# Patient Record
Sex: FEMALE | Race: WHITE | NOT HISPANIC OR LATINO | Employment: OTHER | URBAN - METROPOLITAN AREA
[De-identification: names, ages, dates, MRNs, and addresses within clinical notes are randomized per-mention and may not be internally consistent; named-entity substitution may affect disease eponyms.]

---

## 2017-02-20 ENCOUNTER — TRANSFERRED RECORDS (OUTPATIENT)
Dept: HEALTH INFORMATION MANAGEMENT | Facility: CLINIC | Age: 42
End: 2017-02-20

## 2017-03-03 ENCOUNTER — OFFICE VISIT (OUTPATIENT)
Dept: HEMATOLOGY | Facility: CLINIC | Age: 42
End: 2017-03-03
Attending: INTERNAL MEDICINE
Payer: COMMERCIAL

## 2017-03-03 VITALS
HEART RATE: 80 BPM | SYSTOLIC BLOOD PRESSURE: 122 MMHG | TEMPERATURE: 97.8 F | DIASTOLIC BLOOD PRESSURE: 85 MMHG | BODY MASS INDEX: 24.25 KG/M2 | WEIGHT: 160 LBS | HEIGHT: 68 IN

## 2017-03-03 DIAGNOSIS — M32.9 SYSTEMIC LUPUS ERYTHEMATOSUS (H): Primary | ICD-10-CM

## 2017-03-03 DIAGNOSIS — Z79.01 LONG TERM CURRENT USE OF ANTICOAGULANT THERAPY: ICD-10-CM

## 2017-03-03 PROCEDURE — 99204 OFFICE O/P NEW MOD 45 MIN: CPT | Performed by: INTERNAL MEDICINE

## 2017-03-03 PROCEDURE — 99213 OFFICE O/P EST LOW 20 MIN: CPT

## 2017-03-03 ASSESSMENT — PAIN SCALES - GENERAL: PAINLEVEL: MILD PAIN (3)

## 2017-03-03 NOTE — NURSING NOTE
Present during entire visit with provider.  Reviewed and discussed the patient instructions point by point and patient verbalized understanding. Copy of the After Visit Summary (AVS) given to patient for future reference. Patient given the contact card for the [facility] and has the appropriate numbers to call with any questions.    Guadalupe Newby RN - Nurse Clinician - [facility] - [phone]

## 2017-03-03 NOTE — PROGRESS NOTES
[facility]  [address] [place] [address], [facility] [address], [place] [address]  Phone: [phone], Fax: [phone].    Outpatient Visit Note:    Patient: Patricia Dougherty  MRN: [id number]  : 1975  LAUREEN: March 3, 2017    Reason for Consultation:  Anticoagulation recommendations for pregnancy    History of Present Illness:  Patricia Dougherty  is a 41 year old woman referred by Dr Bhandari for evaluation and recommendations regarding anticoagulation around the time of pregnancy. Patricia brings records with her today for my review from [facility] in [place].  She was seen at their high-risk immunologic disorders clinic for testing and counseling for fertility and pregnancy.  She was tested for mutation with the potential to lead to thrombotic complications during pregnancy leading to miscarriage and was recommended to consider anticoagulation.    She reports no history of recurrent miscarriage, no personal history of thrombosis.    Past Medical History:  Past Medical History   Diagnosis Date     Allergy, unspecified not elsewhere classified      Endometriosis      Fibromyalgia      Migraine without aura, with intractable migraine, so stated, without mention of status migrainosus      Myalgia and myositis, unspecified      Systemic lupus erythematosus (H)        Past Surgical History:  Past Surgical History   Procedure Laterality Date     Surgical history of -        left elbow fracture repair     Orthopedic surgery         Medications:  Current Outpatient Prescriptions   Medication Sig Dispense Refill     METFORMIN HCL PO Take 500 mg by mouth 2 times daily (with meals)       LEVOTHYROXINE SODIUM PO Take 0.25 mg by mouth daily       norethindrone-ethinyl estradiol (ORTHO-NOVUM 1-35 TAB,NORTREL 1-35 TAB) 1-35 MG-MCG per tablet Take 1 tablet by mouth daily       Acetaminophen (TYLENOL PO) Take  by mouth.         PREDNISONE PO Take 1 mg by mouth  daily        IBUPROFEN 800 MG OR TABS 1 tab po TID (Three times per day) with food 90 1     AMBIEN 10 MG OR TABS 1 po HS, prn 20 0     PREDNISONE 5 MG OR TABS as directed 20 0     PLAQUENIL 200 MG OR TABS 1 po BID, needs appt. before more refills 180 0     MULTIVITAMIN TABS   OR   0        Allergies:  Allergies   Allergen Reactions     Penicillins Rash     Sulfa Drugs Rash       ROS:  Denies any bleeding issues. No gum bleeding, No nose bleed. Denies any hematuria or blood in stools. Denies any ecchymosis. Denies any lower extremities swelling or pain. Denies any fever, no chest pain. Denies any shortness of breath.     Social History:  Denies any tobacco use. No significant alcohol use. Denies any illicit drug use.     Family History:  None of thrombosis or recurrent miscarriage    Objectives:  Pleasant 41 year old female in no acute distress.  Vitals: B/P: 122/85, T: 97.8, P: 80, R: Data Unavailable, Wt: 160 lbs 0 oz  Exam:   Gen: Appears well, no distress  Ext: no edema    Labs:  Reviewed outside labs    Imaging:  none    Assessment:  In summary, Patricia Dougherty is a 41 year old woman with SLE but no prior history of thrombosis, who is at high risk for miscarriage. She has no prior history of DVT, PE, or Antiphospholipid antibody syndrome.    I discussed the role of anticoagluation in pregnancy with her today being for patients with active thrombosis or a history of APS and thrombosis, we would treat with 1mg/kg BID of enoxaparin.  For patients with a history of APS and recurrent pregnancy loss, most recommend prophylactic dose LMWH after conception.  For those with a prior history of pregnancy or hormone-related thrombosis, we would also treat with prophylactic dose LMWH.    For women with idiopathic recurrent miscarriages, LMWH has not been demonstrated to improve the life birth rate.  However, this patient would not necessarily fit in that category since she has underlying SLE.    I explained that  her risk of bleeding is low and I can not estimate the added benefit for LMWH for her chances of pregnancy and live birth.  I also reviewed the results of her genetic testing for EARNEST-1, FXIII and HPA-1a and these do not significantly increase her risk for thrombosis or thrombosis-related pregnancy loss to our knowledge.    Plan:  1. As per [facility] recommendations, I think that a dose of 40mg of enoxaparin is reasonable and safe around the time of embryo banking and conception.  2. I would keep the same standard prophylaxis wong of LMWH during pregnancy rather than RFU recommendation of 40mg BID.  This is not a standard dosing of the drug (either 1mg/kg BID for treatment dosing or 40mg daily are the standard doses).  3. If she does become pregnant, she should return during the second trimester to discuss implications of anticoagulation for anesthesia and delivery.    The patient is given our center's contact information and is instructed to call if she should have any further questions or concerns.  Otherwise, we will plan on seeing her back as needed (see above).      Guadalupe Newby, nurse clinician is present throughout the entire clinic visit with the patient today.  Patient understands and agrees with the above plan and recommendation.    Total Time Spent:  I spent a total of 45 minutes face-to-face with Patricia Dougherty during today's office visit.  Over 50% of this time was spent counseling the patient and/or coordinating care regarding anticoagulation during pregnancy.    Guero Rodrigez MD   of Medicine  [facility] [employer]

## 2017-03-03 NOTE — PATIENT INSTRUCTIONS
We would be happy to work with any of your providers around your pursuit of fertility.  WE have worked with the maternal medicine team here at the [facility] quite a bit over the years.  See the following website:    [email or web address]    We agree with Lovenox 40 mg once daily when you are in the embryo transfer portion of your process.  We would not recommend increasing this to twice daily when you become pregnant.     Please feel free to call us or return to see us if you have questions or concerns.    Guadalupe Newby, RN - Nurse Clinician - [facility] - [phone]

## 2017-03-03 NOTE — MR AVS SNAPSHOT
After Visit Summary   3/3/2017    Patricia Dougherty    MRN: [id number]           Patient Information     Date Of Birth          1975        Visit Information        Provider Department      3/3/2017 3:30 PM Guero Rodrigez MD [facility]        Care Instructions    We would be happy to work with any of your providers around your pursuit of fertility.  WE have worked with the [facility] medicine team [facility] quite a bit over the years.  See the following website:    [email or web address]    We agree with Lovenox 40 mg once daily when you are in the embryo transfer portion of your process.  We would not recommend increasing this to twice daily when you become pregnant.     Please feel free to call us or return to see us if you have questions or concerns.    Guadalupe Newby RN - Nurse Clinician - [facility] - [phone]          Follow-ups after your visit        Who to contact     If you have questions or need follow up information about today's clinic visit or your schedule please contact [facility] directly at [phone].  Normal or non-critical lab and imaging results will be communicated to you by MyChart, letter or phone within 4 business days after the clinic has received the results. If you do not hear from us within 7 days, please contact the clinic through MyChart or phone. If you have a critical or abnormal lab result, we will notify you by phone as soon as possible.  Submit refill requests through [facility] or call your pharmacy and they will forward the refill request to us. Please allow 3 business days for your refill to be completed.          Additional Information About Your Visit        MyChart Information     [facility] lets you send messages to your doctor, view your test results, renew your prescriptions, schedule  "appointments and more. To sign up, go to [email or web address] . Click on \"Log in\" on the left side of the screen, which will take you to the Welcome page. Then click on \"Sign up Now\" on the right side of the page.     You will be asked to enter the access code listed below, as well as some personal information. Please follow the directions to create your username and password.     Your access code is: [email or web address]  Expires: 2017  6:31 AM     Your access code will  in 90 days. If you need help or a new code, please call your [facility] clinic or [phone].        Care EveryWhere ID     This is your Care EveryWhere ID. This could be used by other organizations to access your [facility] medical records  [id number]        Your Vitals Were     Pulse Temperature Height BMI (Body Mass Index)          80 97.8  F (36.6  C) (Oral) 1.727 m (5' 8\") 24.33 kg/m2         Blood Pressure from Last 3 Encounters:   17 122/85   10/05/12 111/79   06 112/76    Weight from Last 3 Encounters:   17 72.6 kg (160 lb)   06 70.3 kg (155 lb)   06 78.5 kg (173 lb)              Today, you had the following     No orders found for display         Today's Medication Changes          These changes are accurate as of: 3/3/17  4:34 PM.  If you have any questions, ask your nurse or doctor.               Stop taking these medicines if you haven't already. Please contact your care team if you have questions.     IMITREX 50 MG tablet   Generic drug:  SUMAtriptan   Stopped by:  Guero Rodrigez MD           [facility] DE[facility]OT IM   Stopped by:  Guero Rodrigez MD                    Primary Care Provider Office Phone # Fax #    Riaz Bhnadari [phone]       [facility] [address] [address]        Thank you!     Thank you for choosing  [facility]  for your care. Our goal is always to provide you with excellent care. Hearing back from our " patients is one way we can continue to improve our services. Please take a few minutes to complete the written survey that you may receive in the mail after your visit with us. Thank you!             Your Updated Medication List - Protect others around you: Learn how to safely use, store and throw away your medicines at www.disposemymeds.org.          This list is accurate as of: 3/3/17  4:34 PM.  Always use your most recent med list.                   Brand Name Dispense Instructions for use    AMBIEN 10 MG tablet   Generic drug:  zolpidem     20    1 po HS, prn       ibuprofen 800 MG tablet    ADVIL/MOTRIN    90    1 tab po TID (Three times per day) with food       LEVOTHYROXINE SODIUM PO      Take 0.25 mg by mouth daily       METFORMIN HCL PO      Take 500 mg by mouth 2 times daily (with meals)       MULTIVITAMIN TABS   OR          norethindrone-ethinyl estradiol 1-35 MG-MCG per tablet    ORTHO-NOVUM 1-35 TAB,NORTREL 1-35 TAB     Take 1 tablet by mouth daily       PLAQUENIL 200 MG tablet   Generic drug:  hydroxychloroquine     180    1 po BID, needs appt. before more refills       * predniSONE 5 MG tablet    DELTASONE    20    as directed       * PREDNISONE PO      Take 1 mg by mouth daily       TYLENOL PO      Take  by mouth.       * Notice:  This list has 2 medication(s) that are the same as other medications prescribed for you. Read the directions carefully, and ask your doctor or other care provider to review them with you.

## 2017-03-03 NOTE — NURSING NOTE
"Chief Complaint   Patient presents with     Consult     consult with clotting disorder, tzimmer cma       Initial /85  Pulse 80  Temp 97.8  F (36.6  C) (Oral)  Ht 1.727 m (5' 8\")  Wt 72.6 kg (160 lb)  BMI 24.33 kg/m2 Estimated body mass index is 24.33 kg/(m^2) as calculated from the following:    Height as of this encounter: 1.727 m (5' 8\").    Weight as of this encounter: 72.6 kg (160 lb).  Medication Reconciliation: complete    "

## 2017-05-09 ENCOUNTER — TRANSFERRED RECORDS (OUTPATIENT)
Dept: HEALTH INFORMATION MANAGEMENT | Facility: CLINIC | Age: 42
End: 2017-05-09

## 2017-10-03 ENCOUNTER — TELEPHONE (OUTPATIENT)
Dept: RHEUMATOLOGY | Facility: CLINIC | Age: 42
End: 2017-10-03

## 2017-10-03 NOTE — TELEPHONE ENCOUNTER
Pt is requesting to be seen as soon as possible.  Pt will be sending records to clinic via fax from Immun[facility], and [facility] so we are able to access records from current rheumatologist.    Mickie Solis RN  Rheumatology Clinic

## 2017-10-03 NOTE — TELEPHONE ENCOUNTER
Lupus Patient Intake Form    Referring provider/clinic: Self referral    Primary privider/clinic:  No PCP, uses urgent care    Have you been in the hospital because of Lupus? No.     Has your doctor ever told you that you have SLE (systemic lupus erythematosus)? Yes . If yes, when were you diagnosed? 1999.  What symptoms did you have? Bilateral joint pain, severe malar rash, flu like symptoms, fatigue, mouth ulcers . What are your flare symptoms? Same as above.    Who diagnosed you with lupus? Dr. Mao, [place], Dr. Peñaloza, [place].    Have you ever been told that you have fibromyalgia? Yes  If yes, when were you diagnosed? Does not remember. What symptoms did you have? Aches and pains, morning stiffness.    The following set of questions can be answered yes, no or I don't know. Do you currently have or have you had in the past any of the following symptoms?      Butterfly rash on the face Yes     Sun sensativity (e.g. easy burning, feeling sick in the sun?) Yes     Scarring rash Yes     Mouth or nose sores Yes     Joint pain or swelling Yes      Fluid in your lungs or around your heart (serositis) Yes, pleurisy    Blood disorder (e.g. anemia, low blood count) Yes      History of kidney problems No     Neurologic disorder (e.g. Seizures, stroke) No    Lupus blood tests showing that you have lupus Yes     Immune system disorder other than lupus No     High blood pressure No    Diabetes No    High cholesterol Yes     History of heart problems No     Any other disease that I haven't mentioned? Endometriosis    Hair loss Yes     Raynaud's (hands sensitive to cold) No    Skin rashes Yes     Dry eyes Yes      Use of artificial tears for dry eyes No    Dry mouth Yes      History of blood clots No    History of miscarriages Yes  If yes, at what week in pregnancy? 8 weeks    Chronic cough, shortness of breath or chest pain with breathing Yes     Depression or anxiety Yes     Thinking or memory problems Yes     Have you been on any  of these medications:      Prednisone Yes     Methotrexate No    Imuran (Azathioprine) No     Cellcept (mycophenolate) No    Hydroxychloroquine Yes     Cytoxan (Cyclophosphamide) No    Do you currently smoke or have you ever smoked in the past? Yes If yes, how many years/packs per week? 1 pack. How long have you smoked    Have you ever smoked in the past? Yes. If yes, how many years did you smoke? 4 years When did you quit? 20 years ago    How many alcoholic beverages do you drink per week? 0    Do you use any stimulant drugs or cold medicine? No    Is there a family history or diagnosis of an autoimmune disease? Yes  If yes, please list: Chronic Fatigue syndrome    Are there any other symptoms or concerns that I haven't mentioned that you would like to discuss with the doctor? Pregnancy, attempting to get pregnant.  Lupus medications and pregnancy.      Would you like us to contact you about taking part in furture research studies? Doesn't know     Mickie Solis RN  Rheumatology Clinic

## 2017-10-04 NOTE — TELEPHONE ENCOUNTER
Called and spoke with pt.  Pt will come in next Thursday at 9:00.  Pt aware that she will see a resident first and then will see Dr. Roy. Did explain that Dr. Roy will be her provider.  Pt aware that she will likely be here for several hours.    Mickie Solis RN  Rheumatology Clinic

## 2017-10-12 ENCOUNTER — OFFICE VISIT (OUTPATIENT)
Dept: RHEUMATOLOGY | Facility: CLINIC | Age: 42
End: 2017-10-12
Attending: INTERNAL MEDICINE
Payer: COMMERCIAL

## 2017-10-12 VITALS
HEART RATE: 90 BPM | BODY MASS INDEX: 26.67 KG/M2 | SYSTOLIC BLOOD PRESSURE: 113 MMHG | TEMPERATURE: 98.2 F | WEIGHT: 176 LBS | HEIGHT: 68 IN | DIASTOLIC BLOOD PRESSURE: 76 MMHG

## 2017-10-12 DIAGNOSIS — M32.9 SYSTEMIC LUPUS ERYTHEMATOSUS, UNSPECIFIED SLE TYPE, UNSPECIFIED ORGAN INVOLVEMENT STATUS (H): Primary | ICD-10-CM

## 2017-10-12 DIAGNOSIS — M32.9 SYSTEMIC LUPUS ERYTHEMATOSUS, UNSPECIFIED SLE TYPE, UNSPECIFIED ORGAN INVOLVEMENT STATUS (H): ICD-10-CM

## 2017-10-12 LAB
C3 SERPL-MCNC: 82 MG/DL (ref 76–169)
C4 SERPL-MCNC: 16 MG/DL (ref 15–50)

## 2017-10-12 PROCEDURE — 86160 COMPLEMENT ANTIGEN: CPT | Performed by: INTERNAL MEDICINE

## 2017-10-12 PROCEDURE — 81401 MOPATH PROCEDURE LEVEL 2: CPT | Performed by: INTERNAL MEDICINE

## 2017-10-12 PROCEDURE — 86225 DNA ANTIBODY NATIVE: CPT | Performed by: INTERNAL MEDICINE

## 2017-10-12 PROCEDURE — 99213 OFFICE O/P EST LOW 20 MIN: CPT | Mod: ZF

## 2017-10-12 PROCEDURE — 36415 COLL VENOUS BLD VENIPUNCTURE: CPT | Performed by: INTERNAL MEDICINE

## 2017-10-12 RX ORDER — HYDROXYCHLOROQUINE SULFATE 200 MG/1
TABLET, FILM COATED ORAL
Qty: 60 TABLET | Refills: 5 | Status: SHIPPED | OUTPATIENT
Start: 2017-10-12 | End: 2018-06-14

## 2017-10-12 RX ORDER — CALCIUM CARBONATE 500(1250)
1 TABLET ORAL DAILY
COMMUNITY

## 2017-10-12 RX ORDER — TRIAMCINOLONE ACETONIDE 0.1 %
PASTE (GRAM) DENTAL 2 TIMES DAILY
Qty: 5 G | Refills: 3 | Status: SHIPPED | OUTPATIENT
Start: 2017-10-12 | End: 2023-12-18

## 2017-10-12 ASSESSMENT — PAIN SCALES - GENERAL: PAINLEVEL: MODERATE PAIN (5)

## 2017-10-12 NOTE — MR AVS SNAPSHOT
After Visit Summary   10/12/2017    Patricia Hall    MRN: [id number]           Patient Information     Date Of Birth          1975        Visit Information        Provider Department      10/12/2017 9:00 AM Josh Roy MD [facility] Rheumatology        Today's Diagnoses     Systemic lupus erythematosus, unspecified SLE type, unspecified organ involvement status (H)    -  1      Care Instructions    Try plaquenil brand    Kenalog in orabase paste    Yearly eye exam    Labs today     If normal TPMT, start imuran 50 mg a day    Return as needed          Follow-ups after your visit        Additional Services     MAT FETAL MED CTR REFERRAL-PRECONCEPTION       >> Patient may proceed with recommendations for further testing as directed by the Maternal Fetal Medicine Specialist >>    Dear Patient:   Please be aware that coverage of these services is subject to the terms and limitations of your health insurance plan.  Call member services at your health plan with any benefit or coverage questions.      Please bring the following to your appointment:    >>  Any x-rays, CTs or MRIs which have been performed.  Contact the facility where they were done to arrange for  prior to your scheduled appointment.  Any new CT, MRI or other procedures ordered by your specialist must be performed at a [place] facility or coordinated by your clinic's referral office.  >>  List of current medications   >>  This referral request   >>  Any documents/labs given to you for this referral                    Your next 10 appointments already scheduled     Oct 12, 2017 11:30 AM CDT   LAB with [facility] ([facility])    [address] [place] [id number] [phone]           Patient must bring picture ID. Patient should be prepared to give a urine specimen  Please do not eat 10-12 hours before your appointment if you are coming in fasting for  "labs on lipids, cholesterol, or glucose (sugar). Pregnant women should follow their Care Team instructions. Water with medications is okay. Do not drink coffee or other fluids. If you have concerns about taking  your medications, please ask at office or if scheduling via VIDDIX, send a message by clicking on Secure Messaging, Message Your Care Team.              Future tests that were ordered for you today     Open Future Orders        Priority Expected Expires Ordered    Complement C3 Routine  10/12/2018 10/12/2017    Complement C4 Routine  10/12/2018 10/12/2017    DNA double stranded antibodies Routine  10/12/2018 10/12/2017    Complement C3 Routine  10/12/2018 10/12/2017    Complement C4 Routine  10/12/2018 10/12/2017    DNA double stranded antibodies Routine  10/12/2018 10/12/2017            Who to contact     If you have questions or need follow up information about today's clinic visit or your schedule please contact [facility] RHEUMATOLOGY directly at [phone].  Normal or non-critical lab and imaging results will be communicated to you by 500Shopshart, letter or phone within 4 business days after the clinic has received the results. If you do not hear from us within 7 days, please contact the clinic through VIDDIX or phone. If you have a critical or abnormal lab result, we will notify you by phone as soon as possible.  Submit refill requests through VIDDIX or call your pharmacy and they will forward the refill request to us. Please allow 3 business days for your refill to be completed.          Additional Information About Your Visit        VIDDIX Information     VIDDIX lets you send messages to your doctor, view your test results, renew your prescriptions, schedule appointments and more. To sign up, go to [email or web address] . Click on \"Log in\" on the left side of the screen, which will take you to the Welcome page. Then click on \"Sign up Now\" on the right side of the page.     You will be asked to " "enter the access code listed below, as well as some personal information. Please follow the directions to create your username and password.     Your access code is: [email or web address]  Expires: 2018  6:30 AM     Your access code will  in 90 days. If you need help or a new code, please call your [place] clinic or [phone].        Care EveryWhere ID     This is your Care EveryWhere ID. This could be used by other organizations to access your [place] medical records  [id number]        Your Vitals Were     Pulse Temperature Height BMI (Body Mass Index)          90 98.2  F (36.8  C) (Oral) 1.727 m (5' 8\") 26.76 kg/m2         Blood Pressure from Last 3 Encounters:   10/12/17 113/76   17 122/85   10/05/12 111/79    Weight from Last 3 Encounters:   10/12/17 79.8 kg (176 lb)   17 72.6 kg (160 lb)   06 70.3 kg (155 lb)              We Performed the Following     MAT FETAL MED CTR REFERRAL-PRECONCEPTION     Thiopurine Methyltransferase Genotyping          Today's Medication Changes          These changes are accurate as of: 10/12/17 11:13 AM.  If you have any questions, ask your nurse or doctor.               Start taking these medicines.        Dose/Directions    triamcinolone 0.1 % paste   Commonly known as:  KENALOG   Used for:  Systemic lupus erythematosus, unspecified SLE type, unspecified organ involvement status (H)   Started by:  Josh Roy MD        Take by mouth 2 times daily   Quantity:  5 g   Refills:  3         These medicines have changed or have updated prescriptions.        Dose/Directions    hydroxychloroquine 200 MG tablet   Commonly known as:  PLAQUENIL   This may have changed:  See the new instructions.   Used for:  Systemic lupus erythematosus, unspecified SLE type, unspecified organ involvement status (H)   Changed by:  Josh Roy MD        BRAND ONLY, she can not tolerate generic   Quantity:  60 tablet   Refills:  5         Stop taking these medicines if you " haven't already. Please contact your care team if you have questions.     ibuprofen 800 MG tablet   Commonly known as:  ADVIL/MOTRIN   Stopped by:  Josh Roy MD                Where to get your medicines      These medications were sent to [id number] - [place], [place] - [address] AT [facility] [address]  [address], [place] [address]    Hours:  24-hours Phone:  [phone]     hydroxychloroquine 200 MG tablet    triamcinolone 0.1 % paste                Primary Care Provider Office Phone # Fax #    Riaz Bhandari [phone]       [facility] [address] [facility] [address]        Equal Access to Services     SILVIANO JACKSON AH: Hadii sarah yip hadasho Soharitha, waaxda luqadaha, qaybta kaalmada adeegyada, sindy culver. So [facility] [phone].    ATENCIÓN: Si habla español, tiene a rosa disposición servicios gratuitos de asistencia lingüística. Ll[facility] [phone].    We comply with applicable federal civil rights laws and [place] laws. We do not discriminate on the basis of race, color, national origin, age, disability, sex, sexual orientation, or gender identity.            Thank you!     Thank you for choosing [facility] RHEUMATOLOGY  for your care. Our goal is always to provide you with excellent care. Hearing back from our patients is one way we can continue to improve our services. Please take a few minutes to complete the written survey that you may receive in the mail after your visit with us. Thank you!             Your Updated Medication List - Protect others around you: Learn how to safely use, store and throw away your medicines at www.disposemymeds.org.          This list is accurate as of: 10/12/17 11:13 AM.  Always use your most recent med list.                   Brand Name Dispense Instructions for use Diagnosis    RACHNA SYRUP PO           AMBIEN 10 MG tablet   Generic drug:  zolpidem      20    1 po HS, prn        calcium carbonate 1250 MG tablet    OS-VAHID 500 mg [place]. [place]     Take 1 tablet by mouth daily        hydroxychloroquine 200 MG tablet    PLAQUENIL    60 tablet    BRAND ONLY, she can not tolerate generic    Systemic lupus erythematosus, unspecified SLE type, unspecified organ involvement status (H)       LEVOTHYROXINE SODIUM PO      Take 0.25 mg by mouth daily        METANX PO      Take 1,000 mg by mouth daily        METFORMIN HCL PO      Take 500 mg by mouth 2 times daily (with meals)        MULTIVITAMIN TABS   OR           norethindrone-ethinyl estradiol 1-35 MG-MCG per tablet    ORTHO-NOVUM 1-35 TAB,NORTREL 1-35 TAB     Take 1 tablet by mouth daily        * predniSONE 5 MG tablet    DELTASONE    20    as directed    Systemic lupus erythematosus (H)       * PREDNISONE PO      Take 1 mg by mouth daily        SPIRULINA PO      Take by mouth daily        triamcinolone 0.1 % paste    KENALOG    5 g    Take by mouth 2 times daily    Systemic lupus erythematosus, unspecified SLE type, unspecified organ involvement status (H)       TYLENOL PO      Take  by mouth.        VITAMIN D (CHOLECALCIFEROL) PO      Take 5,000 Units by mouth daily        * Notice:  This list has 2 medication(s) that are the same as other medications prescribed for you. Read the directions carefully, and ask your doctor or other care provider to review them with you.

## 2017-10-12 NOTE — PATIENT INSTRUCTIONS
Try plaquenil brand    Kenalog in orabase paste    Yearly eye exam    Labs today     If normal TPMT, start imuran 50 mg a day    MFM referral    Return as needed

## 2017-10-12 NOTE — LETTER
Patient:  Patricia Hall  :   1975  MRN:     [id number]        Ms.Jennifer Marta Hall  [address] [facility] [address]        2017    Dear Ms.Getz Hall,    We are writing to inform you of your test results. These labs are normal (they usually become abnormal with lupus flare) which is good news.      Resulted Orders   Complement C3   Result Value Ref Range    Complement C3 82 76 - 169 mg/dL   Complement C4   Result Value Ref Range    Complement C4 16 15 - 50 mg/dL   DNA double stranded antibodies   Result Value Ref Range    DNA-ds 1 <10 IU/mL      Comment:      Negative       Josh Roy MD

## 2017-10-12 NOTE — PROGRESS NOTES
Rheumatology Consult Note    Reason for referral: SLE, 2nd opinion     Referring physician: Riaz Bhandari    CC: SLE with active flares    HPI:  Patricia Hall is a 42 year old female who was referred to our clinic for evaluation and management of her SLE. The patient has been following with Dr. Mao for her SLE    History obtain from chart review and patient interview    Her lupus symptoms first started in during high school including fatigue and rash. She was diagnosed with lupus in early 2000s and she was in graduate school and presented with a few years of arthralgias involving knees and ankles and hands.  She had developed new onset Raynaud's phenomenon in which her fingers turned white in the cold but no digital sores.  VINITA was 1:160.  All specific autoantibodies and rheumatoid serologies negative.  She had a rash on her face, including cheeks and bridge of her nose.  She followed with Dr. Tejal Mayen in Dermatology.  A biopsy of a lesion from the nose was non-diagnostic.  Diagnosis was earlysigns of cutaneous lupus versus acne rosacea.  She was treated with Elidel cream.  She reported intermittent oral ulcers and significant fatigue as well as intermittent episodes of hair loss.  She was started on prednisone in 2003 along with Plaquenil.  She has been maintained on prednisone 5 mg q day with intermittent bursts up as high as 30 mg for a short time.  She has found it very difficult to taper off of prednisone because she gets flare-ups of skin rash, arthralgias, and fatigue. Patient reports significant symptoms during the summers and reports significant flares this summer with photosensitivity with face rash,  Fatigue and join pain (knees and toes and hips).  She had fevers, mouth sores  And also reports increased hair loss.  Increased pain with walking and morning stiffness with Fibromyalgia burning in the morning. Currently taking prednisone 10 mg qd and plaquenil 200 mg  BID.    Patient reports complicated fertility/OBGYN history with stage four endometriosis, fibroids and one pregnancy resulting in a miscarriage. Three rounds of IVF with one banked viable egg. She is currently undergoing fertility evaluation.  She has been working with infertility specialist for years. She currently has one viable egg and would like to undergo one more episode of IVF. She was seen by an autoimmune OB/GYN specialist in [place] (Daya Frost 02/17) for extensive testing. She was found to be heterozygous for MTHFR mutation. She is now taking Matanx (L-methyl folate). She has noticed less bruising since she started this. Metformin was recommended, but it upsets her stomach. DHEA was recommended, but she is not taking it. Her thyroid studies were normal, but she was started on Synthroid 25  g daily for the TSH to be less than 2.0. It was recommended that she take Lovenox prior to IFV and throughout the pregnancy to avoid risk of miscarriage. It was also recommended that she be treated with IVIG prior to IVF. 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  She plans to proceeded further with in vitro plans, but wants to get better control of her flaring lupus and concerns for IVF treatments/hormones.     Past Medical History:   Diagnosis Date     Allergy, unspecified not elsewhere classified      Endometriosis      Fibromyalgia      Migraine without aura, with intractable migraine, so stated, without mention of status migrainosus      Myalgia and myositis, unspecified      Systemic lupus erythematosus (H)      Past Surgical History:   Procedure Laterality Date     ORTHOPEDIC SURGERY       SURGICAL HISTORY OF -   1986    left elbow fracture repair     Family History   Problem Relation Age of Onset     DIABETES Maternal Grandfather      Lipids Mother      Social History     Social History     Marital status:      Spouse name:  N/A     Number of children: N/A     Years of education: N/A     Occupational History     Not on file.     Social History Main Topics     Smoking status: Never Smoker     Smokeless tobacco: Never Used     Alcohol use Yes      Comment: occ.- 2/week     Drug use: No     Sexual activity: Yes     Partners: Male     Birth control/ protection: Condom     Other Topics Concern     Not on file     Social History Narrative     Patient Active Problem List   Diagnosis     Insomnia     Systemic lupus erythematosus (H)     Refractory migraine without aura     Allergies   Allergen Reactions     Penicillins Rash     Sulfa Drugs Rash       Outpatient Encounter Prescriptions as of 10/12/2017   Medication Sig Dispense Refill     L-Methylfolate-Algae-B12-B6 (METANX PO) Take 1,000 mg by mouth daily       RACHNA SYRUP PO        VITAMIN D, CHOLECALCIFEROL, PO Take 5,000 Units by mouth daily       calcium carbonate (OS-VAHID 500 MG [place]. [place]) 1250 MG tablet Take 1 tablet by mouth daily       SPIRULINA PO Take by mouth daily       METFORMIN HCL PO Take 500 mg by mouth 2 times daily (with meals)       LEVOTHYROXINE SODIUM PO Take 0.25 mg by mouth daily       norethindrone-ethinyl estradiol (ORTHO-NOVUM 1-35 TAB,NORTREL 1-35 TAB) 1-35 MG-MCG per tablet Take 1 tablet by mouth daily       Acetaminophen (TYLENOL PO) Take  by mouth.         PREDNISONE PO Take 1 mg by mouth daily        AMBIEN 10 MG OR TABS 1 po HS, prn 20 0     PREDNISONE 5 MG OR TABS as directed 20 0     PLAQUENIL 200 MG OR TABS 1 po BID, needs appt. before more refills 180 0     MULTIVITAMIN TABS   OR   0     [DISCONTINUED] IBUPROFEN 800 MG OR TABS 1 tab po TID (Three times per day) with food 90 1     No facility-administered encounter medications on file as of 10/12/2017.      ROS:  A comprehensive ROS was done, positives are per HPI.    Her records were reviewed.    Results for orders placed or performed in visit on 09/20/08   EKG 12 LEAD   Result Value Ref Range    Ventricular  "Rate 90 BPM    Atrial Rate 90 BPM    FL Interval 132 ms    QRS Duration 86 ms     ms    QTc 437 ms    P Axis 64 degrees    R AXIS 61 degrees    T Axis 42 degrees    Interpretation ECG       Sinus rhythm  Normal ECG  Unconfirmed report - interpretation of this ECG is computer generated - see   medical record for final interpretation     Pregnancy: She is . H/o OCP and HRT use, see HPI    Ph.E:    /76  Pulse 90  Temp 98.2  F (36.8  C) (Oral)  Ht 1.727 m (5' 8\")  Wt 79.8 kg (176 lb)  BMI 26.76 kg/m2    Constitutional: WD/WN. Pleasant, NAD  Eyes: EOM intact, PERRLA, sclera anicteric, conj not injected  HEENT: No oral ulcers or thrush. Normal salivary pool, hair thinning.   Neck: No cervical LAP or thyromegaly  Chest: Clear to auscultation bilaterally  CV: RRR, no murmurs/ rubs or gallops. No edema, clubbing or cyanosis.   GI: Abdomen is soft and non tender. No HSP.  MS: No synovitis. Cool joints. No tenderness of the joints. No  joint deformities. Full ROM of the joints. No nodules. No Jaccoud's deformity.  Skin: malar rash Erythema around eyes and cheeks. No livedo, periungual erythema, alopecia, digital ulcers or nail changes.  Neuro: A&O x 3. Grossly non focal, NL DTR, sensation intact, muscular power 5/5 in all ext  Psych: reduced affect and diminished mood    Assessment/ plan:  1-Systemic lupus erythematosus. For further work up, including Complement C3 and C4, DNA double stranded DNA, Thiopurine Methyltransferase Genotyping. Will contact the patient with test results and plan of care.  Recommend patient start Imuran if NL TPMT and attempt to taper down to 5 mg of prednisone with better control of the patient's Lupus. Imuran risks were discussed. Needs lab monitoring 4 wk after start of imuran and q3mo afterwards. Imuran could be used in pregnancy.  - Triamcinolone (KENALOG) 0.1 % paste for oral ulcers; risks were discussed  - Hydroxychloroquine (PLAQUENIL) 200 MG tablet; BRAND ONLY, can not " tolerate generic, was reminded to have eye exam  - Plan to start Imuran (azathioprine) 50 mg daily once TPMT testing returns normal  - MAT FETAL MED CTR REFERRAL  - Yearly eye exam for HCQ    2-Education. Advised her to avoid triggers for lupus: Sun, Bactrim, echinacea, mateus mateus sprouts, melatonin, rozerem and garlic.    PLAN: Follow up as needed, could do f/u with primary rheumatologist    MEDICATION CHANGES: Pending initiation of Imuran    Note was completed with Dr. Craig, acting as a scribe    Ambrocio Craig MD, [facility]  Internal Medicine PGY-2  [facility]  Pager: [phone]      Attending Note: I saw and evaluated the patient with Dr. Craig. I agree with the assessment and plan. Agree with trial of AZA for better control of lupus to allow tapering prednisoe to 5 mg qd. Patients with stable lupus at the time of pregnancy have better lupus outcome. Despite active lupus, not severe with major organ involvement and ok to pursue IVF with close monitoring of lupus. Recommend HCQ and AZA (if tolerates and helps) to be continued during pregnancy.     Josh Roy MD      Orders Placed This Encounter   Procedures     Complement C3     Complement C4     DNA double stranded antibodies     Complement C3     Complement C4     DNA double stranded antibodies     Thiopurine Methyltransferase Genotyping     MAT FETAL MED CTR REFERRAL-PRECONCEPTION

## 2017-10-12 NOTE — LETTER
10/12/2017       RE: Patricia Hall  [address]  [facility] [address]     Dear Colleague,    Thank you for referring your patient, Patricia Hall, to the [facility] RHEUMATOLOGY at [facility]. Please see a copy of my visit note below.    Rheumatology Consult Note    Reason for referral: SLE, 2nd opinion     Referring physician: Riaz Bhandari    CC: SLE with active flares    HPI:  Patricia Hall is a 42 year old female who was referred to our clinic for evaluation and management of her SLE. The patient has been following with Dr. Mao for her SLE    History obtain from chart review and patient interview    Her lupus symptoms first started in during high school including fatigue and rash. She was diagnosed with lupus in early 2000s and she was in graduate school and presented with a few years of arthralgias involving knees and ankles and hands.  She had developed new onset Raynaud's phenomenon in which her fingers turned white in the cold but no digital sores.  VINITA was 1:160.  All specific autoantibodies and rheumatoid serologies negative.  She had a rash on her face, including cheeks and bridge of her nose.  She followed with Dr. Tejal Mayen in Dermatology.  A biopsy of a lesion from the nose was non-diagnostic.  Diagnosis was earlysigns of cutaneous lupus versus acne rosacea.  She was treated with Elidel cream.  She reported intermittent oral ulcers and significant fatigue as well as intermittent episodes of hair loss.  She was started on prednisone in 2003 along with Plaquenil.  She has been maintained on prednisone 5 mg q day with intermittent bursts up as high as 30 mg for a short time.  She has found it very difficult to taper off of prednisone because she gets flare-ups of skin rash, arthralgias, and fatigue. Patient reports significant symptoms during the summers and reports significant flares this summer with  photosensitivity with face rash,  Fatigue and join pain (knees and toes and hips).  She had fevers, mouth sores  And also reports increased hair loss.  Increased pain with walking and morning stiffness with Fibromyalgia burning in the morning. Currently taking prednisone 10 mg qd and plaquenil 200 mg BID.    Patient reports complicated fertility/OBGYN history with stage four endometriosis, fibroids and one pregnancy resulting in a miscarriage. Three rounds of IVF with one banked viable egg. She is currently undergoing fertility evaluation.  She has been working with infertility specialist for years. She currently has one viable egg and would like to undergo one more episode of IVF. She was seen by an autoimmune OB/GYN specialist in [place] (Daya Frost 02/17) for extensive testing. She was found to be heterozygous for MTHFR mutation. She is now taking Matanx (L-methyl folate). She has noticed less bruising since she started this. Metformin was recommended, but it upsets her stomach. DHEA was recommended, but she is not taking it. Her thyroid studies were normal, but she was started on Synthroid 25  g daily for the TSH to be less than 2.0. It was recommended that she take Lovenox prior to IFV and throughout the pregnancy to avoid risk of miscarriage. It was also recommended that she be treated with IVIG prior to IVF. 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  She plans to proceeded further with in vitro plans, but wants to get better control of her flaring lupus and concerns for IVF treatments/hormones.     Past Medical History:   Diagnosis Date     Allergy, unspecified not elsewhere classified      Endometriosis      Fibromyalgia      Migraine without aura, with intractable migraine, so stated, without mention of status migrainosus      Myalgia and myositis, unspecified      Systemic lupus erythematosus (H)      Past Surgical History:    Procedure Laterality Date     ORTHOPEDIC SURGERY       SURGICAL HISTORY OF -   1986    left elbow fracture repair     Family History   Problem Relation Age of Onset     DIABETES Maternal Grandfather      Lipids Mother      Social History     Social History     Marital status:      Spouse name: N/A     Number of children: N/A     Years of education: N/A     Occupational History     Not on file.     Social History Main Topics     Smoking status: Never Smoker     Smokeless tobacco: Never Used     Alcohol use Yes      Comment: occ.- 2/week     Drug use: No     Sexual activity: Yes     Partners: Male     Birth control/ protection: Condom     Other Topics Concern     Not on file     Social History Narrative     Patient Active Problem List   Diagnosis     Insomnia     Systemic lupus erythematosus (H)     Refractory migraine without aura     Allergies   Allergen Reactions     Penicillins Rash     Sulfa Drugs Rash       Outpatient Encounter Prescriptions as of 10/12/2017   Medication Sig Dispense Refill     L-Methylfolate-Algae-B12-B6 (METANX PO) Take 1,000 mg by mouth daily       RACHNA SYRUP PO        VITAMIN D, CHOLECALCIFEROL, PO Take 5,000 Units by mouth daily       calcium carbonate (OS-VAHID 500 MG [place]. [place]) 1250 MG tablet Take 1 tablet by mouth daily       SPIRULINA PO Take by mouth daily       METFORMIN HCL PO Take 500 mg by mouth 2 times daily (with meals)       LEVOTHYROXINE SODIUM PO Take 0.25 mg by mouth daily       norethindrone-ethinyl estradiol (ORTHO-NOVUM 1-35 TAB,NORTREL 1-35 TAB) 1-35 MG-MCG per tablet Take 1 tablet by mouth daily       Acetaminophen (TYLENOL PO) Take  by mouth.         PREDNISONE PO Take 1 mg by mouth daily        AMBIEN 10 MG OR TABS 1 po HS, prn 20 0     PREDNISONE 5 MG OR TABS as directed 20 0     PLAQUENIL 200 MG OR TABS 1 po BID, needs appt. before more refills 180 0     MULTIVITAMIN TABS   OR   0     [DISCONTINUED] IBUPROFEN 800 MG OR TABS 1 tab po TID (Three times per day)  "with food 90 1     No facility-administered encounter medications on file as of 10/12/2017.      ROS:  A comprehensive ROS was done, positives are per HPI.    Her records were reviewed.    Results for orders placed or performed in visit on 08   EKG 12 LEAD   Result Value Ref Range    Ventricular Rate 90 BPM    Atrial Rate 90 BPM    FL Interval 132 ms    QRS Duration 86 ms     ms    QTc 437 ms    P Axis 64 degrees    R AXIS 61 degrees    T Axis 42 degrees    Interpretation ECG       Sinus rhythm  Normal ECG  Unconfirmed report - interpretation of this ECG is computer generated - see   medical record for final interpretation     Pregnancy: She is . H/o OCP and HRT use, see HPI    Ph.E:    /76  Pulse 90  Temp 98.2  F (36.8  C) (Oral)  Ht 1.727 m (5' 8\")  Wt 79.8 kg (176 lb)  BMI 26.76 kg/m2    Constitutional: WD/WN. Pleasant, NAD  Eyes: EOM intact, PERRLA, sclera anicteric, conj not injected  HEENT: No oral ulcers or thrush. Normal salivary pool, hair thinning.   Neck: No cervical LAP or thyromegaly  Chest: Clear to auscultation bilaterally  CV: RRR, no murmurs/ rubs or gallops. No edema, clubbing or cyanosis.   GI: Abdomen is soft and non tender. No HSP.  MS: No synovitis. Cool joints. No tenderness of the joints. No  joint deformities. Full ROM of the joints. No nodules. No Jaccoud's deformity.  Skin: malar rash Erythema around eyes and cheeks. No livedo, periungual erythema, alopecia, digital ulcers or nail changes.  Neuro: A&O x 3. Grossly non focal, NL DTR, sensation intact, muscular power 5/5 in all ext  Psych: reduced affect and diminished mood    Assessment/ plan:  1-Systemic lupus erythematosus. For further work up, including Complement C3 and C4, DNA double stranded DNA, Thiopurine Methyltransferase Genotyping. Will contact the patient with test results and plan of care.  Recommend patient start Imuran if NL TPMT and attempt to taper down to 5 mg of prednisone with better control of " abdominal pain the patient's Lupus. Imuran risks were discussed. Needs lab monitoring 4 wk after start of imuran and q3mo afterwards. Imuran could be used in pregnancy.  - Triamcinolone (KENALOG) 0.1 % paste for oral ulcers; risks were discussed  - Hydroxychloroquine (PLAQUENIL) 200 MG tablet; BRAND ONLY, can not tolerate generic, was reminded to have eye exam  - Plan to start Imuran (azathioprine) 50 mg daily once TPMT testing returns normal  - [facility] REFERRAL  - Yearly eye exam for HCQ    2-Education. Advised her to avoid triggers for lupus: Sun, Bactrim, echinacea, mateus mateus sprouts, melatonin, rozerem and garlic.    PLAN: Follow up as needed, could do f/u with primary rheumatologist    MEDICATION CHANGES: Pending initiation of Imuran    Note was completed with Dr. Craig, acting as a scribe    Ambrocio Craig MD, A  Internal Medicine PGY-2  [facility]  Pager: [phone]      Attending Note: I saw and evaluated the patient with Dr. Craig. I agree with the assessment and plan. Agree with trial of AZA for better control of lupus to allow tapering prednisoe to 5 mg qd. Patients with stable lupus at the time of pregnancy have better lupus outcome. Despite active lupus, not severe with major organ involvement and ok to pursue IVF with close monitoring of lupus. Recommend HCQ and AZA (if tolerates and helps) to be continued during pregnancy.     Josh Roy MD      Orders Placed This Encounter   Procedures     Complement C3     Complement C4     DNA double stranded antibodies     Complement C3     Complement C4     DNA double stranded antibodies     Thiopurine Methyltransferase Genotyping     [facility]ETAL [facility] REFERRAL-PRECONCEPTION          Normal TPMT, you could go ahead and start Imuran 50 mg a day. Make sure to do imuran monitoring labs 4 weeks after start of imuran.

## 2017-10-12 NOTE — NURSING NOTE
"Chief Complaint   Patient presents with     Consult     consult with lupus, tzimmer cma       Initial /76  Pulse 90  Temp 98.2  F (36.8  C) (Oral)  Ht 1.727 m (5' 8\")  Wt 79.8 kg (176 lb)  BMI 26.76 kg/m2 Estimated body mass index is 26.76 kg/(m^2) as calculated from the following:    Height as of this encounter: 1.727 m (5' 8\").    Weight as of this encounter: 79.8 kg (176 lb).  Medication Reconciliation: complete    "

## 2017-10-13 LAB — DSDNA AB SER-ACNC: 1 IU/ML

## 2017-10-17 LAB
SPECIMEN SOURCE: NORMAL
TPMT GENE MUT ANL BLD/T: NORMAL
TPMT GENE MUT ANL BLD/T: NORMAL

## 2017-10-18 NOTE — PROGRESS NOTES
Normal TPMT, you could go ahead and start Imuran 50 mg a day. Make sure to do imuran monitoring labs 4 weeks after start of imuran.

## 2017-10-21 ENCOUNTER — HEALTH MAINTENANCE LETTER (OUTPATIENT)
Age: 42
End: 2017-10-21

## 2017-11-15 ENCOUNTER — PRE VISIT (OUTPATIENT)
Dept: MATERNAL FETAL MEDICINE | Facility: CLINIC | Age: 42
End: 2017-11-15

## 2017-11-16 ENCOUNTER — OFFICE VISIT (OUTPATIENT)
Dept: MATERNAL FETAL MEDICINE | Facility: CLINIC | Age: 42
End: 2017-11-16
Attending: INTERNAL MEDICINE
Payer: COMMERCIAL

## 2017-11-16 DIAGNOSIS — Z31.69 ENCOUNTER FOR PRECONCEPTION CONSULTATION: ICD-10-CM

## 2017-11-16 DIAGNOSIS — M32.8 OTHER FORMS OF SYSTEMIC LUPUS ERYTHEMATOSUS, UNSPECIFIED ORGAN INVOLVEMENT STATUS (H): Primary | ICD-10-CM

## 2017-11-16 DIAGNOSIS — N97.9 FEMALE INFERTILITY: ICD-10-CM

## 2017-11-16 PROCEDURE — 40000809 ZZH STATISTIC NO DOCUMENTATION TO SUPPORT CHARGE

## 2017-11-16 PROCEDURE — 96040 ZZH GENETIC COUNSELING, EACH 30 MINUTES: CPT | Mod: ZF | Performed by: GENETIC COUNSELOR, MS

## 2017-11-16 NOTE — PROGRESS NOTES
"[facility]  Genetic Counseling Consult    Patient: Patricia Hall YOB: 1975   Date of Service: 17      Patricia Hall was seen at [facility] for genetic consultation to discuss the options for screening and testing for fetal chromosome abnormalities.  The indication for genetic counseling is advanced maternal age.        Impression/Plan:   Patricia had a genetic counseling consult and Maternal Fetal Medicine consult only.  Please see [facility] consul for additional information.    Pregnancy History:   /Parity:    Age: 42 year old    Pregnancy history:  o 2011: 8 week, SAB. POC karyotype 45, X.    Medical History:   Patricia was diagnosed with Lupus at age 24. She is currently taking Prednisone and Plaquenil. Her rheumatologist wants to also start Imuran. I reviewed with Patricia that Prednisone and Plaquenil have not been associated with an increased risk for congenital anomalies. Studies regarding Imuran use in pregnancy also do not indicated an increased risk for a consistent pattern of congenital anomalies. We discussed that even if certain medications have been associated with adverse fetal outcome, the benefits may still outweigh the risks and this can only be determined by her health care providers.     In  Patricia was diagnosed with endometriosis and uterine fibroids. Patricia had laparoscopy through [facility] in  and had uterine fibroid removed. Following these procedures she had three egg retrievals and a total of 27 eggs were retrieved. Patricia reported that embryos were screened for aneuploidy and she has one frozen embryo.    In 2017, Patricia was evaluated by Dr Daya Frost infertility specialists in [place] who recommended that she take Levothyroxine (0.025 mg) to \"reduce thyroid levels\" and Metformin for mild PCOS. As part of " her work-up in [place], Patricia was also found to be heterozygous for a MTHFR mutation, EARNEST-1 mutation and Factor 13. Patricia has no person or family history of thrombosis. She was evaluated by hematology here six months ago and started Lovenox.    Patricia was scheduled for a Maternal Fetal Medicine consult today. Please see [facility] consult for additional information.       Family History:   A three-generation pedigree was obtained, and is scanned under the  Media  tab. The reported family history is negative for multiple miscarriages, stillbirths, birth defects, mental retardation, known genetic conditions, and consanguinity.       Carrier Screening:   The patient reports that she and the father of the pregnancy have  ancestry:     Cystic fibrosis is an autosomal recessive genetic condition that occurs with increased frequency in individuals of  ancestry and carrier screening for this condition is available.  In addition,  screening in the [facility] includes cystic fibrosis.    Expanded carrier screening for mutations in a large panel of genes associated with autosomal recessive conditions including cystic fibrosis, spinal muscular atrophy, and others, is now available.    The patient has declined the carrier screening options reviewed today.       Risk Assessment for Chromosome Conditions:   We explained that the risk for fetal chromosome abnormalities increases with maternal age. We discussed specific features of common chromosome abnormalities, including Down syndrome, trisomy 13, trisomy 18, and sex chromosome trisomies.      At age 42 at delivery, the risk to have a baby with Down syndrome is 1 in 50.     At age 42 at delivery, the risk to have a baby with any chromosome abnormality is 1 in 32.     Patricia has one frozen embryo with a normal preimplantation genetic screening (PGS) result. We discussed that a normal PGS result would significantly reduce the risk for aneuploidy in  pregnancy.       Testing Options for future pregnancy:   We discussed the following options:   First trimester screening    First trimester ultrasound with nuchal translucency and nasal bone assessments, maternal plasma hCG, JO-A, and AFP measurement    Screens for fetal trisomy 21, trisomy 13, and trisomy 18    Cannot screen for open neural tube defects; maternal serum AFP after 15 weeks is recommended     Non-invasive Prenatal Testing (NIPT)    Maternal plasma cell-free DNA testing; first trimester ultrasound with nuchal translucency and nasal bone assessment is recommended, when appropriate    Screens for fetal trisomy 21, trisomy 13, trisomy 18, and sex chromosome aneuploidy    Cannot screen for open neural tube defects; maternal serum AFP after 15 weeks is recommended     Chorionic villus sampling (CVS)    Invasive procedure typically performed in the first trimester by which placental villi are obtained for the purpose of chromosome analysis and/or other prenatal genetic analysis    Diagnostic results; >99% sensitivity for fetal chromosome abnormalities    Cannot test for open neural tube defects; maternal serum AFP after 15 weeks is recommended     Genetic Amniocentesis    Invasive procedure typically performed in the second trimester by which amniotic fluid is obtained for the purpose of chromosome analysis and/or other prenatal genetic analysis    Diagnostic results; >99% sensitivity for fetal chromosome abnormalities    AFAFP measurement tests for open neural tube defects     Comprehensive (Level II) ultrasound: Detailed ultrasound performed between 18-22 weeks gestation to screen for major birth defects and markers for aneuploidy.    We reviewed the benefits and limitations of this testing.  Screening tests provide a risk assessment specific to the pregnancy for certain fetal chromosome abnormalities, but cannot definitively diagnose or exclude a fetal chromosome abnormality.  Follow-up genetic  counseling and consideration of diagnostic testing is recommended with any abnormal screening result. Diagnostic tests carry inherent risks- including risk of miscarriage- that require careful consideration.  These tests can detect fetal chromosome abnormalities with greater than 99% certainty.  Results can be compromised by maternal cell contamination or mosaicism, and are limited by the resolution of cytogenetic G-banding technology.  There is no screening nor diagnostic test that can detect all forms of birth defects or mental disability.     It was a pleasure to be involved with Patricia whittaker care. Face-to-face time of the meeting was 60 minutes.    Zoë Solorio MS, [facility]  Maternal Fetal Medicine  [facility]  Phone:[phone]  Email: [email or web address]

## 2017-11-16 NOTE — NURSING NOTE
Patricia here for preconception GC/MFM consult d/t Hx lupus, AMA. Patricia is currently preparing for IVF. She confirms 1 previous pregnancy in 2011 which resulted in miscarriage. GC met with Patricia- see note. Dr Steven and Dr Atkinson met with patient to offer MFM recommendations. Patient was discharged stable and ambulatory. PCC contact information was given for her to email or fax us her past laparoscopy reports from Dr Levy's office. CHANI LANIER RN

## 2017-11-16 NOTE — MR AVS SNAPSHOT
After Visit Summary   11/16/2017    Patricia Hall    MRN: [id number]           Patient Information     Date Of Birth          1975        Visit Information        Provider Department      11/16/2017 12:45 PM Zoë Solorio GC [facility]        Today's Diagnoses     Encounter for preconception consultation           Follow-ups after your visit        Who to contact     If you have questions or need follow up information about today's clinic visit or your schedule please contact [facility] directly at [phone].  Normal or non-critical lab and imaging results will be communicated to you by [employer], letter or phone within 4 business days after the clinic has received the results. If you do not hear from us within 7 days, please contact the clinic through [employer] or phone. If you have a critical or abnormal lab result, we will notify you by phone as soon as possible.  Submit refill requests through [employer] or call your pharmacy and they will forward the refill request to us. Please allow 3 business days for your refill to be completed.          Additional Information About Your Visit        MyChart Information     [employer] gives you secure access to your electronic health record. If you see a primary care provider, you can also send messages to your care team and make appointments. If you have questions, please call your primary care clinic.  If you do not have a primary care provider, please call [phone] and they will assist you.        Care EveryWhere ID     This is your Care EveryWhere ID. This could be used by other organizations to access your [place] medical records  [id number]         Blood Pressure from Last 3 Encounters:   10/12/17 113/76   03/03/17 122/85   10/05/12 111/79    Weight from Last 3 Encounters:   10/12/17 79.8 kg (176 lb)   03/03/17 72.6 kg (160 lb)   05/27/06 70.3 kg (155 lb)               We Performed the Following     [facility] Genetic Counseling        Primary Care Provider Office Phone # Fax #    Riaz Bhandari [phone]       [facility] [address] [address]        Equal Access to Services     CROWTIAN RENETTA : Haddavidson sarah yip kellyo Soannieali, waaxda luqadaha, qaybta kaalmada adeegyada, sindy danain hayaaisha lukeyaron villagranla nena culver. So [facility] [phone].    ATENCIÓN: Si habla español, tiene a rosa disposición servicios gratuitos de asistencia lingüística. Llame al [phone].    We comply with applicable federal civil rights laws and [place] laws. We do not discriminate on the basis of race, color, national origin, age, disability, sex, sexual orientation, or gender identity.            Thank you!     Thank you for choosing [facility]  for your care. Our goal is always to provide you with excellent care. Hearing back from our patients is one way we can continue to improve our services. Please take a few minutes to complete the written survey that you may receive in the mail after your visit with us. Thank you!             Your Updated Medication List - Protect others around you: Learn how to safely use, store and throw away your medicines at www.disposemymeds.org.          This list is accurate as of: 11/16/17  4:35 PM.  Always use your most recent med list.                   Brand Name Dispense Instructions for use Diagnosis    RACHNA SYRUP PO           AMBIEN 10 MG tablet   Generic drug:  zolpidem     20    1 po HS, prn        calcium carbonate 1250 MG tablet    OS-VAHID 500 mg [place]. [place]     Take 1 tablet by mouth daily        hydroxychloroquine 200 MG tablet    PLAQUENIL    60 tablet    BRAND ONLY, she can not tolerate generic    Systemic lupus erythematosus, unspecified SLE type, unspecified organ involvement status (H)       LEVOTHYROXINE SODIUM PO      Take 0.25 mg by mouth daily        METANX PO      Take 1,000  mg by mouth daily        METFORMIN HCL PO      Take 500 mg by mouth 2 times daily (with meals)        MULTIVITAMIN TABS   OR           norethindrone-ethinyl estradiol 1-35 MG-MCG per tablet    ORTHO-NOVUM 1-35 TAB,NORTREL 1-35 TAB     Take 1 tablet by mouth daily        * predniSONE 5 MG tablet    DELTASONE    20    as directed    Systemic lupus erythematosus (H)       * PREDNISONE PO      Take 1 mg by mouth daily        SPIRULINA PO      Take by mouth daily        triamcinolone 0.1 % paste    KENALOG    5 g    Take by mouth 2 times daily    Systemic lupus erythematosus, unspecified SLE type, unspecified organ involvement status (H)       TYLENOL PO      Take  by mouth.        VITAMIN D (CHOLECALCIFEROL) PO      Take 5,000 Units by mouth daily        * Notice:  This list has 2 medication(s) that are the same as other medications prescribed for you. Read the directions carefully, and ask your doctor or other care provider to review them with you.

## 2017-11-16 NOTE — MR AVS SNAPSHOT
After Visit Summary   11/16/2017    Patricia Hall    MRN: [id number]           Patient Information     Date Of Birth          1975        Visit Information        Provider Department      11/16/2017 1:30 PM Ellie Atkinson MD [facility] Maternal Fetal Medicine [facility]        Today's Diagnoses     Other forms of systemic lupus erythematosus, unspecified organ involvement status (H)    -  1    Female infertility        Encounter for preconception consultation           Follow-ups after your visit        Who to contact     If you have questions or need follow up information about today's clinic visit or your schedule please contact [facility] MATERNAL FETAL MEDICINE [facility] directly at [phone].  Normal or non-critical lab and imaging results will be communicated to you by MyChart, letter or phone within 4 business days after the clinic has received the results. If you do not hear from us within 7 days, please contact the clinic through [employer] or phone. If you have a critical or abnormal lab result, we will notify you by phone as soon as possible.  Submit refill requests through [employer] or call your pharmacy and they will forward the refill request to us. Please allow 3 business days for your refill to be completed.          Additional Information About Your Visit        MyChart Information     [employer] gives you secure access to your electronic health record. If you see a primary care provider, you can also send messages to your care team and make appointments. If you have questions, please call your primary care clinic.  If you do not have a primary care provider, please call [phone] and they will assist you.        Care EveryWhere ID     This is your Care EveryWhere ID. This could be used by other organizations to access your [place] medical records  [id number]         Blood Pressure from Last 3 Encounters:   10/12/17 113/76   03/03/17 122/85   10/05/12 111/79     Weight from Last 3 Encounters:   10/12/17 79.8 kg (176 lb)   03/03/17 72.6 kg (160 lb)   05/27/06 70.3 kg (155 lb)              We Performed the Following     MFM Office Visit        Primary Care Provider Office Phone # Fax #    Riaz CAMILO MD [place] [phone]       [facility] [address] [address]        Equal Access to Services     SILVIANO JACKSON : Hadii aad ku hadasho Soomaali, waaxda luqadaha, qaybta kaalmada adeegyada, waxay idiin hayaan adeeg jarredchelseyla nena lalucas culver. So [facility] [phone].    ATENCIÓN: Si chelsey bond, tiene a rosa disposición servicios gratuitos de asistencia lingüística. Llame al [phone].    We comply with applicable federal civil rights laws and [place] laws. We do not discriminate on the basis of race, color, national origin, age, disability, sex, sexual orientation, or gender identity.            Thank you!     Thank you for choosing [facility] MATERNAL FETAL MEDIC[facility]  for your care. Our goal is always to provide you with excellent care. Hearing back from our patients is one way we can continue to improve our services. Please take a few minutes to complete the written survey that you may receive in the mail after your visit with us. Thank you!             Your Updated Medication List - Protect others around you: Learn how to safely use, store and throw away your medicines at www.disposemymeds.org.          This list is accurate as of: 11/16/17 11:59 PM.  Always use your most recent med list.                   Brand Name Dispense Instructions for use Diagnosis    RACHNA SYRUP PO           AMBIEN 10 MG tablet   Generic drug:  zolpidem     20    1 po HS, prn        calcium carbonate 1250 MG tablet    OS-VAHID 500 mg [place]. Ca     Take 1 tablet by mouth daily        hydroxychloroquine 200 MG tablet    PLAQUENIL    60 tablet    BRAND ONLY, she can not tolerate generic    Systemic lupus erythematosus, unspecified SLE type, unspecified organ  involvement status (H)       LEVOTHYROXINE SODIUM PO      Take 0.25 mg by mouth daily        METANX PO      Take 1,000 mg by mouth daily        METFORMIN HCL PO      Take 500 mg by mouth 2 times daily (with meals)        MULTIVITAMIN TABS   OR           norethindrone-ethinyl estradiol 1-35 MG-MCG per tablet    ORTHO-NOVUM 1-35 TAB,NORTREL 1-35 TAB     Take 1 tablet by mouth daily        * predniSONE 5 MG tablet    DELTASONE    20    as directed    Systemic lupus erythematosus (H)       * PREDNISONE PO      Take 1 mg by mouth daily        SPIRULINA PO      Take by mouth daily        triamcinolone 0.1 % paste    KENALOG    5 g    Take by mouth 2 times daily    Systemic lupus erythematosus, unspecified SLE type, unspecified organ involvement status (H)       TYLENOL PO      Take  by mouth.        VITAMIN D (CHOLECALCIFEROL) PO      Take 5,000 Units by mouth daily        * Notice:  This list has 2 medication(s) that are the same as other medications prescribed for you. Read the directions carefully, and ask your doctor or other care provider to review them with you.

## 2017-11-16 NOTE — PROGRESS NOTES
"Maternal Fetal Medicine Consultation    Dear Dr. Roy,    Thank you for your request for consultation for your patient, Patricia Hall, regarding her history of systemic lupus in preparation for IVF.    HPI:  As you know, Ms. Neto Hall is a 41 year old , with a history of systemic lupus erythematosus.  She reports her typical symptoms include significant sun-sensitivity, joint pain, and fatigue.  She also reports easy bruising which began in 2016.  She has no renal involvement currently.  She is currently taking prednisone 15 mg daily and Plaquenil for treatment of her lupus.  She has been seeing a reproductive immunologist in [place] who has performed an extensive lab evaluation to help optimize Ms. Neto Hall for pregnancy.  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  She has no history of personal clot, and no family history of clot.  She has been recommended to start on 40 mg of Lovenox BID for her IVF cycle and intends to continue this throughout pregnancy.  She was seen by hematology here at the [facility] who discussed with her the indications for anticoagulation in pregnancy and that enoxaparin for this indication has not been shown to increase the live birth rate.  If she is to continue enoxaparin they recommended no more than 40 mg daily as this is the standard prophylactic dosing.  Her reproductive immunologist also has recommended IVIG infusions for conception and throughout the first trimester.  She has also been started on 25 mcg of levothyroxine for subclinical hypothyroidism.  She is taking metformin for \"mild\"  PCOS as well.    She has had one miscarriage after a spontaneous pregnancy in .  She reports her fertility is complicated by uterine fibroids, endometriosis and adenomyosis.  She reports having had 2 prior laparoscopic procedures for endometriosis and for fibroids.  She states she has had a myomectomy but it is unclear if this " surgery was through the myometrium into the endometrial cavity.  She has undergone 3 cycles of IVF and has one PGS-normal embryo banked and intends to proceed with IVF again when her lupus is under better control.    In an effort to wean her prednisone and keep control of her Lupus, she has been recommended to start Imuran.  She is concerned with the safety profile of this specific medication.    Past medical history:  -endometriosis/adenomyosis/fibroids  -systemic lupus erythematosus  -fibromyalgia  -migraine with aura    Past surgical history:  -repair of left elbow fracture at age 10  - D&C for missed AB  -2012 laparoscopy x 2 with possible myomectomy    Family history:  -Mother with hyperlipidemia    Social history:  -never smoker, denies illicit drug use, reports rare alcohol use    Medications:  -Prednisone 15 mg daily  -norethindrone-ethinyl estradiol daily  -metformin 500 mg twice daily  -levothyroxine 25 mcg daily  -Plaquenil 200 mg twice daily  -triamcinolone paste for oral ulcers  -calcium supplement  -vitamin D 5000 units daily  -Metanx 1000 mg daily    Allergies:  -Penicillin  -sulfa    Obstetric history:  -G1:  First trimester spontaneous ; ; required D&C for resolution    ROS:  Negative per HPI    Assessment:  42 year old  for preconception counseling prior to IVF with systemic lupus erythematosus    Plan:  1.  SLE    The goal is to begin pregnancy with SLE in remission. The patient is working on better control with Plaquenil and prednisone, and her rheumatologists have recommended initiating Imuran to wean down prednisone but maintain good control.  We have reiterated that this medication is considered safe in pregnancy and is a good treatment, if recommended, for lupus control in pregnancy. All medications should be kept at the lowest possible efficacious dose. Women with anti-SSA/Ro and/or anti-SSB/La antibodies have a 1-3% risk of congenital heart block. These should be  drawn prior to attempting to conceive if they have not been drawn within the last year.      Maternal complications of lupus include a 20-50% risk of hypertension and preeclampsia, a 30-50% risk of  birth, and an increased risk for gestational diabetes mellitus. As the patient has no history of renal disease, her risks are likely to fall on the lower end of these ranges.  We would recommend continuing an 81 mg tablet of aspirin daily.      Fetal and  complications include increased incidence of first trimester spontaneous loss (10-20%), fetal death, IUGR, congenital heart block, and  lupus.  Baseline labs should be obtained in pregnancy, including CBC with platelets, transaminases, creatinine, BUN, anti-SSA/Ro antibody, anti-SSB/La antibody.  Comprehensive ultrasound should be obtained at 18-20 weeks in pregnancy.  Fetal growth should be evaluated every 4 weeks.  testing with weekly BPP should be initiated at 32 weeks.  Follow up with rheumatology to evaluate and manage the status of SLE should continue prior to and throughout pregnancy.    2.  History of myomectomy    We have requested the patient send us the records for her prior surgeries.  We discussed that if the surgeries did involve dissection through the myometrium to the endometrial cavity, that she would require  delivery prior to the onset of labor (usually scheduled at 36-37 & 6/7 weeks).    3.  Medication management    The patient was counseled that our field of expertise is not to decide which medications should be used to help her achieve conception, that is for her reproductive endocrinologist.  We discussed that after pregnancy is achieved, we would  her that enoxaparin is not indicated for her given current guidelines.  Especially given her recent easy bruising, the risks of the medication may be greater than the unclear benefit.  We discussed that initiating Imuran for pregnancy is safe and indicated  for her autoimmune condition.  Although we cannot comment on the necessity of IVIG for conception, we would not recommend continuing this medication in pregnancy.  Likewise, although metformin is generally well tolerated in pregnancy, it is likely not needed for pregnancy in the absence of a diagnosis of diabetes.  The patient is on 25 mcg of synthroid but is euthyroid.  Again, if needed for conception this is a safe medication, however, once pregnancy is established, this could likely be discontinued unless the patient were overtly hypothyroid.      4.  Advanced maternal age/planned IVF pregnancy    The patient met with our genetic counselors who took a full family and genetic history.  Please see their notes for full details.  We also discussed maternal risks of advancing age including preeclampsia, gestational diabetes, stillbirth, and  delivery.      The use of IVF, with and without ICSI, has been associated with an increased risk for preeclampsia, congenital anomalies (specifically cardiac), hypertensive disorders, placental abruption, placenta previa and other placental abnormalities, and small for gestational age and  delivery.     ICSI has also been associated with imprinting disorders, such as Tayla-Wiedemann and Angelman s Syndromes, however there are no studies confirming these concerns as a causal relationship.  While the association between IVF and these adverse outcomes raise some concern, it is important to note that the literature is contradictory on this subject and the chances of having a healthy child conceived with ART are overall extremely high.    Summary of recommendations:  1.  Obtain records for prior myomectomy to determine appropriate mode of delivery  2.  Draw SSA/SSB titers prior to pregnancy if not drawn within the last year  3.  Draw beta 2 glycoprotein to complete antiphospholipid antibody work up  4. ECG if not recently done.  5. Continue 81 mg aspirin throughout  pregnancy  6.  Agree with beginning Imuran if this is what is recommended for her autoimmune disease with weaning prednisone.  Preferably this would be done prior to attempting conception to ensure adequate lupus control  7.  Screening for pre-gestational diabetes, if not recently done, due to age and chronic steroid use.  8. Begin prenatal vitamin with folic acid if not already done    Recommendations for pregnancy:  9. Lovenox, synthroid and metformin may all be discontinued in pregnancy  10.  Baseline testing with urine Pro/Cr ratio, serum BUN/Cr, liver function tests  11. Frequent visits to assess blood pressure and urine protein  12. First trimester screening and routine genetic screening/testing are recommended even with PGS  13. Comprehensive ultrasound at 18-20 weeks in pregnancy  14.  Fetal echocardiography is indicated if IVF pregnancy attained  15.  Serial growth ultrasounds every 4 weeks for fetal growth following comprehensive ultrasound  16.   testing weekly beginning at 32 weeks  17. Delivery at 39 weeks  18. Stress dose steroids may be necessary at the time of delivery if she continues prednisone throughout pregnancy  19. Close follow-up with rheumatology throughout pregnancy      Thank you for the opportunity to see this patient in consultation.    I served as scribe for Dr. Ellie Steven MD  M Fellow    MFM Attending Attestation  The documentation recorded by the scribe accurately reflects the services I personally performed and the decisions made by me. I spent a total of 30 minutes face-to-face with Patricia Hall during today's office visit. Over 50% of this time was spent counseling the patient and/or coordinating care regarding her lupus and AMA status with plans for pregnancy. See note for details; I have made the necessary edits/additions.      Ellie Atkinson MD  , OB/GYN  Maternal-Fetal Medicine  [email or web address]  [phone] ([facility]  office)  [phone] (Pager)

## 2018-02-23 ENCOUNTER — TELEPHONE (OUTPATIENT)
Dept: RHEUMATOLOGY | Facility: CLINIC | Age: 43
End: 2018-02-23

## 2018-02-23 DIAGNOSIS — M32.9 SYSTEMIC LUPUS ERYTHEMATOSUS, UNSPECIFIED SLE TYPE, UNSPECIFIED ORGAN INVOLVEMENT STATUS (H): Primary | ICD-10-CM

## 2018-02-23 NOTE — TELEPHONE ENCOUNTER
----- Message from Cora Obregno sent at 2/22/2018  2:09 PM CST -----  Regarding: Kirill pt--request to see derm  Contact: [phone]  Good afternoon!    Pt had called to schedule a f/u with Camilaryan, and requested to see a dermatologist that works with the dept. She states when she saw Kirill she was told that some derms treat skin-related lupus issues, and pt would like to see someone. I wasn't sure if it was appropriate to schedule w/ Gino. Would you be able to f/u?    Thank you!    Cora    Please DO NOT send this message and/or reply back to sender. Call Center Representatives DO NOT respond to messages.

## 2018-02-23 NOTE — TELEPHONE ENCOUNTER
Discussed with Dr. Roy,  Pt should see dermatologist in the dermatology clinic, as pt is continuing with rheumatologist at [facility].    Request sent to clinic coordinators for help with scheduling.    Mickie Solis RN  Rheumatology Clinic

## 2018-03-21 ENCOUNTER — TELEPHONE (OUTPATIENT)
Dept: RHEUMATOLOGY | Facility: CLINIC | Age: 43
End: 2018-03-21

## 2018-03-21 NOTE — TELEPHONE ENCOUNTER
Called and spoke with pt regarding questions that she had about dermatology clinic.     Pt states that since January 1st, she has been sick, was viral, turned into sinus infection, tried antibiotics and she cannot shake it. Pt states that sinuses are always hard, hard to breathe at night.  Pt is currently on 10 mg of prednisone currently, but just doesn't feel very well.  Can't seem to shake things.        Pt has a bad toe fungus as well.  She is only using homeopathic on her toes due concerns over any treatment affecting the quality of her eggs.     Pt did not start Imuran due to all going on.  Pt is just starting a round of IVF, will start Imuran after that, maybe.  Pt is doing a transfer of an embryo, this summer.  She at this point just does not feel well overall, does understand that likely she doesn't feel great due to her not being on medication for her lupus.    Pt is wondering if she can be seen in Rheum/Derm clinic.  She has a spot on her nose that does not want to heal, and then is dealing with the toe fungus as well.  She will send a picture of her nose, as if it is related to her lupus, she can be seen in rheum/derm clinic.    Mickie Solis RN  Rheumatology Clinic

## 2018-03-21 NOTE — TELEPHONE ENCOUNTER
----- Message from Baldomero Hernandez LPN sent at 3/21/2018 10:09 AM CDT -----  Regarding: FW: Dr. Garrett's patient - Lupus clinic  Contact: [phone]      ----- Message -----     From: Gilbert De La Vega     Sent: 3/20/2018   4:47 PM       To: Adult Rheum Triage-  Subject: Dr. Garrett's patient - Lupus clinic             Good afternoon team,    Patient called in today requesting to speak with Dr. Garrett's nurse in regards to the Lupus clinic. Please follow up at your earliest convenience.    Thank you,  Gilbert De La Vega

## 2018-03-27 NOTE — TELEPHONE ENCOUNTER
Could you schedule her with Dr. Christian at [facility] for now? I saw Patricia once for 2nd opinion and she wanted to do f/u with her primary rheumatologist at .

## 2018-04-11 NOTE — TELEPHONE ENCOUNTER
Pt returned call to clinic.  Discussed that Dr. Roy would like her to see Dr. Christian.  Have offered appt for 5/10/18.  Pt has accepted and will call if she needs to cancel.    Mickie Solis RN  Rheumatology Clinic

## 2018-06-13 ENCOUNTER — MYC MEDICAL ADVICE (OUTPATIENT)
Dept: RHEUMATOLOGY | Facility: CLINIC | Age: 43
End: 2018-06-13

## 2018-06-13 DIAGNOSIS — M32.9 SYSTEMIC LUPUS ERYTHEMATOSUS, UNSPECIFIED SLE TYPE, UNSPECIFIED ORGAN INVOLVEMENT STATUS (H): ICD-10-CM

## 2018-06-14 ENCOUNTER — OFFICE VISIT (OUTPATIENT)
Dept: DERMATOLOGY | Facility: CLINIC | Age: 43
End: 2018-06-14
Attending: DERMATOLOGY
Payer: COMMERCIAL

## 2018-06-14 VITALS
RESPIRATION RATE: 18 BRPM | HEART RATE: 89 BPM | SYSTOLIC BLOOD PRESSURE: 128 MMHG | DIASTOLIC BLOOD PRESSURE: 85 MMHG | BODY MASS INDEX: 27.46 KG/M2 | WEIGHT: 180.6 LBS

## 2018-06-14 DIAGNOSIS — B35.1 ONYCHOMYCOSIS: Primary | ICD-10-CM

## 2018-06-14 DIAGNOSIS — B35.3 TINEA PEDIS OF BOTH FEET: ICD-10-CM

## 2018-06-14 PROCEDURE — G0463 HOSPITAL OUTPT CLINIC VISIT: HCPCS | Mod: ZF

## 2018-06-14 PROCEDURE — 87101 SKIN FUNGI CULTURE: CPT | Performed by: DERMATOLOGY

## 2018-06-14 RX ORDER — HYDROXYCHLOROQUINE SULFATE 200 MG/1
TABLET, FILM COATED ORAL
Qty: 60 TABLET | Refills: 0 | Status: SHIPPED | OUTPATIENT
Start: 2018-06-14 | End: 2018-06-18

## 2018-06-14 RX ORDER — PROMETHAZINE HYDROCHLORIDE 25 MG/1
TABLET ORAL
Refills: 0 | COMMUNITY
Start: 2018-06-11 | End: 2020-02-24

## 2018-06-14 RX ORDER — PREDNISONE 5 MG/1
15 TABLET ORAL SEE ADMIN INSTRUCTIONS
Qty: 90 TABLET | Refills: 1 | Status: SHIPPED | OUTPATIENT
Start: 2018-06-14 | End: 2018-12-20

## 2018-06-14 RX ORDER — PRENATAL VIT 91/IRON/FOLIC/DHA 28-975-200
COMBINATION PACKAGE (EA) ORAL
Qty: 42 G | Refills: 3 | Status: SHIPPED | OUTPATIENT
Start: 2018-06-14 | End: 2019-08-21

## 2018-06-14 ASSESSMENT — PAIN SCALES - GENERAL: PAINLEVEL: MILD PAIN (3)

## 2018-06-14 NOTE — PROGRESS NOTES
[facility] Dermatology Note    Dermatology Problem List:  1. Tinea pedis/onychomycosis  2. Periorificial dermatitis  3. Currently going through IVF  4. SLE on plaquenil, prednisone, and azathioprine, IVIG    Encounter Date: Jun 14, 2018    CC:   Chief Complaint   Patient presents with     RECHECK     Rash consult       History of Present Illness:  Ms. Patricia Hall is a 43 year old female w/ history of SLE on plaquenil, prednisone, and azathioprine, IVIG who presents for 2 concerns. Firstly, erythematous bumps on dorsal nose. Since high school she has had papules ont he L lateral nose. This was biopsied at health [facility], in 2002, showing follicular plug,    epidermal flattening. Vascular dilatation is associated   with minimal dermal inflammation. The epidermal changes   could be those of lupus, although, the absence of inflammation   would be unusual for this diagnosis. The possibility of rosacea   or telangiectasia has been considered.   She's currently not using anything on the face.  She also notes foot fungus on both feet, great toenails R>L for approximately 1 year. She has been using magnesium spray, thieves oil, hydrogen peroxide without success. It started on the great toenails and now has spread to the other toes on the L foot. She recently lost the L great toenail last week and is distraught about it not growing back. She's not lost a nail previously.   She does note she is trying IVF with her - they are planning to implant late summer/early fall.     Past Medical History:   Patient Active Problem List   Diagnosis     Insomnia     Systemic lupus erythematosus (H)     Refractory migraine without aura     Past Medical History:   Diagnosis Date     Allergy, unspecified not elsewhere classified      Endometriosis      Fibromyalgia      Migraine without aura, with intractable migraine, so stated, without mention of status migrainosus      Myalgia and myositis,  unspecified      Systemic lupus erythematosus (H)      Past Surgical History:   Procedure Laterality Date     ORTHOPEDIC SURGERY       SURGICAL HISTORY OF -   1986    left elbow fracture repair       Social History:  The patient wears physical sunblockers daily. Uses sunless mirella    Family History:  noncontributory    Medications:  Current Outpatient Prescriptions   Medication Sig Dispense Refill     RACHNA SYRUP PO        Acetaminophen (TYLENOL PO) Take  by mouth.         AMBIEN 10 MG OR TABS 1 po HS, prn 20 0     calcium carbonate (OS-VAHID 500 MG [place]. [place]) 1250 MG tablet Take 1 tablet by mouth daily       hydroxychloroquine (PLAQUENIL) 200 MG tablet BRAND ONLY, she can not tolerate generic 60 tablet 5     L-Methylfolate-Algae-B12-B6 (METANX PO) Take 1,000 mg by mouth daily       LEVOTHYROXINE SODIUM PO Take 0.25 mg by mouth daily       METFORMIN HCL PO Take 500 mg by mouth 2 times daily (with meals)       MULTIVITAMIN TABS   OR   0     norethindrone-ethinyl estradiol (ORTHO-NOVUM 1-35 TAB,NORTREL 1-35 TAB) 1-35 MG-MCG per tablet Take 1 tablet by mouth daily       PREDNISONE 5 MG OR TABS as directed 20 0     PREDNISONE PO Take 1 mg by mouth daily        promethazine (PHENERGAN) 25 MG tablet TK 1 T PO  Q 6 H PRN N  0     SPIRULINA PO Take by mouth daily       triamcinolone (KENALOG) 0.1 % paste Take by mouth 2 times daily 5 g 3     UNABLE TO FIND DISSOLVE 1 TO 4 LOZENGES UNDER TONGUE AS NEEDED DAILY  0     VITAMIN D, CHOLECALCIFEROL, PO Take 5,000 Units by mouth daily          Allergies   Allergen Reactions     Cherry Anaphylaxis     Dairy Aid  [Lactase]      Other reaction(s): Arthralgia (Joint Pain)     Gluten Meal      Other reaction(s): Abdominal Pain     No Clinical Screening - See Comments Anaphylaxis and Itching     Pistachio Nut Extract Skin Test Anaphylaxis     Brassica Oleracea Italica      Other reaction(s): Abdominal Pain  Other reaction(s): Abdominal Pain     Penicillins Hives and Rash     Sulfa Drugs  Hives and Rash     Review of Systems:  -Skin: As above in HPI. No additional skin concerns.    Physical exam:  Vitals: /85  Pulse 89  Resp 18  Wt 81.9 kg (180 lb 9.6 oz)  BMI 27.46 kg/m2  GEN: This is a well developed, well-nourished female in no acute distress, in a pleasant mood.    SKIN: Focused examination of the face, neck, hands, feet was performed.  - R lateral nose with erythematous papule~5mm, no telangectasias or erosions on dermoscopy  - L great toenail mostly removed, with irregular dystrophic broken toenail adherent to the nail bed. Other toenails on that foot are thickened and yellow  - R great toenail is thickened and distally dystrophic with normal appearing proximal rim.   -scant scaling of bilateral lateral feet. A few round macules with infacing scale on the lateral aspects of the toes. Maceration between the 4th and 5th toe  -woods lamp examination of feet negative  -No other lesions of concern on areas examined.     Impression/Plan:  1.tinea pedis/onychomycosis: no evidence of erythrasma today, and quality of scale on feet/maceration between 4th-5th toes c/w tinea pedis will treat this with topical terbinafine daily for 2 weeks then once weekly for maintenance. Also encouraged her to apply this to the nails to keep spread of the fungus at bay but noted that topicals are not effective for toenail fungus. Discussed applying vaseline or teatree oil to the nail bed to keep it moist and help nail grow in.   - nail clipping for culture today- may need systemic treatment in the future however will not do oral at this time 2/2 plan to get pregnant    2. Periorificial dermatitis/ rosacea  - Discussed her nasal papules are most c/w rosacea/periorificial dermatitis. Discussed there are more effective treatments that are systemic, however as she's trying to get pregnant, will not treat with systemic therapy.   - recommended azelaic acid 10% suspension available OTC.   - continue aggressive sun  protection with mineral sunscreen    3. Currently going through IVF    4. SLE on plaquenil, prednisone, and azathioprine, IVIG  - follows with Dr. Roy.    CC Dr. Roy on close of this encounter.  Follow-up prn       staffed the patient.    Staff Involved:  Resident(Indiana Flores)/Staff(as above)    Staff Physician Comments:   I saw and evaluated the patient with the resident and I agree with the assessment and plan.  I was present for the examination.    Vishal Christian MD, FAAD    Departments of Internal Medicine and Dermatology  [facility]  [phone]

## 2018-06-14 NOTE — LETTER
6/14/2018      RE: Patricia Hall  [address]  [place] [address]       [facility] Dermatology Note    Dermatology Problem List:  1. Tinea pedis/onychomycosis  2. Periorificial dermatitis  3. Currently going through IVF  4. SLE on plaquenil, prednisone, and azathioprine, IVIG    Encounter Date: Jun 14, 2018    CC:   Chief Complaint   Patient presents with     RECHECK     Rash consult       History of Present Illness:  Ms. Patricia Hall is a 43 year old female w/ history of SLE on plaquenil, prednisone, and azathioprine, IVIG who presents for 2 concerns. Firstly, erythematous bumps on dorsal nose. Since high school she has had papules ont he L lateral nose. This was biopsied at health [facility], in 2002, showing follicular plug,    epidermal flattening. Vascular dilatation is associated   with minimal dermal inflammation. The epidermal changes   could be those of lupus, although, the absence of inflammation   would be unusual for this diagnosis. The possibility of rosacea   or telangiectasia has been considered.   She's currently not using anything on the face.  She also notes foot fungus on both feet, great toenails R>L for approximately 1 year. She has been using magnesium spray, thieves oil, hydrogen peroxide without success. It started on the great toenails and now has spread to the other toes on the L foot. She recently lost the L great toenail last week and is distraught about it not growing back. She's not lost a nail previously.   She does note she is trying IVF with her - they are planning to implant late summer/early fall.     Past Medical History:   Patient Active Problem List   Diagnosis     Insomnia     Systemic lupus erythematosus (H)     Refractory migraine without aura     Past Medical History:   Diagnosis Date     Allergy, unspecified not elsewhere classified      Endometriosis      Fibromyalgia      Migraine without aura, with intractable  migraine, so stated, without mention of status migrainosus      Myalgia and myositis, unspecified      Systemic lupus erythematosus (H)      Past Surgical History:   Procedure Laterality Date     ORTHOPEDIC SURGERY       SURGICAL HISTORY OF -   1986    left elbow fracture repair       Social History:  The patient wears physical sunblockers daily. Uses sunless mirella    Family History:  noncontributory    Medications:  Current Outpatient Prescriptions   Medication Sig Dispense Refill     RACHNA SYRUP PO        Acetaminophen (TYLENOL PO) Take  by mouth.         AMBIEN 10 MG OR TABS 1 po HS, prn 20 0     calcium carbonate (OS-VAHID 500 MG [place]. [place]) 1250 MG tablet Take 1 tablet by mouth daily       hydroxychloroquine (PLAQUENIL) 200 MG tablet BRAND ONLY, she can not tolerate generic 60 tablet 5     L-Methylfolate-Algae-B12-B6 (METANX PO) Take 1,000 mg by mouth daily       LEVOTHYROXINE SODIUM PO Take 0.25 mg by mouth daily       METFORMIN HCL PO Take 500 mg by mouth 2 times daily (with meals)       MULTIVITAMIN TABS   OR   0     norethindrone-ethinyl estradiol (ORTHO-NOVUM 1-35 TAB,NORTREL 1-35 TAB) 1-35 MG-MCG per tablet Take 1 tablet by mouth daily       PREDNISONE 5 MG OR TABS as directed 20 0     PREDNISONE PO Take 1 mg by mouth daily        promethazine (PHENERGAN) 25 MG tablet TK 1 T PO  Q 6 H PRN N  0     SPIRULINA PO Take by mouth daily       triamcinolone (KENALOG) 0.1 % paste Take by mouth 2 times daily 5 g 3     UNABLE TO FIND DISSOLVE 1 TO 4 LOZENGES UNDER TONGUE AS NEEDED DAILY  0     VITAMIN D, CHOLECALCIFEROL, PO Take 5,000 Units by mouth daily          Allergies   Allergen Reactions     Cherry Anaphylaxis     Dairy Aid  [Lactase]      Other reaction(s): Arthralgia (Joint Pain)     Gluten Meal      Other reaction(s): Abdominal Pain     No Clinical Screening - See Comments Anaphylaxis and Itching     Pistachio Nut Extract Skin Test Anaphylaxis     Brassica Oleracea Italica      Other reaction(s): Abdominal  Pain  Other reaction(s): Abdominal Pain     Penicillins Hives and Rash     Sulfa Drugs Hives and Rash     Review of Systems:  -Skin: As above in HPI. No additional skin concerns.    Physical exam:  Vitals: /85  Pulse 89  Resp 18  Wt 81.9 kg (180 lb 9.6 oz)  BMI 27.46 kg/m2  GEN: This is a well developed, well-nourished female in no acute distress, in a pleasant mood.    SKIN: Focused examination of the face, neck, hands, feet was performed.  - R lateral nose with erythematous papule~5mm, no telangectasias or erosions on dermoscopy  - L great toenail mostly removed, with irregular dystrophic broken toenail adherent to the nail bed. Other toenails on that foot are thickened and yellow  - R great toenail is thickened and distally dystrophic with normal appearing proximal rim.   -scant scaling of bilateral lateral feet. A few round macules with infacing scale on the lateral aspects of the toes. Maceration between the 4th and 5th toe  -woods lamp examination of feet negative  -No other lesions of concern on areas examined.     Impression/Plan:  1.tinea pedis/onychomycosis: no evidence of erythrasma today, and quality of scale on feet/maceration between 4th-5th toes c/w tinea pedis will treat this with topical terbinafine daily for 2 weeks then once weekly for maintenance. Also encouraged her to apply this to the nails to keep spread of the fungus at bay but noted that topicals are not effective for toenail fungus. Discussed applying vaseline or teatree oil to the nail bed to keep it moist and help nail grow in.   - nail clipping for culture today- may need systemic treatment in the future however will not do oral at this time 2/2 plan to get pregnant    2. Periorificial dermatitis/ rosacea  - Discussed her nasal papules are most c/w rosacea/periorificial dermatitis. Discussed there are more effective treatments that are systemic, however as she's trying to get pregnant, will not treat with systemic therapy.   -  recommended azelaic acid 10% suspension available OTC.   - continue aggressive sun protection with mineral sunscreen    3. Currently going through IVF    4. SLE on plaquenil, prednisone, and azathioprine, IVIG  - follows with Dr. Roy.    CC Dr. Roy on close of this encounter.  Follow-up prn       staffed the patient.    Staff Involved:  Resident(Indiana Flores)/Staff(as above)    Staff Physician Comments:   I saw and evaluated the patient with the resident and I agree with the assessment and plan.  I was present for the examination.    Vishal Christian MD, FAAD    Departments of Internal Medicine and Dermatology  [facility]  [phone]

## 2018-06-14 NOTE — MR AVS SNAPSHOT
After Visit Summary   6/14/2018    Patricia Hall    MRN: [id number]           Patient Information     Date Of Birth          1975        Visit Information        Provider Department      6/14/2018 11:00 AM Vishal Christian MD [facility] Rheumatology        Today's Diagnoses     Onychomycosis    -  1    Tinea pedis of both feet          Care Instructions    Azelaic Acid Cream or Suspension 10%  The Ordinary          Follow-ups after your visit        Your next 10 appointments already scheduled     Jul 20, 2018  2:30 PM CDT   (Arrive by 2:15 PM)   Return Visit with Josh Roy MD   [facility] Rheumatology ([facility])    [address]  [address]  [facility] [address] [phone]              Who to contact     If you have questions or need follow up information about today's clinic visit or your schedule please contact [facility] RHEUMATOLOGY directly at [phone].  Normal or non-critical lab and imaging results will be communicated to you by MyChart, letter or phone within 4 business days after the clinic has received the results. If you do not hear from us within 7 days, please contact the clinic through [employer] or phone. If you have a critical or abnormal lab result, we will notify you by phone as soon as possible.  Submit refill requests through [employer] or call your pharmacy and they will forward the refill request to us. Please allow 3 business days for your refill to be completed.          Additional Information About Your Visit        [employer] Information     [employer] gives you secure access to your electronic health record. If you see a primary care provider, you can also send messages to your care team and make appointments. If you have questions, please call your primary care clinic.  If you do not have a primary care provider, please call [phone] and they will assist you.        Care EveryWhere ID     This is your Care  EveryWhere ID. This could be used by other organizations to access your [facility] medical records  [id number]        Your Vitals Were     Pulse Respirations BMI (Body Mass Index)             89 18 27.46 kg/m2          Blood Pressure from Last 3 Encounters:   06/14/18 128/85   10/12/17 113/76   03/03/17 122/85    Weight from Last 3 Encounters:   06/14/18 81.9 kg (180 lb 9.6 oz)   10/12/17 79.8 kg (176 lb)   03/03/17 72.6 kg (160 lb)              We Performed the Following     Fungus skin hair nail culture          Today's Medication Changes          These changes are accurate as of 6/14/18  1:20 PM.  If you have any questions, ask your nurse or doctor.               Start taking these medicines.        Dose/Directions    terbinafine 1 % cream   Commonly known as:  lamISIL   Used for:  Onychomycosis, Tinea pedis of both feet   Started by:  Vishal Christian MD        Apply to feet once daily for 2 weeks then once weekly for maintenance   Quantity:  42 g   Refills:  3            Where to get your medicines      These medications were sent to [id number] - [place], [place] [address] [facility] [address]  [address], [place] [address]    Hours:  24-hours Phone:  [phone]     terbinafine 1 % cream                Primary Care Provider Office Phone # Fax #    Nancykristylissette Bhandari [phone]       [place] [id number] [facility] [address]        Equal Access to Services     TIAN JACKSON AH: Haddavidson mendozao Soharitha, waaxda luqadaha, qaybta kaalmahiginio mastersonay idicecille culver. So [facility] [phone].    ATENCIÓN: Si habla español, tiene a rosa disposición servicios gratuitos de asistencia lingüística. Llame al [phone].    We comply with applicable federal civil rights laws and [place] laws. We do not discriminate on the basis of race, color, national origin, age, disability, sex, sexual orientation,  or gender identity.            Thank you!     Thank you for choosing [facility] RHEUMATOLOGY  for your care. Our goal is always to provide you with excellent care. Hearing back from our patients is one way we can continue to improve our services. Please take a few minutes to complete the written survey that you may receive in the mail after your visit with us. Thank you!             Your Updated Medication List - Protect others around you: Learn how to safely use, store and throw away your medicines at www.disposemymeds.org.          This list is accurate as of 6/14/18  1:20 PM.  Always use your most recent med list.                   Brand Name Dispense Instructions for use Diagnosis    RACHNA SYRUP PO           AMBIEN 10 MG tablet   Generic drug:  zolpidem     20    1 po HS, prn        calcium carbonate 500 MG tablet    OS-VAHID 500 mg [place]. [place]     Take 1 tablet by mouth daily        hydroxychloroquine 200 MG tablet    PLAQUENIL    60 tablet    BRAND ONLY, she can not tolerate generic    Systemic lupus erythematosus, unspecified SLE type, unspecified organ involvement status (H)       LEVOTHYROXINE SODIUM PO      Take 0.25 mg by mouth daily        METANX PO      Take 1,000 mg by mouth daily        METFORMIN HCL PO      Take 500 mg by mouth 2 times daily (with meals)        MULTIVITAMIN TABS   OR           norethindrone-ethinyl estradiol 1-35 MG-MCG per tablet    ORTHO-NOVUM 1-35 TAB,NORTREL 1-35 TAB     Take 1 tablet by mouth daily        * predniSONE 5 MG tablet    DELTASONE    20    as directed    Systemic lupus erythematosus (H)       * PREDNISONE PO      Take 1 mg by mouth daily        promethazine 25 MG tablet    PHENERGAN     TK 1 T PO  Q 6 H PRN N    Onychomycosis, Tinea pedis of both feet       SPIRULINA PO      Take by mouth daily        terbinafine 1 % cream    lamISIL    42 g    Apply to feet once daily for 2 weeks then once weekly for maintenance    Onychomycosis, Tinea pedis of both feet        triamcinolone 0.1 % paste    KENALOG    5 g    Take by mouth 2 times daily    Systemic lupus erythematosus, unspecified SLE type, unspecified organ involvement status (H)       TYLENOL PO      Take  by mouth.        UNABLE TO FIND      DISSOLVE 1 TO 4 LOZENGES UNDER TONGUE AS NEEDED DAILY    Onychomycosis, Tinea pedis of both feet       VITAMIN D (CHOLECALCIFEROL) PO      Take 5,000 Units by mouth daily        * Notice:  This list has 2 medication(s) that are the same as other medications prescribed for you. Read the directions carefully, and ask your doctor or other care provider to review them with you.

## 2018-06-14 NOTE — NURSING NOTE
Chief Complaint   Patient presents with     RECHECK     Rash consult       /85  Pulse 89  Resp 18  Wt 81.9 kg (180 lb 9.6 oz)  BMI 27.46 kg/m2    LUCIA TORIBIO CMA

## 2018-06-14 NOTE — TELEPHONE ENCOUNTER
Pt was in clinic, discussed needing eye exam for refill of plaquenil.  She admits it has been a while since her last eye exam, looks like the last one was in 2015.  Will see if Dr. Roy is willing to refill for one month until eye exam completed.    Pt is also requesting refill of prednisone, continuing to taper, currently at 15 mg a day.    Pt is also requesting to get brand name plaquenil, will check with insurance company to see if able without high co-pay.      Mickie Solis RN  Rheumatology Clinic

## 2018-06-15 ENCOUNTER — MYC MEDICAL ADVICE (OUTPATIENT)
Dept: RHEUMATOLOGY | Facility: CLINIC | Age: 43
End: 2018-06-15

## 2018-06-15 DIAGNOSIS — M32.9 SYSTEMIC LUPUS ERYTHEMATOSUS, UNSPECIFIED SLE TYPE, UNSPECIFIED ORGAN INVOLVEMENT STATUS (H): Primary | ICD-10-CM

## 2018-06-18 DIAGNOSIS — M32.9 SYSTEMIC LUPUS ERYTHEMATOSUS, UNSPECIFIED SLE TYPE, UNSPECIFIED ORGAN INVOLVEMENT STATUS (H): ICD-10-CM

## 2018-06-19 RX ORDER — HYDROXYCHLOROQUINE SULFATE 200 MG/1
200 TABLET, FILM COATED ORAL 2 TIMES DAILY
Qty: 60 TABLET | Refills: 1 | Status: SHIPPED | OUTPATIENT
Start: 2018-06-19 | End: 2019-08-21

## 2018-06-19 NOTE — TELEPHONE ENCOUNTER
"Message from pharmacy:    hydroxychloroquine (PLAQUENIL) 200 MG tablet:   [facility], \"Please clarify directions for medication\".  "

## 2018-06-27 ENCOUNTER — VIRTUAL VISIT (OUTPATIENT)
Dept: DERMATOLOGY | Facility: CLINIC | Age: 43
End: 2018-06-27

## 2018-06-27 DIAGNOSIS — L60.3 ONYCHODYSTROPHY: Primary | ICD-10-CM

## 2018-06-27 RX ORDER — HYDROXYCHLOROQUINE SULFATE 200 MG/1
200 TABLET, FILM COATED ORAL 2 TIMES DAILY
Qty: 60 TABLET | Refills: 5 | Status: SHIPPED | OUTPATIENT
Start: 2018-06-27 | End: 2018-12-17

## 2018-06-27 NOTE — TELEPHONE ENCOUNTER
Pt requesting to be switched back to generic plaquenil due to cost of brand name.    Mickie Solis RN  Rheumatology Clinic

## 2018-06-27 NOTE — PROGRESS NOTES
Called pt with results of toenail clipping: Possible fungus. Will have her come in next week for repeat toenail clipping for PAS with labs for start of lamisil PO.

## 2018-06-27 NOTE — MR AVS SNAPSHOT
After Visit Summary   6/27/2018    Patricia Hall    MRN: [id number]           Patient Information     Date Of Birth          1975        Visit Information        Provider Department      6/27/2018 5:50 PM Vishal Christian MD [facility] Rheumatology        Today's Diagnoses     Onychodystrophy    -  1       Follow-ups after your visit        Your next 10 appointments already scheduled     Jul 20, 2018  2:30 PM CDT   (Arrive by 2:15 PM)   Return Visit with Josh Roy MD   [facility] Rheumatology ([facility])    [address] [place] [address]  [address]  [facility] [address] [phone]              Future tests that were ordered for you today     Open Future Orders        Priority Expected Expires Ordered    ALT Routine  6/27/2019 6/27/2018    CBC with platelets Routine  6/27/2019 6/27/2018    AST Routine  6/27/2019 6/27/2018            Who to contact     If you have questions or need follow up information about today's clinic visit or your schedule please contact [facility] RHEUMATOLOGY directly at [phone].  Normal or non-critical lab and imaging results will be communicated to you by [employer], letter or phone within 4 business days after the clinic has received the results. If you do not hear from us within 7 days, please contact the clinic through [employer] or phone. If you have a critical or abnormal lab result, we will notify you by phone as soon as possible.  Submit refill requests through [employer] or call your pharmacy and they will forward the refill request to us. Please allow 3 business days for your refill to be completed.          Additional Information About Your Visit        AskNsharehart Information     [employer] gives you secure access to your electronic health record. If you see a primary care provider, you can also send messages to your care team and make appointments. If you have questions, please call your primary care clinic.  If you do not  have a primary care provider, please call [phone] and they will assist you.        Care EveryWhere ID     This is your Care EveryWhere ID. This could be used by other organizations to access your [place] medical records  [id number]         Blood Pressure from Last 3 Encounters:   06/14/18 128/85   10/12/17 113/76   03/03/17 122/85    Weight from Last 3 Encounters:   06/14/18 81.9 kg (180 lb 9.6 oz)   10/12/17 79.8 kg (176 lb)   03/03/17 72.6 kg (160 lb)               Primary Care Provider Office Phone # Fax #    Nancyaysha CAMILO MD [place] [phone]       [place] [address] [facility] [address]        Equal Access to Services     SILVIANO JACKSON : Hadii sarah yip hadasho Soharitha, waaxda luqadaha, qaybta kaalmada adeegyada, sindy culver. So [facility] [phone].    ATENCIÓN: Si habla español, tiene a rosa disposición servicios gratuitos de asistencia lingüística. Jose al [phone].    We comply with applicable federal civil rights laws and [place] laws. We do not discriminate on the basis of race, color, national origin, age, disability, sex, sexual orientation, or gender identity.            Thank you!     Thank you for choosing [facility]  for your care. Our goal is always to provide you with excellent care. Hearing back from our patients is one way we can continue to improve our services. Please take a few minutes to complete the written survey that you may receive in the mail after your visit with us. Thank you!             Your Updated Medication List - Protect others around you: Learn how to safely use, store and throw away your medicines at www.disposemymeds.org.          This list is accurate as of 6/27/18  5:53 PM.  Always use your most recent med list.                   Brand Name Dispense Instructions for use Diagnosis    RACHNA SYRUP PO           AMBIEN 10 MG tablet   Generic drug:  zolpidem     20    1 po HS, prn         calcium carbonate 500 MG tablet    OS-VAHID 500 mg [place]. [place]     Take 1 tablet by mouth daily        * hydroxychloroquine 200 MG tablet    PLAQUENIL    60 tablet    Take 1 tablet (200 mg) by mouth 2 times daily BRAND ONLY, she can not tolerate generic    Systemic lupus erythematosus, unspecified SLE type, unspecified organ involvement status (H)       * hydroxychloroquine 200 MG tablet    PLAQUENIL    60 tablet    Take 1 tablet (200 mg) by mouth 2 times daily    Systemic lupus erythematosus, unspecified SLE type, unspecified organ involvement status (H)       LEVOTHYROXINE SODIUM PO      Take 0.25 mg by mouth daily        METANX PO      Take 1,000 mg by mouth daily        METFORMIN HCL PO      Take 500 mg by mouth 2 times daily (with meals)        MULTIVITAMIN TABS   OR           norethindrone-ethinyl estradiol 1-35 MG-MCG per tablet    ORTHO-NOVUM 1-35 TAB,NORTREL 1-35 TAB     Take 1 tablet by mouth daily        * predniSONE 5 MG tablet    DELTASONE    90 tablet    Take 3 tablets (15 mg) by mouth See Admin Instructions    Systemic lupus erythematosus, unspecified SLE type, unspecified organ involvement status (H)       * PREDNISONE PO      Take 1 mg by mouth daily        promethazine 25 MG tablet    PHENERGAN     TK 1 T PO  Q 6 H PRN N    Onychomycosis, Tinea pedis of both feet       SPIRULINA PO      Take by mouth daily        terbinafine 1 % cream    lamISIL    42 g    Apply to feet once daily for 2 weeks then once weekly for maintenance    Onychomycosis, Tinea pedis of both feet       triamcinolone 0.1 % paste    KENALOG    5 g    Take by mouth 2 times daily    Systemic lupus erythematosus, unspecified SLE type, unspecified organ involvement status (H)       TYLENOL PO      Take  by mouth.        UNABLE TO FIND      DISSOLVE 1 TO 4 LOZENGES UNDER TONGUE AS NEEDED DAILY    Onychomycosis, Tinea pedis of both feet       VITAMIN D (CHOLECALCIFEROL) PO      Take 5,000 Units by mouth daily        * Notice:  This list has  4 medication(s) that are the same as other medications prescribed for you. Read the directions carefully, and ask your doctor or other care provider to review them with you.

## 2018-06-28 ENCOUNTER — TELEPHONE (OUTPATIENT)
Dept: DERMATOLOGY | Facility: CLINIC | Age: 43
End: 2018-06-28

## 2018-07-05 ENCOUNTER — OFFICE VISIT (OUTPATIENT)
Dept: DERMATOLOGY | Facility: CLINIC | Age: 43
End: 2018-07-05
Payer: COMMERCIAL

## 2018-07-05 DIAGNOSIS — L60.3 NAIL DYSTROPHY: Primary | ICD-10-CM

## 2018-07-05 ASSESSMENT — PAIN SCALES - GENERAL: PAINLEVEL: NO PAIN (0)

## 2018-07-05 NOTE — MR AVS SNAPSHOT
After Visit Summary   7/5/2018    Patricia Hall    MRN: [id number]           Patient Information     Date Of Birth          1975        Visit Information        Provider Department      7/5/2018 12:45 PM Vishal Christian MD [facility] Dermatology        Today's Diagnoses     Nail dystrophy    -  1       Follow-ups after your visit        Follow-up notes from your care team     Return if symptoms worsen or fail to improve.      Your next 10 appointments already scheduled     Jul 20, 2018  2:30 PM CDT   (Arrive by 2:15 PM)   Return Visit with Josh Roy MD   [facility] Rheumatology ([facility])    [address] [facility]  [address]  [facility] [address] [phone]              Who to contact     Please call your clinic at [phone] to:    Ask questions about your health    Make or cancel appointments    Discuss your medicines    Learn about your test results    Speak to your doctor            Additional Information About Your Visit        MyChart Information     [employer] gives you secure access to your electronic health record. If you see a primary care provider, you can also send messages to your care team and make appointments. If you have questions, please call your primary care clinic.  If you do not have a primary care provider, please call [phone] and they will assist you.      [employer] is an electronic gateway that provides easy, online access to your medical records. With [employer], you can request a clinic appointment, read your test results, renew a prescription or communicate with your care team.     To access your existing account, please contact your [facility] or call [phone] for assistance.        Care EveryWhere ID     This is your Care EveryWhere ID. This could be used by other organizations to access your [place] medical records  [id number]         Blood Pressure from Last 3 Encounters:    06/14/18 128/85   10/12/17 113/76   03/03/17 122/85    Weight from Last 3 Encounters:   06/14/18 81.9 kg (180 lb 9.6 oz)   10/12/17 79.8 kg (176 lb)   03/03/17 72.6 kg (160 lb)              We Performed the Following     Dermatological path order and indications        Primary Care Provider Office Phone # Fax #    Riaz Bhandari [phone]       [facility] [address] [facility] [address]        Equal Access to Services     [facility]: Hadii aad ku hadasho Soomaali, waaxda luqadaha, qaybta kaalmada adesanjeev, sindy culver. So [facility] [phone].    ATENCIÓN: Si habla español, tiene a rosa disposición servicios gratuitos de asistencia lingüística. [facility] [phone].    We comply with applicable federal civil rights laws and [place] laws. We do not discriminate on the basis of race, color, national origin, age, disability, sex, sexual orientation, or gender identity.            Thank you!     Thank you for choosing [facility] DERMATOLOGY  for your care. Our goal is always to provide you with excellent care. Hearing back from our patients is one way we can continue to improve our services. Please take a few minutes to complete the written survey that you may receive in the mail after your visit with us. Thank you!             Your Updated Medication List - Protect others around you: Learn how to safely use, store and throw away your medicines at www.disposemymeds.org.          This list is accurate as of 7/5/18 11:59 PM.  Always use your most recent med list.                   Brand Name Dispense Instructions for use Diagnosis    RACHNA SYRUP PO           AMBIEN 10 MG tablet   Generic drug:  zolpidem     20    1 po HS, prn        calcium carbonate 500 MG tablet    OS-VAHID 500 mg [place]. Ca     Take 1 tablet by mouth daily        * hydroxychloroquine 200 MG tablet    PLAQUENIL    60 tablet    Take 1 tablet (200 mg) by mouth 2 times daily BRAND  ONLY, she can not tolerate generic    Systemic lupus erythematosus, unspecified SLE type, unspecified organ involvement status (H)       * hydroxychloroquine 200 MG tablet    PLAQUENIL    60 tablet    Take 1 tablet (200 mg) by mouth 2 times daily    Systemic lupus erythematosus, unspecified SLE type, unspecified organ involvement status (H)       LEVOTHYROXINE SODIUM PO      Take 0.25 mg by mouth daily        METANX PO      Take 1,000 mg by mouth daily        METFORMIN HCL PO      Take 500 mg by mouth 2 times daily (with meals)        MULTIVITAMIN TABS   OR           norethindrone-ethinyl estradiol 1-35 MG-MCG per tablet    ORTHO-NOVUM 1-35 TAB,NORTREL 1-35 TAB     Take 1 tablet by mouth daily        * predniSONE 5 MG tablet    DELTASONE    90 tablet    Take 3 tablets (15 mg) by mouth See Admin Instructions    Systemic lupus erythematosus, unspecified SLE type, unspecified organ involvement status (H)       * PREDNISONE PO      Take 1 mg by mouth daily        promethazine 25 MG tablet    PHENERGAN     TK 1 T PO  Q 6 H PRN N    Onychomycosis, Tinea pedis of both feet       SPIRULINA PO      Take by mouth daily        terbinafine 1 % cream    lamISIL    42 g    Apply to feet once daily for 2 weeks then once weekly for maintenance    Onychomycosis, Tinea pedis of both feet       triamcinolone 0.1 % paste    KENALOG    5 g    Take by mouth 2 times daily    Systemic lupus erythematosus, unspecified SLE type, unspecified organ involvement status (H)       TYLENOL PO      Take  by mouth.        UNABLE TO FIND      DISSOLVE 1 TO 4 LOZENGES UNDER TONGUE AS NEEDED DAILY    Onychomycosis, Tinea pedis of both feet       VITAMIN D (CHOLECALCIFEROL) PO      Take 5,000 Units by mouth daily        * Notice:  This list has 4 medication(s) that are the same as other medications prescribed for you. Read the directions carefully, and ask your doctor or other care provider to review them with you.

## 2018-07-05 NOTE — LETTER
7/5/2018       RE: Patricia Hall  [address]  [place] [address]     Dear Colleague,    Thank you for referring your patient, Patricia Hall, to the [facility] DERMATOLOGY at [facility]. Please see a copy of my visit note below.    [facility] Dermatology Note    Dermatology Problem List:  1. Tinea pedis/onychomycosis  2. Periorificial dermatitis  3. Currently going through IVF  4. SLE on plaquenil, prednisone, and azathioprine, IVIG    Encounter Date: Jul 5, 2018    CC:   Chief Complaint   Patient presents with     Derm Problem     Patricia is here to have a sample of her toenail collected.         History of Present Illness:  Ms. Patricia Hall is a 43 year old female who presents as a follow-up for onychomycosis. The patient was last seen on June 14, 2018 when a fungal culture was sent and KOH was read as possible fungus, but culture is reading negative to date.  The patient presents for repeat nail clipping for PAS.  She will also obtain lab work in advance of initiating oral antifungal medication.  She says that there has been little change in her nails since her last visit about 2 weeks ago.  She has not been applying any topical agents to her nails.    Past Medical History:   Patient Active Problem List   Diagnosis     Insomnia     Systemic lupus erythematosus (H)     Refractory migraine without aura     Past Medical History:   Diagnosis Date     Allergy, unspecified not elsewhere classified      Endometriosis      Fibromyalgia      Migraine without aura, with intractable migraine, so stated, without mention of status migrainosus      Myalgia and myositis, unspecified      Systemic lupus erythematosus (H)      Past Surgical History:   Procedure Laterality Date     ORTHOPEDIC SURGERY       SURGICAL HISTORY OF -   1986    left elbow fracture repair       Social History:  The patient works. The patient denies  use of tanning beds.    Family History:  Noncontributory    Medications:  Current Outpatient Prescriptions   Medication Sig Dispense Refill     RACHNA SYRUP PO        Acetaminophen (TYLENOL PO) Take  by mouth.         AMBIEN 10 MG OR TABS 1 po HS, prn 20 0     calcium carbonate (OS-VAHID 500 MG [place]. [place]) 1250 MG tablet Take 1 tablet by mouth daily       hydroxychloroquine (PLAQUENIL) 200 MG tablet Take 1 tablet (200 mg) by mouth 2 times daily 60 tablet 5     hydroxychloroquine (PLAQUENIL) 200 MG tablet Take 1 tablet (200 mg) by mouth 2 times daily BRAND ONLY, she can not tolerate generic 60 tablet 1     L-Methylfolate-Algae-B12-B6 (METANX PO) Take 1,000 mg by mouth daily       LEVOTHYROXINE SODIUM PO Take 0.25 mg by mouth daily       METFORMIN HCL PO Take 500 mg by mouth 2 times daily (with meals)       MULTIVITAMIN TABS   OR   0     norethindrone-ethinyl estradiol (ORTHO-NOVUM 1-35 TAB,NORTREL 1-35 TAB) 1-35 MG-MCG per tablet Take 1 tablet by mouth daily       predniSONE (DELTASONE) 5 MG tablet Take 3 tablets (15 mg) by mouth See Admin Instructions 90 tablet 1     PREDNISONE PO Take 1 mg by mouth daily        promethazine (PHENERGAN) 25 MG tablet TK 1 T PO  Q 6 H PRN N  0     SPIRULINA PO Take by mouth daily       terbinafine (LAMISIL) 1 % cream Apply to feet once daily for 2 weeks then once weekly for maintenance 42 g 3     triamcinolone (KENALOG) 0.1 % paste Take by mouth 2 times daily 5 g 3     UNABLE TO FIND DISSOLVE 1 TO 4 LOZENGES UNDER TONGUE AS NEEDED DAILY  0     VITAMIN D, CHOLECALCIFEROL, PO Take 5,000 Units by mouth daily          Allergies   Allergen Reactions     Cherry Anaphylaxis     Dairy Aid  [Lactase]      Other reaction(s): Arthralgia (Joint Pain)     Gluten Meal      Other reaction(s): Abdominal Pain     No Clinical Screening - See Comments Anaphylaxis and Itching     Pistachio Nut Extract Skin Test Anaphylaxis     Brassica Oleracea Italica      Other reaction(s): Abdominal Pain  Other  reaction(s): Abdominal Pain     Penicillins Hives and Rash     Sulfa Drugs Hives and Rash       Review of Systems:  -Const: Denies fevers, chills or changes in weight.   -Constitutional: The patient denies fatigue, fevers, chills, unintended weight loss, and night sweats.  -HEENT: Patient denies nonhealing oral sores.  -Skin: As above in HPI. No additional skin concerns.    Physical exam:  Vitals: There were no vitals taken for this visit.  GEN: This is a well developed, well-nourished female in no acute distress, in a pleasant mood.    SKIN: Focused examination of the hands and feet was performed.  - L great toenail mostly removed, with irregular dystrophic broken toenail adherent to the nail bed. Other toenails on that foot are thickened and yellow  - R great toenail is thickened and distally dystrophic with normal appearing proximal rim.   -No other lesions of concern on areas examined.     Impression/Plan:  1. Dystrophic toe nails    Nail clippings of the right and left great toes and left second toe was obtained    CBC, AST, ALT ordered in advance of initiation of oral terbinafine    CC Dr. Bhandari on close of this encounter.  Follow-up PRN for new or changing lesions.       Dr. Christian staffed the patient.    Staff Involved:  Resident(Clemente Geiger)/Staff(as above)    Staff Physician Comments:   I saw and evaluated the patient with the resident and I agree with the assessment and plan.  I was present for the examination.    Again, thank you for allowing me to participate in the care of your patient.      Sincerely,    Vishal Christian MD

## 2018-07-05 NOTE — NURSING NOTE
Dermatology Rooming Note    Patricia Hall's goals for this visit include:   Chief Complaint   Patient presents with     Derm Problem     Patricia is here to have a sample of her toenail collected.         Liya Mcnamara LPN

## 2018-07-05 NOTE — PROGRESS NOTES
[facility] Dermatology Note    Dermatology Problem List:  1. Tinea pedis/onychomycosis  2. Periorificial dermatitis  3. Currently going through IVF  4. SLE on plaquenil, prednisone, and azathioprine, IVIG    Encounter Date: Jul 5, 2018    CC:   Chief Complaint   Patient presents with     Derm Problem     Patricia is here to have a sample of her toenail collected.         History of Present Illness:  Ms. Patricia Hall is a 43 year old female who presents as a follow-up for onychomycosis. The patient was last seen on June 14, 2018 when a fungal culture was sent and KOH was read as possible fungus, but culture is reading negative to date.  The patient presents for repeat nail clipping for PAS.  She will also obtain lab work in advance of initiating oral antifungal medication.  She says that there has been little change in her nails since her last visit about 2 weeks ago.  She has not been applying any topical agents to her nails.    Past Medical History:   Patient Active Problem List   Diagnosis     Insomnia     Systemic lupus erythematosus (H)     Refractory migraine without aura     Past Medical History:   Diagnosis Date     Allergy, unspecified not elsewhere classified      Endometriosis      Fibromyalgia      Migraine without aura, with intractable migraine, so stated, without mention of status migrainosus      Myalgia and myositis, unspecified      Systemic lupus erythematosus (H)      Past Surgical History:   Procedure Laterality Date     ORTHOPEDIC SURGERY       SURGICAL HISTORY OF -   1986    left elbow fracture repair       Social History:  The patient works. The patient denies use of tanning beds.    Family History:  Noncontributory    Medications:  Current Outpatient Prescriptions   Medication Sig Dispense Refill     RACHNA SYRUP PO        Acetaminophen (TYLENOL PO) Take  by mouth.         AMBIEN 10 MG OR TABS 1 po HS, prn 20 0     calcium carbonate (OS-VAHID 500 MG [place]. [place])  1250 MG tablet Take 1 tablet by mouth daily       hydroxychloroquine (PLAQUENIL) 200 MG tablet Take 1 tablet (200 mg) by mouth 2 times daily 60 tablet 5     hydroxychloroquine (PLAQUENIL) 200 MG tablet Take 1 tablet (200 mg) by mouth 2 times daily BRAND ONLY, she can not tolerate generic 60 tablet 1     L-Methylfolate-Algae-B12-B6 (METANX PO) Take 1,000 mg by mouth daily       LEVOTHYROXINE SODIUM PO Take 0.25 mg by mouth daily       METFORMIN HCL PO Take 500 mg by mouth 2 times daily (with meals)       MULTIVITAMIN TABS   OR   0     norethindrone-ethinyl estradiol (ORTHO-NOVUM 1-35 TAB,NORTREL 1-35 TAB) 1-35 MG-MCG per tablet Take 1 tablet by mouth daily       predniSONE (DELTASONE) 5 MG tablet Take 3 tablets (15 mg) by mouth See Admin Instructions 90 tablet 1     PREDNISONE PO Take 1 mg by mouth daily        promethazine (PHENERGAN) 25 MG tablet TK 1 T PO  Q 6 H PRN N  0     SPIRULINA PO Take by mouth daily       terbinafine (LAMISIL) 1 % cream Apply to feet once daily for 2 weeks then once weekly for maintenance 42 g 3     triamcinolone (KENALOG) 0.1 % paste Take by mouth 2 times daily 5 g 3     UNABLE TO FIND DISSOLVE 1 TO 4 LOZENGES UNDER TONGUE AS NEEDED DAILY  0     VITAMIN D, CHOLECALCIFEROL, PO Take 5,000 Units by mouth daily          Allergies   Allergen Reactions     Cherry Anaphylaxis     Dairy Aid  [Lactase]      Other reaction(s): Arthralgia (Joint Pain)     Gluten Meal      Other reaction(s): Abdominal Pain     No Clinical Screening - See Comments Anaphylaxis and Itching     Pistachio Nut Extract Skin Test Anaphylaxis     Brassica Oleracea Italica      Other reaction(s): Abdominal Pain  Other reaction(s): Abdominal Pain     Penicillins Hives and Rash     Sulfa Drugs Hives and Rash       Review of Systems:  -Const: Denies fevers, chills or changes in weight.   -Constitutional: The patient denies fatigue, fevers, chills, unintended weight loss, and night sweats.  -HEENT: Patient denies nonhealing oral  sores.  -Skin: As above in HPI. No additional skin concerns.    Physical exam:  Vitals: There were no vitals taken for this visit.  GEN: This is a well developed, well-nourished female in no acute distress, in a pleasant mood.    SKIN: Focused examination of the hands and feet was performed.  - L great toenail mostly removed, with irregular dystrophic broken toenail adherent to the nail bed. Other toenails on that foot are thickened and yellow  - R great toenail is thickened and distally dystrophic with normal appearing proximal rim.   -No other lesions of concern on areas examined.     Impression/Plan:  1. Dystrophic toe nails    Nail clippings of the right and left great toes and left second toe was obtained    CBC, AST, ALT ordered in advance of initiation of oral terbinafine    CC Dr. Bhandari on close of this encounter.  Follow-up PRN for new or changing lesions.       Dr. Christian staffed the patient.    Staff Involved:  Resident(Clemente Geiger)/Staff(as above)    Answers for HPI/ROS submitted by the patient on 7/5/2018   PHQ-2 Score: Incomplete    Staff Physician Comments:   I saw and evaluated the patient with the resident and I agree with the assessment and plan.  I was present for the examination.    Vishal Christian MD, FAAD    Departments of Internal Medicine and Dermatology  [facility]  [phone]

## 2018-07-12 LAB
BACTERIA SPEC CULT: NORMAL
SPECIMEN SOURCE: NORMAL

## 2018-07-13 LAB — COPATH REPORT: NORMAL

## 2018-07-16 DIAGNOSIS — B35.1 ONYCHOMYCOSIS: Primary | ICD-10-CM

## 2018-07-16 RX ORDER — TERBINAFINE HYDROCHLORIDE 250 MG/1
250 TABLET ORAL DAILY
Qty: 45 TABLET | Refills: 0 | Status: SHIPPED | OUTPATIENT
Start: 2018-07-16 | End: 2018-07-16

## 2018-07-20 ENCOUNTER — OFFICE VISIT (OUTPATIENT)
Dept: RHEUMATOLOGY | Facility: CLINIC | Age: 43
End: 2018-07-20
Attending: INTERNAL MEDICINE
Payer: COMMERCIAL

## 2018-07-20 VITALS
BODY MASS INDEX: 28.07 KG/M2 | HEIGHT: 68 IN | OXYGEN SATURATION: 96 % | DIASTOLIC BLOOD PRESSURE: 88 MMHG | HEART RATE: 90 BPM | SYSTOLIC BLOOD PRESSURE: 130 MMHG | TEMPERATURE: 97.7 F | WEIGHT: 185.2 LBS

## 2018-07-20 DIAGNOSIS — M32.9 SYSTEMIC LUPUS ERYTHEMATOSUS, UNSPECIFIED SLE TYPE, UNSPECIFIED ORGAN INVOLVEMENT STATUS (H): ICD-10-CM

## 2018-07-20 DIAGNOSIS — M32.9 SYSTEMIC LUPUS ERYTHEMATOSUS, UNSPECIFIED SLE TYPE, UNSPECIFIED ORGAN INVOLVEMENT STATUS (H): Primary | ICD-10-CM

## 2018-07-20 DIAGNOSIS — Z51.81 ENCOUNTER FOR MEDICATION MONITORING: ICD-10-CM

## 2018-07-20 LAB
ALBUMIN SERPL-MCNC: 4.2 G/DL (ref 3.4–5)
ALBUMIN UR-MCNC: NEGATIVE MG/DL
ALP SERPL-CCNC: 62 U/L (ref 40–150)
ALT SERPL W P-5'-P-CCNC: 152 U/L (ref 0–50)
ANION GAP SERPL CALCULATED.3IONS-SCNC: 7 MMOL/L (ref 3–14)
APPEARANCE UR: CLEAR
AST SERPL W P-5'-P-CCNC: 45 U/L (ref 0–45)
BASOPHILS # BLD AUTO: 0 10E9/L (ref 0–0.2)
BASOPHILS NFR BLD AUTO: 0.5 %
BILIRUB SERPL-MCNC: 0.4 MG/DL (ref 0.2–1.3)
BILIRUB UR QL STRIP: NEGATIVE
BUN SERPL-MCNC: 12 MG/DL (ref 7–30)
CALCIUM SERPL-MCNC: 9.1 MG/DL (ref 8.5–10.1)
CHLORIDE SERPL-SCNC: 102 MMOL/L (ref 94–109)
CO2 SERPL-SCNC: 29 MMOL/L (ref 20–32)
COLOR UR AUTO: YELLOW
CREAT SERPL-MCNC: 0.79 MG/DL (ref 0.52–1.04)
CREAT UR-MCNC: 23 MG/DL
CRP SERPL-MCNC: <2.9 MG/L (ref 0–8)
DIFFERENTIAL METHOD BLD: NORMAL
EOSINOPHIL # BLD AUTO: 0 10E9/L (ref 0–0.7)
EOSINOPHIL NFR BLD AUTO: 0 %
ERYTHROCYTE [DISTWIDTH] IN BLOOD BY AUTOMATED COUNT: 12.8 % (ref 10–15)
ERYTHROCYTE [SEDIMENTATION RATE] IN BLOOD BY WESTERGREN METHOD: 7 MM/H (ref 0–20)
GFR SERPL CREATININE-BSD FRML MDRD: 79 ML/MIN/1.7M2
GLUCOSE SERPL-MCNC: 96 MG/DL (ref 70–99)
GLUCOSE UR STRIP-MCNC: NEGATIVE MG/DL
HCT VFR BLD AUTO: 43.9 % (ref 35–47)
HGB BLD-MCNC: 14.5 G/DL (ref 11.7–15.7)
HGB UR QL STRIP: ABNORMAL
IMM GRANULOCYTES # BLD: 0.1 10E9/L (ref 0–0.4)
IMM GRANULOCYTES NFR BLD: 1.2 %
KETONES UR STRIP-MCNC: NEGATIVE MG/DL
LEUKOCYTE ESTERASE UR QL STRIP: NEGATIVE
LYMPHOCYTES # BLD AUTO: 1 10E9/L (ref 0.8–5.3)
LYMPHOCYTES NFR BLD AUTO: 13 %
MCH RBC QN AUTO: 31.3 PG (ref 26.5–33)
MCHC RBC AUTO-ENTMCNC: 33 G/DL (ref 31.5–36.5)
MCV RBC AUTO: 95 FL (ref 78–100)
MONOCYTES # BLD AUTO: 0.2 10E9/L (ref 0–1.3)
MONOCYTES NFR BLD AUTO: 2.4 %
NEUTROPHILS # BLD AUTO: 6.4 10E9/L (ref 1.6–8.3)
NEUTROPHILS NFR BLD AUTO: 82.9 %
NITRATE UR QL: NEGATIVE
NRBC # BLD AUTO: 0 10*3/UL
NRBC BLD AUTO-RTO: 0 /100
PH UR STRIP: 7 PH (ref 5–7)
PLATELET # BLD AUTO: 246 10E9/L (ref 150–450)
POTASSIUM SERPL-SCNC: 3.7 MMOL/L (ref 3.4–5.3)
PROT SERPL-MCNC: 8 G/DL (ref 6.8–8.8)
PROT UR-MCNC: <0.05 G/L
PROT/CREAT 24H UR: NORMAL G/G CR (ref 0–0.2)
RBC # BLD AUTO: 4.64 10E12/L (ref 3.8–5.2)
RBC #/AREA URNS AUTO: 2 /HPF (ref 0–2)
SODIUM SERPL-SCNC: 138 MMOL/L (ref 133–144)
SOURCE: ABNORMAL
SP GR UR STRIP: 1.01 (ref 1–1.03)
SQUAMOUS #/AREA URNS AUTO: <1 /HPF (ref 0–1)
UROBILINOGEN UR STRIP-MCNC: 0 MG/DL (ref 0–2)
WBC # BLD AUTO: 7.8 10E9/L (ref 4–11)
WBC #/AREA URNS AUTO: <1 /HPF (ref 0–5)

## 2018-07-20 PROCEDURE — 86146 BETA-2 GLYCOPROTEIN ANTIBODY: CPT | Performed by: INTERNAL MEDICINE

## 2018-07-20 PROCEDURE — 00000401 ZZHCL STATISTIC THROMBIN TIME NC: Performed by: INTERNAL MEDICINE

## 2018-07-20 PROCEDURE — 86160 COMPLEMENT ANTIGEN: CPT | Performed by: INTERNAL MEDICINE

## 2018-07-20 PROCEDURE — 84156 ASSAY OF PROTEIN URINE: CPT | Performed by: INTERNAL MEDICINE

## 2018-07-20 PROCEDURE — 85730 THROMBOPLASTIN TIME PARTIAL: CPT | Performed by: INTERNAL MEDICINE

## 2018-07-20 PROCEDURE — 85652 RBC SED RATE AUTOMATED: CPT | Performed by: INTERNAL MEDICINE

## 2018-07-20 PROCEDURE — 81001 URINALYSIS AUTO W/SCOPE: CPT | Performed by: INTERNAL MEDICINE

## 2018-07-20 PROCEDURE — 86140 C-REACTIVE PROTEIN: CPT | Performed by: INTERNAL MEDICINE

## 2018-07-20 PROCEDURE — 00000167 ZZHCL STATISTIC INR NC: Performed by: INTERNAL MEDICINE

## 2018-07-20 PROCEDURE — 82306 VITAMIN D 25 HYDROXY: CPT | Performed by: INTERNAL MEDICINE

## 2018-07-20 PROCEDURE — 80053 COMPREHEN METABOLIC PANEL: CPT | Performed by: INTERNAL MEDICINE

## 2018-07-20 PROCEDURE — 86225 DNA ANTIBODY NATIVE: CPT | Performed by: INTERNAL MEDICINE

## 2018-07-20 PROCEDURE — 36415 COLL VENOUS BLD VENIPUNCTURE: CPT | Performed by: INTERNAL MEDICINE

## 2018-07-20 PROCEDURE — 85613 RUSSELL VIPER VENOM DILUTED: CPT | Performed by: INTERNAL MEDICINE

## 2018-07-20 PROCEDURE — G0463 HOSPITAL OUTPT CLINIC VISIT: HCPCS | Mod: ZF

## 2018-07-20 PROCEDURE — 85025 COMPLETE CBC W/AUTO DIFF WBC: CPT | Performed by: INTERNAL MEDICINE

## 2018-07-20 RX ORDER — AZATHIOPRINE 50 MG/1
50 TABLET ORAL DAILY
Qty: 30 TABLET | Refills: 1 | Status: SHIPPED | OUTPATIENT
Start: 2018-07-20 | End: 2019-08-21

## 2018-07-20 ASSESSMENT — PAIN SCALES - GENERAL: PAINLEVEL: WORST PAIN (10)

## 2018-07-20 NOTE — LETTER
Patient:  Patricia Hall  :   1975  MRN:     [id number]        Ms.Jennifer Marta Hall  [address] [place] [address]        2018    Dear Ms.Getz Hall,    We are writing to inform you of your test results. Stable labs (including negative lupus and anti-phospholipid serology) except elevated ALT (liver test). This abnormal test could be due to oral contraceptives (birth control pills), recommend to re-check it in 2 weeks, you should be off birth control pills, correct? Don't start the imuran till this liver test goes back to normal. I ordered the liver tests.      Results for orders placed or performed in visit on 18   CBC with platelets differential   Result Value Ref Range    WBC 7.8 4.0 - 11.0 10e9/L    RBC Count 4.64 3.8 - 5.2 10e12/L    Hemoglobin 14.5 11.7 - 15.7 g/dL    Hematocrit 43.9 35.0 - 47.0 %    MCV 95 78 - 100 fl    MCH 31.3 26.5 - 33.0 pg    MCHC 33.0 31.5 - 36.5 g/dL    RDW 12.8 10.0 - 15.0 %    Platelet Count 246 150 - 450 10e9/L    Diff Method Automated Method     % Neutrophils 82.9 %    % Lymphocytes 13.0 %    % Monocytes 2.4 %    % Eosinophils 0.0 %    % Basophils 0.5 %    % Immature Granulocytes 1.2 %    Nucleated RBCs 0 0 /100    Absolute Neutrophil 6.4 1.6 - 8.3 10e9/L    Absolute Lymphocytes 1.0 0.8 - 5.3 10e9/L    Absolute Monocytes 0.2 0.0 - 1.3 10e9/L    Absolute Eosinophils 0.0 0.0 - 0.7 10e9/L    Absolute Basophils 0.0 0.0 - 0.2 10e9/L    Abs Immature Granulocytes 0.1 0 - 0.4 10e9/L    Absolute Nucleated RBC 0.0    Complement C3   Result Value Ref Range    Complement C3 101 76 - 169 mg/dL   Complement C4   Result Value Ref Range    Complement C4 16 15 - 50 mg/dL   Comprehensive metabolic panel   Result Value Ref Range    Sodium 138 133 - 144 mmol/L    Potassium 3.7 3.4 - 5.3 mmol/L    Chloride 102 94 - 109 mmol/L    Carbon Dioxide 29 20 - 32 mmol/L    Anion Gap 7 3 - 14 mmol/L    Glucose 96 70 - 99 mg/dL    Urea Nitrogen 12 7 - 30  mg/dL    Creatinine 0.79 0.52 - 1.04 mg/dL    GFR Estimate 79 >60 mL/min/1.7m2    GFR Estimate If Black >90 >60 mL/min/1.7m2    Calcium 9.1 8.5 - 10.1 mg/dL    Bilirubin Total 0.4 0.2 - 1.3 mg/dL    Albumin 4.2 3.4 - 5.0 g/dL    Protein Total 8.0 6.8 - 8.8 g/dL    Alkaline Phosphatase 62 40 - 150 U/L     (H) 0 - 50 U/L    AST 45 0 - 45 U/L   CRP inflammation   Result Value Ref Range    CRP Inflammation <2.9 0.0 - 8.0 mg/L   Erythrocyte sedimentation rate auto   Result Value Ref Range    Sed Rate 7 0 - 20 mm/h   DNA double stranded antibodies   Result Value Ref Range    DNA-ds 1 <10 IU/mL   Routine UA with micro reflex to culture   Result Value Ref Range    Color Urine Yellow     Appearance Urine Clear     Glucose Urine Negative NEG^Negative mg/dL    Bilirubin Urine Negative NEG^Negative    Ketones Urine Negative NEG^Negative mg/dL    Specific Gravity Urine 1.006 1.003 - 1.035    Blood Urine Small (A) NEG^Negative    pH Urine 7.0 5.0 - 7.0 pH    Protein Albumin Urine Negative NEG^Negative mg/dL    Urobilinogen mg/dL 0.0 0.0 - 2.0 mg/dL    Nitrite Urine Negative NEG^Negative    Leukocyte Esterase Urine Negative NEG^Negative    Source Midstream Urine     WBC Urine <1 0 - 5 /HPF    RBC Urine 2 0 - 2 /HPF    Squamous Epithelial /HPF Urine <1 0 - 1 /HPF   Protein  random urine with Creat Ratio   Result Value Ref Range    Protein Random Urine <0.05 g/L    Protein Total Urine g/gr Creatinine Unable to calculate due to low value 0 - 0.2 g/g Cr   Vitamin D Deficiency   Result Value Ref Range    Vitamin D Deficiency screening 51 20 - 75 ug/L   Beta 2 Glycoprotein 1 Antibody IgA   Result Value Ref Range    Beta 2 Glycoprotein 1 Antibody IgA 0.6 <7 U/mL   Beta 2 Glycoprotein 1 Antibody IgG   Result Value Ref Range    Beta 2 Glycoprotein 1 Antibody IgG 1.2 <7 U/mL   Beta 2 Glycoprotein 1 Antibody IgM   Result Value Ref Range    Beta 2 Glycoprotein 1 Antibody IgM <0.9 <7 U/mL   Lupus Anticoagulant Panel   Result Value Ref  Range    Lupus Result Negative NEG^Negative   Creatinine urine calculation only   Result Value Ref Range    Creatinine Urine 23 mg/dL       Josh Roy MD

## 2018-07-20 NOTE — LETTER
7/20/2018      RE: Patrciia Hall  [address]  [place] [address]       Rheumatology Consult Note    Reason for referral: SLE, 2nd opinion     Referring physician: Riaz Bhandari    CC: SLE with active flares    First Consult: 10/12/2017    Today's Date of Service: 7/20/2018    Original HPI (10/12/2017):  Patricia Hall is a 42 year old female who was referred to our clinic for evaluation and management of her SLE. The patient has been following with Dr. Mao for her SLE    History obtain from chart review and patient interview    Her lupus symptoms first started in during high school including fatigue and rash. She was diagnosed with lupus in early 2000s and she was in graduate school and presented with a few years of arthralgias involving knees and ankles and hands.  She had developed new onset Raynaud's phenomenon in which her fingers turned white in the cold but no digital sores.  VINITA was 1:160.  All specific autoantibodies and rheumatoid serologies negative.  She had a rash on her face, including cheeks and bridge of her nose.  She followed with Dr. Tejal Mayen in Dermatology.  A biopsy of a lesion from the nose was non-diagnostic.  Diagnosis was earlysigns of cutaneous lupus versus acne rosacea.  She was treated with Elidel cream.  She reported intermittent oral ulcers and significant fatigue as well as intermittent episodes of hair loss.  She was started on prednisone in 2003 along with Plaquenil.  She has been maintained on prednisone 5 mg q day with intermittent bursts up as high as 30 mg for a short time.  She has found it very difficult to taper off of prednisone because she gets flare-ups of skin rash, arthralgias, and fatigue. Patient reports significant symptoms during the summers and reports significant flares this summer with photosensitivity with face rash,  Fatigue and join pain (knees and toes and hips).  She had fevers, mouth sores  And also reports  increased hair loss.  Increased pain with walking and morning stiffness with Fibromyalgia burning in the morning. Currently taking prednisone 10 mg qd and plaquenil 200 mg BID.    Patient reports complicated fertility/OBGYN history with stage four endometriosis, fibroids and one pregnancy resulting in a miscarriage. Three rounds of IVF with one banked viable egg. She is currently undergoing fertility evaluation.  She has been working with infertility specialist for years. She currently has one viable egg and would like to undergo one more episode of IVF. She was seen by an autoimmune OB/GYN specialist in [place] (Daya Frost 02/17) for extensive testing. She was found to be heterozygous for MTHFR mutation. She is now taking Matanx (L-methyl folate). She has noticed less bruising since she started this. Metformin was recommended, but it upsets her stomach. DHEA was recommended, but she is not taking it. Her thyroid studies were normal, but she was started on Synthroid 25  g daily for the TSH to be less than 2.0. It was recommended that she take Lovenox prior to IFV and throughout the pregnancy to avoid risk of miscarriage. It was also recommended that she be treated with IVIG prior to IVF. She states she was also seen at the 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  She plans to proceeded further with in vitro plans, but wants to get better control of her flaring lupus and concerns for IVF treatments/hormones.     Interval HPI (7/20/2018):  Repeatedly ill during the winter with both URIs and flares of disease (joint pain, malaise, fatigue). Still undergoing IVF care via MFM and reproductive immunology. Did not initiate azathioprine following previous visit with Dr. Roy due to worry over negative effects on pregnancy. Currently undergoing medication treatment for IVF, is picking up medications this weekend for stimulation.    Symptomatically, her recent flares include  "fatigue, flu-like symptoms (fever, chills, sweats), polyarticular joint pain (fingers, toes, feet, ankles, wrists, elbows).    Recently, she has experienced further hair loss (clumps in shower), ulcers on buccal mucosa (now resolved), fatigue, malaise, significant B/L hip pain (worse from previous, approx 1 year). Specifically, it is constant deep joint pain in hips, would say 8/10 in severity when it occurs during movement. Has been seriously disrupting her daily activities. Has been using tylenol to control pain without complete relief.     Has seen Reproductive Immunology in the interim, has undergone two rounds of IVIG (believed to help with NK cell and cytokine activity in embryo implantation), first IVIG May 14th, then second was July 16th. Had flu-like symptoms with first infusion (HA, myalgias, malaise, felt \"gross\", lower back pain, neck pain). During the 2nd infusion, they slowed infusion, took benadryl/tylenol/upped prednisone to 20 mg she did not experience SEs with this. Overall, felt that IVIG improved her lupus symptoms globally. Short-lived relief. Plan is to use IVIG monthly with monitoring of cytokine levels and NK cell counts.     Currently on 15 mg of prednisone chronically, > 1 year. Today, she would say her disease is mild-moderately active in spite of the 15 mg prednisone. Currently on 400-500 U of vitamin D. Taking 1000 mg calcium.     ROS:  (-) pleurisy, sob, cough, hematuria, dysuria, eye redness, nose bleeds, easy bruising, malar rash  (+) alternating diarrhea/constipation, dry eye, dry mouth, palatal ulcers/petechiae    Is interested in discussing SLE management (Imuran) while pursuing IVF. Will be undergoing planning and another round of IVF stimulation in August.         Past Medical History:   Diagnosis Date     Allergy, unspecified not elsewhere classified      Endometriosis      Fibromyalgia      Migraine without aura, with intractable migraine, so stated, without mention of status " migrainosus      Myalgia and myositis, unspecified      Systemic lupus erythematosus (H)      Past Surgical History:   Procedure Laterality Date     ORTHOPEDIC SURGERY       SURGICAL HISTORY OF -   1986    left elbow fracture repair     Family History   Problem Relation Age of Onset     Diabetes Maternal Grandfather      Lipids Mother      Skin Cancer Paternal Grandmother      Melanoma No family hx of      Social History     Social History     Marital status:      Spouse name: N/A     Number of children: N/A     Years of education: N/A     Occupational History     Not on file.     Social History Main Topics     Smoking status: Never Smoker     Smokeless tobacco: Never Used     Alcohol use Yes      Comment: occ.- 2/week     Drug use: No     Sexual activity: Yes     Partners: Male     Birth control/ protection: Condom     Other Topics Concern     Not on file     Social History Narrative     Patient Active Problem List   Diagnosis     Insomnia     Systemic lupus erythematosus (H)     Refractory migraine without aura     Allergies   Allergen Reactions     Cherry Anaphylaxis     Dairy Aid  [Lactase]      Other reaction(s): Arthralgia (Joint Pain)     Gluten Meal      Other reaction(s): Abdominal Pain     No Clinical Screening - See Comments Anaphylaxis and Itching     Pistachio Nut Extract Skin Test Anaphylaxis     Brassica Oleracea Italica      Other reaction(s): Abdominal Pain  Other reaction(s): Abdominal Pain     Penicillins Hives and Rash     Sulfa Drugs Hives and Rash       Outpatient Encounter Prescriptions as of 7/20/2018   Medication Sig Dispense Refill     RACHNA SYRUP PO        Acetaminophen (TYLENOL PO) Take  by mouth.         AMBIEN 10 MG OR TABS 1 po HS, prn 20 0     calcium carbonate (OS-VAHID 500 MG Kiana. CA) 1250 MG tablet Take 1 tablet by mouth daily       hydroxychloroquine (PLAQUENIL) 200 MG tablet Take 1 tablet (200 mg) by mouth 2 times daily 60 tablet 5     hydroxychloroquine (PLAQUENIL) 200 MG  tablet Take 1 tablet (200 mg) by mouth 2 times daily BRAND ONLY, she can not tolerate generic 60 tablet 1     L-Methylfolate-Algae-B12-B6 (METANX PO) Take 1,000 mg by mouth daily       LEVOTHYROXINE SODIUM PO Take 0.25 mg by mouth daily       METFORMIN HCL PO Take 500 mg by mouth 2 times daily (with meals)       MULTIVITAMIN TABS   OR   0     norethindrone-ethinyl estradiol (ORTHO-NOVUM 1-35 TAB,NORTREL 1-35 TAB) 1-35 MG-MCG per tablet Take 1 tablet by mouth daily       predniSONE (DELTASONE) 5 MG tablet Take 3 tablets (15 mg) by mouth See Admin Instructions 90 tablet 1     PREDNISONE PO Take 1 mg by mouth daily        promethazine (PHENERGAN) 25 MG tablet TK 1 T PO  Q 6 H PRN N  0     SPIRULINA PO Take by mouth daily       terbinafine (LAMISIL) 1 % cream Apply to feet once daily for 2 weeks then once weekly for maintenance 42 g 3     triamcinolone (KENALOG) 0.1 % paste Take by mouth 2 times daily 5 g 3     UNABLE TO FIND DISSOLVE 1 TO 4 LOZENGES UNDER TONGUE AS NEEDED DAILY  0     VITAMIN D, CHOLECALCIFEROL, PO Take 5,000 Units by mouth daily       No facility-administered encounter medications on file as of 7/20/2018.      ROS:  A comprehensive ROS was done, positives are per HPI.    Her records were reviewed.    Results for orders placed or performed in visit on 07/05/18   Dermatological path order and indications   Result Value Ref Range    Copath Report       Patient Name: ALEXA QUIGLEY  MR#: [id number]  Specimen #: [id number]  Collected: 7/5/2018  Received: 7/6/2018  Reported: 7/13/2018 18:33  Ordering Phy(s): DANIELLE ONTIVEROS  Additional Phy(s): LROENA GODOY    For improved result formatting, select 'View Enhanced Report Format' under   Linked Documents section.    SPECIMEN(S):  Nail, bilateral great toenails    FINAL DIAGNOSIS:  Nail, bilateral great toenails:  - Oncyhomycosis    I have personally reviewed all specimens  and/or slides, including the   listed special stains, and used  "them  with my medical judgement to determine or confirm the final diagnosis.    Electronically signed out by:  Kennedy Chung    GROSS:  A: The specimen is received in formalin with proper patient   identification, labeled \"bilateral great toenail\".   The specimen consists of multiple segments of yellow thickened now   ranging from 0.2-0.8 cm in greatest  dimension.  It is wrapped and entirely submitted in cassette A1. (Dictated   by: Will PUENTE ASC 2018  03:04 PM)    MICROSCOPIC:  The specimen consists of compacted keratin consistent with nail plate   fragments stained with PAS. Fungal  hyphae are seen invading the keratinaceous nail plate.    CPT Codes:  A: 89895-WF7.P, 38762-MC0.T, 09910-BLM    TESTING LAB LOCATION:  [facility], [address]   [address]  [phone]    COLLECTION SITE:  Client: [facility]  Location: [facility] (B)         Pregnancy: She is . H/o OCP and HRT use, see HPI    Ph.E:    /88  Pulse 90  Temp 97.7  F (36.5  C) (Oral)  Ht 1.727 m (5' 8\")  Wt 84 kg (185 lb 3.2 oz)  SpO2 96%  BMI 28.16 kg/m2    Constitutional: WD/WN. Pleasant, NAD  Eyes: EOM intact, PERRLA, sclera anicteric, conj not injected  HEENT: No oral ulcers or thrush. Normal salivary pool, hair thinning.   Neck: No cervical LAP or thyromegaly  Chest: Clear to auscultation bilaterally  CV: RRR, no murmurs/ rubs or gallops. No edema, clubbing or cyanosis.   GI: Abdomen is soft and non tender. No HSP.  MS: No synovitis. Cool joints. No tenderness of the joints. No  joint deformities. Full ROM of the joints. No nodules. No Jaccoud's deformity.  Skin: Lack of malar rash. No livedo, periungual erythema, alopecia, digital ulcers or nail changes.  Neuro: A&O x 3. Grossly non focal, NL DTR, sensation intact, muscular power 5/5 in all ext  Psych: reduced affect and diminished mood    Assessment/Plan " (7/20/2018):  1 - Known SLE, dx in 2000s (VINITA: 1:80, dsDNA +, Smith negative, normal complements, joint pains, malar rash, oral ulcers), usually flares approximately monthly with joint pains and malar rash. Currently, feels that disease is mild-moderately active. Has been on chronic 15 mg every day prednisone since Fall of 2017. Beginning stimulation this weekend in preparation for IVF. Planning for PGS normal embryo implantation planning on August, 9th 2018.   - Lupus monitoring labs today  - Continue hydroxychloroquine 200 mg daily, reminded to have eye exam (will have appt next Monday)  - Continue prednisone taper at 15 mg, will taper to 12.5 mg once next round of stimulation is complete  - Yearly eye exam while on HCQ (appt next Monday)  - Initiate 50 mg azathioprine daily (TPMT testing complete, NL), will begin drug monitoring labs 4 wk after start of AZA    PLAN: RTC in 1 month    MEDICATION CHANGES: initiate 50 mg azathioprine    Viraj Lauren MD  PGY-1, Internal Medicine  p [phone]      This patient was seen and discussed with Dr. Roy, and she is in agreement with the assessment and plan.     Attending Note: I saw and evaluated the patient with Dr. Lauren. I agree with the assessment and plan.    Josh Roy MD    Orders Placed This Encounter   Procedures     CBC with platelets differential     Complement C3     Complement C4     Comprehensive metabolic panel     CRP inflammation     Erythrocyte sedimentation rate auto     DNA double stranded antibodies     Routine UA with micro reflex to culture     Protein  random urine with Creat Ratio     Creatinine random urine     Vitamin D Deficiency     Beta 2 Glycoprotein 1 Antibody IgA     Beta 2 Glycoprotein 1 Antibody IgG     Beta 2 Glycoprotein 1 Antibody IgM     Lupus Anticoagulant Panel           Josh Roy MD

## 2018-07-20 NOTE — MR AVS SNAPSHOT
After Visit Summary   7/20/2018    Patricia Hall    MRN: [id number]           Patient Information     Date Of Birth          1975        Visit Information        Provider Department      7/20/2018 2:30 PM Josh Roy MD [facility] Rheumatology        Today's Diagnoses     Systemic lupus erythematosus, unspecified SLE type, unspecified organ involvement status (H)    -  1    Encounter for medication monitoring          Care Instructions    Labs today    Start imuran 50 mg a day (safe in pregnancy)    Try to taper prednisone to 12.5 mg a day x 2 weeks then 10 mg a day    Return in a month 8/17 4:30 pm          Follow-ups after your visit        Your next 10 appointments already scheduled     Jul 20, 2018  4:30 PM CDT   Lab with  LAB   [facility] Lab ([facility])    [address] [place] [address]  1st Floor  [facility] [address] [phone]            Aug 17, 2018  4:30 PM CDT   (Arrive by 4:15 PM)   Return Visit with Josh Roy MD   [facility] Rheumatology ([facility])    [address] [place] [address]  [address] [place] [address] [phone]              Future tests that were ordered for you today     Open Future Orders        Priority Expected Expires Ordered    CBC with platelets differential Routine  1/19/2019 7/20/2018    Complement C3 Routine  1/19/2019 7/20/2018    Complement C4 Routine  1/19/2019 7/20/2018    Comprehensive metabolic panel Routine  1/19/2019 7/20/2018    CRP inflammation Routine  1/19/2019 7/20/2018    Erythrocyte sedimentation rate auto Routine  1/19/2019 7/20/2018    DNA double stranded antibodies Routine  1/19/2019 7/20/2018    Routine UA with micro reflex to culture Routine  1/19/2019 7/20/2018    Protein  random urine with Creat Ratio Routine  1/19/2019 7/20/2018    Creatinine random urine Routine  1/19/2019 7/20/2018    Vitamin D Deficiency Routine  1/19/2019 7/20/2018    Beta 2 Glycoprotein 1  "Antibody IgA Routine  1/19/2019 7/20/2018    Beta 2 Glycoprotein 1 Antibody IgG Routine  1/19/2019 7/20/2018    Beta 2 Glycoprotein 1 Antibody IgM Routine  1/19/2019 7/20/2018    Lupus Anticoagulant Panel Routine  1/19/2019 7/20/2018            Who to contact     If you have questions or need follow up information about today's clinic visit or your schedule please contact [facility] RHEUMATOLOGY directly at [phone].  Normal or non-critical lab and imaging results will be communicated to you by [employer], letter or phone within 4 business days after the clinic has received the results. If you do not hear from us within 7 days, please contact the clinic through [employer] or phone. If you have a critical or abnormal lab result, we will notify you by phone as soon as possible.  Submit refill requests through [employer] or call your pharmacy and they will forward the refill request to us. Please allow 3 business days for your refill to be completed.          Additional Information About Your Visit        Prediculous Information     [employer] gives you secure access to your electronic health record. If you see a primary care provider, you can also send messages to your care team and make appointments. If you have questions, please call your primary care clinic.  If you do not have a primary care provider, please call [phone] and they will assist you.        Care EveryWhere ID     This is your Care EveryWhere ID. This could be used by other organizations to access your [place] medical records  [id number]        Your Vitals Were     Pulse Temperature Height Pulse Oximetry BMI (Body Mass Index)       90 97.7  F (36.5  C) (Oral) 1.727 m (5' 8\") 96% 28.16 kg/m2        Blood Pressure from Last 3 Encounters:   07/20/18 130/88   06/14/18 128/85   10/12/17 113/76    Weight from Last 3 Encounters:   07/20/18 84 kg (185 lb 3.2 oz)   06/14/18 81.9 kg (180 lb 9.6 oz)   10/12/17 79.8 kg (176 lb)                 Today's Medication Changes "          These changes are accurate as of 7/20/18  4:21 PM.  If you have any questions, ask your nurse or doctor.               Start taking these medicines.        Dose/Directions    azaTHIOprine 50 MG tablet   Commonly known as:  IMURAN   Used for:  Systemic lupus erythematosus, unspecified SLE type, unspecified organ involvement status (H)   Started by:  Josh Roy MD        Dose:  50 mg   Take 1 tablet (50 mg) by mouth daily   Quantity:  30 tablet   Refills:  1            Where to get your medicines      These medications were sent to [facility] - [place], [place] - [address] [facility] [address]  [address], [place] [address]    Hours:  24-hours Phone:  [phone]     azaTHIOprine 50 MG tablet                Primary Care Provider Office Phone # Fax #    Riaz Bhandari [phone]       [facility] [address] [facility] [address]        Equal Access to Services     SILVIANO JACKSON AH: Hadii aad ku hadasho Soomaali, waaxda luqadaha, qaybta kaalmada adeegyada, waxay idiin hayaan richelle damon ah. So [facility] [phone].    ATENCIÓN: Si habla español, tiene a rosa disposición servicios gratuitos de asistencia lingüística. [facility] [phone].    We comply with applicable federal civil rights laws and [place] laws. We do not discriminate on the basis of race, color, national origin, age, disability, sex, sexual orientation, or gender identity.            Thank you!     Thank you for choosing [facility] RHEUMATOLOGY  for your care. Our goal is always to provide you with excellent care. Hearing back from our patients is one way we can continue to improve our services. Please take a few minutes to complete the written survey that you may receive in the mail after your visit with us. Thank you!             Your Updated Medication List - Protect others around you: Learn how to safely use, store and throw away your medicines at  www.disposemymeds.org.          This list is accurate as of 7/20/18  4:21 PM.  Always use your most recent med list.                   Brand Name Dispense Instructions for use Diagnosis    RACHNA SYRUP PO           AMBIEN 10 MG tablet   Generic drug:  zolpidem     20    1 po HS, prn        azaTHIOprine 50 MG tablet    IMURAN    30 tablet    Take 1 tablet (50 mg) by mouth daily    Systemic lupus erythematosus, unspecified SLE type, unspecified organ involvement status (H)       calcium carbonate 500 MG tablet    OS-VAHID 500 mg [place]. Ca     Take 1 tablet by mouth daily        * hydroxychloroquine 200 MG tablet    PLAQUENIL    60 tablet    Take 1 tablet (200 mg) by mouth 2 times daily BRAND ONLY, she can not tolerate generic    Systemic lupus erythematosus, unspecified SLE type, unspecified organ involvement status (H)       * hydroxychloroquine 200 MG tablet    PLAQUENIL    60 tablet    Take 1 tablet (200 mg) by mouth 2 times daily    Systemic lupus erythematosus, unspecified SLE type, unspecified organ involvement status (H)       LEVOTHYROXINE SODIUM PO      Take 0.25 mg by mouth daily        METANX PO      Take 1,000 mg by mouth daily        METFORMIN HCL PO      Take 500 mg by mouth 2 times daily (with meals)        MULTIVITAMIN TABS   OR           norethindrone-ethinyl estradiol 1-35 MG-MCG per tablet    ORTHO-NOVUM 1-35 TAB,NORTREL 1-35 TAB     Take 1 tablet by mouth daily        * predniSONE 5 MG tablet    DELTASONE    90 tablet    Take 3 tablets (15 mg) by mouth See Admin Instructions    Systemic lupus erythematosus, unspecified SLE type, unspecified organ involvement status (H)       * PREDNISONE PO      Take 1 mg by mouth daily        promethazine 25 MG tablet    PHENERGAN     TK 1 T PO  Q 6 H PRN N    Onychomycosis, Tinea pedis of both feet       SPIRULINA PO      Take by mouth daily        terbinafine 1 % cream    lamISIL    42 g    Apply to feet once daily for 2 weeks then once weekly for maintenance     Onychomycosis, Tinea pedis of both feet       triamcinolone 0.1 % paste    KENALOG    5 g    Take by mouth 2 times daily    Systemic lupus erythematosus, unspecified SLE type, unspecified organ involvement status (H)       TYLENOL PO      Take  by mouth.        UNABLE TO FIND      DISSOLVE 1 TO 4 LOZENGES UNDER TONGUE AS NEEDED DAILY    Onychomycosis, Tinea pedis of both feet       VITAMIN D (CHOLECALCIFEROL) PO      Take 5,000 Units by mouth daily        * Notice:  This list has 4 medication(s) that are the same as other medications prescribed for you. Read the directions carefully, and ask your doctor or other care provider to review them with you.

## 2018-07-20 NOTE — PROGRESS NOTES
Rheumatology Consult Note    Reason for referral: SLE, 2nd opinion     Referring physician: Riaz Bhandari    CC: SLE with active flares    First Consult: 10/12/2017    Today's Date of Service: 7/20/2018    Original HPI (10/12/2017):  Patricia Hall is a 42 year old female who was referred to our clinic for evaluation and management of her SLE. The patient has been following with Dr. Mao for her SLE    History obtain from chart review and patient interview    Her lupus symptoms first started in during high school including fatigue and rash. She was diagnosed with lupus in early 2000s and she was in graduate school and presented with a few years of arthralgias involving knees and ankles and hands.  She had developed new onset Raynaud's phenomenon in which her fingers turned white in the cold but no digital sores.  VINITA was 1:160.  All specific autoantibodies and rheumatoid serologies negative.  She had a rash on her face, including cheeks and bridge of her nose.  She followed with Dr. Tejal Mayen in Dermatology.  A biopsy of a lesion from the nose was non-diagnostic.  Diagnosis was earlysigns of cutaneous lupus versus acne rosacea.  She was treated with Elidel cream.  She reported intermittent oral ulcers and significant fatigue as well as intermittent episodes of hair loss.  She was started on prednisone in 2003 along with Plaquenil.  She has been maintained on prednisone 5 mg q day with intermittent bursts up as high as 30 mg for a short time.  She has found it very difficult to taper off of prednisone because she gets flare-ups of skin rash, arthralgias, and fatigue. Patient reports significant symptoms during the summers and reports significant flares this summer with photosensitivity with face rash,  Fatigue and join pain (knees and toes and hips).  She had fevers, mouth sores  And also reports increased hair loss.  Increased pain with walking and morning stiffness with Fibromyalgia  burning in the morning. Currently taking prednisone 10 mg qd and plaquenil 200 mg BID.    Patient reports complicated fertility/OBGYN history with stage four endometriosis, fibroids and one pregnancy resulting in a miscarriage. Three rounds of IVF with one banked viable egg. She is currently undergoing fertility evaluation.  She has been working with infertility specialist for years. She currently has one viable egg and would like to undergo one more episode of IVF. She was seen by an autoimmune OB/GYN specialist in [place] (Daya Frost 02/17) for extensive testing. She was found to be heterozygous for MTHFR mutation. She is now taking Matanx (L-methyl folate). She has noticed less bruising since she started this. Metformin was recommended, but it upsets her stomach. DHEA was recommended, but she is not taking it. Her thyroid studies were normal, but she was started on Synthroid 25  g daily for the TSH to be less than 2.0. It was recommended that she take Lovenox prior to IFV and throughout the pregnancy to avoid risk of miscarriage. It was also recommended that she be treated with IVIG prior to IVF. 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  She plans to proceeded further with in vitro plans, but wants to get better control of her flaring lupus and concerns for IVF treatments/hormones.     Interval HPI (7/20/2018):  Repeatedly ill during the winter with both URIs and flares of disease (joint pain, malaise, fatigue). Still undergoing IVF care via MFM and reproductive immunology. Did not initiate azathioprine following previous visit with Dr. Roy due to worry over negative effects on pregnancy. Currently undergoing medication treatment for IVF, is picking up medications this weekend for stimulation.    Symptomatically, her recent flares include fatigue, flu-like symptoms (fever, chills, sweats), polyarticular joint pain (fingers, toes,  "feet, ankles, wrists, elbows).    Recently, she has experienced further hair loss (clumps in shower), ulcers on buccal mucosa (now resolved), fatigue, malaise, significant B/L hip pain (worse from previous, approx 1 year). Specifically, it is constant deep joint pain in hips, would say 8/10 in severity when it occurs during movement. Has been seriously disrupting her daily activities. Has been using tylenol to control pain without complete relief.     Has seen Reproductive Immunology in the interim, has undergone two rounds of IVIG (believed to help with NK cell and cytokine activity in embryo implantation), first IVIG May 14th, then second was July 16th. Had flu-like symptoms with first infusion (HA, myalgias, malaise, felt \"gross\", lower back pain, neck pain). During the 2nd infusion, they slowed infusion, took benadryl/tylenol/upped prednisone to 20 mg she did not experience SEs with this. Overall, felt that IVIG improved her lupus symptoms globally. Short-lived relief. Plan is to use IVIG monthly with monitoring of cytokine levels and NK cell counts.     Currently on 15 mg of prednisone chronically, > 1 year. Today, she would say her disease is mild-moderately active in spite of the 15 mg prednisone. Currently on 400-500 U of vitamin D. Taking 1000 mg calcium.     ROS:  (-) pleurisy, sob, cough, hematuria, dysuria, eye redness, nose bleeds, easy bruising, malar rash  (+) alternating diarrhea/constipation, dry eye, dry mouth, palatal ulcers/petechiae    Is interested in discussing SLE management (Imuran) while pursuing IVF. Will be undergoing planning and another round of IVF stimulation in August.         Past Medical History:   Diagnosis Date     Allergy, unspecified not elsewhere classified      Endometriosis      Fibromyalgia      Migraine without aura, with intractable migraine, so stated, without mention of status migrainosus      Myalgia and myositis, unspecified      Systemic lupus erythematosus (H)  "     Past Surgical History:   Procedure Laterality Date     ORTHOPEDIC SURGERY       SURGICAL HISTORY OF -   1986    left elbow fracture repair     Family History   Problem Relation Age of Onset     Diabetes Maternal Grandfather      Lipids Mother      Skin Cancer Paternal Grandmother      Melanoma No family hx of      Social History     Social History     Marital status:      Spouse name: N/A     Number of children: N/A     Years of education: N/A     Occupational History     Not on file.     Social History Main Topics     Smoking status: Never Smoker     Smokeless tobacco: Never Used     Alcohol use Yes      Comment: occ.- 2/week     Drug use: No     Sexual activity: Yes     Partners: Male     Birth control/ protection: Condom     Other Topics Concern     Not on file     Social History Narrative     Patient Active Problem List   Diagnosis     Insomnia     Systemic lupus erythematosus (H)     Refractory migraine without aura     Allergies   Allergen Reactions     Cherry Anaphylaxis     Dairy Aid  [Lactase]      Other reaction(s): Arthralgia (Joint Pain)     Gluten Meal      Other reaction(s): Abdominal Pain     No Clinical Screening - See Comments Anaphylaxis and Itching     Pistachio Nut Extract Skin Test Anaphylaxis     Brassica Oleracea Italica      Other reaction(s): Abdominal Pain  Other reaction(s): Abdominal Pain     Penicillins Hives and Rash     Sulfa Drugs Hives and Rash       Outpatient Encounter Prescriptions as of 7/20/2018   Medication Sig Dispense Refill     RACHNA SYRUP PO        Acetaminophen (TYLENOL PO) Take  by mouth.         AMBIEN 10 MG OR TABS 1 po HS, prn 20 0     calcium carbonate (OS-VAHID 500 MG [place]. [place]) 1250 MG tablet Take 1 tablet by mouth daily       hydroxychloroquine (PLAQUENIL) 200 MG tablet Take 1 tablet (200 mg) by mouth 2 times daily 60 tablet 5     hydroxychloroquine (PLAQUENIL) 200 MG tablet Take 1 tablet (200 mg) by mouth 2 times daily BRAND ONLY, she can not tolerate  generic 60 tablet 1     L-Methylfolate-Algae-B12-B6 (METANX PO) Take 1,000 mg by mouth daily       LEVOTHYROXINE SODIUM PO Take 0.25 mg by mouth daily       METFORMIN HCL PO Take 500 mg by mouth 2 times daily (with meals)       MULTIVITAMIN TABS   OR   0     norethindrone-ethinyl estradiol (ORTHO-NOVUM 1-35 TAB,NORTREL 1-35 TAB) 1-35 MG-MCG per tablet Take 1 tablet by mouth daily       predniSONE (DELTASONE) 5 MG tablet Take 3 tablets (15 mg) by mouth See Admin Instructions 90 tablet 1     PREDNISONE PO Take 1 mg by mouth daily        promethazine (PHENERGAN) 25 MG tablet TK 1 T PO  Q 6 H PRN N  0     SPIRULINA PO Take by mouth daily       terbinafine (LAMISIL) 1 % cream Apply to feet once daily for 2 weeks then once weekly for maintenance 42 g 3     triamcinolone (KENALOG) 0.1 % paste Take by mouth 2 times daily 5 g 3     UNABLE TO FIND DISSOLVE 1 TO 4 LOZENGES UNDER TONGUE AS NEEDED DAILY  0     VITAMIN D, CHOLECALCIFEROL, PO Take 5,000 Units by mouth daily       No facility-administered encounter medications on file as of 7/20/2018.      ROS:  A comprehensive ROS was done, positives are per HPI.    Her records were reviewed.    Results for orders placed or performed in visit on 07/05/18   Dermatological path order and indications   Result Value Ref Range    Copath Report       Patient Name: ALEXA QUIGLEY  MR#: [id number]  Specimen #: [id number]  Collected: 7/5/2018  Received: 7/6/2018  Reported: 7/13/2018 18:33  Ordering Phy(s): DANIELLE ONTIVEROS  Additional Phy(s): LORENA GODOY    For improved result formatting, select 'View Enhanced Report Format' under   Linked Documents section.    SPECIMEN(S):  Nail, bilateral great toenails    FINAL DIAGNOSIS:  Nail, bilateral great toenails:  - Oncyhomycosis    I have personally reviewed all specimens  and/or slides, including the   listed special stains, and used them  with my medical judgement to determine or confirm the final diagnosis.    Electronically  "signed out by:  Kennedy Chung    GROSS:  A: The specimen is received in formalin with proper patient   identification, labeled \"bilateral great toenail\".   The specimen consists of multiple segments of yellow thickened now   ranging from 0.2-0.8 cm in greatest  dimension.  It is wrapped and entirely submitted in cassette A1. (Dictated   by: Will PUENTE [facility] 2018  03:04 PM)    MICROSCOPIC:  The specimen consists of compacted keratin consistent with nail plate   fragments stained with PAS. Fungal  hyphae are seen invading the keratinaceous nail plate.    CPT Codes:  A: 40812-TX5.P, 51525-QQ1.T, 80673-AZN    TESTING LAB LOCATION:  [facility], [address]   [address] [phone]    COLLECTION SITE:  Client: [facility]  Location: [facility] ([facility])         Pregnancy: She is . H/o OCP and HRT use, see HPI    Ph.E:    /88  Pulse 90  Temp 97.7  F (36.5  C) (Oral)  Ht 1.727 m (5' 8\")  Wt 84 kg (185 lb 3.2 oz)  SpO2 96%  BMI 28.16 kg/m2    Constitutional: WD/WN. Pleasant, NAD  Eyes: EOM intact, PERRLA, sclera anicteric, conj not injected  HEENT: No oral ulcers or thrush. Normal salivary pool, hair thinning.   Neck: No cervical LAP or thyromegaly  Chest: Clear to auscultation bilaterally  CV: RRR, no murmurs/ rubs or gallops. No edema, clubbing or cyanosis.   GI: Abdomen is soft and non tender. No HSP.  MS: No synovitis. Cool joints. No tenderness of the joints. No  joint deformities. Full ROM of the joints. No nodules. No Jaccoud's deformity.  Skin: Lack of malar rash. No livedo, periungual erythema, alopecia, digital ulcers or nail changes.  Neuro: A&O x 3. Grossly non focal, NL DTR, sensation intact, muscular power 5/5 in all ext  Psych: reduced affect and diminished mood    Assessment/Plan (2018):  1 - Known SLE, dx in s (VINITA: 1:80, dsDNA +, Smith negative, normal " complements, joint pains, malar rash, oral ulcers), usually flares approximately monthly with joint pains and malar rash. Currently, feels that disease is mild-moderately active. Has been on chronic 15 mg every day prednisone since Fall of 2017. Beginning stimulation this weekend in preparation for IVF. Planning for PGS normal embryo implantation planning on August, 9th 2018.   - Lupus monitoring labs today  - Continue hydroxychloroquine 200 mg daily, reminded to have eye exam (will have appt next Monday)  - Continue prednisone taper at 15 mg, will taper to 12.5 mg once next round of stimulation is complete  - Yearly eye exam while on HCQ (appt next Monday)  - Initiate 50 mg azathioprine daily (TPMT testing complete, NL), will begin drug monitoring labs 4 wk after start of AZA    PLAN: RTC in 1 month    MEDICATION CHANGES: initiate 50 mg azathioprine    Viraj Lauren MD  PGY-1, Internal Medicine  p [phone]      This patient was seen and discussed with Dr. Roy, and she is in agreement with the assessment and plan.     Attending Note: I saw and evaluated the patient with Dr. Lauren. I agree with the assessment and plan.    Josh Roy MD    Orders Placed This Encounter   Procedures     CBC with platelets differential     Complement C3     Complement C4     Comprehensive metabolic panel     CRP inflammation     Erythrocyte sedimentation rate auto     DNA double stranded antibodies     Routine UA with micro reflex to culture     Protein  random urine with Creat Ratio     Creatinine random urine     Vitamin D Deficiency     Beta 2 Glycoprotein 1 Antibody IgA     Beta 2 Glycoprotein 1 Antibody IgG     Beta 2 Glycoprotein 1 Antibody IgM     Lupus Anticoagulant Panel

## 2018-07-20 NOTE — PATIENT INSTRUCTIONS
Labs today    Start imuran 50 mg a day (safe in pregnancy)    Try to taper prednisone to 12.5 mg a day x 2 weeks then 10 mg a day    Return in a month 8/17 4:30 pm

## 2018-07-22 LAB — DEPRECATED CALCIDIOL+CALCIFEROL SERPL-MC: 51 UG/L (ref 20–75)

## 2018-07-23 ENCOUNTER — TRANSFERRED RECORDS (OUTPATIENT)
Dept: HEALTH INFORMATION MANAGEMENT | Facility: CLINIC | Age: 43
End: 2018-07-23

## 2018-07-23 LAB
B2 GLYCOPROT1 IGA SER-ACNC: 0.6 U/ML
B2 GLYCOPROT1 IGG SERPL IA-ACNC: 1.2 U/ML
B2 GLYCOPROT1 IGM SERPL IA-ACNC: <0.9 U/ML
C3 SERPL-MCNC: 101 MG/DL (ref 76–169)
C4 SERPL-MCNC: 16 MG/DL (ref 15–50)
DSDNA AB SER-ACNC: 1 IU/ML

## 2018-07-24 LAB — LA PPP-IMP: NEGATIVE

## 2018-07-28 ENCOUNTER — HOSPITAL ENCOUNTER (OUTPATIENT)
Dept: LAB | Facility: CLINIC | Age: 43
Discharge: HOME | End: 2018-07-28
Payer: COMMERCIAL

## 2018-07-28 ENCOUNTER — MEDICAL CORRESPONDENCE (OUTPATIENT)
Dept: HEALTH INFORMATION MANAGEMENT | Facility: CLINIC | Age: 43
End: 2018-07-28

## 2018-07-28 DIAGNOSIS — N97.9 PRIMARY FEMALE INFERTILITY: ICD-10-CM

## 2018-07-28 LAB
ESTRADIOL SERPL-MCNC: 141 PG/ML
FSH SERPL-ACNC: 7.1 IU/L

## 2018-07-28 PROCEDURE — 82670 ASSAY OF TOTAL ESTRADIOL: CPT

## 2018-07-28 PROCEDURE — 36415 COLL VENOUS BLD VENIPUNCTURE: CPT

## 2018-07-28 PROCEDURE — 83001 ASSAY OF GONADOTROPIN (FSH): CPT

## 2018-07-31 ENCOUNTER — TELEPHONE (OUTPATIENT)
Dept: RHEUMATOLOGY | Facility: CLINIC | Age: 43
End: 2018-07-31

## 2018-07-31 DIAGNOSIS — R79.89 ABNORMAL LIVER FUNCTION TEST: Primary | ICD-10-CM

## 2018-07-31 NOTE — TELEPHONE ENCOUNTER
Notes      Josh Roy MD at 7/31/2018 11:33 AM      Status: Signed            Stable labs (including negative lupus and anti-phospholipid serology) except elevated ALT (liver test). This abnormal test could be due to oral contraceptives (birth control pills), recommend to re-check it in 2 weeks, you should be off birth control pills, correct? Don't start the imuran till this liver test goes back to normal. I ordered the liver test.           Left message requesting return call.    Mickie Solis RN  Rheumatology Clinic

## 2018-07-31 NOTE — PROGRESS NOTES
Stable labs (including negative lupus and anti-phospholipid serology) except elevated ALT (liver test). This abnormal test could be due to oral contraceptives (birth control pills), recommend to re-check it in 2 weeks, you should be off birth control pills, correct? Don't start the imuran till this liver test goes back to normal. I ordered the liver test.

## 2018-08-02 ENCOUNTER — TELEPHONE (OUTPATIENT)
Dept: RHEUMATOLOGY | Facility: CLINIC | Age: 43
End: 2018-08-02

## 2018-08-02 NOTE — TELEPHONE ENCOUNTER
[facility] Call Center    Phone Message    May a detailed message be left on voicemail: yes    Reason for Call: Other: Pt was calling to discuss lab results with Mickie.  Pt said to send a message over to Mickie to have her call pt and she will also send a mychart msg.  Please have Mickie follow up with pt. Thank you!     Action Taken: Other: [facility] Rheum Adult [facility]

## 2018-08-02 NOTE — TELEPHONE ENCOUNTER
Pt read message in My Chart on labs.  Will send to her in My Chart message to see if I can hear back from her.  Will complete in My Chart Encounter.    Mickie Solis RN  Rheumatology Clinic

## 2018-11-02 DIAGNOSIS — R79.89 ABNORMAL LIVER FUNCTION TEST: ICD-10-CM

## 2018-11-02 DIAGNOSIS — M32.9 SYSTEMIC LUPUS ERYTHEMATOSUS, UNSPECIFIED SLE TYPE, UNSPECIFIED ORGAN INVOLVEMENT STATUS (H): ICD-10-CM

## 2018-11-02 DIAGNOSIS — L60.3 ONYCHODYSTROPHY: ICD-10-CM

## 2018-11-02 LAB
ALBUMIN SERPL-MCNC: 3.8 G/DL (ref 3.4–5)
ALP SERPL-CCNC: 41 U/L (ref 40–150)
ALT SERPL W P-5'-P-CCNC: 39 U/L (ref 0–50)
AST SERPL W P-5'-P-CCNC: 28 U/L (ref 0–45)
BILIRUB DIRECT SERPL-MCNC: 0.1 MG/DL (ref 0–0.2)
BILIRUB SERPL-MCNC: 0.5 MG/DL (ref 0.2–1.3)
ERYTHROCYTE [DISTWIDTH] IN BLOOD BY AUTOMATED COUNT: 12.1 % (ref 10–15)
HCT VFR BLD AUTO: 42.8 % (ref 35–47)
HGB BLD-MCNC: 14.2 G/DL (ref 11.7–15.7)
MCH RBC QN AUTO: 31.6 PG (ref 26.5–33)
MCHC RBC AUTO-ENTMCNC: 33.2 G/DL (ref 31.5–36.5)
MCV RBC AUTO: 95 FL (ref 78–100)
PLATELET # BLD AUTO: 232 10E9/L (ref 150–450)
PROT SERPL-MCNC: 7.9 G/DL (ref 6.8–8.8)
RBC # BLD AUTO: 4.5 10E12/L (ref 3.8–5.2)
WBC # BLD AUTO: 8.6 10E9/L (ref 4–11)

## 2018-11-02 NOTE — LETTER
Patient:  Patricia Hall  :   1975  MRN:     [id number]        Ms.Jennifer Marta Hall  [address] [place] [address]        2018    Dear Ms.Getz Hall,    We are writing to inform you of your test results. Normal labs which is good news.       Results for orders placed or performed in visit on 18   Complement C3   Result Value Ref Range    Complement C3 97 76 - 169 mg/dL   Complement C4   Result Value Ref Range    Complement C4 17 15 - 50 mg/dL   DNA double stranded antibodies   Result Value Ref Range    DNA-ds 1 <10 IU/mL   CBC with platelets   Result Value Ref Range    WBC 8.6 4.0 - 11.0 10e9/L    RBC Count 4.50 3.8 - 5.2 10e12/L    Hemoglobin 14.2 11.7 - 15.7 g/dL    Hematocrit 42.8 35.0 - 47.0 %    MCV 95 78 - 100 fl    MCH 31.6 26.5 - 33.0 pg    MCHC 33.2 31.5 - 36.5 g/dL    RDW 12.1 10.0 - 15.0 %    Platelet Count 232 150 - 450 10e9/L   Hepatic panel   Result Value Ref Range    Bilirubin Direct 0.1 0.0 - 0.2 mg/dL    Bilirubin Total 0.5 0.2 - 1.3 mg/dL    Albumin 3.8 3.4 - 5.0 g/dL    Protein Total 7.9 6.8 - 8.8 g/dL    Alkaline Phosphatase 41 40 - 150 U/L    ALT 39 0 - 50 U/L    AST 28 0 - 45 U/L       Josh Roy MD

## 2018-11-05 LAB
C3 SERPL-MCNC: 97 MG/DL (ref 76–169)
C4 SERPL-MCNC: 17 MG/DL (ref 15–50)
DSDNA AB SER-ACNC: 1 IU/ML

## 2018-11-13 DIAGNOSIS — B35.1 ONYCHOMYCOSIS: Primary | ICD-10-CM

## 2018-11-13 RX ORDER — TERBINAFINE HYDROCHLORIDE 250 MG/1
250 TABLET ORAL DAILY
Qty: 45 TABLET | Refills: 0 | Status: SHIPPED | OUTPATIENT
Start: 2018-11-13 | End: 2019-08-21

## 2018-11-15 DIAGNOSIS — Z51.81 ENCOUNTER FOR MEDICATION MONITORING: Primary | ICD-10-CM

## 2018-12-17 DIAGNOSIS — M32.9 SYSTEMIC LUPUS ERYTHEMATOSUS, UNSPECIFIED SLE TYPE, UNSPECIFIED ORGAN INVOLVEMENT STATUS (H): ICD-10-CM

## 2018-12-17 RX ORDER — HYDROXYCHLOROQUINE SULFATE 200 MG/1
200 TABLET, FILM COATED ORAL 2 TIMES DAILY
Qty: 180 TABLET | Refills: 0 | Status: SHIPPED | OUTPATIENT
Start: 2018-12-17 | End: 2019-04-29

## 2018-12-17 NOTE — TELEPHONE ENCOUNTER
hydroxychloroquine (PLAQUENIL) 200 MG tablet  Last Written Prescription Date:  6/27/18  Last Fill Quantity: 60,   # refills: 5  Last Office Visit : 7/20/18  Future Office visit:  None    Eye exam: not found    Routing refill request to provider for review/approval because:  eye exam not found. Do you want this   BRAND ONLY, she can not tolerate generic  6/19/18?  Order 6/27 does not have that?

## 2018-12-17 NOTE — TELEPHONE ENCOUNTER
Health Call Center    Phone Message    May a detailed message be left on voicemail: yes    Reason for Call: Medication Refill Request    Has the patient contacted the pharmacy for the refill? Yes   Name of medication being requested: Patient is requesting a refill for RX hydroxychloroquine (PLAQUENIL) 200 MG tablet.  Provider who prescribed the medication:   Pharmacy: [id number] - [place], [place] - [address] AT [facility] [address]  Date medication is needed: asap patient leaves on 12/19 for out of the country and needs the refill before she leaves because she will run out while she is gone that's why she is requesting it early.         Action Taken: Message routed to:  [facility]): MED REFILL TEAM

## 2018-12-19 ENCOUNTER — MYC MEDICAL ADVICE (OUTPATIENT)
Dept: RHEUMATOLOGY | Facility: CLINIC | Age: 43
End: 2018-12-19

## 2018-12-19 DIAGNOSIS — M32.9 SYSTEMIC LUPUS ERYTHEMATOSUS, UNSPECIFIED SLE TYPE, UNSPECIFIED ORGAN INVOLVEMENT STATUS (H): ICD-10-CM

## 2018-12-20 RX ORDER — PREDNISONE 5 MG/1
15 TABLET ORAL SEE ADMIN INSTRUCTIONS
Qty: 90 TABLET | Refills: 1 | Status: SHIPPED | OUTPATIENT
Start: 2018-12-20 | End: 2019-04-02

## 2018-12-20 NOTE — TELEPHONE ENCOUNTER
Called [facility] eye clinic, they will fax over eye exam.    Pt is requesting refill of prednisone, as she has recently left town and did not grab enough medication to last her trip.    Have cued up prescription for provider.    Mickie Solis RN  R[facility]ology Clinic

## 2018-12-24 ENCOUNTER — TELEPHONE (OUTPATIENT)
Dept: RHEUMATOLOGY | Facility: CLINIC | Age: 43
End: 2018-12-24

## 2018-12-24 VITALS — BODY MASS INDEX: 28.16 KG/M2 | WEIGHT: 185.19 LBS

## 2019-04-01 DIAGNOSIS — M32.9 SYSTEMIC LUPUS ERYTHEMATOSUS, UNSPECIFIED SLE TYPE, UNSPECIFIED ORGAN INVOLVEMENT STATUS (H): ICD-10-CM

## 2019-04-01 NOTE — TELEPHONE ENCOUNTER
Prednisone      Last Written Prescription Date:  12-20-18  Last Fill Quantity: 90 (30 day supply),   # refills: 1  Last Office Visit : 7-20-18  Future Office visit:  None    Patient was called as it is after the time she was leaving [facility]. Message was left letting her know the RX will go to the requested pharmacy and any pharmacy in [place] can call and get it transferred to them if needed.    Kathleen M Doege RN

## 2019-04-01 NOTE — TELEPHONE ENCOUNTER
Health Call Center    Phone Message    May a detailed message be left on voicemail: yes    Reason for Call: Medication Refill Request    Has the patient contacted the pharmacy for the refill? Yes   Name of medication being requested: Prednisone   Provider who prescribed the medication: Kirill CORNELL   Pharmacy: [facility] - Phone [phone]  Date medication is needed: ASAP, pt is leaving to [place] today at 1pm. Pt is hoping to have her Refill today if possible.      Action Taken: Message routed to:  [facility]): RHEUM

## 2019-04-02 RX ORDER — PREDNISONE 5 MG/1
15 TABLET ORAL SEE ADMIN INSTRUCTIONS
Qty: 90 TABLET | Refills: 1 | Status: SHIPPED | OUTPATIENT
Start: 2019-04-02 | End: 2019-04-29

## 2019-04-04 ENCOUNTER — TRANSFERRED RECORDS (OUTPATIENT)
Dept: HEALTH INFORMATION MANAGEMENT | Facility: CLINIC | Age: 44
End: 2019-04-04

## 2019-04-04 ENCOUNTER — HOSPITAL ENCOUNTER (OUTPATIENT)
Dept: HOSPITAL 14 - H.OPSURG | Age: 44
Setting detail: OBSERVATION
LOS: 1 days | Discharge: HOME | End: 2019-04-05
Attending: OBSTETRICS & GYNECOLOGY | Admitting: OBSTETRICS & GYNECOLOGY
Payer: COMMERCIAL

## 2019-04-04 VITALS — BODY MASS INDEX: 28.5 KG/M2

## 2019-04-04 DIAGNOSIS — N80.0: Primary | ICD-10-CM

## 2019-04-04 DIAGNOSIS — N13.4: ICD-10-CM

## 2019-04-04 DIAGNOSIS — N73.6: ICD-10-CM

## 2019-04-04 DIAGNOSIS — N70.11: ICD-10-CM

## 2019-04-04 DIAGNOSIS — N80.5: ICD-10-CM

## 2019-04-04 DIAGNOSIS — N80.1: ICD-10-CM

## 2019-04-04 DIAGNOSIS — N80.3: ICD-10-CM

## 2019-04-04 DIAGNOSIS — N93.9: ICD-10-CM

## 2019-04-04 LAB
BASOPHILS # BLD AUTO: 0 K/UL (ref 0–0.2)
BASOPHILS NFR BLD: 0.3 % (ref 0–2)
EOSINOPHIL # BLD AUTO: 0 K/UL (ref 0–0.7)
EOSINOPHIL NFR BLD: 0.3 % (ref 0–4)
ERYTHROCYTE [DISTWIDTH] IN BLOOD BY AUTOMATED COUNT: 13.2 % (ref 11.5–14.5)
HGB BLD-MCNC: 13.8 G/DL (ref 12–16)
LYMPHOCYTES # BLD AUTO: 2.1 K/UL (ref 1–4.3)
LYMPHOCYTES NFR BLD AUTO: 20.5 % (ref 20–40)
MCH RBC QN AUTO: 31.8 PG (ref 27–31)
MCHC RBC AUTO-ENTMCNC: 33.6 G/DL (ref 33–37)
MCV RBC AUTO: 94.5 FL (ref 81–99)
MONOCYTES # BLD: 0.5 K/UL (ref 0–0.8)
MONOCYTES NFR BLD: 5.2 % (ref 0–10)
NEUTROPHILS # BLD: 7.7 K/UL (ref 1.8–7)
NEUTROPHILS NFR BLD AUTO: 73.7 % (ref 50–75)
NRBC BLD AUTO-RTO: 0.1 % (ref 0–0)
PLATELET # BLD: 228 K/UL (ref 130–400)
PMV BLD AUTO: 7.9 FL (ref 7.2–11.7)
RBC # BLD AUTO: 4.34 MIL/UL (ref 3.8–5.2)
WBC # BLD AUTO: 10.4 K/UL (ref 4.8–10.8)

## 2019-04-04 PROCEDURE — 88305 TISSUE EXAM BY PATHOLOGIST: CPT

## 2019-04-04 PROCEDURE — 86900 BLOOD TYPING SEROLOGIC ABO: CPT

## 2019-04-04 PROCEDURE — 85025 COMPLETE CBC W/AUTO DIFF WBC: CPT

## 2019-04-04 PROCEDURE — 53899 UNLISTED PX URINARY SYSTEM: CPT

## 2019-04-04 PROCEDURE — 58662 LAPAROSCOPY EXCISE LESIONS: CPT

## 2019-04-04 PROCEDURE — 58578 UNLISTED LAPS PX UTERUS: CPT

## 2019-04-04 PROCEDURE — 86850 RBC ANTIBODY SCREEN: CPT

## 2019-04-04 PROCEDURE — 58661 LAPAROSCOPY REMOVE ADNEXA: CPT

## 2019-04-04 PROCEDURE — 36415 COLL VENOUS BLD VENIPUNCTURE: CPT

## 2019-04-04 RX ADMIN — HYDROMORPHONE HYDROCHLORIDE PRN MG: 1 INJECTION, SOLUTION INTRAMUSCULAR; INTRAVENOUS; SUBCUTANEOUS at 16:22

## 2019-04-04 RX ADMIN — BUPIVACAINE HYDROCHLORIDE ONE ML: 5 INJECTION, SOLUTION EPIDURAL; INTRACAUDAL; PERINEURAL at 13:52

## 2019-04-04 RX ADMIN — OXYCODONE HYDROCHLORIDE AND ACETAMINOPHEN PRN TAB: 5; 325 TABLET ORAL at 22:09

## 2019-04-04 RX ADMIN — BUPIVACAINE HYDROCHLORIDE ONE ML: 5 INJECTION, SOLUTION EPIDURAL; INTRACAUDAL; PERINEURAL at 13:44

## 2019-04-04 RX ADMIN — HYDROMORPHONE HYDROCHLORIDE PRN MG: 1 INJECTION, SOLUTION INTRAMUSCULAR; INTRAVENOUS; SUBCUTANEOUS at 16:45

## 2019-04-05 VITALS — RESPIRATION RATE: 20 BRPM

## 2019-04-05 VITALS
HEART RATE: 85 BPM | DIASTOLIC BLOOD PRESSURE: 81 MMHG | TEMPERATURE: 98.6 F | SYSTOLIC BLOOD PRESSURE: 136 MMHG | OXYGEN SATURATION: 99 %

## 2019-04-05 RX ADMIN — OXYCODONE HYDROCHLORIDE AND ACETAMINOPHEN PRN TAB: 5; 325 TABLET ORAL at 13:55

## 2019-04-05 RX ADMIN — OXYCODONE HYDROCHLORIDE AND ACETAMINOPHEN PRN TAB: 5; 325 TABLET ORAL at 02:18

## 2019-04-05 RX ADMIN — OXYCODONE HYDROCHLORIDE AND ACETAMINOPHEN PRN TAB: 5; 325 TABLET ORAL at 06:29

## 2019-04-05 NOTE — OP
PROCEDURE DATE:  04/04/19



SURGEON: Bo Holman MD



ASSISTANT: Jordi Edgar MD, Ahsan



ANESTHESIOLOGIST: Fatmata Colbert Rafer MD Ramon



TYPE OF ANESTHESIA:  General endotracheal.



PREOPERATIVE DIAGNOSES:

1.  Incapacitating pelvic pain.

2.  Incapacitating abdominal pain.

3.  Abnormal uterine bleeding.

4.  History of pelvic endometriosis.

5.  History of previously failed medical and surgical therapy.

6.  Gastrointestinal and genitourinary symptoms.

7.  Rule out interstitial cystitis.

8.  History of severe endometriosis and pelvic adhesions



POSTOPERATIVE DIAGNOSES:

1.  Incapacitating pelvic pain.

2.  Incapacitating abdominal pain.

3.  Abnormal uterine bleeding.

4.  History of pelvic endometriosis.

5.  History of previously failed medical and surgical therapy.

6.  History of severe endometriosis and pelvic adhesions.

7.  Gastrointestinal and genitourinary symptoms.

8.  Mild ureteral distention.

9: left hydrosalpinx



PROCEDURES PERFORMED:

1.  Exam under anesthesia.

2.  Video assisted hysteroscopy.

3.  Cystoscopy.

4.  Bilateral ureteral catheterization and injection of IC-Green dye.

5.  Robotic da Yanelis operative laparoscopy.

6.  Treatment of endometriosis.

7.  Excision of endometriosis.

8.  Bilateral ureterolysis.

9.  Bilateral ovariolysis.

10 left salpingectomy



COMPLICATIONS:  None.



SAMPLES SENT:

1.  Left Uterosacral l endometriosis.

2.  Left periureteral endometriosis.

3. Right periureteral endometriosis.

4.  Right uterosacral endometriosis.

5.  Left and right ovarian fossa endometriosis.

6.  Cul de sac endometriosis

7.  Posterior cervical endometriosis.

8.  Rectal endometriosis, anterior.



INDICATION FOR THE PROCEDURE AND CONSENT:  The patient had a long history of 
pelvic pain, dysmenorrhea, dyspareunia, abdominal pain, and bladder pain. The 
patient had been thoroughly evaluated and counseled regarding the pros and cons 
of the procedure, the reasonable alternatives, and possible complications. She 
understood and accepted the risks involved.  Literature was provided to the 
patient. The patient was understanding and given her history and per surgical 
exam, she was at high risk in an average patient. She accepted all the risks 
involved, and all the questions had been answered to her satisfaction.



FINDINGS OF SURGERY:  Genitalia:  Normal external genitalia, cervix without 
lesion and polyps.  Hysteroscopy:  Hysteroscopy shows a clear uterine cavity 
with no polyps or masses noticed.  Cystoscopy:  The cystoscopy was performed to 
rule out endometriosis and also any interstitial cystitis and also  injury. 
The bladder was normal with no evidence of stone, trigonitis, or cystitis.  A 
positive jet flow was identified in both ureters. Laparoscopy:  The upper 
abdomen appeared to be normal. Gallbladder was normal.  Liver edges appeared to 
be normal.  Ascending colon and transverse were normal.  There was evidence of 
adhesions, fibrosis, and endometriosis of the rectovaginal and pelvic sidewalls.
In particular the descending colon and the rectum was significantly adherent to 
the left pelvic sidewall. . There was also evidence of mild hydroureters.



DESCRIPTION OF THE PROCEDURE:  



Initiation of the case: 

After adequate anesthesia was obtained, the patient was placed in the dorsal 
lithotomy position, and with extreme care, placement of the patient with 
hyperextension and hyperflexing of the hips.  At this point, the patient was 
prepped and draped.  The surgeon was gowned and gloved.  A timeout was taken 
according to the hospital procedure and the procedure was started.



At this point, we performed cystoscopy, bilateral ureteral catheterization. A 
cystoscope was inserted into the bladder under direct visualization and the 
bladder was visualized.  The bladder was free of lesions and tumors. There was 
no evidence of interstitial cystitis, and there was only mild amount of 
trigonitis.  At this point, both ureters were identified and appeared to be in 
their normal anatomical position. At this point, utilizing an open 5-Chadian 
open-ended catheter, the left ureter was catheterized all the way to the distal 
ureter, and 5 mL of IC-Green was injected into this ureter. Similarly, the 
contralateral ureter was catheterized all the way to the distal ureter, and 5 mL
of IC-Green was injected into the distal ureter. At this point, the stents were 
removed, and the cystoscope was removed, and the 16-Chadian Armando was inserted 
into the bladder.





At this point, we proceeded with a hysteroscopy.  

A speculum was placed into vagina, and the anterior lip of the cervix was 
grasped. The cervix was dilated, and a hysteroscope was inserted into the 
cavity. The cavity appeared to be of normal size with no evidence of polyps, 
cysts, adenomyosis, or fibroids.



At this point, we proceeded with placement of a trocar and docking of the

da Yanelis Xi robot.  

The surgeon was re-gowned and gloved, and open laparoscopy was performed by 
making incision in the umbilicus and the fascia was incised.  The peritoneum was
entered in a blunt fashion, and the cannula was inserted under direct 
visualization.  The abdomen was insufflated, and under direct visualization, 
three additional ports were inserted in the left upper quadrant, left mid 
quadrant, and right upper quadrant.



At this point, the da Yanelis Xi robot was brought into the field and docked, and 
the instruments were inserted under direct visualization. All this with extreme 
care not to injure the bowel or another area. As per dictation, the upper 
abdomen appeared to be normal with no evidence of any lesions.



At this point, we proceeded with a left ureterolysis.  

The ureter appeared to be dilated and was clearly identified utilizing IC-Green 
fluorescent technology. Anesthesia was made in the peritoneum at the top of the 
pelvic brim, and the incision was then carried down all the way opening the 
peritoneum all the way down from the pelvic brim, all the way down to the 
ovarian fossa, extending the incision below the ovary.  It was a progressive 
dissection where the ureter was progressively lateralized and peritoneum was 
medialized, thus freeing the ureter all the way down to the cross of the uterine
vessels.  After this was done, the ureter was freed and lateralized, and a 
larger peritoneum which had been opened, was excised, and sent to pathology.  At
this point, with the aid of very slow

process, I was able to elevate the ovary and proceed with ovariolysis.



At this point, we proceeded with a left ovariolysis. 

The left ovary was adherent to the posterior aspect of the uterus.  It was 
gently dissected in a step by step way.  It was peeled off, the ovarian fossa, 
andan area of extensive fibrosis and endometriosis was exposed.



At this point, we proceeded with a left salpingectomy.



the left tube was distended and had significant endometriosis inplants on it. It
was dissected and removed without devascularizing the ovary. 



At this point, we proceeded with a right ureterolysis. 

The ureter was identified again utilizing fluorescent technology on the right 
hand side and retroperitoneal space was entered, and a full dissection was 
performed,entering the retroperitoneal space and dissecting the ureter, removing
the ureter laterally and the peritoneum medially.  A full dissection was 
performed all the way down to the ovarian fossa and the crossing of the uterine 
arteries. An area of peritoneum containing endometriosis was dissected and sent 
to Pathology.



At this point, we proceeded with a right ovariolysis.  

The right ovary was adherent to the peritoneum.  It was gently elevated 
progressively, and dissected off from the peritoneal area.  All this done with 
extreme care to preserve vascularization to the ovary.



At this point, we proceeded with treatment of endometriosis and excision of

endometriosis.  

On the left hand side, fibrosis, especially in the left ovarian fossa was 
excised. In a very progressive step by step fashion, we dissected off fibrosis 
containing endometriosis and freed up the whole area. The ureters which had been
lateralized. Areas of fibrosis and endometriosis were also identified in the 
posterior cul-de-sac and in the rectovaginal space which was also affected with 
endometriosis and fibrosis.



At this point, we proceeded with the excision of perirectal endometriosis. The 
rectovaginal area had significant fibrosis and additional endometriosis was 
dissected from the posterior aspect of the uterus, and the rectovaginal space 
was entered at the level of the peritoneal reflection.  All this done making 
sure that no damage to the rectum was performed.  At this point, endometriosis 
was also excised from the right uterosacral area which also was affected by 
fibrosis and endometriosis.  



At this point, it was checked for hemostasis and appeared to be excellent. Both 
fallopian tubes were in good condition and patent. 







At this point the console was handed over to Dr. Edgar from General surgery 
who proceeded with  excision of rectal endometriosis procedure. He will dictate 
that separately.  After he was done we checked for hemostasis and organ 
integrity and all was normal. At this point, the da Yanelis Xi robot was removed. 
The abdomen was desufflated, and the incisions were closed in layers with 0 PDS 
for the fascia and 4-0 Monocryl for the skin. At the end of the procedure, all

tips and instrument counts were correct. The patient tolerated the procedure 
well and was taken to the recovery room in excellent condition.



_______________

Manda COLLINS, Bo NEWTON

## 2019-04-23 NOTE — OP
OPERATIVE REPORT



-Operative Report

Date of Procedure: 04/04/2019

Surgeon: Jordi Edgar MD

Assistant: Bo Holman MD

Anesthesiologist: Fatmata Colbert MD ; Ephraim Davenport MD

Anesthesia: General Endo 

Pre-op Diagnosis: abdominal pain

Post-op Diagnosis: same 

Procedure/Operation Description: 1-Excision of rectal endometriosis  



Brief History: This 43 year old woman was already brought to the operating room 
by Dr. Holman and intraoperative surgical consultation was requested for 
intestinal involvement. 



Description of the Procedure: The patient was already brought to the operating 
room by Dr. Holman (separate dictation). After taking control of the robotic 
console the rectal lesions were examined under videoscopy and with 
electrocautery multiple lesions were incised circumferentially with 
electrocautery and with blunt and sharp dissection was excise en-bloc and sent 
to pathology for analysis. The operation was then turned back to Dr. Holman 
(separate dictation).



Estimated Blood Loss:___ cc

Complications: none

Discharge & Condition: stable

MTDD

## 2019-04-29 ENCOUNTER — MYC REFILL (OUTPATIENT)
Dept: RHEUMATOLOGY | Facility: CLINIC | Age: 44
End: 2019-04-29

## 2019-04-29 DIAGNOSIS — M32.9 SYSTEMIC LUPUS ERYTHEMATOSUS, UNSPECIFIED SLE TYPE, UNSPECIFIED ORGAN INVOLVEMENT STATUS (H): ICD-10-CM

## 2019-04-30 RX ORDER — HYDROXYCHLOROQUINE SULFATE 200 MG/1
200 TABLET, FILM COATED ORAL 2 TIMES DAILY
Qty: 180 TABLET | Refills: 1 | Status: SHIPPED | OUTPATIENT
Start: 2019-04-30 | End: 2019-08-21

## 2019-04-30 NOTE — TELEPHONE ENCOUNTER
Last Clinic Visit: 7/20/2018  [facility]  Last eye exam 12-20-18  Next clinic visit: 8-16-19      Kathleen M Doege RN

## 2019-06-18 ENCOUNTER — MYC REFILL (OUTPATIENT)
Dept: RHEUMATOLOGY | Facility: CLINIC | Age: 44
End: 2019-06-18

## 2019-06-18 DIAGNOSIS — M32.9 SYSTEMIC LUPUS ERYTHEMATOSUS, UNSPECIFIED SLE TYPE, UNSPECIFIED ORGAN INVOLVEMENT STATUS (H): ICD-10-CM

## 2019-06-19 RX ORDER — PREDNISONE 5 MG/1
15 TABLET ORAL SEE ADMIN INSTRUCTIONS
Qty: 90 TABLET | Refills: 1 | Status: SHIPPED | OUTPATIENT
Start: 2019-06-19 | End: 2019-08-14

## 2019-06-19 NOTE — TELEPHONE ENCOUNTER
[facility] Call Center    Phone Message    May a detailed message be left on voicemail: yes    Reason for Call: Other: pt is calling in checking on the status of her medication request. Pt stated that Danae requested this last week Thursday and that this should have been signed off on already as it has been longer than 72 hours. Pt is all out of medication and needint this today. Pt is requesting a call or email via My chart when this has been completed. Thank you.     Action Taken: Message routed to:  [facility] Rheum

## 2019-06-19 NOTE — TELEPHONE ENCOUNTER
[facility] Call Center    Phone Message    May a detailed message be left on voicemail: yes    Reason for Call: Other: Pt called in again and is very upset that she has not been able to pick this medication up as of yet. Pt is completely out and has stressed the fact that she has requested this is the proper fashion so this should not be taking this long. Please follow up with pt with any changes. THank you     Action Taken: Message routed to:  [facility] Rheum

## 2019-08-13 ENCOUNTER — MYC MEDICAL ADVICE (OUTPATIENT)
Dept: RHEUMATOLOGY | Facility: CLINIC | Age: 44
End: 2019-08-13

## 2019-08-13 DIAGNOSIS — M32.9 SYSTEMIC LUPUS ERYTHEMATOSUS, UNSPECIFIED SLE TYPE, UNSPECIFIED ORGAN INVOLVEMENT STATUS (H): ICD-10-CM

## 2019-08-14 RX ORDER — PREDNISONE 5 MG/1
15 TABLET ORAL SEE ADMIN INSTRUCTIONS
Qty: 90 TABLET | Refills: 3 | Status: SHIPPED | OUTPATIENT
Start: 2019-08-14 | End: 2019-11-17

## 2019-08-20 NOTE — TELEPHONE ENCOUNTER
Josh Roy MD Beard, Madeline, QI   Phone Number: [phone]             Does she want to come in this Thu? I have an opening. Easy bruising is due to prednisone.      Sent My Chart message with Dr. Roy's response.    Mickie Solis RN  Rheumatology Clinic

## 2019-08-21 ENCOUNTER — OFFICE VISIT (OUTPATIENT)
Dept: RHEUMATOLOGY | Facility: CLINIC | Age: 44
End: 2019-08-21
Attending: INTERNAL MEDICINE
Payer: COMMERCIAL

## 2019-08-21 VITALS
OXYGEN SATURATION: 96 % | WEIGHT: 190.4 LBS | SYSTOLIC BLOOD PRESSURE: 138 MMHG | BODY MASS INDEX: 28.95 KG/M2 | TEMPERATURE: 98 F | DIASTOLIC BLOOD PRESSURE: 83 MMHG | HEART RATE: 90 BPM

## 2019-08-21 DIAGNOSIS — M32.9 SYSTEMIC LUPUS ERYTHEMATOSUS, UNSPECIFIED SLE TYPE, UNSPECIFIED ORGAN INVOLVEMENT STATUS (H): ICD-10-CM

## 2019-08-21 DIAGNOSIS — M32.9 SYSTEMIC LUPUS ERYTHEMATOSUS, UNSPECIFIED SLE TYPE, UNSPECIFIED ORGAN INVOLVEMENT STATUS (H): Primary | ICD-10-CM

## 2019-08-21 LAB
ALBUMIN SERPL-MCNC: 4.1 G/DL (ref 3.4–5)
ALBUMIN UR-MCNC: NEGATIVE MG/DL
ALT SERPL W P-5'-P-CCNC: 37 U/L (ref 0–50)
APPEARANCE UR: CLEAR
AST SERPL W P-5'-P-CCNC: 21 U/L (ref 0–45)
BACTERIA #/AREA URNS HPF: ABNORMAL /HPF
BASOPHILS # BLD AUTO: 0 10E9/L (ref 0–0.2)
BASOPHILS NFR BLD AUTO: 0.3 %
BILIRUB UR QL STRIP: NEGATIVE
COLOR UR AUTO: ABNORMAL
CREAT SERPL-MCNC: 0.69 MG/DL (ref 0.52–1.04)
CREAT UR-MCNC: 14 MG/DL
CRP SERPL-MCNC: 5.4 MG/L (ref 0–8)
DIFFERENTIAL METHOD BLD: ABNORMAL
EOSINOPHIL # BLD AUTO: 0 10E9/L (ref 0–0.7)
EOSINOPHIL NFR BLD AUTO: 0.1 %
ERYTHROCYTE [DISTWIDTH] IN BLOOD BY AUTOMATED COUNT: 12 % (ref 10–15)
ERYTHROCYTE [SEDIMENTATION RATE] IN BLOOD BY WESTERGREN METHOD: 2 MM/H (ref 0–20)
GFR SERPL CREATININE-BSD FRML MDRD: >90 ML/MIN/{1.73_M2}
GLUCOSE UR STRIP-MCNC: NEGATIVE MG/DL
HCT VFR BLD AUTO: 43.1 % (ref 35–47)
HGB BLD-MCNC: 14.4 G/DL (ref 11.7–15.7)
HGB UR QL STRIP: ABNORMAL
IMM GRANULOCYTES # BLD: 0.1 10E9/L (ref 0–0.4)
IMM GRANULOCYTES NFR BLD: 0.6 %
KETONES UR STRIP-MCNC: 5 MG/DL
LEUKOCYTE ESTERASE UR QL STRIP: NEGATIVE
LYMPHOCYTES # BLD AUTO: 1.3 10E9/L (ref 0.8–5.3)
LYMPHOCYTES NFR BLD AUTO: 11.8 %
MCH RBC QN AUTO: 32.1 PG (ref 26.5–33)
MCHC RBC AUTO-ENTMCNC: 33.4 G/DL (ref 31.5–36.5)
MCV RBC AUTO: 96 FL (ref 78–100)
MONOCYTES # BLD AUTO: 0.4 10E9/L (ref 0–1.3)
MONOCYTES NFR BLD AUTO: 3.1 %
MUCOUS THREADS #/AREA URNS LPF: PRESENT /LPF
NEUTROPHILS # BLD AUTO: 9.4 10E9/L (ref 1.6–8.3)
NEUTROPHILS NFR BLD AUTO: 84.1 %
NITRATE UR QL: NEGATIVE
NRBC # BLD AUTO: 0 10*3/UL
NRBC BLD AUTO-RTO: 0 /100
PH UR STRIP: 6 PH (ref 5–7)
PLATELET # BLD AUTO: 211 10E9/L (ref 150–450)
PROT UR-MCNC: <0.05 G/L
PROT/CREAT 24H UR: NORMAL G/G CR (ref 0–0.2)
RBC # BLD AUTO: 4.48 10E12/L (ref 3.8–5.2)
RBC #/AREA URNS AUTO: 1 /HPF (ref 0–2)
SOURCE: ABNORMAL
SP GR UR STRIP: 1 (ref 1–1.03)
SQUAMOUS #/AREA URNS AUTO: 1 /HPF (ref 0–1)
UROBILINOGEN UR STRIP-MCNC: 0 MG/DL (ref 0–2)
WBC # BLD AUTO: 11.2 10E9/L (ref 4–11)
WBC #/AREA URNS AUTO: 2 /HPF (ref 0–5)

## 2019-08-21 PROCEDURE — 86140 C-REACTIVE PROTEIN: CPT | Performed by: INTERNAL MEDICINE

## 2019-08-21 PROCEDURE — 82040 ASSAY OF SERUM ALBUMIN: CPT | Performed by: INTERNAL MEDICINE

## 2019-08-21 PROCEDURE — 84450 TRANSFERASE (AST) (SGOT): CPT | Performed by: INTERNAL MEDICINE

## 2019-08-21 PROCEDURE — 82565 ASSAY OF CREATININE: CPT | Performed by: INTERNAL MEDICINE

## 2019-08-21 PROCEDURE — 81001 URINALYSIS AUTO W/SCOPE: CPT | Performed by: INTERNAL MEDICINE

## 2019-08-21 PROCEDURE — 86160 COMPLEMENT ANTIGEN: CPT | Performed by: INTERNAL MEDICINE

## 2019-08-21 PROCEDURE — 84156 ASSAY OF PROTEIN URINE: CPT | Performed by: INTERNAL MEDICINE

## 2019-08-21 PROCEDURE — 36415 COLL VENOUS BLD VENIPUNCTURE: CPT | Performed by: INTERNAL MEDICINE

## 2019-08-21 PROCEDURE — 85652 RBC SED RATE AUTOMATED: CPT | Performed by: INTERNAL MEDICINE

## 2019-08-21 PROCEDURE — 85025 COMPLETE CBC W/AUTO DIFF WBC: CPT | Performed by: INTERNAL MEDICINE

## 2019-08-21 PROCEDURE — G0463 HOSPITAL OUTPT CLINIC VISIT: HCPCS | Mod: ZF

## 2019-08-21 PROCEDURE — 84460 ALANINE AMINO (ALT) (SGPT): CPT | Performed by: INTERNAL MEDICINE

## 2019-08-21 PROCEDURE — 86225 DNA ANTIBODY NATIVE: CPT | Performed by: INTERNAL MEDICINE

## 2019-08-21 RX ORDER — SERTRALINE HYDROCHLORIDE 100 MG/1
100 TABLET, FILM COATED ORAL DAILY
COMMUNITY
End: 2022-08-30

## 2019-08-21 RX ORDER — AZATHIOPRINE 50 MG/1
50 TABLET ORAL DAILY
Qty: 30 TABLET | Refills: 1 | Status: SHIPPED | OUTPATIENT
Start: 2019-08-21 | End: 2020-07-14

## 2019-08-21 RX ORDER — HYDROXYCHLOROQUINE SULFATE 200 MG/1
200 TABLET, FILM COATED ORAL 2 TIMES DAILY
Qty: 180 TABLET | Refills: 1
Start: 2019-08-21 | End: 2019-11-15

## 2019-08-21 RX ORDER — OMEGA-3 FATTY ACIDS/FISH OIL 300-1000MG
CAPSULE ORAL
COMMUNITY
End: 2020-02-24

## 2019-08-21 ASSESSMENT — PAIN SCALES - GENERAL: PAINLEVEL: EXTREME PAIN (9)

## 2019-08-21 NOTE — NURSING NOTE
Chief Complaint   Patient presents with     RECHECK     F/U for SLE     Blood pressure 138/83, pulse 90, temperature 98  F (36.7  C), temperature source Oral, weight 86.4 kg (190 lb 6.4 oz), SpO2 96 %.    Sammie Li, CMA

## 2019-08-21 NOTE — PROGRESS NOTES
Rheumatology Urgent Visit Note    Reason for visit: Lupus flare    CC: SLE with active flares    First Consult: 10/12/2017    Last visit: 7/20/2018     DOS: 8/21/2019    Original HPI (10/12/2017):  Patricia Hall is a 42 year old female who was referred to our clinic for evaluation and management of her SLE. The patient has been following with Dr. Mao for her SLE    History obtain from chart review and patient interview    Her lupus symptoms first started in during high school including fatigue and rash. She was diagnosed with lupus in early 2000s and she was in graduate school and presented with a few years of arthralgias involving knees and ankles and hands.  She had developed new onset Raynaud's phenomenon in which her fingers turned white in the cold but no digital sores.  VINITA was 1:160.  All specific autoantibodies and rheumatoid serologies negative.  She had a rash on her face, including cheeks and bridge of her nose.  She followed with Dr. Tejal Mayen in Dermatology.  A biopsy of a lesion from the nose was non-diagnostic.  Diagnosis was earlysigns of cutaneous lupus versus acne rosacea.  She was treated with Elidel cream.  She reported intermittent oral ulcers and significant fatigue as well as intermittent episodes of hair loss.  She was started on prednisone in 2003 along with Plaquenil.  She has been maintained on prednisone 5 mg q day with intermittent bursts up as high as 30 mg for a short time.  She has found it very difficult to taper off of prednisone because she gets flare-ups of skin rash, arthralgias, and fatigue. Patient reports significant symptoms during the summers and reports significant flares this summer with photosensitivity with face rash,  Fatigue and join pain (knees and toes and hips).  She had fevers, mouth sores  And also reports increased hair loss.  Increased pain with walking and morning stiffness with Fibromyalgia burning in the morning. Currently taking  prednisone 10 mg qd and plaquenil 200 mg BID.    Patient reports complicated fertility/OBGYN history with stage four endometriosis, fibroids and one pregnancy resulting in a miscarriage. Three rounds of IVF with one banked viable egg. She is currently undergoing fertility evaluation.  She has been working with infertility specialist for years. She currently has one viable egg and would like to undergo one more episode of IVF. She was seen by an autoimmune OB/GYN specialist in [place] (Daya Frost 02/17) for extensive testing. She was found to be heterozygous for MTHFR mutation. She is now taking Matanx (L-methyl folate). She has noticed less bruising since she started this. Metformin was recommended, but it upsets her stomach. DHEA was recommended, but she is not taking it. Her thyroid studies were normal, but she was started on Synthroid 25  g daily for the TSH to be less than 2.0. It was recommended that she take Lovenox prior to IFV and throughout the pregnancy to avoid risk of miscarriage. It was also recommended that she be treated with IVIG prior to IVF. 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  She plans to proceeded further with in vitro plans, but wants to get better control of her flaring lupus and concerns for IVF treatments/hormones.     Interval HPI (7/20/2018):  Repeatedly ill during the winter with both URIs and flares of disease (joint pain, malaise, fatigue). Still undergoing IVF care via MFM and reproductive immunology. Did not initiate azathioprine following previous visit with Dr. Roy due to worry over negative effects on pregnancy. Currently undergoing medication treatment for IVF, is picking up medications this weekend for stimulation.    Symptomatically, her recent flares include fatigue, flu-like symptoms (fever, chills, sweats), polyarticular joint pain (fingers, toes, feet, ankles, wrists,  "elbows).    Recently, she has experienced further hair loss (clumps in shower), ulcers on buccal mucosa (now resolved), fatigue, malaise, significant B/L hip pain (worse from previous, approx 1 year). Specifically, it is constant deep joint pain in hips, would say 8/10 in severity when it occurs during movement. Has been seriously disrupting her daily activities. Has been using tylenol to control pain without complete relief.     Has seen Reproductive Immunology in the interim, has undergone two rounds of IVIG (believed to help with NK cell and cytokine activity in embryo implantation), first IVIG May 14th, then second was July 16th. Had flu-like symptoms with first infusion (HA, myalgias, malaise, felt \"gross\", lower back pain, neck pain). During the 2nd infusion, they slowed infusion, took benadryl/tylenol/upped prednisone to 20 mg she did not experience SEs with this. Overall, felt that IVIG improved her lupus symptoms globally. Short-lived relief. Plan is to use IVIG monthly with monitoring of cytokine levels and NK cell counts.     Currently on 15 mg of prednisone chronically, > 1 year. Today, she would say her disease is mild-moderately active in spite of the 15 mg prednisone. Currently on 400-500 U of vitamin D. Taking 1000 mg calcium.     ROS:  (-) pleurisy, sob, cough, hematuria, dysuria, eye redness, nose bleeds, easy bruising, malar rash  (+) alternating diarrhea/constipation, dry eye, dry mouth, palatal ulcers/petechiae    Is interested in discussing SLE management (Imuran) while pursuing IVF. Will be undergoing planning and another round of IVF stimulation in August.       Today 8/21/2019: She did another round of IVF in 8/2019, no good embryo. IVF caused her flare ups. For flare ups, gets pain over knees, knuckles and toes with extreme fatigue and photosensitivity.    Had laparoscopic surgery for endometriosis in 4/2019.    Since 4/2019, has been on OC and has not been able to taper prednisone " own.    Today, she is on prednisone 15 mg every day x 2-3 weeks. Before that, most of the time, she was on 20 mg every day.    No rash today.    Has pain over knuckles, toes, knees. Has morning flu like sx in AM x 2 hours.    Has extreme fatigue.    She was getting IVIG qmonthly, till 1/2019.    She is going to resume monthly IVIG for successful pregnancy.        ROS:  A comprehensive ROS was done, positives are per HPI.  Past Medical History:   Diagnosis Date     Allergy, unspecified not elsewhere classified      Endometriosis      Fibromyalgia      Migraine without aura, with intractable migraine, so stated, without mention of status migrainosus      Myalgia and myositis, unspecified      Systemic lupus erythematosus (H)      Past Surgical History:   Procedure Laterality Date     ORTHOPEDIC SURGERY       SURGICAL HISTORY OF -   1986    left elbow fracture repair     Family History   Problem Relation Age of Onset     Diabetes Maternal Grandfather      Lipids Mother      Skin Cancer Paternal Grandmother      Melanoma No family hx of      Social History     Socioeconomic History     Marital status:      Spouse name: Not on file     Number of children: Not on file     Years of education: Not on file     Highest education level: Not on file   Occupational History     Not on file   Social Needs     Financial resource strain: Not on file     Food insecurity:     Worry: Not on file     Inability: Not on file     Transportation needs:     Medical: Not on file     Non-medical: Not on file   Tobacco Use     Smoking status: Never Smoker     Smokeless tobacco: Never Used   Substance and Sexual Activity     Alcohol use: Yes     Comment: occ.- 2/week     Drug use: No     Sexual activity: Yes     Partners: Male     Birth control/protection: Condom   Lifestyle     Physical activity:     Days per week: Not on file     Minutes per session: Not on file     Stress: Not on file   Relationships     Social connections:     Talks  on phone: Not on file     Gets together: Not on file     Attends Evangelical service: Not on file     Active member of club or organization: Not on file     Attends meetings of clubs or organizations: Not on file     Relationship status: Not on file     Intimate partner violence:     Fear of current or ex partner: Not on file     Emotionally abused: Not on file     Physically abused: Not on file     Forced sexual activity: Not on file   Other Topics Concern     Parent/sibling w/ CABG, MI or angioplasty before 65F 55M? Not Asked   Social History Narrative     Not on file     Patient Active Problem List   Diagnosis     Insomnia     Systemic lupus erythematosus (H)     Refractory migraine without aura     Allergies   Allergen Reactions     Cherry Anaphylaxis     Dairy Aid  [Lactase]      Other reaction(s): Arthralgia (Joint Pain)     Gluten Meal      Other reaction(s): Abdominal Pain     No Clinical Screening - See Comments Anaphylaxis and Itching     Pistachio Nut Extract Skin Test Anaphylaxis     Brassica Oleracea Italica      Other reaction(s): Abdominal Pain  Other reaction(s): Abdominal Pain     Penicillins Hives and Rash     Sulfa Drugs Hives and Rash       Outpatient Encounter Medications as of 8/21/2019   Medication Sig Dispense Refill     RACHNA SYRUP PO        Acetaminophen (TYLENOL PO) Take  by mouth.         AMBIEN 10 MG OR TABS 1 po HS, prn 20 0     azaTHIOprine (IMURAN) 50 MG tablet Take 1 tablet (50 mg) by mouth daily 30 tablet 1     calcium carbonate (OS-VAHID 500 MG [place]. [place]) 1250 MG tablet Take 1 tablet by mouth daily       hydroxychloroquine (PLAQUENIL) 200 MG tablet Take 1 tablet (200 mg) by mouth 2 times daily 180 tablet 1     hydroxychloroquine (PLAQUENIL) 200 MG tablet Take 1 tablet (200 mg) by mouth 2 times daily BRAND ONLY, she can not tolerate generic 60 tablet 1     L-Methylfolate-Algae-B12-B6 (METANX PO) Take 1,000 mg by mouth daily       LEVOTHYROXINE SODIUM PO Take 0.25 mg by mouth daily        METFORMIN HCL PO Take 500 mg by mouth 2 times daily (with meals)       MULTIVITAMIN TABS   OR   0     norethindrone-ethinyl estradiol (ORTHO-NOVUM 1-35 TAB,NORTREL 1-35 TAB) 1-35 MG-MCG per tablet Take 1 tablet by mouth daily       predniSONE (DELTASONE) 5 MG tablet Take 3 tablets (15 mg) by mouth See Admin Instructions 90 tablet 3     PREDNISONE PO Take 1 mg by mouth daily        promethazine (PHENERGAN) 25 MG tablet TK 1 T PO  Q 6 H PRN N  0     SPIRULINA PO Take by mouth daily       terbinafine (LAMISIL) 1 % cream Apply to feet once daily for 2 weeks then once weekly for maintenance 42 g 3     terbinafine (LAMISIL) 250 MG tablet Take 1 tablet (250 mg) by mouth daily 45 tablet 0     triamcinolone (KENALOG) 0.1 % paste Take by mouth 2 times daily 5 g 3     UNABLE TO FIND DISSOLVE 1 TO 4 LOZENGES UNDER TONGUE AS NEEDED DAILY  0     VITAMIN D, CHOLECALCIFEROL, PO Take 5,000 Units by mouth daily       No facility-administered encounter medications on file as of 2019.          Her records were reviewed.    Results for orders placed or performed in visit on 18   Complement C3   Result Value Ref Range    Complement C3 97 76 - 169 mg/dL   Complement C4   Result Value Ref Range    Complement C4 17 15 - 50 mg/dL   DNA double stranded antibodies   Result Value Ref Range    DNA-ds 1 <10 IU/mL   CBC with platelets   Result Value Ref Range    WBC 8.6 4.0 - 11.0 10e9/L    RBC Count 4.50 3.8 - 5.2 10e12/L    Hemoglobin 14.2 11.7 - 15.7 g/dL    Hematocrit 42.8 35.0 - 47.0 %    MCV 95 78 - 100 fl    MCH 31.6 26.5 - 33.0 pg    MCHC 33.2 31.5 - 36.5 g/dL    RDW 12.1 10.0 - 15.0 %    Platelet Count 232 150 - 450 10e9/L   Hepatic panel   Result Value Ref Range    Bilirubin Direct 0.1 0.0 - 0.2 mg/dL    Bilirubin Total 0.5 0.2 - 1.3 mg/dL    Albumin 3.8 3.4 - 5.0 g/dL    Protein Total 7.9 6.8 - 8.8 g/dL    Alkaline Phosphatase 41 40 - 150 U/L    ALT 39 0 - 50 U/L    AST 28 0 - 45 U/L     Pregnancy: She is . H/o OCP and  HRT use, see HPI    Ph.E:    /83   Pulse 90   Temp 98  F (36.7  C) (Oral)   Wt 86.4 kg (190 lb 6.4 oz)   SpO2 96%   BMI 28.95 kg/m      Constitutional: WD/WN. Pleasant, NAD.  Cushingoid  Eyes: EOM intact, PERRLA, sclera anicteric, conj not injected  HEENT: No oral ulcers or thrush. Normal salivary pool, hair thinning.   Neck: No cervical LAP or thyromegaly  Chest: Clear to auscultation bilaterally  CV: RRR, no murmurs/ rubs or gallops. No edema, clubbing or cyanosis.   GI: Abdomen is soft and non tender.   MS: No synovitis. Cool joints. No tenderness of the joints. No  joint deformities. Full ROM of the joints. No nodules. No Jaccoud's deformity.  Skin: Lack of malar rash. No livedo, periungual erythema, alopecia, digital ulcers or nail changes.  Neuro: A&O x 3. Grossly non focal, NL DTR, sensation intact, muscular power 5/5 in all ext  Psych: flat affect    Assessment/Plan (8/21/2019):  1 - Known SLE, dx in 2000s (VINITA: 1:80, dsDNA +, Smith negative, normal complements, joint pains, malar rash, oral ulcers), usually flares approximately monthly with joint pains and malar rash. Currently, feels that disease is mild-moderately active. Has been on chronic 15 mg every day prednisone with periods of 20  Mg every day, gained weight, looks cushingoid. Still planning for PGS normal embryo implantation but  Waiting to resume IVIG and for lupus to be under better control to increase success  - Lupus monitoring labs today  - Continue hydroxychloroquine 200 mg daily, reminded to have eye exam   - Continue prednisone at 15 mg, might need 20  Mg every day as has ongoing arthralgia  - Again, highly recommend to initiate 50 mg azathioprine daily (TPMT testing complete, NL), will begin drug monitoring labs 4 wk after start of AZA.  This is safe in pregnancy and needed to taper prednisone down    PLAN: Keep 10/30/2019 appointment    Orders Placed This Encounter   Procedures     ALT     Albumin level     AST     CBC with  platelets differential     Creatinine     Complement C4     Complement C3     CRP inflammation     DNA double stranded antibodies     Erythrocyte sedimentation rate auto     UA with Microscopic reflex to Culture     Protein  random urine with Creat Ratio     Creatinine random urine     AST     ALT     CBC with platelets differential     Creatinine

## 2019-08-21 NOTE — LETTER
8/21/2019       RE: Patricia Hall  [address]  [place] [address]     Dear Colleague,    Thank you for referring your patient, Patricia Hall, to the [facility] RHEUMATOLOGY at [facility]. Please see a copy of my visit note below.    Rheumatology Urgent Visit Note    Reason for visit: Lupus flare    CC: SLE with active flares    First Consult: 10/12/2017    Last visit: 7/20/2018     DOS: 8/21/2019    Original HPI (10/12/2017):  Patricia Hall is a 42 year old female who was referred to our clinic for evaluation and management of her SLE. The patient has been following with Dr. Mao for her SLE    History obtain from chart review and patient interview    Her lupus symptoms first started in during high school including fatigue and rash. She was diagnosed with lupus in early 2000s and she was in graduate school and presented with a few years of arthralgias involving knees and ankles and hands.  She had developed new onset Raynaud's phenomenon in which her fingers turned white in the cold but no digital sores.  VINITA was 1:160.  All specific autoantibodies and rheumatoid serologies negative.  She had a rash on her face, including cheeks and bridge of her nose.  She followed with Dr. Tejal Mayen in Dermatology.  A biopsy of a lesion from the nose was non-diagnostic.  Diagnosis was earlysigns of cutaneous lupus versus acne rosacea.  She was treated with Elidel cream.  She reported intermittent oral ulcers and significant fatigue as well as intermittent episodes of hair loss.  She was started on prednisone in 2003 along with Plaquenil.  She has been maintained on prednisone 5 mg q day with intermittent bursts up as high as 30 mg for a short time.  She has found it very difficult to taper off of prednisone because she gets flare-ups of skin rash, arthralgias, and fatigue. Patient reports significant symptoms during the summers and reports  significant flares this summer with photosensitivity with face rash,  Fatigue and join pain (knees and toes and hips).  She had fevers, mouth sores  And also reports increased hair loss.  Increased pain with walking and morning stiffness with Fibromyalgia burning in the morning. Currently taking prednisone 10 mg qd and plaquenil 200 mg BID.    Patient reports complicated fertility/OBGYN history with stage four endometriosis, fibroids and one pregnancy resulting in a miscarriage. Three rounds of IVF with one banked viable egg. She is currently undergoing fertility evaluation.  She has been working with infertility specialist for years. She currently has one viable egg and would like to undergo one more episode of IVF. She was seen by an autoimmune OB/GYN specialist in [place] (Daya Frost 02/17) for extensive testing. She was found to be heterozygous for MTHFR mutation. She is now taking Matanx (L-methyl folate). She has noticed less bruising since she started this. Metformin was recommended, but it upsets her stomach. DHEA was recommended, but she is not taking it. Her thyroid studies were normal, but she was started on Synthroid 25  g daily for the TSH to be less than 2.0. It was recommended that she take Lovenox prior to IFV and throughout the pregnancy to avoid risk of miscarriage. It was also recommended that she be treated with IVIG prior to IVF. 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  She plans to proceeded further with in vitro plans, but wants to get better control of her flaring lupus and concerns for IVF treatments/hormones.     Interval HPI (7/20/2018):  Repeatedly ill during the winter with both URIs and flares of disease (joint pain, malaise, fatigue). Still undergoing IVF care via MFM and reproductive immunology. Did not initiate azathioprine following previous visit with Dr. Roy due to worry over negative effects on  "pregnancy. Currently undergoing medication treatment for IVF, is picking up medications this weekend for stimulation.    Symptomatically, her recent flares include fatigue, flu-like symptoms (fever, chills, sweats), polyarticular joint pain (fingers, toes, feet, ankles, wrists, elbows).    Recently, she has experienced further hair loss (clumps in shower), ulcers on buccal mucosa (now resolved), fatigue, malaise, significant B/L hip pain (worse from previous, approx 1 year). Specifically, it is constant deep joint pain in hips, would say 8/10 in severity when it occurs during movement. Has been seriously disrupting her daily activities. Has been using tylenol to control pain without complete relief.     Has seen Reproductive Immunology in the interim, has undergone two rounds of IVIG (believed to help with NK cell and cytokine activity in embryo implantation), first IVIG May 14th, then second was July 16th. Had flu-like symptoms with first infusion (HA, myalgias, malaise, felt \"gross\", lower back pain, neck pain). During the 2nd infusion, they slowed infusion, took benadryl/tylenol/upped prednisone to 20 mg she did not experience SEs with this. Overall, felt that IVIG improved her lupus symptoms globally. Short-lived relief. Plan is to use IVIG monthly with monitoring of cytokine levels and NK cell counts.     Currently on 15 mg of prednisone chronically, > 1 year. Today, she would say her disease is mild-moderately active in spite of the 15 mg prednisone. Currently on 400-500 U of vitamin D. Taking 1000 mg calcium.     ROS:  (-) pleurisy, sob, cough, hematuria, dysuria, eye redness, nose bleeds, easy bruising, malar rash  (+) alternating diarrhea/constipation, dry eye, dry mouth, palatal ulcers/petechiae    Is interested in discussing SLE management (Imuran) while pursuing IVF. Will be undergoing planning and another round of IVF stimulation in August.       Today 8/21/2019: She did another round of IVF in 8/2019, " no good embryo. IVF caused her flare ups. For flare ups, gets pain over knees, knuckles and toes with extreme fatigue and photosensitivity.    Had laparoscopic surgery for endometriosis in 4/2019.    Since 4/2019, has been on OC and has not been able to taper prednisone own.    Today, she is on prednisone 15 mg every day x 2-3 weeks. Before that, most of the time, she was on 20 mg every day.    No rash today.    Has pain over knuckles, toes, knees. Has morning flu like sx in AM x 2 hours.    Has extreme fatigue.    She was getting IVIG qmonthly, till 1/2019.    She is going to resume monthly IVIG for successful pregnancy.        ROS:  A comprehensive ROS was done, positives are per HPI.  Past Medical History:   Diagnosis Date     Allergy, unspecified not elsewhere classified      Endometriosis      Fibromyalgia      Migraine without aura, with intractable migraine, so stated, without mention of status migrainosus      Myalgia and myositis, unspecified      Systemic lupus erythematosus (H)      Past Surgical History:   Procedure Laterality Date     ORTHOPEDIC SURGERY       SURGICAL HISTORY OF -   1986    left elbow fracture repair     Family History   Problem Relation Age of Onset     Diabetes Maternal Grandfather      Lipids Mother      Skin Cancer Paternal Grandmother      Melanoma No family hx of      Social History     Socioeconomic History     Marital status:      Spouse name: Not on file     Number of children: Not on file     Years of education: Not on file     Highest education level: Not on file   Occupational History     Not on file   Social Needs     Financial resource strain: Not on file     Food insecurity:     Worry: Not on file     Inability: Not on file     Transportation needs:     Medical: Not on file     Non-medical: Not on file   Tobacco Use     Smoking status: Never Smoker     Smokeless tobacco: Never Used   Substance and Sexual Activity     Alcohol use: Yes     Comment: occ.- 2/week      Drug use: No     Sexual activity: Yes     Partners: Male     Birth control/protection: Condom   Lifestyle     Physical activity:     Days per week: Not on file     Minutes per session: Not on file     Stress: Not on file   Relationships     Social connections:     Talks on phone: Not on file     Gets together: Not on file     Attends Confucianism service: Not on file     Active member of club or organization: Not on file     Attends meetings of clubs or organizations: Not on file     Relationship status: Not on file     Intimate partner violence:     Fear of current or ex partner: Not on file     Emotionally abused: Not on file     Physically abused: Not on file     Forced sexual activity: Not on file   Other Topics Concern     Parent/sibling w/ CABG, MI or angioplasty before 65F 55M? Not Asked   Social History Narrative     Not on file     Patient Active Problem List   Diagnosis     Insomnia     Systemic lupus erythematosus (H)     Refractory migraine without aura     Allergies   Allergen Reactions     Cherry Anaphylaxis     Dairy Aid  [Lactase]      Other reaction(s): Arthralgia (Joint Pain)     Gluten Meal      Other reaction(s): Abdominal Pain     No Clinical Screening - See Comments Anaphylaxis and Itching     Pistachio Nut Extract Skin Test Anaphylaxis     Brassica Oleracea Italica      Other reaction(s): Abdominal Pain  Other reaction(s): Abdominal Pain     Penicillins Hives and Rash     Sulfa Drugs Hives and Rash       Outpatient Encounter Medications as of 8/21/2019   Medication Sig Dispense Refill     RACHNA SYRUP PO        Acetaminophen (TYLENOL PO) Take  by mouth.         AMBIEN 10 MG OR TABS 1 po HS, prn 20 0     azaTHIOprine (IMURAN) 50 MG tablet Take 1 tablet (50 mg) by mouth daily 30 tablet 1     calcium carbonate (OS-VAHID 500 MG [place]. [place]) 1250 MG tablet Take 1 tablet by mouth daily       hydroxychloroquine (PLAQUENIL) 200 MG tablet Take 1 tablet (200 mg) by mouth 2 times daily 180 tablet 1      hydroxychloroquine (PLAQUENIL) 200 MG tablet Take 1 tablet (200 mg) by mouth 2 times daily BRAND ONLY, she can not tolerate generic 60 tablet 1     L-Methylfolate-Algae-B12-B6 (METANX PO) Take 1,000 mg by mouth daily       LEVOTHYROXINE SODIUM PO Take 0.25 mg by mouth daily       METFORMIN HCL PO Take 500 mg by mouth 2 times daily (with meals)       MULTIVITAMIN TABS   OR   0     norethindrone-ethinyl estradiol (ORTHO-NOVUM 1-35 TAB,NORTREL 1-35 TAB) 1-35 MG-MCG per tablet Take 1 tablet by mouth daily       predniSONE (DELTASONE) 5 MG tablet Take 3 tablets (15 mg) by mouth See Admin Instructions 90 tablet 3     PREDNISONE PO Take 1 mg by mouth daily        promethazine (PHENERGAN) 25 MG tablet TK 1 T PO  Q 6 H PRN N  0     SPIRULINA PO Take by mouth daily       terbinafine (LAMISIL) 1 % cream Apply to feet once daily for 2 weeks then once weekly for maintenance 42 g 3     terbinafine (LAMISIL) 250 MG tablet Take 1 tablet (250 mg) by mouth daily 45 tablet 0     triamcinolone (KENALOG) 0.1 % paste Take by mouth 2 times daily 5 g 3     UNABLE TO FIND DISSOLVE 1 TO 4 LOZENGES UNDER TONGUE AS NEEDED DAILY  0     VITAMIN D, CHOLECALCIFEROL, PO Take 5,000 Units by mouth daily       No facility-administered encounter medications on file as of 8/21/2019.          Her records were reviewed.    Results for orders placed or performed in visit on 11/02/18   Complement C3   Result Value Ref Range    Complement C3 97 76 - 169 mg/dL   Complement C4   Result Value Ref Range    Complement C4 17 15 - 50 mg/dL   DNA double stranded antibodies   Result Value Ref Range    DNA-ds 1 <10 IU/mL   CBC with platelets   Result Value Ref Range    WBC 8.6 4.0 - 11.0 10e9/L    RBC Count 4.50 3.8 - 5.2 10e12/L    Hemoglobin 14.2 11.7 - 15.7 g/dL    Hematocrit 42.8 35.0 - 47.0 %    MCV 95 78 - 100 fl    MCH 31.6 26.5 - 33.0 pg    MCHC 33.2 31.5 - 36.5 g/dL    RDW 12.1 10.0 - 15.0 %    Platelet Count 232 150 - 450 10e9/L   Hepatic panel   Result  Value Ref Range    Bilirubin Direct 0.1 0.0 - 0.2 mg/dL    Bilirubin Total 0.5 0.2 - 1.3 mg/dL    Albumin 3.8 3.4 - 5.0 g/dL    Protein Total 7.9 6.8 - 8.8 g/dL    Alkaline Phosphatase 41 40 - 150 U/L    ALT 39 0 - 50 U/L    AST 28 0 - 45 U/L     Pregnancy: She is . H/o OCP and HRT use, see HPI    Ph.E:    /83   Pulse 90   Temp 98  F (36.7  C) (Oral)   Wt 86.4 kg (190 lb 6.4 oz)   SpO2 96%   BMI 28.95 kg/m       Constitutional: WD/WN. Pleasant, NAD.  Cushingoid  Eyes: EOM intact, PERRLA, sclera anicteric, conj not injected  HEENT: No oral ulcers or thrush. Normal salivary pool, hair thinning.   Neck: No cervical LAP or thyromegaly  Chest: Clear to auscultation bilaterally  CV: RRR, no murmurs/ rubs or gallops. No edema, clubbing or cyanosis.   GI: Abdomen is soft and non tender.   MS: No synovitis. Cool joints. No tenderness of the joints. No  joint deformities. Full ROM of the joints. No nodules. No Jaccoud's deformity.  Skin: Lack of malar rash. No livedo, periungual erythema, alopecia, digital ulcers or nail changes.  Neuro: A&O x 3. Grossly non focal, NL DTR, sensation intact, muscular power 5/5 in all ext  Psych: flat affect    Assessment/Plan (2019):  1 - Known SLE, dx in s (VINITA: 1:80, dsDNA +, Smith negative, normal complements, joint pains, malar rash, oral ulcers), usually flares approximately monthly with joint pains and malar rash. Currently, feels that disease is mild-moderately active. Has been on chronic 15 mg every day prednisone with periods of 20  Mg every day, gained weight, looks cushingoid. Still planning for PGS normal embryo implantation but  Waiting to resume IVIG and for lupus to be under better control to increase success  - Lupus monitoring labs today  - Continue hydroxychloroquine 200 mg daily, reminded to have eye exam   - Continue prednisone at 15 mg, might need 20  Mg every day as has ongoing arthralgia  - Again, highly recommend to initiate 50 mg azathioprine  daily (TPMT testing complete, NL), will begin drug monitoring labs 4 wk after start of AZA.  This is safe in pregnancy and needed to taper prednisone down    PLAN: Keep 10/30/2019 appointment    Orders Placed This Encounter   Procedures     ALT     Albumin level     AST     CBC with platelets differential     Creatinine     Complement C4     Complement C3     CRP inflammation     DNA double stranded antibodies     Erythrocyte sedimentation rate auto     UA with Microscopic reflex to Culture     Protein  random urine with Creat Ratio     Creatinine random urine     AST     ALT     CBC with platelets differential     Creatinine             Again, thank you for allowing me to participate in the care of your patient.      Sincerely,    Josh Roy MD

## 2019-08-21 NOTE — LETTER
Patient:  Patricia Hall  :   1975  MRN:     [id number]        Ms.Jennifer Marta Hall  [address]  [place] [address]        2019    Dear Ms.Getz Hall,    We are writing to inform you of your test results. Labs look good. Slightly high white count is because of prednisone.      Results for orders placed or performed in visit on 19   Protein  random urine with Creat Ratio   Result Value Ref Range    Protein Random Urine <0.05 g/L    Protein Total Urine g/gr Creatinine Unable to calculate due to low value 0 - 0.2 g/g Cr   UA with Microscopic reflex to Culture   Result Value Ref Range    Color Urine Straw     Appearance Urine Clear     Glucose Urine Negative NEG^Negative mg/dL    Bilirubin Urine Negative NEG^Negative    Ketones Urine 5 (A) NEG^Negative mg/dL    Specific Gravity Urine 1.002 (L) 1.003 - 1.035    Blood Urine Small (A) NEG^Negative    pH Urine 6.0 5.0 - 7.0 pH    Protein Albumin Urine Negative NEG^Negative mg/dL    Urobilinogen mg/dL 0.0 0.0 - 2.0 mg/dL    Nitrite Urine Negative NEG^Negative    Leukocyte Esterase Urine Negative NEG^Negative    Source Midstream Urine     WBC Urine 2 0 - 5 /HPF    RBC Urine 1 0 - 2 /HPF    Bacteria Urine Few (A) NEG^Negative /HPF    Squamous Epithelial /HPF Urine 1 0 - 1 /HPF    Mucous Urine Present (A) NEG^Negative /LPF   Erythrocyte sedimentation rate auto   Result Value Ref Range    Sed Rate 2 0 - 20 mm/h   DNA double stranded antibodies   Result Value Ref Range    DNA-ds 1 <10 IU/mL   CRP inflammation   Result Value Ref Range    CRP Inflammation 5.4 0.0 - 8.0 mg/L   Complement C3   Result Value Ref Range    Complement C3 87 76 - 169 mg/dL   Complement C4   Result Value Ref Range    Complement C4 17 15 - 50 mg/dL   Creatinine   Result Value Ref Range    Creatinine 0.69 0.52 - 1.04 mg/dL    GFR Estimate >90 >60 mL/min/[1.73_m2]    GFR Estimate If Black >90 >60 mL/min/[1.73_m2]   CBC with platelets differential    Result Value Ref Range    WBC 11.2 (H) 4.0 - 11.0 10e9/L    RBC Count 4.48 3.8 - 5.2 10e12/L    Hemoglobin 14.4 11.7 - 15.7 g/dL    Hematocrit 43.1 35.0 - 47.0 %    MCV 96 78 - 100 fl    MCH 32.1 26.5 - 33.0 pg    MCHC 33.4 31.5 - 36.5 g/dL    RDW 12.0 10.0 - 15.0 %    Platelet Count 211 150 - 450 10e9/L    Diff Method Automated Method     % Neutrophils 84.1 %    % Lymphocytes 11.8 %    % Monocytes 3.1 %    % Eosinophils 0.1 %    % Basophils 0.3 %    % Immature Granulocytes 0.6 %    Nucleated RBCs 0 0 /100    Absolute Neutrophil 9.4 (H) 1.6 - 8.3 10e9/L    Absolute Lymphocytes 1.3 0.8 - 5.3 10e9/L    Absolute Monocytes 0.4 0.0 - 1.3 10e9/L    Absolute Eosinophils 0.0 0.0 - 0.7 10e9/L    Absolute Basophils 0.0 0.0 - 0.2 10e9/L    Abs Immature Granulocytes 0.1 0 - 0.4 10e9/L    Absolute Nucleated RBC 0.0    AST   Result Value Ref Range    AST 21 0 - 45 U/L   Albumin level   Result Value Ref Range    Albumin 4.1 3.4 - 5.0 g/dL   ALT   Result Value Ref Range    ALT 37 0 - 50 U/L   Creatinine urine calculation only   Result Value Ref Range    Creatinine Urine 14 mg/dL       Josh Roy MD

## 2019-08-21 NOTE — LETTER
8/21/2019      RE: Patricia Hall  [address]  [place] [address]       Rheumatology Urgent Visit Note    Reason for visit: Lupus flare    CC: SLE with active flares    First Consult: 10/12/2017    Last visit: 7/20/2018     DOS: 8/21/2019    Original HPI (10/12/2017):  Patricia Hall is a 42 year old female who was referred to our clinic for evaluation and management of her SLE. The patient has been following with Dr. Mao for her SLE    History obtain from chart review and patient interview    Her lupus symptoms first started in during high school including fatigue and rash. She was diagnosed with lupus in early 2000s and she was in graduate school and presented with a few years of arthralgias involving knees and ankles and hands.  She had developed new onset Raynaud's phenomenon in which her fingers turned white in the cold but no digital sores.  VINITA was 1:160.  All specific autoantibodies and rheumatoid serologies negative.  She had a rash on her face, including cheeks and bridge of her nose.  She followed with Dr. Tejal Mayen in Dermatology.  A biopsy of a lesion from the nose was non-diagnostic.  Diagnosis was earlysigns of cutaneous lupus versus acne rosacea.  She was treated with Elidel cream.  She reported intermittent oral ulcers and significant fatigue as well as intermittent episodes of hair loss.  She was started on prednisone in 2003 along with Plaquenil.  She has been maintained on prednisone 5 mg q day with intermittent bursts up as high as 30 mg for a short time.  She has found it very difficult to taper off of prednisone because she gets flare-ups of skin rash, arthralgias, and fatigue. Patient reports significant symptoms during the summers and reports significant flares this summer with photosensitivity with face rash,  Fatigue and join pain (knees and toes and hips).  She had fevers, mouth sores  And also reports increased hair loss.  Increased pain with  walking and morning stiffness with Fibromyalgia burning in the morning. Currently taking prednisone 10 mg qd and plaquenil 200 mg BID.    Patient reports complicated fertility/OBGYN history with stage four endometriosis, fibroids and one pregnancy resulting in a miscarriage. Three rounds of IVF with one banked viable egg. She is currently undergoing fertility evaluation.  She has been working with infertility specialist for years. She currently has one viable egg and would like to undergo one more episode of IVF. She was seen by an autoimmune OB/GYN specialist in [place] (Daya Frost 02/17) for extensive testing. She was found to be heterozygous for MTHFR mutation. She is now taking Matanx (L-methyl folate). She has noticed less bruising since she started this. Metformin was recommended, but it upsets her stomach. DHEA was recommended, but she is not taking it. Her thyroid studies were normal, but she was started on Synthroid 25  g daily for the TSH to be less than 2.0. It was recommended that she take Lovenox prior to IFV and throughout the pregnancy to avoid risk of miscarriage. It was also recommended that she be treated with IVIG prior to IVF. 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  She plans to proceeded further with in vitro plans, but wants to get better control of her flaring lupus and concerns for IVF treatments/hormones.     Interval HPI (7/20/2018):  Repeatedly ill during the winter with both URIs and flares of disease (joint pain, malaise, fatigue). Still undergoing IVF care via MFM and reproductive immunology. Did not initiate azathioprine following previous visit with Dr. Roy due to worry over negative effects on pregnancy. Currently undergoing medication treatment for IVF, is picking up medications this weekend for stimulation.    Symptomatically, her recent flares include fatigue, flu-like symptoms (fever, chills,  "sweats), polyarticular joint pain (fingers, toes, feet, ankles, wrists, elbows).    Recently, she has experienced further hair loss (clumps in shower), ulcers on buccal mucosa (now resolved), fatigue, malaise, significant B/L hip pain (worse from previous, approx 1 year). Specifically, it is constant deep joint pain in hips, would say 8/10 in severity when it occurs during movement. Has been seriously disrupting her daily activities. Has been using tylenol to control pain without complete relief.     Has seen Reproductive Immunology in the interim, has undergone two rounds of IVIG (believed to help with NK cell and cytokine activity in embryo implantation), first IVIG May 14th, then second was July 16th. Had flu-like symptoms with first infusion (HA, myalgias, malaise, felt \"gross\", lower back pain, neck pain). During the 2nd infusion, they slowed infusion, took benadryl/tylenol/upped prednisone to 20 mg she did not experience SEs with this. Overall, felt that IVIG improved her lupus symptoms globally. Short-lived relief. Plan is to use IVIG monthly with monitoring of cytokine levels and NK cell counts.     Currently on 15 mg of prednisone chronically, > 1 year. Today, she would say her disease is mild-moderately active in spite of the 15 mg prednisone. Currently on 400-500 U of vitamin D. Taking 1000 mg calcium.     ROS:  (-) pleurisy, sob, cough, hematuria, dysuria, eye redness, nose bleeds, easy bruising, malar rash  (+) alternating diarrhea/constipation, dry eye, dry mouth, palatal ulcers/petechiae    Is interested in discussing SLE management (Imuran) while pursuing IVF. Will be undergoing planning and another round of IVF stimulation in August.       Today 8/21/2019: She did another round of IVF in 8/2019, no good embryo. IVF caused her flare ups. For flare ups, gets pain over knees, knuckles and toes with extreme fatigue and photosensitivity.    Had laparoscopic surgery for endometriosis in 4/2019.    Since " 4/2019, has been on OC and has not been able to taper prednisone own.    Today, she is on prednisone 15 mg every day x 2-3 weeks. Before that, most of the time, she was on 20 mg every day.    No rash today.    Has pain over knuckles, toes, knees. Has morning flu like sx in AM x 2 hours.    Has extreme fatigue.    She was getting IVIG qmonthly, till 1/2019.    She is going to resume monthly IVIG for successful pregnancy.        ROS:  A comprehensive ROS was done, positives are per HPI.  Past Medical History:   Diagnosis Date     Allergy, unspecified not elsewhere classified      Endometriosis      Fibromyalgia      Migraine without aura, with intractable migraine, so stated, without mention of status migrainosus      Myalgia and myositis, unspecified      Systemic lupus erythematosus (H)      Past Surgical History:   Procedure Laterality Date     ORTHOPEDIC SURGERY       SURGICAL HISTORY OF -   1986    left elbow fracture repair     Family History   Problem Relation Age of Onset     Diabetes Maternal Grandfather      Lipids Mother      Skin Cancer Paternal Grandmother      Melanoma No family hx of      Social History     Socioeconomic History     Marital status:      Spouse name: Not on file     Number of children: Not on file     Years of education: Not on file     Highest education level: Not on file   Occupational History     Not on file   Social Needs     Financial resource strain: Not on file     Food insecurity:     Worry: Not on file     Inability: Not on file     Transportation needs:     Medical: Not on file     Non-medical: Not on file   Tobacco Use     Smoking status: Never Smoker     Smokeless tobacco: Never Used   Substance and Sexual Activity     Alcohol use: Yes     Comment: occ.- 2/week     Drug use: No     Sexual activity: Yes     Partners: Male     Birth control/protection: Condom   Lifestyle     Physical activity:     Days per week: Not on file     Minutes per session: Not on file      Stress: Not on file   Relationships     Social connections:     Talks on phone: Not on file     Gets together: Not on file     Attends [employer] service: Not on file     Active member of club or organization: Not on file     Attends meetings of clubs or organizations: Not on file     Relationship status: Not on file     Intimate partner violence:     Fear of current or ex partner: Not on file     Emotionally abused: Not on file     Physically abused: Not on file     Forced sexual activity: Not on file   Other Topics Concern     Parent/sibling w/ CABG, MI or angioplasty before 65F 55M? Not Asked   Social History Narrative     Not on file     Patient Active Problem List   Diagnosis     Insomnia     Systemic lupus erythematosus (H)     Refractory migraine without aura     Allergies   Allergen Reactions     Cherry Anaphylaxis     Dairy Aid  [Lactase]      Other reaction(s): Arthralgia (Joint Pain)     Gluten Meal      Other reaction(s): Abdominal Pain     No Clinical Screening - See Comments Anaphylaxis and Itching     Pistachio Nut Extract Skin Test Anaphylaxis     Brassica Oleracea Italica      Other reaction(s): Abdominal Pain  Other reaction(s): Abdominal Pain     Penicillins Hives and Rash     Sulfa Drugs Hives and Rash       Outpatient Encounter Medications as of 8/21/2019   Medication Sig Dispense Refill     RACHNA SYRUP PO        Acetaminophen (TYLENOL PO) Take  by mouth.         AMBIEN 10 MG OR TABS 1 po HS, prn 20 0     azaTHIOprine (IMURAN) 50 MG tablet Take 1 tablet (50 mg) by mouth daily 30 tablet 1     calcium carbonate (OS-VAHID 500 MG [place]. [place]) 1250 MG tablet Take 1 tablet by mouth daily       hydroxychloroquine (PLAQUENIL) 200 MG tablet Take 1 tablet (200 mg) by mouth 2 times daily 180 tablet 1     hydroxychloroquine (PLAQUENIL) 200 MG tablet Take 1 tablet (200 mg) by mouth 2 times daily BRAND ONLY, she can not tolerate generic 60 tablet 1     L-Methylfolate-Algae-B12-B6 (METANX PO) Take 1,000 mg by mouth  daily       LEVOTHYROXINE SODIUM PO Take 0.25 mg by mouth daily       METFORMIN HCL PO Take 500 mg by mouth 2 times daily (with meals)       MULTIVITAMIN TABS   OR   0     norethindrone-ethinyl estradiol (ORTHO-NOVUM 1-35 TAB,NORTREL 1-35 TAB) 1-35 MG-MCG per tablet Take 1 tablet by mouth daily       predniSONE (DELTASONE) 5 MG tablet Take 3 tablets (15 mg) by mouth See Admin Instructions 90 tablet 3     PREDNISONE PO Take 1 mg by mouth daily        promethazine (PHENERGAN) 25 MG tablet TK 1 T PO  Q 6 H PRN N  0     SPIRULINA PO Take by mouth daily       terbinafine (LAMISIL) 1 % cream Apply to feet once daily for 2 weeks then once weekly for maintenance 42 g 3     terbinafine (LAMISIL) 250 MG tablet Take 1 tablet (250 mg) by mouth daily 45 tablet 0     triamcinolone (KENALOG) 0.1 % paste Take by mouth 2 times daily 5 g 3     UNABLE TO FIND DISSOLVE 1 TO 4 LOZENGES UNDER TONGUE AS NEEDED DAILY  0     VITAMIN D, CHOLECALCIFEROL, PO Take 5,000 Units by mouth daily       No facility-administered encounter medications on file as of 8/21/2019.          Her records were reviewed.    Results for orders placed or performed in visit on 11/02/18   Complement C3   Result Value Ref Range    Complement C3 97 76 - 169 mg/dL   Complement C4   Result Value Ref Range    Complement C4 17 15 - 50 mg/dL   DNA double stranded antibodies   Result Value Ref Range    DNA-ds 1 <10 IU/mL   CBC with platelets   Result Value Ref Range    WBC 8.6 4.0 - 11.0 10e9/L    RBC Count 4.50 3.8 - 5.2 10e12/L    Hemoglobin 14.2 11.7 - 15.7 g/dL    Hematocrit 42.8 35.0 - 47.0 %    MCV 95 78 - 100 fl    MCH 31.6 26.5 - 33.0 pg    MCHC 33.2 31.5 - 36.5 g/dL    RDW 12.1 10.0 - 15.0 %    Platelet Count 232 150 - 450 10e9/L   Hepatic panel   Result Value Ref Range    Bilirubin Direct 0.1 0.0 - 0.2 mg/dL    Bilirubin Total 0.5 0.2 - 1.3 mg/dL    Albumin 3.8 3.4 - 5.0 g/dL    Protein Total 7.9 6.8 - 8.8 g/dL    Alkaline Phosphatase 41 40 - 150 U/L    ALT 39 0 -  50 U/L    AST 28 0 - 45 U/L     Pregnancy: She is . H/o OCP and HRT use, see HPI    Ph.E:    /83   Pulse 90   Temp 98  F (36.7  C) (Oral)   Wt 86.4 kg (190 lb 6.4 oz)   SpO2 96%   BMI 28.95 kg/m       Constitutional: WD/WN. Pleasant, NAD.  Cushingoid  Eyes: EOM intact, PERRLA, sclera anicteric, conj not injected  HEENT: No oral ulcers or thrush. Normal salivary pool, hair thinning.   Neck: No cervical LAP or thyromegaly  Chest: Clear to auscultation bilaterally  CV: RRR, no murmurs/ rubs or gallops. No edema, clubbing or cyanosis.   GI: Abdomen is soft and non tender.   MS: No synovitis. Cool joints. No tenderness of the joints. No  joint deformities. Full ROM of the joints. No nodules. No Jaccoud's deformity.  Skin: Lack of malar rash. No livedo, periungual erythema, alopecia, digital ulcers or nail changes.  Neuro: A&O x 3. Grossly non focal, NL DTR, sensation intact, muscular power 5/5 in all ext  Psych: flat affect    Assessment/Plan (2019):  1 - Known SLE, dx in s (VINITA: 1:80, dsDNA +, Smith negative, normal complements, joint pains, malar rash, oral ulcers), usually flares approximately monthly with joint pains and malar rash. Currently, feels that disease is mild-moderately active. Has been on chronic 15 mg every day prednisone with periods of 20  Mg every day, gained weight, looks cushingoid. Still planning for PGS normal embryo implantation but  Waiting to resume IVIG and for lupus to be under better control to increase success  - Lupus monitoring labs today  - Continue hydroxychloroquine 200 mg daily, reminded to have eye exam   - Continue prednisone at 15 mg, might need 20  Mg every day as has ongoing arthralgia  - Again, highly recommend to initiate 50 mg azathioprine daily (TPMT testing complete, NL), will begin drug monitoring labs 4 wk after start of AZA.  This is safe in pregnancy and needed to taper prednisone down    PLAN: Keep 10/30/2019 appointment    Orders Placed This  Encounter   Procedures     ALT     Albumin level     AST     CBC with platelets differential     Creatinine     Complement C4     Complement C3     CRP inflammation     DNA double stranded antibodies     Erythrocyte sedimentation rate auto     UA with Microscopic reflex to Culture     Protein  random urine with Creat Ratio     Creatinine random urine     AST     ALT     CBC with platelets differential     Creatinine             Josh Roy MD

## 2019-08-21 NOTE — PATIENT INSTRUCTIONS
Labs today    Start imuran 50 mg a day with monitoring labs 4 weeks later    Keep 10/30/2019 appointment

## 2019-08-22 LAB
C3 SERPL-MCNC: 87 MG/DL (ref 76–169)
C4 SERPL-MCNC: 17 MG/DL (ref 15–50)
DSDNA AB SER-ACNC: 1 IU/ML

## 2019-09-04 ENCOUNTER — TRANSFERRED RECORDS (OUTPATIENT)
Dept: HEALTH INFORMATION MANAGEMENT | Facility: CLINIC | Age: 44
End: 2019-09-04

## 2019-09-28 ENCOUNTER — HEALTH MAINTENANCE LETTER (OUTPATIENT)
Age: 44
End: 2019-09-28

## 2019-10-09 DIAGNOSIS — M32.9 SYSTEMIC LUPUS ERYTHEMATOSUS, UNSPECIFIED SLE TYPE, UNSPECIFIED ORGAN INVOLVEMENT STATUS (H): ICD-10-CM

## 2019-10-09 LAB
ALT SERPL W P-5'-P-CCNC: 44 U/L (ref 0–50)
AST SERPL W P-5'-P-CCNC: 28 U/L (ref 0–45)
BASOPHILS # BLD AUTO: 0.1 10E9/L (ref 0–0.2)
BASOPHILS NFR BLD AUTO: 0.6 %
CREAT SERPL-MCNC: 0.67 MG/DL (ref 0.52–1.04)
DIFFERENTIAL METHOD BLD: NORMAL
EOSINOPHIL # BLD AUTO: 0 10E9/L (ref 0–0.7)
EOSINOPHIL NFR BLD AUTO: 0 %
ERYTHROCYTE [DISTWIDTH] IN BLOOD BY AUTOMATED COUNT: 12.9 % (ref 10–15)
GFR SERPL CREATININE-BSD FRML MDRD: >90 ML/MIN/{1.73_M2}
HCT VFR BLD AUTO: 42.7 % (ref 35–47)
HGB BLD-MCNC: 14 G/DL (ref 11.7–15.7)
IMM GRANULOCYTES # BLD: 0.1 10E9/L (ref 0–0.4)
IMM GRANULOCYTES NFR BLD: 1.3 %
LYMPHOCYTES # BLD AUTO: 1.2 10E9/L (ref 0.8–5.3)
LYMPHOCYTES NFR BLD AUTO: 14.6 %
MCH RBC QN AUTO: 32.1 PG (ref 26.5–33)
MCHC RBC AUTO-ENTMCNC: 32.8 G/DL (ref 31.5–36.5)
MCV RBC AUTO: 98 FL (ref 78–100)
MONOCYTES # BLD AUTO: 0.3 10E9/L (ref 0–1.3)
MONOCYTES NFR BLD AUTO: 3.7 %
NEUTROPHILS # BLD AUTO: 6.7 10E9/L (ref 1.6–8.3)
NEUTROPHILS NFR BLD AUTO: 79.8 %
NRBC # BLD AUTO: 0 10*3/UL
NRBC BLD AUTO-RTO: 0 /100
PLATELET # BLD AUTO: 239 10E9/L (ref 150–450)
RBC # BLD AUTO: 4.36 10E12/L (ref 3.8–5.2)
WBC # BLD AUTO: 8.4 10E9/L (ref 4–11)

## 2019-10-09 NOTE — LETTER
Patient:  Patricia Hall  :   1975  MRN:     [id number]        Ms.Jennifer Marta Hall  [address] [place] [address]        2019    Dear Ms.Getz Hall,    We are writing to inform you of your test results. Normal labs, is imuran helping you? Do you want to increase the dose?      Results for orders placed or performed in visit on 10/09/19   Creatinine   Result Value Ref Range    Creatinine 0.67 0.52 - 1.04 mg/dL    GFR Estimate >90 >60 mL/min/[1.73_m2]    GFR Estimate If Black >90 >60 mL/min/[1.73_m2]   CBC with platelets differential   Result Value Ref Range    WBC 8.4 4.0 - 11.0 10e9/L    RBC Count 4.36 3.8 - 5.2 10e12/L    Hemoglobin 14.0 11.7 - 15.7 g/dL    Hematocrit 42.7 35.0 - 47.0 %    MCV 98 78 - 100 fl    MCH 32.1 26.5 - 33.0 pg    MCHC 32.8 31.5 - 36.5 g/dL    RDW 12.9 10.0 - 15.0 %    Platelet Count 239 150 - 450 10e9/L    Diff Method Automated Method     % Neutrophils 79.8 %    % Lymphocytes 14.6 %    % Monocytes 3.7 %    % Eosinophils 0.0 %    % Basophils 0.6 %    % Immature Granulocytes 1.3 %    Nucleated RBCs 0 0 /100    Absolute Neutrophil 6.7 1.6 - 8.3 10e9/L    Absolute Lymphocytes 1.2 0.8 - 5.3 10e9/L    Absolute Monocytes 0.3 0.0 - 1.3 10e9/L    Absolute Eosinophils 0.0 0.0 - 0.7 10e9/L    Absolute Basophils 0.1 0.0 - 0.2 10e9/L    Abs Immature Granulocytes 0.1 0 - 0.4 10e9/L    Absolute Nucleated RBC 0.0    ALT   Result Value Ref Range    ALT 44 0 - 50 U/L   AST   Result Value Ref Range    AST 28 0 - 45 U/L       Josh Roy MD

## 2019-10-18 ENCOUNTER — TRANSFERRED RECORDS (OUTPATIENT)
Dept: HEALTH INFORMATION MANAGEMENT | Facility: CLINIC | Age: 44
End: 2019-10-18

## 2019-10-21 ENCOUNTER — DOCUMENTATION ONLY (OUTPATIENT)
Dept: RHEUMATOLOGY | Facility: CLINIC | Age: 44
End: 2019-10-21

## 2019-10-21 VITALS — BODY MASS INDEX: 28.96 KG/M2 | WEIGHT: 190.48 LBS

## 2019-11-11 ENCOUNTER — TELEPHONE (OUTPATIENT)
Dept: RHEUMATOLOGY | Facility: CLINIC | Age: 44
End: 2019-11-11

## 2019-11-11 NOTE — TELEPHONE ENCOUNTER
[facility] Call Center    Phone Message    May a detailed message be left on voicemail: yes    Reason for Call: Patient is communication via [employer].           I have my period right now and it is extremely painful today with my severe endometriosis (stage IV) - plus leg pain. I'm miserable. Can I get on a wait list or schedule next appointment rather than today? I also feel like starting Imuran is important before seeing her - don't want to waste her time. Soonest we have is in April now.      Action Taken: [facility]

## 2019-11-13 NOTE — TELEPHONE ENCOUNTER
Tried to return call to patient and left a message to have her call back.  Will also send [employer] message.    Kesha Rivera, BSN RN   Rheumatology Clinic Nurse   [facility]

## 2019-11-13 NOTE — TELEPHONE ENCOUNTER
Patricia returned my call and she is able to come in on 11/27 at 3:30 pm. She also wanted to give an update on her health. Since sept she has been having this extreme pain from her right side of her butt down to her ankle every morning when she wakes up which goes away after a couple hours, but has been just as bad every single morning. She thought this may be due to sciatica but the pain is not the same as the pain she has had from that. She started to notice this 4 days after her last IVIG infusion. She is unsure if any of this is kidney related since the pain on her right side also feels like it is flank pain and she has had increased edema. She notices that when she has watches on or if anything touches her skin, an imprint is left for quite some time. She also get swelling on the corners of her eyes with a rash which she said she will send a picture of. She has had lupus rashes and also has had a lot of her hair fall out. She was to start Imuran but still hasn't with fear that it will do more harm then good if there is something going on with her kidneys. She states that her urine looks the same but has had a slightly different odor.     Kesha Rivera, NOBLEN RN   Rheumatology Clinic Nurse   [facility] [place]

## 2019-11-15 NOTE — TELEPHONE ENCOUNTER
Sent message in [employer] with Dr. Roy's recommendation:    Hard to assess over the chart, is she ok to go to ER or PCP to have a full evaluations, don't want to miss anything.     Kesha Rivera, BSN RN   Rheumatology Clinic Nurse    [facility]

## 2019-11-27 ENCOUNTER — OFFICE VISIT (OUTPATIENT)
Dept: ORTHOPEDICS | Facility: CLINIC | Age: 44
End: 2019-11-27
Payer: COMMERCIAL

## 2019-11-27 ENCOUNTER — ANCILLARY PROCEDURE (OUTPATIENT)
Dept: GENERAL RADIOLOGY | Facility: CLINIC | Age: 44
End: 2019-11-27
Attending: PREVENTIVE MEDICINE
Payer: COMMERCIAL

## 2019-11-27 ENCOUNTER — OFFICE VISIT (OUTPATIENT)
Dept: RHEUMATOLOGY | Facility: CLINIC | Age: 44
End: 2019-11-27
Attending: INTERNAL MEDICINE
Payer: COMMERCIAL

## 2019-11-27 VITALS
TEMPERATURE: 98.2 F | OXYGEN SATURATION: 100 % | SYSTOLIC BLOOD PRESSURE: 124 MMHG | HEART RATE: 101 BPM | DIASTOLIC BLOOD PRESSURE: 79 MMHG

## 2019-11-27 DIAGNOSIS — R52 PAIN: Primary | ICD-10-CM

## 2019-11-27 DIAGNOSIS — M54.16 LUMBAR RADICULAR PAIN: ICD-10-CM

## 2019-11-27 DIAGNOSIS — Z51.81 ENCOUNTER FOR MEDICATION MONITORING: Primary | ICD-10-CM

## 2019-11-27 DIAGNOSIS — R52 PAIN: ICD-10-CM

## 2019-11-27 DIAGNOSIS — M77.41 METATARSALGIA OF RIGHT FOOT: ICD-10-CM

## 2019-11-27 DIAGNOSIS — M32.9 SYSTEMIC LUPUS ERYTHEMATOSUS, UNSPECIFIED SLE TYPE, UNSPECIFIED ORGAN INVOLVEMENT STATUS (H): ICD-10-CM

## 2019-11-27 PROCEDURE — G0463 HOSPITAL OUTPT CLINIC VISIT: HCPCS | Mod: ZF

## 2019-11-27 RX ORDER — PREGABALIN 25 MG/1
25 CAPSULE ORAL 2 TIMES DAILY
COMMUNITY
End: 2020-02-24

## 2019-11-27 ASSESSMENT — PAIN SCALES - GENERAL: PAINLEVEL: SEVERE PAIN (7)

## 2019-11-27 NOTE — LETTER
11/27/2019       RE: Patricia Hall  [address] [facility] [id number]     Dear Colleague,    Thank you for referring your patient, Patricia Hall, to the [facility] RHEUMATOLOGY at [facility]. Please see a copy of my visit note below.    Rheumatology Urgent Visit Note    Reason for visit: Lupus, back pain      First Consult: 10/12/2017    Last visit: 8/21/2019    DOS: 11/27/2019    Original HPI (10/12/2017):  Patricia Hall is a 42 year old female who was referred to our clinic for evaluation and management of her SLE. The patient has been following with Dr. Mao for her SLE    History obtain from chart review and patient interview    Her lupus symptoms first started in during high school including fatigue and rash. She was diagnosed with lupus in early 2000s and she was in graduate school and presented with a few years of arthralgias involving knees and ankles and hands.  She had developed new onset Raynaud's phenomenon in which her fingers turned white in the cold but no digital sores.  VINITA was 1:160.  All specific autoantibodies and rheumatoid serologies negative.  She had a rash on her face, including cheeks and bridge of her nose.  She followed with Dr. Tejal Mayen in Dermatology.  A biopsy of a lesion from the nose was non-diagnostic.  Diagnosis was earlysigns of cutaneous lupus versus acne rosacea.  She was treated with Elidel cream.  She reported intermittent oral ulcers and significant fatigue as well as intermittent episodes of hair loss.  She was started on prednisone in 2003 along with Plaquenil.  She has been maintained on prednisone 5 mg q day with intermittent bursts up as high as 30 mg for a short time.  She has found it very difficult to taper off of prednisone because she gets flare-ups of skin rash, arthralgias, and fatigue. Patient reports significant symptoms during the summers and reports significant flares this summer  with photosensitivity with face rash,  Fatigue and join pain (knees and toes and hips).  She had fevers, mouth sores  And also reports increased hair loss.  Increased pain with walking and morning stiffness with Fibromyalgia burning in the morning. Currently taking prednisone 10 mg qd and plaquenil 200 mg BID.    Patient reports complicated fertility/OBGYN history with stage four endometriosis, fibroids and one pregnancy resulting in a miscarriage. Three rounds of IVF with one banked viable egg. She is currently undergoing fertility evaluation.  She has been working with infertility specialist for years. She currently has one viable egg and would like to undergo one more episode of IVF. She was seen by an autoimmune OB/GYN specialist in [place] (Daya Frost 02/17) for extensive testing. She was found to be heterozygous for MTHFR mutation. She is now taking Matanx (L-methyl folate). She has noticed less bruising since she started this. Metformin was recommended, but it upsets her stomach. DHEA was recommended, but she is not taking it. Her thyroid studies were normal, but she was started on Synthroid 25  g daily for the TSH to be less than 2.0. It was recommended that she take Lovenox prior to IFV and throughout the pregnancy to avoid risk of miscarriage. It was also recommended that she be treated with IVIG prior to IVF. 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  She plans to proceeded further with in vitro plans, but wants to get better control of her flaring lupus and concerns for IVF treatments/hormones.     Interval HPI (7/20/2018):  Repeatedly ill during the winter with both URIs and flares of disease (joint pain, malaise, fatigue). Still undergoing IVF care via M and reproductive immunology. Did not initiate azathioprine following previous visit with Dr. Roy due to worry over negative effects on pregnancy. Currently undergoing  "medication treatment for IVF, is picking up medications this weekend for stimulation.    Symptomatically, her recent flares include fatigue, flu-like symptoms (fever, chills, sweats), polyarticular joint pain (fingers, toes, feet, ankles, wrists, elbows).    Recently, she has experienced further hair loss (clumps in shower), ulcers on buccal mucosa (now resolved), fatigue, malaise, significant B/L hip pain (worse from previous, approx 1 year). Specifically, it is constant deep joint pain in hips, would say 8/10 in severity when it occurs during movement. Has been seriously disrupting her daily activities. Has been using tylenol to control pain without complete relief.     Has seen Reproductive Immunology in the interim, has undergone two rounds of IVIG (believed to help with NK cell and cytokine activity in embryo implantation), first IVIG May 14th, then second was July 16th. Had flu-like symptoms with first infusion (HA, myalgias, malaise, felt \"gross\", lower back pain, neck pain). During the 2nd infusion, they slowed infusion, took benadryl/tylenol/upped prednisone to 20 mg she did not experience SEs with this. Overall, felt that IVIG improved her lupus symptoms globally. Short-lived relief. Plan is to use IVIG monthly with monitoring of cytokine levels and NK cell counts.     Currently on 15 mg of prednisone chronically, > 1 year. Today, she would say her disease is mild-moderately active in spite of the 15 mg prednisone. Currently on 400-500 U of vitamin D. Taking 1000 mg calcium.     ROS:  (-) pleurisy, sob, cough, hematuria, dysuria, eye redness, nose bleeds, easy bruising, malar rash  (+) alternating diarrhea/constipation, dry eye, dry mouth, palatal ulcers/petechiae    Is interested in discussing SLE management (Imuran) while pursuing IVF. Will be undergoing planning and another round of IVF stimulation in August.       Today 8/21/2019: She did another round of IVF in 8/2019, no good embryo. IVF caused her " flare ups. For flare ups, gets pain over knees, knuckles and toes with extreme fatigue and photosensitivity.    Had laparoscopic surgery for endometriosis in 4/2019.    Since 4/2019, has been on OC and has not been able to taper prednisone own.    Today, she is on prednisone 15 mg every day x 2-3 weeks. Before that, most of the time, she was on 20 mg every day.    No rash today.    Has pain over knuckles, toes, knees. Has morning flu like sx in AM x 2 hours.    Has extreme fatigue.    She was getting IVIG qmonthly, till 1/2019.    She is going to resume monthly IVIG for successful pregnancy.    Today 11/27/2019: Started IVIG on 9/8/2019, her eyes swelled up, 2 days later had severe LBP/SI joint pain. Was doing keto diet at that time, had headaches.now has sciatica pain on the R side. Did not have this reaction with IVIG before.    Late Oct/early Nov, had malar rash, hair loss.    Had LE edema, went up on her prednisone to 30-40 mg a day x few days. Back to her baseline hair loss and baseline prednisone 20 mg every day.      R SI joint pain is better, but still has it, uses topical pain cream. It is the worst in AM.    Still has swollen ankles.    This edema is new for her.    Off birth control pills. Not on IVF tx either.    Her lupus is back to her baseline.    Has not started AZA yet, but not thinks she is ready to try it.      ROS:  A comprehensive ROS was done, positives are per HPI.  Past Medical History:   Diagnosis Date     Allergy, unspecified not elsewhere classified      Endometriosis      Fibromyalgia      Migraine without aura, with intractable migraine, so stated, without mention of status migrainosus      Myalgia and myositis, unspecified      Systemic lupus erythematosus (H)      Past Surgical History:   Procedure Laterality Date     ORTHOPEDIC SURGERY       SURGICAL HISTORY OF -   1986    left elbow fracture repair     Family History   Problem Relation Age of Onset     Diabetes Maternal Grandfather       Lipids Mother      Skin Cancer Paternal Grandmother      Melanoma No family hx of      Social History     Socioeconomic History     Marital status:      Spouse name: Not on file     Number of children: Not on file     Years of education: Not on file     Highest education level: Not on file   Occupational History     Not on file   Social Needs     Financial resource strain: Not on file     Food insecurity:     Worry: Not on file     Inability: Not on file     Transportation needs:     Medical: Not on file     Non-medical: Not on file   Tobacco Use     Smoking status: Never Smoker     Smokeless tobacco: Never Used   Substance and Sexual Activity     Alcohol use: Yes     Comment: occ.- 2/week     Drug use: No     Sexual activity: Yes     Partners: Male     Birth control/protection: Condom   Lifestyle     Physical activity:     Days per week: Not on file     Minutes per session: Not on file     Stress: Not on file   Relationships     Social connections:     Talks on phone: Not on file     Gets together: Not on file     Attends [employer] service: Not on file     Active member of club or organization: Not on file     Attends meetings of clubs or organizations: Not on file     Relationship status: Not on file     Intimate partner violence:     Fear of current or ex partner: Not on file     Emotionally abused: Not on file     Physically abused: Not on file     Forced sexual activity: Not on file   Other Topics Concern     Parent/sibling w/ CABG, MI or angioplasty before 65F 55M? Not Asked   Social History Narrative     Not on file     Patient Active Problem List   Diagnosis     Insomnia     Systemic lupus erythematosus (H)     Refractory migraine without aura     Allergies   Allergen Reactions     Cherry Anaphylaxis     Dairy Aid  [Lactase]      Other reaction(s): Arthralgia (Joint Pain)     Gluten Meal      Other reaction(s): Abdominal Pain     No Clinical Screening - See Comments Anaphylaxis and Itching      Pistachio Nut Extract Skin Test Anaphylaxis     Brassica Oleracea Italica      Other reaction(s): Abdominal Pain  Other reaction(s): Abdominal Pain     Penicillins Hives and Rash     Sulfa Drugs Hives and Rash       Outpatient Encounter Medications as of 11/27/2019   Medication Sig Dispense Refill     RACHNA SYRUP PO        Acetaminophen (TYLENOL PO) Take  by mouth.         AMBIEN 10 MG OR TABS 1 po HS, prn 20 0     hydroxychloroquine (PLAQUENIL) 200 MG tablet Take 1 tablet (200 mg) by mouth 2 times daily 180 tablet 1     MULTIVITAMIN TABS   OR   0     Omega-3 Fatty Acids (OMEGA 3 PO) Take 1,250 mg by mouth daily       predniSONE (DELTASONE) 5 MG tablet Take 3 tablets (15 mg) by mouth See Admin Instructions (Patient taking differently: Take 20 mg by mouth See Admin Instructions ) 90 tablet 3     sertraline (ZOLOFT) 100 MG tablet Take 100 mg by mouth daily       VITAMIN D, CHOLECALCIFEROL, PO Take 5,000 Units by mouth daily       azaTHIOprine (IMURAN) 50 MG tablet Take 1 tablet (50 mg) by mouth daily (Patient not taking: Reported on 11/27/2019) 30 tablet 1     calcium carbonate (OS-VAHID 500 MG [place]. CA) 1250 MG tablet Take 1 tablet by mouth daily       L-Methylfolate-Algae-B12-B6 (METANX PO) Take 1,000 mg by mouth daily       LEVOTHYROXINE SODIUM PO Take 0.25 mg by mouth daily       METFORMIN HCL PO Take 500 mg by mouth 2 times daily (with meals)       norethindrone-ethinyl estradiol (ORTHO-NOVUM 1-35 TAB,NORTREL 1-35 TAB) 1-35 MG-MCG per tablet Take 1 tablet by mouth daily       omega 3 1000 MG CAPS        PREDNISONE PO Take 1 mg by mouth daily        promethazine (PHENERGAN) 25 MG tablet TK 1 T PO  Q 6 H PRN N  0     SPIRULINA PO Take by mouth daily       triamcinolone (KENALOG) 0.1 % paste Take by mouth 2 times daily (Patient not taking: Reported on 8/21/2019) 5 g 3     UNABLE TO FIND DISSOLVE 1 TO 4 LOZENGES UNDER TONGUE AS NEEDED DAILY  0     No facility-administered encounter medications on file as of  2019.          Her records were reviewed.    No results found for any visits on 19.  Pregnancy: She is . H/o OCP and HRT use, see HPI    Ph.E:    /79 (BP Location: Right arm, Patient Position: Sitting, Cuff Size: Adult Regular)   Pulse 101   Temp 98.2  F (36.8  C)   SpO2 100%     Constitutional: WD/WN. Pleasant, NAD.  Cushingoid  Eyes: EOM intact, PERRLA, sclera anicteric, conj not injected  HEENT: No oral ulcers or thrush. Normal salivary pool, hair thinning.   Neck: No cervical LAP or thyromegaly  Chest: Clear to auscultation bilaterally  CV: RRR, no murmurs/ rubs or gallops. No edema, clubbing or cyanosis.   GI: Abdomen is soft and non tender.   MS: No synovitis. Cool joints. No tenderness of the joints. No  joint deformities. Full ROM of the joints. No nodules. No Jaccoud's deformity. _R SLR test  Skin: Lack of malar rash. No livedo, periungual erythema, alopecia, digital ulcers or nail changes.  Neuro: A&O x 3. Grossly non focal, NL DTR, sensation intact, muscular power 5/5 in all ext  Psych: NL affect    Assessment/Plan (2019):  1 - Known SLE, dx in s (VINITA: 1:80, dsDNA +, Smith negative, normal complements, joint pains, malar rash, oral ulcers), usually flares approximately monthly with joint pains and malar rash. Currently, feels that disease is active. Has been on chronic 20 mg every day prednisone, gained weight, looks cushingoid. Still planning for PGS normal embryo implantation. Had a reaction to IVIG which was given for infertility tx and is not going to get it again. Her back pain seems to be sciatica and not related to SLE.    Refer to pain management    Try walking clinic for ortho/sciatica pain today    - Continue hydroxychloroquine 200 mg daily, reminded to have eye exam   - Continue prednisone at 20 mg every day  - Again, highly recommend to initiate 50 mg azathioprine daily (TPMT testing complete, NL), will begin drug monitoring labs 4 wk after start of AZA.  This  is safe in pregnancy and needed to taper prednisone down    PLAN: RTC 3-4 months    Orders Placed This Encounter   Procedures     ALT     Albumin level     AST     CBC with platelets differential     Creatinine     Complement C4     Complement C3     CRP inflammation     DNA double stranded antibodies     Erythrocyte sedimentation rate auto     UA with Microscopic reflex to Culture     Protein  random urine with Creat Ratio     Creatinine random urine     PAIN MANAGEMENT REFERRAL             Again, thank you for allowing me to participate in the care of your patient.      Sincerely,    Josh Roy MD

## 2019-11-27 NOTE — PATIENT INSTRUCTIONS
Labs 4 weeks after start of imuran 50 mg a day with food    Refer to pain management    Try walking clinic for ortho/sciatica pain today    Return in 3-4 months (new pt slots could be used)

## 2019-11-27 NOTE — NURSING NOTE
Chief Complaint   Patient presents with     RECHECK     lupus     /79 (BP Location: Right arm, Patient Position: Sitting, Cuff Size: Adult Regular)   Pulse 101   Temp 98.2  F (36.8  C)   SpO2 100%       Andrew Vallejo, EMT

## 2019-11-27 NOTE — PROGRESS NOTES
Rheumatology Urgent Visit Note    Reason for visit: Lupus, back pain      First Consult: 10/12/2017    Last visit: 8/21/2019    DOS: 11/27/2019    Original HPI (10/12/2017):  Patricia Hall is a 42 year old female who was referred to our clinic for evaluation and management of her SLE. The patient has been following with Dr. Mao for her SLE    History obtain from chart review and patient interview    Her lupus symptoms first started in during high school including fatigue and rash. She was diagnosed with lupus in early 2000s and she was in graduate school and presented with a few years of arthralgias involving knees and ankles and hands.  She had developed new onset Raynaud's phenomenon in which her fingers turned white in the cold but no digital sores.  VINITA was 1:160.  All specific autoantibodies and rheumatoid serologies negative.  She had a rash on her face, including cheeks and bridge of her nose.  She followed with Dr. Tejal Mayen in Dermatology.  A biopsy of a lesion from the nose was non-diagnostic.  Diagnosis was earlysigns of cutaneous lupus versus acne rosacea.  She was treated with Elidel cream.  She reported intermittent oral ulcers and significant fatigue as well as intermittent episodes of hair loss.  She was started on prednisone in 2003 along with Plaquenil.  She has been maintained on prednisone 5 mg q day with intermittent bursts up as high as 30 mg for a short time.  She has found it very difficult to taper off of prednisone because she gets flare-ups of skin rash, arthralgias, and fatigue. Patient reports significant symptoms during the summers and reports significant flares this summer with photosensitivity with face rash,  Fatigue and join pain (knees and toes and hips).  She had fevers, mouth sores  And also reports increased hair loss.  Increased pain with walking and morning stiffness with Fibromyalgia burning in the morning. Currently taking prednisone 10 mg qd and  plaquenil 200 mg BID.    Patient reports complicated fertility/OBGYN history with stage four endometriosis, fibroids and one pregnancy resulting in a miscarriage. Three rounds of IVF with one banked viable egg. She is currently undergoing fertility evaluation.  She has been working with infertility specialist for years. She currently has one viable egg and would like to undergo one more episode of IVF. She was seen by an autoimmune OB/GYN specialist in [place] (Daya Frost 02/17) for extensive testing. She was found to be heterozygous for MTHFR mutation. She is now taking Matanx (L-methyl folate). She has noticed less bruising since she started this. Metformin was recommended, but it upsets her stomach. DHEA was recommended, but she is not taking it. Her thyroid studies were normal, but she was started on Synthroid 25  g daily for the TSH to be less than 2.0. It was recommended that she take Lovenox prior to IFV and throughout the pregnancy to avoid risk of miscarriage. It was also recommended that she be treated with IVIG prior to IVF. 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  She plans to proceeded further with in vitro plans, but wants to get better control of her flaring lupus and concerns for IVF treatments/hormones.     Interval HPI (7/20/2018):  Repeatedly ill during the winter with both URIs and flares of disease (joint pain, malaise, fatigue). Still undergoing IVF care via MFM and reproductive immunology. Did not initiate azathioprine following previous visit with Dr. Roy due to worry over negative effects on pregnancy. Currently undergoing medication treatment for IVF, is picking up medications this weekend for stimulation.    Symptomatically, her recent flares include fatigue, flu-like symptoms (fever, chills, sweats), polyarticular joint pain (fingers, toes, feet, ankles, wrists, elbows).    Recently, she has experienced  "further hair loss (clumps in shower), ulcers on buccal mucosa (now resolved), fatigue, malaise, significant B/L hip pain (worse from previous, approx 1 year). Specifically, it is constant deep joint pain in hips, would say 8/10 in severity when it occurs during movement. Has been seriously disrupting her daily activities. Has been using tylenol to control pain without complete relief.     Has seen Reproductive Immunology in the interim, has undergone two rounds of IVIG (believed to help with NK cell and cytokine activity in embryo implantation), first IVIG May 14th, then second was July 16th. Had flu-like symptoms with first infusion (HA, myalgias, malaise, felt \"gross\", lower back pain, neck pain). During the 2nd infusion, they slowed infusion, took benadryl/tylenol/upped prednisone to 20 mg she did not experience SEs with this. Overall, felt that IVIG improved her lupus symptoms globally. Short-lived relief. Plan is to use IVIG monthly with monitoring of cytokine levels and NK cell counts.     Currently on 15 mg of prednisone chronically, > 1 year. Today, she would say her disease is mild-moderately active in spite of the 15 mg prednisone. Currently on 400-500 U of vitamin D. Taking 1000 mg calcium.     ROS:  (-) pleurisy, sob, cough, hematuria, dysuria, eye redness, nose bleeds, easy bruising, malar rash  (+) alternating diarrhea/constipation, dry eye, dry mouth, palatal ulcers/petechiae    Is interested in discussing SLE management (Imuran) while pursuing IVF. Will be undergoing planning and another round of IVF stimulation in August.       Today 8/21/2019: She did another round of IVF in 8/2019, no good embryo. IVF caused her flare ups. For flare ups, gets pain over knees, knuckles and toes with extreme fatigue and photosensitivity.    Had laparoscopic surgery for endometriosis in 4/2019.    Since 4/2019, has been on OC and has not been able to taper prednisone own.    Today, she is on prednisone 15 mg every " day x 2-3 weeks. Before that, most of the time, she was on 20 mg every day.    No rash today.    Has pain over knuckles, toes, knees. Has morning flu like sx in AM x 2 hours.    Has extreme fatigue.    She was getting IVIG qmonthly, till 1/2019.    She is going to resume monthly IVIG for successful pregnancy.    Today 11/27/2019: Started IVIG on 9/8/2019, her eyes swelled up, 2 days later had severe LBP/SI joint pain. Was doing keto diet at that time, had headaches.now has sciatica pain on the R side. Did not have this reaction with IVIG before.    Late Oct/early Nov, had malar rash, hair loss.    Had LE edema, went up on her prednisone to 30-40 mg a day x few days. Back to her baseline hair loss and baseline prednisone 20 mg every day.      R SI joint pain is better, but still has it, uses topical pain cream. It is the worst in AM.    Still has swollen ankles.    This edema is new for her.    Off birth control pills. Not on IVF tx either.    Her lupus is back to her baseline.    Has not started AZA yet, but not thinks she is ready to try it.      ROS:  A comprehensive ROS was done, positives are per HPI.  Past Medical History:   Diagnosis Date     Allergy, unspecified not elsewhere classified      Endometriosis      Fibromyalgia      Migraine without aura, with intractable migraine, so stated, without mention of status migrainosus      Myalgia and myositis, unspecified      Systemic lupus erythematosus (H)      Past Surgical History:   Procedure Laterality Date     ORTHOPEDIC SURGERY       SURGICAL HISTORY OF -   1986    left elbow fracture repair     Family History   Problem Relation Age of Onset     Diabetes Maternal Grandfather      Lipids Mother      Skin Cancer Paternal Grandmother      Melanoma No family hx of      Social History     Socioeconomic History     Marital status:      Spouse name: Not on file     Number of children: Not on file     Years of education: Not on file     Highest education  level: Not on file   Occupational History     Not on file   Social Needs     Financial resource strain: Not on file     Food insecurity:     Worry: Not on file     Inability: Not on file     Transportation needs:     Medical: Not on file     Non-medical: Not on file   Tobacco Use     Smoking status: Never Smoker     Smokeless tobacco: Never Used   Substance and Sexual Activity     Alcohol use: Yes     Comment: occ.- 2/week     Drug use: No     Sexual activity: Yes     Partners: Male     Birth control/protection: Condom   Lifestyle     Physical activity:     Days per week: Not on file     Minutes per session: Not on file     Stress: Not on file   Relationships     Social connections:     Talks on phone: Not on file     Gets together: Not on file     Attends Holiness service: Not on file     Active member of club or organization: Not on file     Attends meetings of clubs or organizations: Not on file     Relationship status: Not on file     Intimate partner violence:     Fear of current or ex partner: Not on file     Emotionally abused: Not on file     Physically abused: Not on file     Forced sexual activity: Not on file   Other Topics Concern     Parent/sibling w/ CABG, MI or angioplasty before 65F 55M? Not Asked   Social History Narrative     Not on file     Patient Active Problem List   Diagnosis     Insomnia     Systemic lupus erythematosus (H)     Refractory migraine without aura     Allergies   Allergen Reactions     Cherry Anaphylaxis     Dairy Aid  [Lactase]      Other reaction(s): Arthralgia (Joint Pain)     Gluten Meal      Other reaction(s): Abdominal Pain     No Clinical Screening - See Comments Anaphylaxis and Itching     Pistachio Nut Extract Skin Test Anaphylaxis     Brassica Oleracea Italica      Other reaction(s): Abdominal Pain  Other reaction(s): Abdominal Pain     Penicillins Hives and Rash     Sulfa Drugs Hives and Rash       Outpatient Encounter Medications as of 11/27/2019   Medication Sig  Dispense Refill     RACHNA SYRUP PO        Acetaminophen (TYLENOL PO) Take  by mouth.         AMBIEN 10 MG OR TABS 1 po HS, prn 20 0     hydroxychloroquine (PLAQUENIL) 200 MG tablet Take 1 tablet (200 mg) by mouth 2 times daily 180 tablet 1     MULTIVITAMIN TABS   OR   0     Omega-3 Fatty Acids (OMEGA 3 PO) Take 1,250 mg by mouth daily       predniSONE (DELTASONE) 5 MG tablet Take 3 tablets (15 mg) by mouth See Admin Instructions (Patient taking differently: Take 20 mg by mouth See Admin Instructions ) 90 tablet 3     sertraline (ZOLOFT) 100 MG tablet Take 100 mg by mouth daily       VITAMIN D, CHOLECALCIFEROL, PO Take 5,000 Units by mouth daily       azaTHIOprine (IMURAN) 50 MG tablet Take 1 tablet (50 mg) by mouth daily (Patient not taking: Reported on 2019) 30 tablet 1     calcium carbonate (OS-VAIHD 500 MG [place]. [place]) 1250 MG tablet Take 1 tablet by mouth daily       L-Methylfolate-Algae-B12-B6 (METANX PO) Take 1,000 mg by mouth daily       LEVOTHYROXINE SODIUM PO Take 0.25 mg by mouth daily       METFORMIN HCL PO Take 500 mg by mouth 2 times daily (with meals)       norethindrone-ethinyl estradiol (ORTHO-NOVUM 1-35 TAB,NORTREL 1-35 TAB) 1-35 MG-MCG per tablet Take 1 tablet by mouth daily       omega 3 1000 MG CAPS        PREDNISONE PO Take 1 mg by mouth daily        promethazine (PHENERGAN) 25 MG tablet TK 1 T PO  Q 6 H PRN N  0     SPIRULINA PO Take by mouth daily       triamcinolone (KENALOG) 0.1 % paste Take by mouth 2 times daily (Patient not taking: Reported on 2019) 5 g 3     UNABLE TO FIND DISSOLVE 1 TO 4 LOZENGES UNDER TONGUE AS NEEDED DAILY  0     No facility-administered encounter medications on file as of 2019.          Her records were reviewed.    No results found for any visits on 19.  Pregnancy: She is . H/o OCP and HRT use, see HPI    Ph.E:    /79 (BP Location: Right arm, Patient Position: Sitting, Cuff Size: Adult Regular)   Pulse 101   Temp 98.2  F (36.8  C)    SpO2 100%     Constitutional: WD/WN. Pleasant, NAD.  Cushingoid  Eyes: EOM intact, PERRLA, sclera anicteric, conj not injected  HEENT: No oral ulcers or thrush. Normal salivary pool, hair thinning.   Neck: No cervical LAP or thyromegaly  Chest: Clear to auscultation bilaterally  CV: RRR, no murmurs/ rubs or gallops. No edema, clubbing or cyanosis.   GI: Abdomen is soft and non tender.   MS: No synovitis. Cool joints. No tenderness of the joints. No  joint deformities. Full ROM of the joints. No nodules. No Jaccoud's deformity. _R SLR test  Skin: Lack of malar rash. No livedo, periungual erythema, alopecia, digital ulcers or nail changes.  Neuro: A&O x 3. Grossly non focal, NL DTR, sensation intact, muscular power 5/5 in all ext  Psych: NL affect    Assessment/Plan (8/21/2019):  1 - Known SLE, dx in 2000s (VINITA: 1:80, dsDNA +, Smith negative, normal complements, joint pains, malar rash, oral ulcers), usually flares approximately monthly with joint pains and malar rash. Currently, feels that disease is active. Has been on chronic 20 mg every day prednisone, gained weight, looks cushingoid. Still planning for PGS normal embryo implantation. Had a reaction to IVIG which was given for infertility tx and is not going to get it again. Her back pain seems to be sciatica and not related to SLE.    Refer to pain management    Try walking clinic for ortho/sciatica pain today    - Continue hydroxychloroquine 200 mg daily, reminded to have eye exam   - Continue prednisone at 20 mg every day  - Again, highly recommend to initiate 50 mg azathioprine daily (TPMT testing complete, NL), will begin drug monitoring labs 4 wk after start of AZA.  This is safe in pregnancy and needed to taper prednisone down    PLAN: RTC 3-4 months    Orders Placed This Encounter   Procedures     ALT     Albumin level     AST     CBC with platelets differential     Creatinine     Complement C4     Complement C3     CRP inflammation     DNA double stranded  antibodies     Erythrocyte sedimentation rate auto     UA with Microscopic reflex to Culture     Protein  random urine with Creat Ratio     Creatinine random urine     PAIN MANAGEMENT REFERRAL

## 2019-11-27 NOTE — PROGRESS NOTES
SPORTS & ORTHOPEDIC WALK-IN VISIT 11/27/2019    Primary Care Physician: Dr. Bhandari      Pain on right low back. When waking up, back is locked and seized up. Has tried tiger balm and heating and stretching and as the day goes on it gets better but flares up in the am. A lot of neuropathy when it first started to happen. Long toe has been number for years   Reason for visit:     What part of your body is injured / painful?  right low back    What caused the injury /pain? No inciting event     How long ago did your injury occur or pain begin? September 20219    What are your most bothersome symptoms? Pain    How would you characterize your symptom?  aching, dull and sharp    What makes your symptoms better? Heat    What makes your symptoms worse? Movement and Bending    Have you been previously seen for this problem? Yes, GP     Medical History:    Any recent changes to your medical history? No    Any new medication prescribed since last visit? No    Have you had surgery on this body part before? No      Review of Systems:    Do you have fever, chills, weight loss? No    Do you have any vision problems? No    Do you have any chest pain or edema? No     Do you have any shortness of breath or wheezing?  No    Do you have stomach problems? Yes,     Do you have any numbness or focal weakness? No    Do you have diabetes? No    Do you have problems with bleeding or clotting? No    Do you have an rashes or other skin lesions? No

## 2019-11-27 NOTE — PROGRESS NOTES
HISTORY OF PRESENT ILLNESS  Ms. Neto Hall is a pleasant 44 year old year old female who presents to clinic today with low back pain and right foot pain  Patricia explains that this has been going on over the past year  Location: low back and right foot  Quality:  achy pain    Severity: 5/10 at worst    Duration: see above  Timing: occurs intermittently  Context: occurs while sitting and standing for long periods  Modifying factors:  resting and non-use makes it better, movement and use makes it worse  Associated signs & symptoms: radiation into right leg and tingling in foot and pain at times  Additional history: as documented    MEDICAL HISTORY  Patient Active Problem List   Diagnosis     Insomnia     Systemic lupus erythematosus (H)     Refractory migraine without aura       Current Outpatient Medications   Medication Sig Dispense Refill     RACHNA SYRUP PO        Acetaminophen (TYLENOL PO) Take  by mouth.         AMBIEN 10 MG OR TABS 1 po HS, prn 20 0     azaTHIOprine (IMURAN) 50 MG tablet Take 1 tablet (50 mg) by mouth daily (Patient not taking: Reported on 11/27/2019) 30 tablet 1     calcium carbonate (OS-VAHID 500 MG [place]. [place]) 1250 MG tablet Take 1 tablet by mouth daily       hydroxychloroquine (PLAQUENIL) 200 MG tablet Take 1 tablet (200 mg) by mouth 2 times daily 180 tablet 1     L-Methylfolate-Algae-B12-B6 (METANX PO) Take 1,000 mg by mouth daily       LEVOTHYROXINE SODIUM PO Take 0.25 mg by mouth daily       METFORMIN HCL PO Take 500 mg by mouth 2 times daily (with meals)       MULTIVITAMIN TABS   OR   0     norethindrone-ethinyl estradiol (ORTHO-NOVUM 1-35 TAB,NORTREL 1-35 TAB) 1-35 MG-MCG per tablet Take 1 tablet by mouth daily       omega 3 1000 MG CAPS        Omega-3 Fatty Acids (OMEGA 3 PO) Take 1,250 mg by mouth daily       predniSONE (DELTASONE) 5 MG tablet Take 3 tablets (15 mg) by mouth See Admin Instructions (Patient taking differently: Take 20 mg by mouth See Admin Instructions ) 90 tablet 3      PREDNISONE PO Take 1 mg by mouth daily        pregabalin (LYRICA) 25 MG capsule Take 25 mg by mouth 2 times daily       promethazine (PHENERGAN) 25 MG tablet TK 1 T PO  Q 6 H PRN N  0     sertraline (ZOLOFT) 100 MG tablet Take 100 mg by mouth daily       SPIRULINA PO Take by mouth daily       triamcinolone (KENALOG) 0.1 % paste Take by mouth 2 times daily (Patient not taking: Reported on 8/21/2019) 5 g 3     UNABLE TO FIND DISSOLVE 1 TO 4 LOZENGES UNDER TONGUE AS NEEDED DAILY  0     VITAMIN D, CHOLECALCIFEROL, PO Take 5,000 Units by mouth daily         Allergies   Allergen Reactions     Cherry Anaphylaxis     Dairy Aid  [Lactase]      Other reaction(s): Arthralgia (Joint Pain)     Gluten Meal      Other reaction(s): Abdominal Pain     No Clinical Screening - See Comments Anaphylaxis and Itching     Pistachio Nut Extract Skin Test Anaphylaxis     Brassica Oleracea Italica      Other reaction(s): Abdominal Pain  Other reaction(s): Abdominal Pain     Penicillins Hives and Rash     Sulfa Drugs Hives and Rash       Family History   Problem Relation Age of Onset     Diabetes Maternal Grandfather      Lipids Mother      Skin Cancer Paternal Grandmother      Melanoma No family hx of        Additional medical/Social/Surgical histories reviewed in [facility] and updated as appropriate.     REVIEW OF SYSTEMS (11/27/2019)  10 point ROS of systems including Constitutional, Eyes, Respiratory, Cardiovascular, Gastroenterology, Genitourinary, Integumentary, Musculoskeletal, Psychiatric were all negative except for pertinent positives noted in my HPI.     PHYSICAL EXAM  Vitals:     VSS, reviewed  General  - normal appearance, in no obvious distress  CV  - normal peripheral perfusion  Pulm  - normal respiratory pattern, non-labored  Musculoskeletal - lumbar spine  - stance: normal gait without limp, no obvious leg length discrepancy, normal heel and toe walk  - inspection: normal bone and joint alignment, no obvious scoliosis  - palpation:  no paravertebral or bony tenderness  - ROM: flexion exacerbates some right low back pain, normal extension, sidebending, rotation  - strength: lower extremities 5/5 in all planes  - special tests:  (+) straight leg raise- right  (+) slump test- right  Neuro  - patellar and Achilles DTRs 2+ bilaterally, some right lower extremity sensory deficit throughout L5 distribution, grossly normal coordination, normal muscle tone  Skin  - no ecchymosis, erythema, warmth, or induration, no obvious rash  Psych  - interactive, appropriate, normal mood and affect  Right foot: has some pain to palpation over sesamoids and interdigital between 1st and 2nd toes  ASSESSMENT & PLAN  43 yo female with right foot sesamoiditis and lumbar radicular pain  Reviewed lumbar: has some disc space narrowing  Ordered MRI lumbar and MRI right foot at her request  Consider f/u and [place]      Jean-Claude Gilman MD, CAQSM

## 2019-11-27 NOTE — LETTER
11/27/2019      RE: Patricia Hall  [address]  [place] [address]       Rheumatology Urgent Visit Note    Reason for visit: Lupus, back pain      First Consult: 10/12/2017    Last visit: 8/21/2019    DOS: 11/27/2019    Original HPI (10/12/2017):  Patricia Hall is a 42 year old female who was referred to our clinic for evaluation and management of her SLE. The patient has been following with Dr. Mao for her SLE    History obtain from chart review and patient interview    Her lupus symptoms first started in during high school including fatigue and rash. She was diagnosed with lupus in early 2000s and she was in graduate school and presented with a few years of arthralgias involving knees and ankles and hands.  She had developed new onset Raynaud's phenomenon in which her fingers turned white in the cold but no digital sores.  VINITA was 1:160.  All specific autoantibodies and rheumatoid serologies negative.  She had a rash on her face, including cheeks and bridge of her nose.  She followed with Dr. Tejal Mayen in Dermatology.  A biopsy of a lesion from the nose was non-diagnostic.  Diagnosis was earlysigns of cutaneous lupus versus acne rosacea.  She was treated with Elidel cream.  She reported intermittent oral ulcers and significant fatigue as well as intermittent episodes of hair loss.  She was started on prednisone in 2003 along with Plaquenil.  She has been maintained on prednisone 5 mg q day with intermittent bursts up as high as 30 mg for a short time.  She has found it very difficult to taper off of prednisone because she gets flare-ups of skin rash, arthralgias, and fatigue. Patient reports significant symptoms during the summers and reports significant flares this summer with photosensitivity with face rash,  Fatigue and join pain (knees and toes and hips).  She had fevers, mouth sores  And also reports increased hair loss.  Increased pain with walking and morning stiffness  with Fibromyalgia burning in the morning. Currently taking prednisone 10 mg qd and plaquenil 200 mg BID.    Patient reports complicated fertility/OBGYN history with stage four endometriosis, fibroids and one pregnancy resulting in a miscarriage. Three rounds of IVF with one banked viable egg. She is currently undergoing fertility evaluation.  She has been working with infertility specialist for years. She currently has one viable egg and would like to undergo one more episode of IVF. She was seen by an autoimmune OB/GYN specialist in [place] (Daya Frost 02/17) for extensive testing. She was found to be heterozygous for MTHFR mutation. She is now taking Matanx (L-methyl folate). She has noticed less bruising since she started this. Metformin was recommended, but it upsets her stomach. DHEA was recommended, but she is not taking it. Her thyroid studies were normal, but she was started on Synthroid 25  g daily for the TSH to be less than 2.0. It was recommended that she take Lovenox prior to IFV and throughout the pregnancy to avoid risk of miscarriage. It was also recommended that she be treated with IVIG prior to IVF. 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  She plans to proceeded further with in vitro plans, but wants to get better control of her flaring lupus and concerns for IVF treatments/hormones.     Interval HPI (7/20/2018):  Repeatedly ill during the winter with both URIs and flares of disease (joint pain, malaise, fatigue). Still undergoing IVF care via MFM and reproductive immunology. Did not initiate azathioprine following previous visit with Dr. Roy due to worry over negative effects on pregnancy. Currently undergoing medication treatment for IVF, is picking up medications this weekend for stimulation.    Symptomatically, her recent flares include fatigue, flu-like symptoms (fever, chills, sweats), polyarticular joint pain  "(fingers, toes, feet, ankles, wrists, elbows).    Recently, she has experienced further hair loss (clumps in shower), ulcers on buccal mucosa (now resolved), fatigue, malaise, significant B/L hip pain (worse from previous, approx 1 year). Specifically, it is constant deep joint pain in hips, would say 8/10 in severity when it occurs during movement. Has been seriously disrupting her daily activities. Has been using tylenol to control pain without complete relief.     Has seen Reproductive Immunology in the interim, has undergone two rounds of IVIG (believed to help with NK cell and cytokine activity in embryo implantation), first IVIG May 14th, then second was July 16th. Had flu-like symptoms with first infusion (HA, myalgias, malaise, felt \"gross\", lower back pain, neck pain). During the 2nd infusion, they slowed infusion, took benadryl/tylenol/upped prednisone to 20 mg she did not experience SEs with this. Overall, felt that IVIG improved her lupus symptoms globally. Short-lived relief. Plan is to use IVIG monthly with monitoring of cytokine levels and NK cell counts.     Currently on 15 mg of prednisone chronically, > 1 year. Today, she would say her disease is mild-moderately active in spite of the 15 mg prednisone. Currently on 400-500 U of vitamin D. Taking 1000 mg calcium.     ROS:  (-) pleurisy, sob, cough, hematuria, dysuria, eye redness, nose bleeds, easy bruising, malar rash  (+) alternating diarrhea/constipation, dry eye, dry mouth, palatal ulcers/petechiae    Is interested in discussing SLE management (Imuran) while pursuing IVF. Will be undergoing planning and another round of IVF stimulation in August.       Today 8/21/2019: She did another round of IVF in 8/2019, no good embryo. IVF caused her flare ups. For flare ups, gets pain over knees, knuckles and toes with extreme fatigue and photosensitivity.    Had laparoscopic surgery for endometriosis in 4/2019.    Since 4/2019, has been on OC and has not " been able to taper prednisone own.    Today, she is on prednisone 15 mg every day x 2-3 weeks. Before that, most of the time, she was on 20 mg every day.    No rash today.    Has pain over knuckles, toes, knees. Has morning flu like sx in AM x 2 hours.    Has extreme fatigue.    She was getting IVIG qmonthly, till 1/2019.    She is going to resume monthly IVIG for successful pregnancy.    Today 11/27/2019: Started IVIG on 9/8/2019, her eyes swelled up, 2 days later had severe LBP/SI joint pain. Was doing keto diet at that time, had headaches.now has sciatica pain on the R side. Did not have this reaction with IVIG before.    Late Oct/early Nov, had malar rash, hair loss.    Had LE edema, went up on her prednisone to 30-40 mg a day x few days. Back to her baseline hair loss and baseline prednisone 20 mg every day.      R SI joint pain is better, but still has it, uses topical pain cream. It is the worst in AM.    Still has swollen ankles.    This edema is new for her.    Off birth control pills. Not on IVF tx either.    Her lupus is back to her baseline.    Has not started AZA yet, but not thinks she is ready to try it.      ROS:  A comprehensive ROS was done, positives are per HPI.  Past Medical History:   Diagnosis Date     Allergy, unspecified not elsewhere classified      Endometriosis      Fibromyalgia      Migraine without aura, with intractable migraine, so stated, without mention of status migrainosus      Myalgia and myositis, unspecified      Systemic lupus erythematosus (H)      Past Surgical History:   Procedure Laterality Date     ORTHOPEDIC SURGERY       SURGICAL HISTORY OF -   1986    left elbow fracture repair     Family History   Problem Relation Age of Onset     Diabetes Maternal Grandfather      Lipids Mother      Skin Cancer Paternal Grandmother      Melanoma No family hx of      Social History     Socioeconomic History     Marital status:      Spouse name: Not on file     Number of  children: Not on file     Years of education: Not on file     Highest education level: Not on file   Occupational History     Not on file   Social Needs     Financial resource strain: Not on file     Food insecurity:     Worry: Not on file     Inability: Not on file     Transportation needs:     Medical: Not on file     Non-medical: Not on file   Tobacco Use     Smoking status: Never Smoker     Smokeless tobacco: Never Used   Substance and Sexual Activity     Alcohol use: Yes     Comment: occ.- 2/week     Drug use: No     Sexual activity: Yes     Partners: Male     Birth control/protection: Condom   Lifestyle     Physical activity:     Days per week: Not on file     Minutes per session: Not on file     Stress: Not on file   Relationships     Social connections:     Talks on phone: Not on file     Gets together: Not on file     Attends Jew service: Not on file     Active member of club or organization: Not on file     Attends meetings of clubs or organizations: Not on file     Relationship status: Not on file     Intimate partner violence:     Fear of current or ex partner: Not on file     Emotionally abused: Not on file     Physically abused: Not on file     Forced sexual activity: Not on file   Other Topics Concern     Parent/sibling w/ CABG, MI or angioplasty before 65F 55M? Not Asked   Social History Narrative     Not on file     Patient Active Problem List   Diagnosis     Insomnia     Systemic lupus erythematosus (H)     Refractory migraine without aura     Allergies   Allergen Reactions     Cherry Anaphylaxis     Dairy Aid  [Lactase]      Other reaction(s): Arthralgia (Joint Pain)     Gluten Meal      Other reaction(s): Abdominal Pain     No Clinical Screening - See Comments Anaphylaxis and Itching     Pistachio Nut Extract Skin Test Anaphylaxis     Brassica Oleracea Italica      Other reaction(s): Abdominal Pain  Other reaction(s): Abdominal Pain     Penicillins Hives and Rash     Sulfa Drugs Hives and  Rash       Outpatient Encounter Medications as of 2019   Medication Sig Dispense Refill     RACHNA SYRUP PO        Acetaminophen (TYLENOL PO) Take  by mouth.         AMBIEN 10 MG OR TABS 1 po HS, prn 20 0     hydroxychloroquine (PLAQUENIL) 200 MG tablet Take 1 tablet (200 mg) by mouth 2 times daily 180 tablet 1     MULTIVITAMIN TABS   OR   0     Omega-3 Fatty Acids (OMEGA 3 PO) Take 1,250 mg by mouth daily       predniSONE (DELTASONE) 5 MG tablet Take 3 tablets (15 mg) by mouth See Admin Instructions (Patient taking differently: Take 20 mg by mouth See Admin Instructions ) 90 tablet 3     sertraline (ZOLOFT) 100 MG tablet Take 100 mg by mouth daily       VITAMIN D, CHOLECALCIFEROL, PO Take 5,000 Units by mouth daily       azaTHIOprine (IMURAN) 50 MG tablet Take 1 tablet (50 mg) by mouth daily (Patient not taking: Reported on 2019) 30 tablet 1     calcium carbonate (OS-VAHID 500 MG [place]. [place]) 1250 MG tablet Take 1 tablet by mouth daily       L-Methylfolate-Algae-B12-B6 (METANX PO) Take 1,000 mg by mouth daily       LEVOTHYROXINE SODIUM PO Take 0.25 mg by mouth daily       METFORMIN HCL PO Take 500 mg by mouth 2 times daily (with meals)       norethindrone-ethinyl estradiol (ORTHO-NOVUM 1-35 TAB,NORTREL 1-35 TAB) 1-35 MG-MCG per tablet Take 1 tablet by mouth daily       omega 3 1000 MG CAPS        PREDNISONE PO Take 1 mg by mouth daily        promethazine (PHENERGAN) 25 MG tablet TK 1 T PO  Q 6 H PRN N  0     SPIRULINA PO Take by mouth daily       triamcinolone (KENALOG) 0.1 % paste Take by mouth 2 times daily (Patient not taking: Reported on 2019) 5 g 3     UNABLE TO FIND DISSOLVE 1 TO 4 LOZENGES UNDER TONGUE AS NEEDED DAILY  0     No facility-administered encounter medications on file as of 2019.          Her records were reviewed.    No results found for any visits on 19.  Pregnancy: She is . H/o OCP and HRT use, see HPI    Ph.E:    /79 (BP Location: Right arm, Patient Position:  Sitting, Cuff Size: Adult Regular)   Pulse 101   Temp 98.2  F (36.8  C)   SpO2 100%     Constitutional: WD/WN. Pleasant, NAD.  Cushingoid  Eyes: EOM intact, PERRLA, sclera anicteric, conj not injected  HEENT: No oral ulcers or thrush. Normal salivary pool, hair thinning.   Neck: No cervical LAP or thyromegaly  Chest: Clear to auscultation bilaterally  CV: RRR, no murmurs/ rubs or gallops. No edema, clubbing or cyanosis.   GI: Abdomen is soft and non tender.   MS: No synovitis. Cool joints. No tenderness of the joints. No  joint deformities. Full ROM of the joints. No nodules. No Jaccoud's deformity. _R SLR test  Skin: Lack of malar rash. No livedo, periungual erythema, alopecia, digital ulcers or nail changes.  Neuro: A&O x 3. Grossly non focal, NL DTR, sensation intact, muscular power 5/5 in all ext  Psych: NL affect    Assessment/Plan (8/21/2019):  1 - Known SLE, dx in 2000s (VINITA: 1:80, dsDNA +, Smith negative, normal complements, joint pains, malar rash, oral ulcers), usually flares approximately monthly with joint pains and malar rash. Currently, feels that disease is active. Has been on chronic 20 mg every day prednisone, gained weight, looks cushingoid. Still planning for PGS normal embryo implantation. Had a reaction to IVIG which was given for infertility tx and is not going to get it again. Her back pain seems to be sciatica and not related to SLE.    Refer to pain management    Try walking clinic for ortho/sciatica pain today    - Continue hydroxychloroquine 200 mg daily, reminded to have eye exam   - Continue prednisone at 20 mg every day  - Again, highly recommend to initiate 50 mg azathioprine daily (TPMT testing complete, NL), will begin drug monitoring labs 4 wk after start of AZA.  This is safe in pregnancy and needed to taper prednisone down    PLAN: RTC 3-4 months    Orders Placed This Encounter   Procedures     ALT     Albumin level     AST     CBC with platelets differential     Creatinine      Complement C4     Complement C3     CRP inflammation     DNA double stranded antibodies     Erythrocyte sedimentation rate auto     UA with Microscopic reflex to Culture     Protein  random urine with Creat Ratio     Creatinine random urine     PAIN MANAGEMENT REFERRAL             Josh Roy MD

## 2019-12-09 ENCOUNTER — ANCILLARY PROCEDURE (OUTPATIENT)
Dept: MRI IMAGING | Facility: CLINIC | Age: 44
End: 2019-12-09
Attending: PREVENTIVE MEDICINE
Payer: COMMERCIAL

## 2019-12-09 DIAGNOSIS — R52 PAIN: ICD-10-CM

## 2020-02-12 ENCOUNTER — OFFICE VISIT (OUTPATIENT)
Dept: ANESTHESIOLOGY | Facility: CLINIC | Age: 45
End: 2020-02-12
Attending: INTERNAL MEDICINE
Payer: COMMERCIAL

## 2020-02-12 VITALS
HEART RATE: 106 BPM | SYSTOLIC BLOOD PRESSURE: 136 MMHG | HEIGHT: 68 IN | DIASTOLIC BLOOD PRESSURE: 96 MMHG | BODY MASS INDEX: 28.96 KG/M2 | RESPIRATION RATE: 16 BRPM

## 2020-02-12 DIAGNOSIS — M79.7 PRIMARY FIBROMYALGIA SYNDROME: Primary | ICD-10-CM

## 2020-02-12 PROBLEM — M35.9 DISORDER OF CONNECTIVE TISSUE (H): Status: ACTIVE | Noted: 2020-02-12

## 2020-02-12 PROBLEM — J30.2 SEASONAL ALLERGIES: Status: ACTIVE | Noted: 2020-02-12

## 2020-02-12 PROBLEM — Z91.09 HISTORY OF ENVIRONMENTAL ALLERGIES: Status: ACTIVE | Noted: 2020-02-12

## 2020-02-12 PROBLEM — F43.22 ADJUSTMENT DISORDER WITH ANXIETY: Status: ACTIVE | Noted: 2019-11-18

## 2020-02-12 PROBLEM — D89.9 DISEASE OF IMMUNE SYSTEM (H): Status: ACTIVE | Noted: 2017-12-06

## 2020-02-12 PROBLEM — T78.04XA: Status: ACTIVE | Noted: 2020-02-12

## 2020-02-12 PROBLEM — N97.9 FEMALE INFERTILITY: Status: ACTIVE | Noted: 2020-02-12

## 2020-02-12 PROBLEM — N96 RECURRENT PREGNANCY LOSS WITHOUT CURRENT PREGNANCY: Status: ACTIVE | Noted: 2020-02-12

## 2020-02-12 PROBLEM — D68.9 BLOOD COAGULATION DISORDER (H): Status: ACTIVE | Noted: 2017-01-25

## 2020-02-12 PROBLEM — N83.8 DIMINISHED OVARIAN RESERVE: Status: ACTIVE | Noted: 2020-02-12

## 2020-02-12 PROBLEM — I73.00 RAYNAUD'S DISEASE: Status: ACTIVE | Noted: 2017-01-25

## 2020-02-12 PROBLEM — E72.12: Status: ACTIVE | Noted: 2020-02-12

## 2020-02-12 PROBLEM — N80.00 ENDOMETRIOSIS OF UTERUS: Status: ACTIVE | Noted: 2020-02-12

## 2020-02-12 RX ORDER — PREGABALIN 25 MG/1
25 CAPSULE ORAL 2 TIMES DAILY
Qty: 60 CAPSULE | Refills: 2 | Status: SHIPPED | OUTPATIENT
Start: 2020-02-12 | End: 2020-10-08

## 2020-02-12 ASSESSMENT — ENCOUNTER SYMPTOMS
COUGH: 1
LEG PAIN: 1
LEG PAIN: 1
LOSS OF CONSCIOUSNESS: 0
MYALGIAS: 1
VOMITING: 1
DECREASED CONCENTRATION: 1
POLYDIPSIA: 1
SYNCOPE: 0
ARTHRALGIAS: 1
FEVER: 1
EYE WATERING: 1
INSOMNIA: 1
SLEEP DISTURBANCES DUE TO BREATHING: 1
SNORES LOUDLY: 0
SPUTUM PRODUCTION: 1
ALTERED TEMPERATURE REGULATION: 1
JOINT SWELLING: 0
EXERCISE INTOLERANCE: 1
WEIGHT LOSS: 0
DECREASED APPETITE: 1
NERVOUS/ANXIOUS: 1
VOMITING: 1
HEADACHES: 1
TASTE DISTURBANCE: 1
EYE WATERING: 1
ORTHOPNEA: 1
DECREASED APPETITE: 1
MEMORY LOSS: 0
WEIGHT LOSS: 0
NECK MASS: 0
DISTURBANCES IN COORDINATION: 0
NECK PAIN: 1
SWOLLEN GLANDS: 1
HEMOPTYSIS: 0
NAIL CHANGES: 1
SWOLLEN GLANDS: 1
SNORES LOUDLY: 0
MYALGIAS: 1
LIGHT-HEADEDNESS: 1
NAIL CHANGES: 1
DECREASED CONCENTRATION: 1
BLOATING: 1
INSOMNIA: 1
FEVER: 1
SLEEP DISTURBANCES DUE TO BREATHING: 1
LOSS OF CONSCIOUSNESS: 0
SYNCOPE: 0
COUGH: 1
NECK PAIN: 1
SHORTNESS OF BREATH: 1
SKIN CHANGES: 0
NAUSEA: 1
HEARTBURN: 1
WEIGHT GAIN: 1
POLYDIPSIA: 1
SKIN CHANGES: 0
FATIGUE: 1
SMELL DISTURBANCE: 1
PALPITATIONS: 1
DIZZINESS: 1
BACK PAIN: 1
LIGHT-HEADEDNESS: 1
EXERCISE INTOLERANCE: 1
DEPRESSION: 1
FATIGUE: 1
ALTERED TEMPERATURE REGULATION: 1
NAUSEA: 1
DIZZINESS: 1
SPUTUM PRODUCTION: 1
SMELL DISTURBANCE: 1
BACK PAIN: 1
ABDOMINAL PAIN: 1
HEARTBURN: 1
DEPRESSION: 1
ORTHOPNEA: 1
JOINT SWELLING: 0
PALPITATIONS: 1
MEMORY LOSS: 0
BLOATING: 1
EYE PAIN: 1
HEMOPTYSIS: 0
NERVOUS/ANXIOUS: 1
BRUISES/BLEEDS EASILY: 1
ABDOMINAL PAIN: 1
SHORTNESS OF BREATH: 1
BRUISES/BLEEDS EASILY: 1
HEADACHES: 1
WEIGHT GAIN: 1
TASTE DISTURBANCE: 1
ARTHRALGIAS: 1
NECK MASS: 0
EYE PAIN: 1
DISTURBANCES IN COORDINATION: 0

## 2020-02-12 ASSESSMENT — ANXIETY QUESTIONNAIRES
GAD7 TOTAL SCORE: 8
7. FEELING AFRAID AS IF SOMETHING AWFUL MIGHT HAPPEN: SEVERAL DAYS
3. WORRYING TOO MUCH ABOUT DIFFERENT THINGS: SEVERAL DAYS
1. FEELING NERVOUS, ANXIOUS, OR ON EDGE: MORE THAN HALF THE DAYS
GAD7 TOTAL SCORE: 8
5. BEING SO RESTLESS THAT IT IS HARD TO SIT STILL: NOT AT ALL
4. TROUBLE RELAXING: MORE THAN HALF THE DAYS
6. BECOMING EASILY ANNOYED OR IRRITABLE: SEVERAL DAYS
7. FEELING AFRAID AS IF SOMETHING AWFUL MIGHT HAPPEN: SEVERAL DAYS
2. NOT BEING ABLE TO STOP OR CONTROL WORRYING: SEVERAL DAYS

## 2020-02-12 NOTE — PROGRESS NOTES
"  Pain Clinic New Patient Consult Note:    Referring Provider: Kirill   Primary care provider: Riaz Bhandari    Patricia Hall is a 44 year old y.o. old female who presents to the pain clinic with diffuse, whole body pain; axial low back pain; migraines and endometriosis with associated chronic pelvic pain.  She characterizes the pain as follows:    HPI:  Patient Supplied Answers To the [facility] Pain Questionnaire  [facility] -  Patient Entered Questionnaire/Answers 2/12/2020   What number best describes your pain right now:  0 = No pain  to  10 = Worst pain imaginable 8   How would you describe the pain? burning, cramping, sharp, numbness, dull, aching, throbbing   Which of the following worsen your pain? lying down, standing, sitting, walking, exercise   Which of the following improve or reduce your pain?  nothing relieves the pain   What number best describes your average pain for the past week:  0 = No pain  to  10 = Worst pain imaginable 10   What number best describes your LOWEST pain in past 24 hours:  0 = No pain  to  10 = Worst pain imaginable 5   What number best describes your WORST pain in past 24 hours:  0 = No pain  to  10 = Worst pain imaginable 9   When is your pain worst? AM, PM, Night, Constant   What non-medicine treatments have you already had for your pain? acupuncture, chiropractic care, surgery   Have you tried treating your pain with medication?  Yes   Are you currently taking medications for your pain? Yes       She has many sources and types of pain.  She has some difficulty narrowing down the resources for management.  She states today her priority is addressing the types of pain that are most compromising for her quality of life and function, these are all over \"blood boiling\" type of pain that is worse in the morning as well as axial low back pain that is worse with prolonged walking and stairs as well as lumbar extension.    She had right-sided burning pain down the posterior " aspect of her right leg from approximately September to December 2019.  This has now resolved and her pain is isolated to the low back.  She has no current numbness tingling weakness or burning pain.    She recently had quite an extensive surgery to manage her endometriosis.  She has been less since that time and feels quite deconditioned.  She is also frustrated because she knows that is helpful for her pain and overall a general healthy diet but finds this difficult in the setting of her current pain.     Current pain treatments:  Medications:   -Acetaminophen 502,000 mg daily PRN-not taking this very frequently as she is concerned about long-term side effects  -Oral prednisone 20 mg daily as maintenance dose for her lupus  -Xanax-somewhat helpful for pain    Other medications: Zoloft, she has been on this for approximately 6 months.  This is somewhat helpful for mood.    Past pain treatments:  Patricia LYNDSEY [place] Hall has not been seen at a pain clinic in the past.    Herbal medicines: No  Physical therapy:  Previously, but flared her pain  Chiropractor: No  Pain physician: No  Surgery: None  Biofeedback: no  Acupuncture: no    Past Pain Medications:  Has been prescribed Lyrica for pain has not started this as she had not want to start it concurrently with Imuran, as she had managed to isolate did not benefit from various medications.  With Imuran-also has not started this as she is unsure if she wants to use this medication  Muscle relaxant - some cognitive slowing with this    Tests/Imaging reviewed with the patient:  Discussed lumbar MRI from 12/9/2019.  Discussed facet synovial cyst that was displacing the right descending S2 nerve root. Discussed facet degenerative changes also seen on MRI, primarily at L4/5 and L5/S1    Significant Medical History:   Past Medical History:   Diagnosis Date     Allergy, unspecified not elsewhere classified      Endometriosis      Fibromyalgia      Migraine without aura, with  intractable migraine, so stated, without mention of status migrainosus      Myalgia and myositis, unspecified      Systemic lupus erythematosus (H)         Past Surgical History:  Past Surgical History:   Procedure Laterality Date     ORTHOPEDIC SURGERY       SURGICAL HISTORY OF -   1986    left elbow fracture repair        Family History:  Family History   Problem Relation Age of Onset     Diabetes Maternal Grandfather      Lipids Mother      Skin Cancer Paternal Grandmother      Melanoma No family hx of         Social History:  Social History     Socioeconomic History     Marital status:      Spouse name: Not on file     Number of children: Not on file     Years of education: Not on file     Highest education level: Not on file   Occupational History     Not on file   Social Needs     Financial resource strain: Not on file     Food insecurity:     Worry: Not on file     Inability: Not on file     Transportation needs:     Medical: Not on file     Non-medical: Not on file   Tobacco Use     Smoking status: Never Smoker     Smokeless tobacco: Never Used   Substance and Sexual Activity     Alcohol use: Yes     Comment: occ.- 2/week     Drug use: No     Sexual activity: Yes     Partners: Male     Birth control/protection: Condom   Lifestyle     Physical activity:     Days per week: Not on file     Minutes per session: Not on file     Stress: Not on file   Relationships     Social connections:     Talks on phone: Not on file     Gets together: Not on file     Attends Hindu service: Not on file     Active member of club or organization: Not on file     Attends meetings of clubs or organizations: Not on file     Relationship status: Not on file     Intimate partner violence:     Fear of current or ex partner: Not on file     Emotionally abused: Not on file     Physically abused: Not on file     Forced sexual activity: Not on file   Other Topics Concern     Parent/sibling w/ CABG, MI or angioplasty before 65F  55M? Not Asked   Social History Narrative     Not on file     Social History     Social History Narrative     Not on file        Allergies:  Allergies   Allergen Reactions     Cherry Anaphylaxis     Dairy Aid  [Lactase]      Other reaction(s): Arthralgia (Joint Pain)     Gluten Meal      Other reaction(s): Abdominal Pain     No Clinical Screening - See Comments Anaphylaxis and Itching     Pistachio Nut Extract Skin Test Anaphylaxis     Brassica Oleracea Italica      Other reaction(s): Abdominal Pain  Other reaction(s): Abdominal Pain     Penicillins Hives and Rash     Sulfa Drugs Hives and Rash       Current Medications:   Current Outpatient Medications   Medication Sig Dispense Refill     RACHNA SYRUP PO        Acetaminophen (TYLENOL PO) Take  by mouth.         AMBIEN 10 MG OR TABS 1 po HS, prn 20 0     calcium carbonate (OS-VAHID 500 MG [place]. [place]) 1250 MG tablet Take 1 tablet by mouth daily       hydroxychloroquine (PLAQUENIL) 200 MG tablet Take 1 tablet (200 mg) by mouth 2 times daily 180 tablet 1     L-Methylfolate-Algae-B12-B6 (METANX PO) Take 1,000 mg by mouth daily       LEVOTHYROXINE SODIUM PO Take 0.25 mg by mouth daily       METFORMIN HCL PO Take 500 mg by mouth 2 times daily (with meals)       MULTIVITAMIN TABS   OR   0     norethindrone-ethinyl estradiol (ORTHO-NOVUM 1-35 TAB,NORTREL 1-35 TAB) 1-35 MG-MCG per tablet Take 1 tablet by mouth daily       omega 3 1000 MG CAPS        Omega-3 Fatty Acids (OMEGA 3 PO) Take 1,250 mg by mouth daily       predniSONE (DELTASONE) 5 MG tablet Take 3 tablets (15 mg) by mouth See Admin Instructions (Patient taking differently: Take 20 mg by mouth See Admin Instructions ) 90 tablet 3     PREDNISONE PO Take 1 mg by mouth daily        pregabalin (LYRICA) 25 MG capsule Take 25 mg by mouth 2 times daily       promethazine (PHENERGAN) 25 MG tablet TK 1 T PO  Q 6 H PRN N  0     sertraline (ZOLOFT) 100 MG tablet Take 100 mg by mouth daily       SPIRULINA PO Take by mouth daily        UNABLE TO FIND DISSOLVE 1 TO 4 LOZENGES UNDER TONGUE AS NEEDED DAILY  0     VITAMIN D, CHOLECALCIFEROL, PO Take 5,000 Units by mouth daily       azaTHIOprine (IMURAN) 50 MG tablet Take 1 tablet (50 mg) by mouth daily (Patient not taking: Reported on 11/27/2019) 30 tablet 1     triamcinolone (KENALOG) 0.1 % paste Take by mouth 2 times daily (Patient not taking: Reported on 8/21/2019) 5 g 3        [place] prescription Monitoring Program reviewed    Blood thinner: no    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     Current work status: working part time    Psychosocial History:   History of treatment for behavioral disorder: no  History of treatment for chemical dependency: no  History of suicidal ideation or suicidal attempt: no    Review of Systems:  Review of Systems   Constitutional: Positive for fever. Negative for weight loss.   HENT: Positive for ear pain. Negative for ear discharge, hearing loss, nosebleeds and tinnitus.    Eyes: Positive for pain.   Respiratory: Positive for cough, sputum production and shortness of breath. Negative for hemoptysis.    Cardiovascular: Positive for palpitations and orthopnea. Negative for chest pain.   Gastrointestinal: Positive for abdominal pain, heartburn, nausea and vomiting.   Musculoskeletal: Positive for back pain, myalgias and neck pain.   Skin: Negative for itching and rash.   Neurological: Positive for dizziness and headaches. Negative for loss of consciousness.   Endo/Heme/Allergies: Positive for polydipsia. Bruises/bleeds easily.   Psychiatric/Behavioral: Positive for depression. Negative for memory loss. The patient is nervous/anxious and has insomnia.    All other systems reviewed and are negative.    Answers for HPI/ROS submitted by the patient on 2/12/2020   ARELY 7 TOTAL SCORE: 8  General Symptoms: Yes  Skin Symptoms: Yes  HENT Symptoms: Yes  EYE SYMPTOMS: Yes  HEART SYMPTOMS: Yes  LUNG SYMPTOMS: Yes  INTESTINAL SYMPTOMS: Yes  URINARY SYMPTOMS: No  GYNECOLOGIC  "SYMPTOMS: No  BREAST SYMPTOMS: No  SKELETAL SYMPTOMS: Yes  BLOOD SYMPTOMS: Yes  NERVOUS SYSTEM SYMPTOMS: Yes  MENTAL HEALTH SYMPTOMS: Yes  Loss of appetite: Yes  Weight gain: Yes  Fatigue: Yes  Feeling hot or cold when others believe the temperature is normal: Yes  Loss of height: No  Post-operative complications: No  Surgical site pain: Yes  Changes in hair: Yes  Changes in moles/birth marks: No  Changes in nails: Yes  Tooth pain: No  Gum tenderness: Yes  Bleeding gums: No  Change in taste: Yes  Change in sense of smell: Yes  Dry mouth: Yes  Hearing aid used: No  Neck lump: No  Vision loss: No  Dry eyes: Yes  Watery eyes: Yes  Eye bulging: No  Snoring: No  Pain in legs with walking: Yes  Fingers or toes appear blue: No  Fainting: No  Murmurs: No  Pacemaker: No  Varicose veins: Yes  Edema or swelling: Yes  Wake up at night with shortness of breath: Yes  Light-headedness: Yes  Exercise intolerance: Yes  Bloating: Yes  Swollen joints: No  Joint pain: Yes  Anemia: No  Swollen glands: Yes  Trouble with coordination: No  Trouble thinking or concentrating: Yes  Mood changes: Yes    Physical Exam:   Vitals:    02/12/20 1426   BP: (!) 136/96   Pulse: 106   Resp: 16   Height: 1.727 m (5' 8\")     General Appearance: No distress, seated comfortably  Mood: Euthymic  HE ENT: Non constricted pupils  Respiratory: Non labored breathing  CVS: normal heart rate  GI: Soft, non distended, no TTP  Skin: No rashes over exposed skin  MS: No clear redness, swelling, asymmetry of thoracic and lumbar paraspinal tissues  Seated slump and straight leg raise negative bilaterally  Lumbar paraspinal muscles/facets tender to palpation  Reproduction of pain with lumbar extension and rotation  Neuro: CN II-XII are grossly intact.   Strength 5/5 for bilateral shoulder abduction, elbow flexion, wrist extension, elbow extension, finger abduction, and grasp. Sensation intact to light touch throughout the C5-T1 dermatomes of the bilateral upper " extremities.  Reflexes 2+/4 and symmetric for biceps, triceps, brachioradialis. Negative heart's bilaterally. Neg Spurling's and L'hermitte's  Strength 5/5 for bilateral hip flexion, knee extension, dorsiflexion, EHL, and plantarflexion. Sensation intact to light touch throughout the L2-S1 dermatomes of the bilateral lower extremities. Reflexes 2+/4 and symmetric for bilateral patellar, achilles.  No clonus on ankle jerk  Gait: not antalgic, ambulates with out assistance  Neuro: AAOx3.   Skin: No rashes or lesions noted on exposed areas of skin      Laboratory results:  Recent Labs   Lab Test 10/09/19  1711 08/21/19  1653 07/20/18  1640   NA  --   --  138   POTASSIUM  --   --  3.7   CHLORIDE  --   --  102   CO2  --   --  29   ANIONGAP  --   --  7   GLC  --   --  96   BUN  --   --  12   CR 0.67 0.69 0.79   VAHID  --   --  9.1       CBC RESULTS:   Recent Labs   Lab Test 10/09/19  1711   WBC 8.4   RBC 4.36   HGB 14.0   HCT 42.7   MCV 98   MCH 32.1   MCHC 32.8   RDW 12.9            Imaging:       ASSESSMENT AND PLAN:     Encounter Diagnosis:    1. Fibromyalgia-type diffuse pain  2. Lumbar spondylosis with facet joint effusions at L4/5 and L5/S1  3. Right L5/S1 facet synovial cyst without current radicular symptoms  4. History of endometriosis   5. History of infertility s/p multiple rounds of IVF with plans for future attemps    Patricia Hall is a 44 year old y.o. old female who presents to the pain clinic with diffuse, whole body pain; axial low back pain; migraines and endometriosis with associated chronic pelvic pain.  She states that her quality of life is currently quite low due to her chronic pain.  Today her priorities are to address her allover pain as well as her axial low back pain, and she thinks these are most problematic for her quality of life.    Patient's history, physical exam and imaging are suggestive of their pain is resulting from fibromyalgia and polyarthralgia as well as lumbar  facet-mediated pain and associated lumbar myofascial pain.     I have summarized the patient history, discussed their clinical findings and differential diagnosis with the patient. I have discussed anatomy and possible sources of the pain using models and/or pictures(diagrams). I have discussed multi- disciplinary pain management options with the patient as pertaining to their case as detailed above. All questions and concerns were answered to the best of my ability.     Recommendations/Plan:   1. Patient education: I went over the above diagnoses and treatment plan with her and answered all of her questions.  2. Imaging review: I reviewed the lumbar MRI from 12/9/2019 with her   3. Exercise program: Referral to pain physical therapy for development of a daily aerobic graded exercise program for management of fibromyalgia pain as well as for generalized strengthening and conditioning to support her low back health.  4. Medications:   - Consider transitioning from Zoloft to Cymbalta for its dual pain/mood benefits for diffuse fibromyalgia-type pain. SSRIs do not confer the same pain benefits as SNRIs  - Recommend trial of Lyrica as more short-acting medication for management of diffuse fibromyalgia-type pain. With goal dose of 25 mg BID  5. Imaging Orders: none  6. Interventions:   - Given her substantial facet pathology seen on lumbar MRI and exam consistent with facet-mediated pain, consider lumbar MBBs as a workup for possible lumbar RFA.   7. Referrals:   - Pain PT as above  - Pain psychology for CBT, relaxation, biofeedback, and coping mechanisms  8. Follow up: 3 months    Thank you for the consult.   Leesa Norton MD  Pain Medicine Fellow  [facility], [facility]    Patient seen and examined with Dr. Woodward who agrees with the assessment and plan      I saw and examined the patient with the Pain Fellow/Resident. I have reviewed and agree with the resident's note and plan of care and made changes and  corrections directly to the body of the note.    TIME SPENT:  BY FELLOW/RESIDENT ALONE 25 MIN  BY MYSELF AND FELLOW/RESIDENT TOGETHER 20 MIN      These times included 20 minutes I spent counseling her about her diagnosis and treatment options and coordination of care with the primary team    Mana Woodward MD  Pain Medicine, Department of Anesthesiology  , [facility]

## 2020-02-12 NOTE — LETTER
"2/12/2020      RE: Patricia aHll  [address]  [place] [address]     Pain Clinic New Patient Consult Note:    Referring Provider: Kirill   Primary care provider: Riaz Bhandari    Patricia Hall is a 44 year old y.o. old female who presents to the pain clinic with diffuse, whole body pain; axial low back pain; migraines and endometriosis with associated chronic pelvic pain.  She characterizes the pain as follows:    HPI:  Patient Supplied Answers To the [facility] Pain Questionnaire  [facility] -  Patient Entered Questionnaire/Answers 2/12/2020   What number best describes your pain right now:  0 = No pain  to  10 = Worst pain imaginable 8   How would you describe the pain? burning, cramping, sharp, numbness, dull, aching, throbbing   Which of the following worsen your pain? lying down, standing, sitting, walking, exercise   Which of the following improve or reduce your pain?  nothing relieves the pain   What number best describes your average pain for the past week:  0 = No pain  to  10 = Worst pain imaginable 10   What number best describes your LOWEST pain in past 24 hours:  0 = No pain  to  10 = Worst pain imaginable 5   What number best describes your WORST pain in past 24 hours:  0 = No pain  to  10 = Worst pain imaginable 9   When is your pain worst? AM, PM, Night, Constant   What non-medicine treatments have you already had for your pain? acupuncture, chiropractic care, surgery   Have you tried treating your pain with medication?  Yes   Are you currently taking medications for your pain? Yes       She has many sources and types of pain.  She has some difficulty narrowing down the resources for management.  She states today her priority is addressing the types of pain that are most compromising for her quality of life and function, these are all over \"blood boiling\" type of pain that is worse in the morning as well as axial low back pain that is worse with prolonged walking and stairs as " well as lumbar extension.    She had right-sided burning pain down the posterior aspect of her right leg from approximately September to December 2019.  This has now resolved and her pain is isolated to the low back.  She has no current numbness tingling weakness or burning pain.    She recently had quite an extensive surgery to manage her endometriosis.  She has been less since that time and feels quite deconditioned.  She is also frustrated because she knows that is helpful for her pain and overall a general healthy diet but finds this difficult in the setting of her current pain.     Current pain treatments:  Medications:   -Acetaminophen 502,000 mg daily PRN-not taking this very frequently as she is concerned about long-term side effects  -Oral prednisone 20 mg daily as maintenance dose for her lupus  -Xanax-somewhat helpful for pain    Other medications: Zoloft, she has been on this for approximately 6 months.  This is somewhat helpful for mood.    Past pain treatments:  Patricia Hall has not been seen at a pain clinic in the past.    Herbal medicines: No  Physical therapy:  Previously, but flared her pain  Chiropractor: No  Pain physician: No  Surgery: None  Biofeedback: no  Acupuncture: no    Past Pain Medications:  Has been prescribed Lyrica for pain has not started this as she had not want to start it concurrently with Imuran, as she had managed to isolate did not benefit from various medications.  With Imuran-also has not started this as she is unsure if she wants to use this medication  Muscle relaxant - some cognitive slowing with this    Tests/Imaging reviewed with the patient:  Discussed lumbar MRI from 12/9/2019.  Discussed facet synovial cyst that was displacing the right descending S2 nerve root. Discussed facet degenerative changes also seen on MRI, primarily at L4/5 and L5/S1    Significant Medical History:   Past Medical History:   Diagnosis Date     Allergy, unspecified not elsewhere  classified      Endometriosis      Fibromyalgia      Migraine without aura, with intractable migraine, so stated, without mention of status migrainosus      Myalgia and myositis, unspecified      Systemic lupus erythematosus (H)         Past Surgical History:  Past Surgical History:   Procedure Laterality Date     ORTHOPEDIC SURGERY       SURGICAL HISTORY OF -   1986    left elbow fracture repair        Family History:  Family History   Problem Relation Age of Onset     Diabetes Maternal Grandfather      Lipids Mother      Skin Cancer Paternal Grandmother      Melanoma No family hx of         Social History:  Social History     Socioeconomic History     Marital status:      Spouse name: Not on file     Number of children: Not on file     Years of education: Not on file     Highest education level: Not on file   Occupational History     Not on file   Social Needs     Financial resource strain: Not on file     Food insecurity:     Worry: Not on file     Inability: Not on file     Transportation needs:     Medical: Not on file     Non-medical: Not on file   Tobacco Use     Smoking status: Never Smoker     Smokeless tobacco: Never Used   Substance and Sexual Activity     Alcohol use: Yes     Comment: occ.- 2/week     Drug use: No     Sexual activity: Yes     Partners: Male     Birth control/protection: Condom   Lifestyle     Physical activity:     Days per week: Not on file     Minutes per session: Not on file     Stress: Not on file   Relationships     Social connections:     Talks on phone: Not on file     Gets together: Not on file     Attends Orthodox service: Not on file     Active member of club or organization: Not on file     Attends meetings of clubs or organizations: Not on file     Relationship status: Not on file     Intimate partner violence:     Fear of current or ex partner: Not on file     Emotionally abused: Not on file     Physically abused: Not on file     Forced sexual activity: Not on file    Other Topics Concern     Parent/sibling w/ CABG, MI or angioplasty before 65F 55M? Not Asked   Social History Narrative     Not on file     Social History     Social History Narrative     Not on file        Allergies:  Allergies   Allergen Reactions     Cherry Anaphylaxis     Dairy Aid  [Lactase]      Other reaction(s): Arthralgia (Joint Pain)     Gluten Meal      Other reaction(s): Abdominal Pain     No Clinical Screening - See Comments Anaphylaxis and Itching     Pistachio Nut Extract Skin Test Anaphylaxis     Brassica Oleracea Italica      Other reaction(s): Abdominal Pain  Other reaction(s): Abdominal Pain     Penicillins Hives and Rash     Sulfa Drugs Hives and Rash       Current Medications:   Current Outpatient Medications   Medication Sig Dispense Refill     RACHNA SYRUP PO        Acetaminophen (TYLENOL PO) Take  by mouth.         AMBIEN 10 MG OR TABS 1 po HS, prn 20 0     calcium carbonate (OS-VAHID 500 MG Tribe. CA) 1250 MG tablet Take 1 tablet by mouth daily       hydroxychloroquine (PLAQUENIL) 200 MG tablet Take 1 tablet (200 mg) by mouth 2 times daily 180 tablet 1     L-Methylfolate-Algae-B12-B6 (METANX PO) Take 1,000 mg by mouth daily       LEVOTHYROXINE SODIUM PO Take 0.25 mg by mouth daily       METFORMIN HCL PO Take 500 mg by mouth 2 times daily (with meals)       MULTIVITAMIN TABS   OR   0     norethindrone-ethinyl estradiol (ORTHO-NOVUM 1-35 TAB,NORTREL 1-35 TAB) 1-35 MG-MCG per tablet Take 1 tablet by mouth daily       omega 3 1000 MG CAPS        Omega-3 Fatty Acids (OMEGA 3 PO) Take 1,250 mg by mouth daily       predniSONE (DELTASONE) 5 MG tablet Take 3 tablets (15 mg) by mouth See Admin Instructions (Patient taking differently: Take 20 mg by mouth See Admin Instructions ) 90 tablet 3     PREDNISONE PO Take 1 mg by mouth daily        pregabalin (LYRICA) 25 MG capsule Take 25 mg by mouth 2 times daily       promethazine (PHENERGAN) 25 MG tablet TK 1 T PO  Q 6 H PRN N  0     sertraline (ZOLOFT) 100  "MG tablet Take 100 mg by mouth daily       SPIRULINA PO Take by mouth daily       UNABLE TO FIND DISSOLVE 1 TO 4 LOZENGES UNDER TONGUE AS NEEDED DAILY  0     VITAMIN D, CHOLECALCIFEROL, PO Take 5,000 Units by mouth daily       azaTHIOprine (IMURAN) 50 MG tablet Take 1 tablet (50 mg) by mouth daily (Patient not taking: Reported on 11/27/2019) 30 tablet 1     triamcinolone (KENALOG) 0.1 % paste Take by mouth 2 times daily (Patient not taking: Reported on 8/21/2019) 5 g 3        [place] prescription Monitoring Program reviewed    Blood thinner: no    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     Current work status: working part time    Psychosocial History:   History of treatment for behavioral disorder: no  History of treatment for chemical dependency: no  History of suicidal ideation or suicidal attempt: no    Review of Systems:  Review of Systems   Constitutional: Positive for fever. Negative for weight loss.   HENT: Positive for ear pain. Negative for ear discharge, hearing loss, nosebleeds and tinnitus.    Eyes: Positive for pain.   Respiratory: Positive for cough, sputum production and shortness of breath. Negative for hemoptysis.    Cardiovascular: Positive for palpitations and orthopnea. Negative for chest pain.   Gastrointestinal: Positive for abdominal pain, heartburn, nausea and vomiting.   Musculoskeletal: Positive for back pain, myalgias and neck pain.   Skin: Negative for itching and rash.   Neurological: Positive for dizziness and headaches. Negative for loss of consciousness.   Endo/Heme/Allergies: Positive for polydipsia. Bruises/bleeds easily.   Psychiatric/Behavioral: Positive for depression. Negative for memory loss. The patient is nervous/anxious and has insomnia.    All other systems reviewed and are negative.    Physical Exam:   Vitals:    02/12/20 1426   BP: (!) 136/96   Pulse: 106   Resp: 16   Height: 1.727 m (5' 8\")     General Appearance: No distress, seated comfortably  Mood: Euthymic  HE " ENT: Non constricted pupils  Respiratory: Non labored breathing  CVS: normal heart rate  GI: Soft, non distended, no TTP  Skin: No rashes over exposed skin  MS: No clear redness, swelling, asymmetry of thoracic and lumbar paraspinal tissues  Seated slump and straight leg raise negative bilaterally  Lumbar paraspinal muscles/facets tender to palpation  Reproduction of pain with lumbar extension and rotation  Neuro: CN II-XII are grossly intact.   Strength 5/5 for bilateral shoulder abduction, elbow flexion, wrist extension, elbow extension, finger abduction, and grasp. Sensation intact to light touch throughout the C5-T1 dermatomes of the bilateral upper extremities.  Reflexes 2+/4 and symmetric for biceps, triceps, brachioradialis. Negative heart's bilaterally. Neg Spurling's and L'hermitte's  Strength 5/5 for bilateral hip flexion, knee extension, dorsiflexion, EHL, and plantarflexion. Sensation intact to light touch throughout the L2-S1 dermatomes of the bilateral lower extremities. Reflexes 2+/4 and symmetric for bilateral patellar, achilles.  No clonus on ankle jerk  Gait: not antalgic, ambulates with out assistance  Neuro: AAOx3.   Skin: No rashes or lesions noted on exposed areas of skin    Laboratory results:  Recent Labs   Lab Test 10/09/19  1711 08/21/19  1653 07/20/18  1640   NA  --   --  138   POTASSIUM  --   --  3.7   CHLORIDE  --   --  102   CO2  --   --  29   ANIONGAP  --   --  7   GLC  --   --  96   BUN  --   --  12   CR 0.67 0.69 0.79   VAHID  --   --  9.1       CBC RESULTS:   Recent Labs   Lab Test 10/09/19  1711   WBC 8.4   RBC 4.36   HGB 14.0   HCT 42.7   MCV 98   MCH 32.1   MCHC 32.8   RDW 12.9            Imaging:       ASSESSMENT AND PLAN:     Encounter Diagnosis:    1. Fibromyalgia-type diffuse pain  2. Lumbar spondylosis with facet joint effusions at L4/5 and L5/S1  3. Right L5/S1 facet synovial cyst without current radicular symptoms  4. History of endometriosis   5. History of  infertility s/p multiple rounds of IVF with plans for future attemps    Patricia Hall is a 44 year old y.o. old female who presents to the pain clinic with diffuse, whole body pain; axial low back pain; migraines and endometriosis with associated chronic pelvic pain.  She states that her quality of life is currently quite low due to her chronic pain.  Today her priorities are to address her allover pain as well as her axial low back pain, and she thinks these are most problematic for her quality of life.    Patient's history, physical exam and imaging are suggestive of their pain is resulting from fibromyalgia and polyarthralgia as well as lumbar facet-mediated pain and associated lumbar myofascial pain.     I have summarized the patient history, discussed their clinical findings and differential diagnosis with the patient. I have discussed anatomy and possible sources of the pain using models and/or pictures(diagrams). I have discussed multi- disciplinary pain management options with the patient as pertaining to their case as detailed above. All questions and concerns were answered to the best of my ability.     Recommendations/Plan:   1. Patient education: I went over the above diagnoses and treatment plan with her and answered all of her questions.  2. Imaging review: I reviewed the lumbar MRI from 12/9/2019 with her   3. Exercise program: Referral to pain physical therapy for development of a daily aerobic graded exercise program for management of fibromyalgia pain as well as for generalized strengthening and conditioning to support her low back health.  4. Medications:   - Consider transitioning from Zoloft to Cymbalta for its dual pain/mood benefits for diffuse fibromyalgia-type pain. SSRIs do not confer the same pain benefits as SNRIs  - Recommend trial of Lyrica as more short-acting medication for management of diffuse fibromyalgia-type pain. With goal dose of 25 mg BID  5. Imaging Orders:  none  6. Interventions:   - Given her substantial facet pathology seen on lumbar MRI and exam consistent with facet-mediated pain, consider lumbar MBBs as a workup for possible lumbar RFA.   7. Referrals:   - Pain PT as above  - Pain psychology for CBT, relaxation, biofeedback, and coping mechanisms  8. Follow up: 3 months    Thank you for the consult.   Leesa Norton MD  Pain Medicine Fellow  [facility]    Patient seen and examined with Dr. Woodward who agrees with the assessment and plan    I saw and examined the patient with the Pain Fellow/Resident. I have reviewed and agree with the resident's note and plan of care and made changes and corrections directly to the body of the note.    TIME SPENT:  BY FELLOW/RESIDENT ALONE 25 MIN  BY MYSELF AND FELLOW/RESIDENT TOGETHER 20 MIN    These times included 20 minutes I spent counseling her about her diagnosis and treatment options and coordination of care with the primary team    Mana Woodward MD  Pain Medicine, Department of Anesthesiology  , [facility]

## 2020-02-12 NOTE — PATIENT INSTRUCTIONS
Medications:    Start taking Lyrica 25mg at bedtime for three days, then increase to 25mg twice daily.        Referrals:    Pain psychology:    To make an appointment for health psychology, call any of these providers to make an appointment:    Dr. Edu Medina [phone]  Dr. Sally Reno [phone]  Dr. Anson Mackay [phone]  Dr. Melissa Obrien [phone]  Dr. Liya Calderon [phone]          Pain physical therapy at [facility]    Make an appointment for physical therapy with [facility] for Pain physical therapy   Treatment: Evaluation & Treatment    [email or web address]    [place] [phone]  [place] [phone]  [facility] [phone]  [place] [phone]  [place], [place]) [phone]  [place] [phone]  [place] [phone]      Please be aware that coverage of these services is subject to the terms and limitations of your health insurance plan.  Call member services at your health plan with any benefit or coverage questions.            Recommended Follow up:  3 months           To speak with a nurse, schedule/reschedule/cancel a clinic appointment, or request a medication refill call: [phone]     You can also reach us by [employer]: [email or web address]    Please provide the clinic with a minium of 1 week notice, on all prescription refills.

## 2020-02-12 NOTE — NURSING NOTE
AVS given and reviewed with pt.  Pt verbalized understanding and declined any questions.     Kelsy Wyman, RN, BSN

## 2020-02-12 NOTE — LETTER
2/12/2020       RE: Patricia Hall  [address]  [place] [address]     Dear Colleague,    Thank you for referring your patient, Patricia Hall, to the [facility] FOR [facility]HENSIVE PAIN MANAGEMENT at [facility]. Please see a copy of my visit note below.    Pain Clinic New Patient Consult Note:    Referring Provider: Kirill   Primary care provider: Riaz Bhandari    Patricia Hall is a 44 year old y.o. old female who presents to the pain clinic with diffuse, whole body pain; axial low back pain; migraines and endometriosis with associated chronic pelvic pain.  She characterizes the pain as follows:    HPI:  Patient Supplied Answers To the [facility] Pain Questionnaire  [facility] -  Patient Entered Questionnaire/Answers 2/12/2020   What number best describes your pain right now:  0 = No pain  to  10 = Worst pain imaginable 8   How would you describe the pain? burning, cramping, sharp, numbness, dull, aching, throbbing   Which of the following worsen your pain? lying down, standing, sitting, walking, exercise   Which of the following improve or reduce your pain?  nothing relieves the pain   What number best describes your average pain for the past week:  0 = No pain  to  10 = Worst pain imaginable 10   What number best describes your LOWEST pain in past 24 hours:  0 = No pain  to  10 = Worst pain imaginable 5   What number best describes your WORST pain in past 24 hours:  0 = No pain  to  10 = Worst pain imaginable 9   When is your pain worst? AM, PM, Night, Constant   What non-medicine treatments have you already had for your pain? acupuncture, chiropractic care, surgery   Have you tried treating your pain with medication?  Yes   Are you currently taking medications for your pain? Yes       She has many sources and types of pain.  She has some difficulty narrowing down the resources for management.  She states today her priority is addressing the  "types of pain that are most compromising for her quality of life and function, these are all over \"blood boiling\" type of pain that is worse in the morning as well as axial low back pain that is worse with prolonged walking and stairs as well as lumbar extension.    She had right-sided burning pain down the posterior aspect of her right leg from approximately September to December 2019.  This has now resolved and her pain is isolated to the low back.  She has no current numbness tingling weakness or burning pain.    She recently had quite an extensive surgery to manage her endometriosis.  She has been less since that time and feels quite deconditioned.  She is also frustrated because she knows that is helpful for her pain and overall a general healthy diet but finds this difficult in the setting of her current pain.     Current pain treatments:  Medications:   -Acetaminophen 502,000 mg daily PRN-not taking this very frequently as she is concerned about long-term side effects  -Oral prednisone 20 mg daily as maintenance dose for her lupus  -Xanax-somewhat helpful for pain    Other medications: Zoloft, she has been on this for approximately 6 months.  This is somewhat helpful for mood.    Past pain treatments:  Patricia Hall has not been seen at a pain clinic in the past.    Herbal medicines: No  Physical therapy:  Previously, but flared her pain  Chiropractor: No  Pain physician: No  Surgery: None  Biofeedback: no  Acupuncture: no    Past Pain Medications:  Has been prescribed Lyrica for pain has not started this as she had not want to start it concurrently with Imuran, as she had managed to isolate did not benefit from various medications.  With Imuran-also has not started this as she is unsure if she wants to use this medication  Muscle relaxant - some cognitive slowing with this    Tests/Imaging reviewed with the patient:  Discussed lumbar MRI from 12/9/2019.  Discussed facet synovial cyst that was " displacing the right descending S2 nerve root. Discussed facet degenerative changes also seen on MRI, primarily at L4/5 and L5/S1    Significant Medical History:   Past Medical History:   Diagnosis Date     Allergy, unspecified not elsewhere classified      Endometriosis      Fibromyalgia      Migraine without aura, with intractable migraine, so stated, without mention of status migrainosus      Myalgia and myositis, unspecified      Systemic lupus erythematosus (H)         Past Surgical History:  Past Surgical History:   Procedure Laterality Date     ORTHOPEDIC SURGERY       SURGICAL HISTORY OF -   1986    left elbow fracture repair        Family History:  Family History   Problem Relation Age of Onset     Diabetes Maternal Grandfather      Lipids Mother      Skin Cancer Paternal Grandmother      Melanoma No family hx of         Social History:  Social History     Socioeconomic History     Marital status:      Spouse name: Not on file     Number of children: Not on file     Years of education: Not on file     Highest education level: Not on file   Occupational History     Not on file   Social Needs     Financial resource strain: Not on file     Food insecurity:     Worry: Not on file     Inability: Not on file     Transportation needs:     Medical: Not on file     Non-medical: Not on file   Tobacco Use     Smoking status: Never Smoker     Smokeless tobacco: Never Used   Substance and Sexual Activity     Alcohol use: Yes     Comment: occ.- 2/week     Drug use: No     Sexual activity: Yes     Partners: Male     Birth control/protection: Condom   Lifestyle     Physical activity:     Days per week: Not on file     Minutes per session: Not on file     Stress: Not on file   Relationships     Social connections:     Talks on phone: Not on file     Gets together: Not on file     Attends [employer] service: Not on file     Active member of club or organization: Not on file     Attends meetings of clubs or  organizations: Not on file     Relationship status: Not on file     Intimate partner violence:     Fear of current or ex partner: Not on file     Emotionally abused: Not on file     Physically abused: Not on file     Forced sexual activity: Not on file   Other Topics Concern     Parent/sibling w/ CABG, MI or angioplasty before 65F 55M? Not Asked   Social History Narrative     Not on file     Social History     Social History Narrative     Not on file        Allergies:  Allergies   Allergen Reactions     Cherry Anaphylaxis     Dairy Aid  [Lactase]      Other reaction(s): Arthralgia (Joint Pain)     Gluten Meal      Other reaction(s): Abdominal Pain     No Clinical Screening - See Comments Anaphylaxis and Itching     Pistachio Nut Extract Skin Test Anaphylaxis     Brassica Oleracea Italica      Other reaction(s): Abdominal Pain  Other reaction(s): Abdominal Pain     Penicillins Hives and Rash     Sulfa Drugs Hives and Rash       Current Medications:   Current Outpatient Medications   Medication Sig Dispense Refill     RACHNA SYRUP PO        Acetaminophen (TYLENOL PO) Take  by mouth.         AMBIEN 10 MG OR TABS 1 po HS, prn 20 0     calcium carbonate (OS-VAHID 500 MG [place]. [place]) 1250 MG tablet Take 1 tablet by mouth daily       hydroxychloroquine (PLAQUENIL) 200 MG tablet Take 1 tablet (200 mg) by mouth 2 times daily 180 tablet 1     L-Methylfolate-Algae-B12-B6 (METANX PO) Take 1,000 mg by mouth daily       LEVOTHYROXINE SODIUM PO Take 0.25 mg by mouth daily       METFORMIN HCL PO Take 500 mg by mouth 2 times daily (with meals)       MULTIVITAMIN TABS   OR   0     norethindrone-ethinyl estradiol (ORTHO-NOVUM 1-35 TAB,NORTREL 1-35 TAB) 1-35 MG-MCG per tablet Take 1 tablet by mouth daily       omega 3 1000 MG CAPS        Omega-3 Fatty Acids (OMEGA 3 PO) Take 1,250 mg by mouth daily       predniSONE (DELTASONE) 5 MG tablet Take 3 tablets (15 mg) by mouth See Admin Instructions (Patient taking differently: Take 20 mg by mouth  See Admin Instructions ) 90 tablet 3     PREDNISONE PO Take 1 mg by mouth daily        pregabalin (LYRICA) 25 MG capsule Take 25 mg by mouth 2 times daily       promethazine (PHENERGAN) 25 MG tablet TK 1 T PO  Q 6 H PRN N  0     sertraline (ZOLOFT) 100 MG tablet Take 100 mg by mouth daily       SPIRULINA PO Take by mouth daily       UNABLE TO FIND DISSOLVE 1 TO 4 LOZENGES UNDER TONGUE AS NEEDED DAILY  0     VITAMIN D, CHOLECALCIFEROL, PO Take 5,000 Units by mouth daily       azaTHIOprine (IMURAN) 50 MG tablet Take 1 tablet (50 mg) by mouth daily (Patient not taking: Reported on 11/27/2019) 30 tablet 1     triamcinolone (KENALOG) 0.1 % paste Take by mouth 2 times daily (Patient not taking: Reported on 8/21/2019) 5 g 3        [place] prescription Monitoring Program reviewed    Blood thinner: no    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     Current work status: working part time    Psychosocial History:   History of treatment for behavioral disorder: no  History of treatment for chemical dependency: no  History of suicidal ideation or suicidal attempt: no    Review of Systems:  Review of Systems   Constitutional: Positive for fever. Negative for weight loss.   HENT: Positive for ear pain. Negative for ear discharge, hearing loss, nosebleeds and tinnitus.    Eyes: Positive for pain.   Respiratory: Positive for cough, sputum production and shortness of breath. Negative for hemoptysis.    Cardiovascular: Positive for palpitations and orthopnea. Negative for chest pain.   Gastrointestinal: Positive for abdominal pain, heartburn, nausea and vomiting.   Musculoskeletal: Positive for back pain, myalgias and neck pain.   Skin: Negative for itching and rash.   Neurological: Positive for dizziness and headaches. Negative for loss of consciousness.   Endo/Heme/Allergies: Positive for polydipsia. Bruises/bleeds easily.   Psychiatric/Behavioral: Positive for depression. Negative for memory loss. The patient is nervous/anxious and  "has insomnia.    All other systems reviewed and are negative.    Physical Exam:   Vitals:    02/12/20 1426   BP: (!) 136/96   Pulse: 106   Resp: 16   Height: 1.727 m (5' 8\")     General Appearance: No distress, seated comfortably  Mood: Euthymic  HE ENT: Non constricted pupils  Respiratory: Non labored breathing  CVS: normal heart rate  GI: Soft, non distended, no TTP  Skin: No rashes over exposed skin  MS: No clear redness, swelling, asymmetry of thoracic and lumbar paraspinal tissues  Seated slump and straight leg raise negative bilaterally  Lumbar paraspinal muscles/facets tender to palpation  Reproduction of pain with lumbar extension and rotation  Neuro: CN II-XII are grossly intact.   Strength 5/5 for bilateral shoulder abduction, elbow flexion, wrist extension, elbow extension, finger abduction, and grasp. Sensation intact to light touch throughout the C5-T1 dermatomes of the bilateral upper extremities.  Reflexes 2+/4 and symmetric for biceps, triceps, brachioradialis. Negative heart's bilaterally. Neg Spurling's and L'hermitte's  Strength 5/5 for bilateral hip flexion, knee extension, dorsiflexion, EHL, and plantarflexion. Sensation intact to light touch throughout the L2-S1 dermatomes of the bilateral lower extremities. Reflexes 2+/4 and symmetric for bilateral patellar, achilles.  No clonus on ankle jerk  Gait: not antalgic, ambulates with out assistance  Neuro: AAOx3.   Skin: No rashes or lesions noted on exposed areas of skin      Laboratory results:  Recent Labs   Lab Test 10/09/19  1711 08/21/19  1653 07/20/18  1640   NA  --   --  138   POTASSIUM  --   --  3.7   CHLORIDE  --   --  102   CO2  --   --  29   ANIONGAP  --   --  7   GLC  --   --  96   BUN  --   --  12   CR 0.67 0.69 0.79   VAHID  --   --  9.1       CBC RESULTS:   Recent Labs   Lab Test 10/09/19  1711   WBC 8.4   RBC 4.36   HGB 14.0   HCT 42.7   MCV 98   MCH 32.1   MCHC 32.8   RDW 12.9            Imaging:       ASSESSMENT AND PLAN: "     Encounter Diagnosis:    1. Fibromyalgia-type diffuse pain  2. Lumbar spondylosis with facet joint effusions at L4/5 and L5/S1  3. Right L5/S1 facet synovial cyst without current radicular symptoms  4. History of endometriosis   5. History of infertility s/p multiple rounds of IVF with plans for future attemps    Patricia Hall is a 44 year old y.o. old female who presents to the pain clinic with diffuse, whole body pain; axial low back pain; migraines and endometriosis with associated chronic pelvic pain.  She states that her quality of life is currently quite low due to her chronic pain.  Today her priorities are to address her allover pain as well as her axial low back pain, and she thinks these are most problematic for her quality of life.    Patient's history, physical exam and imaging are suggestive of their pain is resulting from fibromyalgia and polyarthralgia as well as lumbar facet-mediated pain and associated lumbar myofascial pain.     I have summarized the patient history, discussed their clinical findings and differential diagnosis with the patient. I have discussed anatomy and possible sources of the pain using models and/or pictures(diagrams). I have discussed multi- disciplinary pain management options with the patient as pertaining to their case as detailed above. All questions and concerns were answered to the best of my ability.     Recommendations/Plan:   1. Patient education: I went over the above diagnoses and treatment plan with her and answered all of her questions.  2. Imaging review: I reviewed the lumbar MRI from 12/9/2019 with her   3. Exercise program: Referral to pain physical therapy for development of a daily aerobic graded exercise program for management of fibromyalgia pain as well as for generalized strengthening and conditioning to support her low back health.  4. Medications:   - Consider transitioning from Zoloft to Cymbalta for its dual pain/mood benefits for  diffuse fibromyalgia-type pain. SSRIs do not confer the same pain benefits as SNRIs  - Recommend trial of Lyrica as more short-acting medication for management of diffuse fibromyalgia-type pain. With goal dose of 25 mg BID  5. Imaging Orders: none  6. Interventions:   - Given her substantial facet pathology seen on lumbar MRI and exam consistent with facet-mediated pain, consider lumbar MBBs as a workup for possible lumbar RFA.   7. Referrals:   - Pain PT as above  - Pain psychology for CBT, relaxation, biofeedback, and coping mechanisms  8. Follow up: 3 months    Thank you for the consult.   Leesa Norton MD  Pain Medicine Fellow  [facility]    Patient seen and examined with Dr. Woodward who agrees with the assessment and plan    I saw and examined the patient with the Pain Fellow/Resident. I have reviewed and agree with the resident's note and plan of care and made changes and corrections directly to the body of the note.    TIME SPENT:  BY FELLOW/RESIDENT ALONE 25 MIN  BY MYSELF AND FELLOW/RESIDENT TOGETHER 20 MIN    These times included 20 minutes I spent counseling her about her diagnosis and treatment options and coordination of care with the primary team    Mana Woodward MD  Pain Medicine, Department of Anesthesiology  , [facility]

## 2020-02-13 ASSESSMENT — ANXIETY QUESTIONNAIRES: GAD7 TOTAL SCORE: 8

## 2020-02-24 ENCOUNTER — OFFICE VISIT (OUTPATIENT)
Dept: INTERNAL MEDICINE | Facility: CLINIC | Age: 45
End: 2020-02-24
Payer: COMMERCIAL

## 2020-02-24 VITALS
HEIGHT: 67 IN | HEART RATE: 93 BPM | BODY MASS INDEX: 31.83 KG/M2 | TEMPERATURE: 98 F | OXYGEN SATURATION: 98 % | SYSTOLIC BLOOD PRESSURE: 126 MMHG | DIASTOLIC BLOOD PRESSURE: 80 MMHG | WEIGHT: 202.8 LBS

## 2020-02-24 DIAGNOSIS — M32.9 SYSTEMIC LUPUS ERYTHEMATOSUS, UNSPECIFIED SLE TYPE, UNSPECIFIED ORGAN INVOLVEMENT STATUS (H): ICD-10-CM

## 2020-02-24 DIAGNOSIS — G89.29 OTHER CHRONIC PAIN: Primary | ICD-10-CM

## 2020-02-24 RX ORDER — FLUTICASONE PROPIONATE 50 MCG
SPRAY, SUSPENSION (ML) NASAL
COMMUNITY
Start: 2020-01-27 | End: 2022-08-30

## 2020-02-24 RX ORDER — PREDNISONE 5 MG/1
15 TABLET ORAL SEE ADMIN INSTRUCTIONS
Qty: 90 TABLET | Refills: 3 | Status: SHIPPED | OUTPATIENT
Start: 2020-02-24 | End: 2020-06-25

## 2020-02-24 RX ORDER — ALBUTEROL SULFATE 90 UG/1
AEROSOL, METERED RESPIRATORY (INHALATION)
COMMUNITY
Start: 2020-01-23 | End: 2023-12-18

## 2020-02-24 RX ORDER — SERTRALINE HYDROCHLORIDE 100 MG/1
TABLET, FILM COATED ORAL
COMMUNITY
Start: 2020-02-16 | End: 2020-02-24

## 2020-02-24 RX ORDER — TRAMADOL HYDROCHLORIDE 50 MG/1
50 TABLET ORAL EVERY 6 HOURS PRN
Qty: 30 TABLET | Refills: 5 | Status: SHIPPED | OUTPATIENT
Start: 2020-02-24 | End: 2020-05-11

## 2020-02-24 RX ORDER — EPINEPHRINE 0.3 MG/.3ML
INJECTION SUBCUTANEOUS
COMMUNITY
End: 2022-08-30

## 2020-02-24 ASSESSMENT — PAIN SCALES - GENERAL: PAINLEVEL: EXTREME PAIN (8)

## 2020-02-24 ASSESSMENT — MIFFLIN-ST. JEOR: SCORE: 1595.13

## 2020-02-24 NOTE — NURSING NOTE
Chief Complaint   Patient presents with     Establish Care     pt here to establish care     Sinus Problem     pt states she is plugged up, aches, cough       Sylvia Manuel CMA at 4:41 PM on 2/24/2020.

## 2020-02-24 NOTE — PATIENT INSTRUCTIONS
[facility] Medication Refill Request Information:  * Please contact your pharmacy regarding ANY request for medication refills.  ** [facility] Prescription Fax = [phone]  * Please allow 3 business days for routine medication refills.  * Please allow 5 business days for controlled substance medication refills.     [facility] Test Result notification information:  *You will be notified with in 7-10 days of your appointment day regarding the results of your test.  If you are on MyChart you will be notified as soon as the provider has reviewed the results and signed off on them.    [facility]: [phone]

## 2020-02-24 NOTE — PROGRESS NOTES
[facility]  Primary Care Center   Thomas Mendez MD  02/24/2020      Chief Complaint:   Establish Care and Sinus Problem       History of Present Illness:   Patricia Hall is a 44 year old female with a history of endometriosis, fibromyalgia, migraine, systemic lupus erythematosus, and irritable bowel syndrome who presents to [facility].     She has had a difficult past year with endometriosis difficulties and a herniated disc. She had the flu at the beginning of 2020 and felt ill for over a month, and continues to have congestion.     Systemic lupus erythematosus: She has discussed starting Imuran with Dr. Roy. She has one embryo after four rounds of IVF and is worried that this will affect her potential for pregnancy. She needs a Prednisone refill. She has tapered down to 15 mg daily. She has experienced hair loss and leg edema. She was supposed to schedule an echocardiogram and never got it. She thinks this may be related to her Prednisone use. Her hair is starting to grow back. She denies rash. Her most recent CBC and thyroid labs were normal.     Primary fibromyalgia: She recently saw the pain clinic. She is interested in doing physical therapy. They suggested switching from Zoloft to Cymbalta and also starting Lyrica. She is going to talk to her mental health provider about switching from Zoloft to Cymbalta. She usually takes Tylenol when she has pain, but is concerned about taking too much. NSAIDs work somewhat but cause GI side effects.    Review of Systems:   Pertinent items are noted in HPI or as in patient entered ROS below, remainder of complete ROS is negative.     Active Medications:     Current Outpatient Medications:      RACHNA SYRUP PO, , Disp: , Rfl:      Acetaminophen (TYLENOL PO), Take  by mouth.  , Disp: , Rfl:      albuterol (PROAIR HFA/PROVENTIL HFA/VENTOLIN HFA) 108 (90 Base) MCG/ACT inhaler, , Disp: , Rfl:      AMBIEN 10 MG OR TABS, 1 po HS, prn, Disp: 20, Rfl: 0      calcium carbonate (OS-VAHID 500 MG [place]. [place]) 1250 MG tablet, Take 1 tablet by mouth daily, Disp: , Rfl:      EPINEPHrine (ANY BX GENERIC EQUIV) 0.3 MG/0.3ML injection 2-pack, epinephrine 0.3 mg/0.3 mL injection, auto-injector, Disp: , Rfl:      fluticasone (FLONASE) 50 MCG/ACT nasal spray, , Disp: , Rfl:      hydroxychloroquine (PLAQUENIL) 200 MG tablet, Take 1 tablet (200 mg) by mouth 2 times daily, Disp: 180 tablet, Rfl: 1     L-Methylfolate-Algae-B12-B6 (METANX PO), Take 1,000 mg by mouth daily, Disp: , Rfl:      MULTIVITAMIN TABS   OR, , Disp: , Rfl: 0     Omega-3 Fatty Acids (OMEGA 3 PO), Take 1,250 mg by mouth daily, Disp: , Rfl:      predniSONE (DELTASONE) 5 MG tablet, Take 3 tablets (15 mg) by mouth See Admin Instructions, Disp: 90 tablet, Rfl: 3     sertraline (ZOLOFT) 100 MG tablet, Take 100 mg by mouth daily, Disp: , Rfl:      SPIRULINA PO, Take by mouth daily, Disp: , Rfl:      traMADol (ULTRAM) 50 MG tablet, Take 1 tablet (50 mg) by mouth every 6 hours as needed, Disp: 30 tablet, Rfl: 5     VITAMIN D, CHOLECALCIFEROL, PO, Take 5,000 Units by mouth daily, Disp: , Rfl:      azaTHIOprine (IMURAN) 50 MG tablet, Take 1 tablet (50 mg) by mouth daily (Patient not taking: Reported on 2/24/2020), Disp: 30 tablet, Rfl: 1     pregabalin (LYRICA) 25 MG capsule, Take 1 capsule (25 mg) by mouth 2 times daily (Patient not taking: Reported on 2/24/2020), Disp: 60 capsule, Rfl: 2     triamcinolone (KENALOG) 0.1 % paste, Take by mouth 2 times daily (Patient not taking: Reported on 8/21/2019), Disp: 5 g, Rfl: 3      Allergies:   Cherry; Dairy aid  [lactase]; Gluten meal; No clinical screening - see comments; Pistachio nut extract skin test; Brassica oleracea italica; Penicillins; and Sulfa drugs      Past Medical History:  Endometriosis  Fibromyalgia  Migraine without aura, with intractable migraine, so stated, without mention of status migrainosus  Myalgia and myositis  Systemic lupus erythematosus   Insomnia  "5,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  Adjustment disorder with anxiety  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 She is a  and .      Physical Exam:   /80 (BP Location: Right arm, Patient Position: Sitting, Cuff Size: Adult Large)   Pulse 93   Temp 98  F (36.7  C) (Oral)   Ht 1.698 m (5' 6.85\")   Wt 92 kg (202 lb 12.8 oz)   SpO2 98%   BMI 31.91 kg/m     Constitutional: Alert. In no distress.  Psychiatric: Mentation appears normal. Normal affect.     No further exam was done. Total time spent 30  minutes.  More than 50% of the time spent with Ms. Neto Hall on counseling / coordinating her care      Assessment and Plan:  Systemic lupus erythematosus, unspecified SLE type, unspecified organ involvement status (H)  Refilled. This is normally managed by rheumatology, but her next appointment is not until April, and she would otherwise run out.  - predniSONE (DELTASONE) 5 MG tablet  Dispense: 90 tablet; Refill: 3    Other chronic pain  Fibromyalgia  Reviewed notes from recent pain clinic visit. Discussed Tramadol as a potential for pain management. She is interested in this and will trial Tramadol 50 mg daily. She will also continue Lyrica, as prescribed by the pain clinic. Discussed heat, pool therapy, and stretching as additional treatment for fibromyalgia. She will return in 3 months " for follow up to see how these treatments are working. She can also contact via [employer].   - traMADol (ULTRAM) 50 MG tablet  Dispense: 30 tablet; Refill: 5     Follow-up: Return in about 3 months (around 5/24/2020) for Routine Visit.      Scribe Disclosure:  Arina CHEN, am serving as a scribe to document services personally performed by Thomas Mendez MD at this visit, based upon the provider's statements to me. All documentation has been reviewed by the aforementioned provider prior to being entered into the official medical record.    Scribe Preparation Attestation:  Arina CHEN, a scribe, prepared the chart for today's encounter.      Portions of this medical record were completed by a scribe. UPON MY REVIEW AND AUTHENTICATION BY ELECTRONIC SIGNATURE, this confirms (a) I performed the applicable clinical services, and (b) the record is accurate.     -- Shavon Mendez MD     Answers for HPI/ROS submitted by the patient on 2/12/2020   General Symptoms: Yes  Skin Symptoms: Yes  HENT Symptoms: Yes  EYE SYMPTOMS: Yes  HEART SYMPTOMS: Yes  LUNG SYMPTOMS: Yes  INTESTINAL SYMPTOMS: Yes  URINARY SYMPTOMS: No  GYNECOLOGIC SYMPTOMS: No  BREAST SYMPTOMS: No  SKELETAL SYMPTOMS: Yes  BLOOD SYMPTOMS: Yes  NERVOUS SYSTEM SYMPTOMS: Yes  MENTAL HEALTH SYMPTOMS: Yes  Fever: Yes  Loss of appetite: Yes  Weight loss: No  Weight gain: Yes  Fatigue: Yes  Excessive thirst: Yes  Feeling hot or cold when others believe the temperature is normal: Yes  Loss of height: No  Post-operative complications: No  Surgical site pain: Yes  Changes in hair: Yes  Changes in moles/birth marks: No  Itching: No  Rashes: No  Changes in nails: Yes  Ear pain: Yes  Ear discharge: No  Hearing loss: No  Tinnitus: No  Nosebleeds: No  Tooth pain: No  Gum tenderness: Yes  Bleeding gums: No  Change in taste: Yes  Change in sense of smell: Yes  Dry mouth: Yes  Hearing aid used: No  Neck lump: No  Eye pain: Yes  Vision loss: No  Dry eyes:  Yes  Watery eyes: Yes  Eye bulging: No  Cough: Yes  Sputum or phlegm: Yes  Coughing up blood: No  Difficulty breating or shortness of breath: Yes  Snoring: No  Chest pain or pressure: No  Fast or irregular heartbeat: Yes  Pain in legs with walking: Yes  Trouble breathing while lying down: Yes  Fingers or toes appear blue: No  Fainting: No  Murmurs: No  Pacemaker: No  Varicose veins: Yes  Edema or swelling: Yes  Wake up at night with shortness of breath: Yes  Light-headedness: Yes  Exercise intolerance: Yes  Heart burn or indigestion: Yes  Nausea: Yes  Vomiting: Yes  Abdominal pain: Yes  Bloating: Yes  Back pain: Yes  Muscle aches: Yes  Neck pain: Yes  Swollen joints: No  Joint pain: Yes  Anemia: No  Swollen glands: Yes  Easy bleeding or bruising: Yes  Trouble with coordination: No  Dizziness or trouble with balance: Yes  Fainting or black-out spells: No  Memory loss: No  Headache: Yes  Nervous or Anxious: Yes  Depression: Yes  Trouble sleeping: Yes  Trouble thinking or concentrating: Yes  Mood changes: Yes

## 2020-03-15 ENCOUNTER — HEALTH MAINTENANCE LETTER (OUTPATIENT)
Age: 45
End: 2020-03-15

## 2020-03-17 DIAGNOSIS — M32.9 SYSTEMIC LUPUS ERYTHEMATOSUS, UNSPECIFIED SLE TYPE, UNSPECIFIED ORGAN INVOLVEMENT STATUS (H): ICD-10-CM

## 2020-03-18 RX ORDER — HYDROXYCHLOROQUINE SULFATE 200 MG/1
200 TABLET, FILM COATED ORAL 2 TIMES DAILY
Qty: 180 TABLET | Refills: 1 | OUTPATIENT
Start: 2020-03-18

## 2020-04-08 ENCOUNTER — VIRTUAL VISIT (OUTPATIENT)
Dept: INTERNAL MEDICINE | Facility: CLINIC | Age: 45
End: 2020-04-08
Payer: COMMERCIAL

## 2020-04-08 DIAGNOSIS — Z12.11 SPECIAL SCREENING FOR MALIGNANT NEOPLASMS, COLON: ICD-10-CM

## 2020-04-08 DIAGNOSIS — R10.9 ABDOMINAL PAIN, UNSPECIFIED ABDOMINAL LOCATION: ICD-10-CM

## 2020-04-08 DIAGNOSIS — K60.2 ANAL FISSURE: Primary | ICD-10-CM

## 2020-04-08 ASSESSMENT — PAIN SCALES - GENERAL: PAINLEVEL: SEVERE PAIN (6)

## 2020-04-08 NOTE — PROGRESS NOTES
"Patricia Hall is a 44 year old female who is being evaluated via a billable video visit.      The patient has been notified of following:     \"This video visit will be conducted via a call between you and your physician/provider. We have found that certain health care needs can be provided without the need for an in-person physical exam.  This service lets us provide the care you need with a video conversation.  If a prescription is necessary we can send it directly to your pharmacy.  If lab work is needed we can place an order for that and you can then stop by our lab to have the test done at a later time.    Video visits are billed at different rates depending on your insurance coverage.  Please reach out to your insurance provider with any questions.    If during the course of the call the physician/provider feels a video visit is not appropriate, you will not be charged for this service.\"    Patient has given verbal consent for Video visit? Yes    Patient would like the video invitation sent by: Send to e-mail at: [email or web address]      Subjective     Patricia Hall is a 44 year old female with a past medical history of Allergy, unspecified not elsewhere classified, Endometriosis, Fibromyalgia, Migraine without aura, with intractable migraine, so stated, without mention of status migrainosus, Myalgia and myositis, unspecified, and Systemic lupus erythematosus (H).     She presents to clinic today for the following health issues:  Sciatica, GI issues, endometriosis, chronic pain    She sent My Chart message a few days ago:    \"The Tramadol has been helping tremendously.  Life/game changer, thank you.      Want to follow up from when I was in last when I mentioned  Gi issues that were going on since Dec. I was going to give a little more time before seeking a colonoscopy. We did not discuss in depth. But now symptoms have changed some. I've had bleeding (bright red blood) when wiping (maybe 4-5 " "times over a 1.5 to 2 weeks) after going #2. I have pretty consistent pain after going #2, with or without blood. Radiates up my lower back a few inches. I've iced for temporary relief. I know I have been constipated, which could have caused fissures that are trying to heal. Also experiencing a flare again w/ herniated discs and endometriosis pain... lots going on in that general area. I wonder about bowel involvement with endometriosis, something to revisit with those docs . I guess since the GI pain has changed some what do you think about doing Cologuard since it is probably not good for me to go in for colonoscopy/endoscopy with Lupus and being immunosuppressed with Covid-19, plus these procedures might not be scheduled again until later this summer? I am not sure.  Thought it might be a good thing to do in the meantime.   Other symptoms: I have  fullness that comes and goes (like a water balloon sitting at the bottom of my lower pelvis) and sometimes I feel pressure (but not pain) upon urination. I am running acidic - bending over I can throw up a little in my mouth (unusual for me).  Of course my TMJ is super bad right now as well -- horrible clicking of joint on my right side every time I eat. Stress much??? Trying to be gentle with everything that's going on but...omg...so stressful, as you know.   Anyway, since the GI pain has changed some let me know your thoughts about Cologuard and how to get a prescription to that company.\"      Today, she begins by recounting symptoms dating a while back. Endometriosis [place] one [place].  Had some adhesions between bowel and uterus, it was a complicated surgery.  She was on prednisone at the time and had prolonged wound healing.  Was given IVIG to lower cytokines for inflammation.  After surgery, had an acute disc bulge with sciatica and that was another setback--this was last fall.  Started to get better in December 2019.  In January " "2020 had a \"bad flu\" but didn't get any testing.     Since January/Feb, had some pain after BM, with recently some bleeding after BM too, this is more recent such as early March.  Started tramadol for pain management in the meantime and is wondering if that drug can cause constipation.  Currently has pain in the rectal area radiates into the low back 4-6 inches up from the anal area, usually after a BM, but occasionally in absence of BM as well.  Also endorses some periumbilical pain that seems separate from this one.    Video exam:  Alert, NAD, pleasant and conversational.       A/P:  Patricia was seen today for gastrointestinal problem.  The situation she describes with lower abdominal and back pain, rectal pain after BM (tenesmus) and bleeding sound fairly classic for anal fissure.  Other possibilities include bleeding hemorrhoid but this should not be all that painful and she does not describe a lump or swelling.  Remote possibility of proctitis or IBD such as Crohns or ulcerative colitis also considered.  She asked about endometriosis involving the bowel, which is also possible.  For these reasons, I am going to treat presumptively with topical nifedipine but schedule a CT if no better in 2-4 weeks.     Anal fissure  -     nifedipine 0.2% in white petrolatum 0.2 % OINT ointment; Apply topically 4 times daily    Special screening for malignant neoplasms, colon: cologuard requested and orderd     Abdominal pain, unspecified abdominal location  -     CT Abdomen/Pelvis w contrast; Future    Total time spent 25 minutes.  More than 50% of the time spent with Ms. Neto Hall on counseling / coordinating her care    Advised to contact the clinic if symptoms worsen or do not improve.      Video-Visit Details    Type of service:  Video Visit started 2:28    Video End Time (time video stopped): 2:56    Originating Location (pt. Location): Home    Distant Location (provider location):  [facility]     Mode " of Communication:  Video Conference via [employer]       --Shavon Mendez MD

## 2020-04-09 ENCOUNTER — TELEPHONE (OUTPATIENT)
Dept: INTERNAL MEDICINE | Facility: CLINIC | Age: 45
End: 2020-04-09

## 2020-04-09 NOTE — TELEPHONE ENCOUNTER
Called patient to help with setting up the CT scan ordered by Dr. Andrea Guzman, 04-08-20. No answered. Left message with Imaging phone number to call to schedule. Informed patient that imaging are scheduling out a month at this time due to COVID 19.

## 2020-04-24 ENCOUNTER — TELEPHONE (OUTPATIENT)
Dept: PSYCHOLOGY | Facility: CLINIC | Age: 45
End: 2020-04-24

## 2020-04-24 ENCOUNTER — MYC MEDICAL ADVICE (OUTPATIENT)
Dept: INTERNAL MEDICINE | Facility: CLINIC | Age: 45
End: 2020-04-24

## 2020-04-24 NOTE — TELEPHONE ENCOUNTER
Called patient on referral list and left message.  Arina Wilder, PhD,   Health Psychology  [phone]

## 2020-04-27 DIAGNOSIS — K60.2 ANAL FISSURE: Primary | ICD-10-CM

## 2020-04-27 NOTE — TELEPHONE ENCOUNTER
Fax received from [facility] pharmacy stating nifedipine powder is out of stock. Request for diltiazem powder instead if appropriate.    Will send to PCP for review.    Miriam Aguayo RN ([place])

## 2020-04-29 ENCOUNTER — VIRTUAL VISIT (OUTPATIENT)
Dept: RHEUMATOLOGY | Facility: CLINIC | Age: 45
End: 2020-04-29
Attending: INTERNAL MEDICINE
Payer: COMMERCIAL

## 2020-04-29 DIAGNOSIS — M32.9 SYSTEMIC LUPUS ERYTHEMATOSUS, UNSPECIFIED SLE TYPE, UNSPECIFIED ORGAN INVOLVEMENT STATUS (H): Primary | ICD-10-CM

## 2020-04-29 ASSESSMENT — PAIN SCALES - GENERAL: PAINLEVEL: MILD PAIN (3)

## 2020-04-29 NOTE — PROGRESS NOTES
"Patricia Hall is a 44 year old female who is being evaluated via a billable video visit.      The patient has been notified of following:     \"This video visit will be conducted via a call between you and your physician/provider. We have found that certain health care needs can be provided without the need for an in-person physical exam.  This service lets us provide the care you need with a video conversation.  If a prescription is necessary we can send it directly to your pharmacy.  If lab work is needed we can place an order for that and you can then stop by our lab to have the test done at a later time.    Video visits are billed at different rates depending on your insurance coverage.  Please reach out to your insurance provider with any questions.    If during the course of the call the physician/provider feels a video visit is not appropriate, you will not be charged for this service.\"    Patient has given verbal consent for Video visit? Yes    How would you like to obtain your AVS? [facility]    Patient would like the video invitation sent by: Text to cell phone: [phone]    Will anyone else be joining your video visit? No        Video-Visit Details    Type of service:  Video Visit    Video Start Time: 3pm  Video End Time: 3:20  pm  Originating Location (pt. Location): home    Distant Location (provider location):  [facility] RHEUMATOLOGY     Mode of Communication:  Video Conference via [facility]    Josh Roy MD        "

## 2020-04-29 NOTE — LETTER
Date: 2020    Patricia L Netodax Hall  [address]  [place] [address]      MRN: [id number]  : 1975    Dx:    ICD-10-CM    1. Systemic lupus erythematosus, unspecified SLE type, unspecified organ involvement status (H)  M32.9 COVID-19 Virus (Coronavirus) Antibody       Orders Placed This Encounter     COVID-19 Virus (Coronavirus) Antibody     Standing Status:   Future     Standing Expiration Date:   2021     Order Specific Question:   Healthcare Worker?     Answer:   No     Order Specific Question:   If no:     Answer:   COVID symptoms started >10 days ago, no previous testing       Fax results to [phone]    Sincerely,    Josh Roy MD    Division of Rheumatic and Autoimmune Diseases

## 2020-04-29 NOTE — PROGRESS NOTES
Rheumatology Virtual Visit Note    Reason for visit: Lupus (this is a video  visit due to COVID-19 outbreak), patient agreed        First Consult: 10/12/2017    Last visit: 11/27/2019      DOS: 4/29/2020    Original HPI (10/12/2017):  Patricia Hall is a 42 year old female who was referred to our clinic for evaluation and management of her SLE. The patient has been following with Dr. Mao for her SLE    History obtain from chart review and patient interview    Her lupus symptoms first started in during high school including fatigue and rash. She was diagnosed with lupus in early 2000s and she was in graduate school and presented with a few years of arthralgias involving knees and ankles and hands.  She had developed new onset Raynaud's phenomenon in which her fingers turned white in the cold but no digital sores.  VINITA was 1:160.  All specific autoantibodies and rheumatoid serologies negative.  She had a rash on her face, including cheeks and bridge of her nose.  She followed with Dr. Tejal Mayen in Dermatology.  A biopsy of a lesion from the nose was non-diagnostic.  Diagnosis was earlysigns of cutaneous lupus versus acne rosacea.  She was treated with Elidel cream.  She reported intermittent oral ulcers and significant fatigue as well as intermittent episodes of hair loss.  She was started on prednisone in 2003 along with Plaquenil.  She has been maintained on prednisone 5 mg q day with intermittent bursts up as high as 30 mg for a short time.  She has found it very difficult to taper off of prednisone because she gets flare-ups of skin rash, arthralgias, and fatigue. Patient reports significant symptoms during the summers and reports significant flares this summer with photosensitivity with face rash,  Fatigue and join pain (knees and toes and hips).  She had fevers, mouth sores  And also reports increased hair loss.  Increased pain with walking and morning stiffness with Fibromyalgia burning in  the morning. Currently taking prednisone 10 mg qd and plaquenil 200 mg BID.    Patient reports complicated fertility/OBGYN history with stage four endometriosis, fibroids and one pregnancy resulting in a miscarriage. Three rounds of IVF with one banked viable egg. She is currently undergoing fertility evaluation.  She has been working with infertility specialist for years. She currently has one viable egg and would like to undergo one more episode of IVF. She was seen by an autoimmune OB/GYN specialist in [place] (Daya Frost 02/17) for extensive testing. She was found to be heterozygous for MTHFR mutation. She is now taking Matanx (L-methyl folate). She has noticed less bruising since she started this. Metformin was recommended, but it upsets her stomach. DHEA was recommended, but she is not taking it. Her thyroid studies were normal, but she was started on Synthroid 25  g daily for the TSH to be less than 2.0. It was recommended that she take Lovenox prior to IFV and throughout the pregnancy to avoid risk of miscarriage. It was also recommended that she be treated with IVIG prior to IVF. 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  She plans to proceeded further with in vitro plans, but wants to get better control of her flaring lupus and concerns for IVF treatments/hormones.     Interval HPI (7/20/2018):  Repeatedly ill during the winter with both URIs and flares of disease (joint pain, malaise, fatigue). Still undergoing IVF care via MFM and reproductive immunology. Did not initiate azathioprine following previous visit with Dr. Roy due to worry over negative effects on pregnancy. Currently undergoing medication treatment for IVF, is picking up medications this weekend for stimulation.    Symptomatically, her recent flares include fatigue, flu-like symptoms (fever, chills, sweats), polyarticular joint pain (fingers, toes, feet,  "ankles, wrists, elbows).    Recently, she has experienced further hair loss (clumps in shower), ulcers on buccal mucosa (now resolved), fatigue, malaise, significant B/L hip pain (worse from previous, approx 1 year). Specifically, it is constant deep joint pain in hips, would say 8/10 in severity when it occurs during movement. Has been seriously disrupting her daily activities. Has been using tylenol to control pain without complete relief.     Has seen Reproductive Immunology in the interim, has undergone two rounds of IVIG (believed to help with NK cell and cytokine activity in embryo implantation), first IVIG May 14th, then second was July 16th. Had flu-like symptoms with first infusion (HA, myalgias, malaise, felt \"gross\", lower back pain, neck pain). During the 2nd infusion, they slowed infusion, took benadryl/tylenol/upped prednisone to 20 mg she did not experience SEs with this. Overall, felt that IVIG improved her lupus symptoms globally. Short-lived relief. Plan is to use IVIG monthly with monitoring of cytokine levels and NK cell counts.     Currently on 15 mg of prednisone chronically, > 1 year. Today, she would say her disease is mild-moderately active in spite of the 15 mg prednisone. Currently on 400-500 U of vitamin D. Taking 1000 mg calcium.     ROS:  (-) pleurisy, sob, cough, hematuria, dysuria, eye redness, nose bleeds, easy bruising, malar rash  (+) alternating diarrhea/constipation, dry eye, dry mouth, palatal ulcers/petechiae    Is interested in discussing SLE management (Imuran) while pursuing IVF. Will be undergoing planning and another round of IVF stimulation in August.       Today 8/21/2019: She did another round of IVF in 8/2019, no good embryo. IVF caused her flare ups. For flare ups, gets pain over knees, knuckles and toes with extreme fatigue and photosensitivity.    Had laparoscopic surgery for endometriosis in 4/2019.    Since 4/2019, has been on OC and has not been able to taper " prednisone own.    Today, she is on prednisone 15 mg every day x 2-3 weeks. Before that, most of the time, she was on 20 mg every day.    No rash today.    Has pain over knuckles, toes, knees. Has morning flu like sx in AM x 2 hours.    Has extreme fatigue.    She was getting IVIG qmonthly, till 1/2019.    She is going to resume monthly IVIG for successful pregnancy.    Today 11/27/2019: Started IVIG on 9/8/2019, her eyes swelled up, 2 days later had severe LBP/SI joint pain. Was doing keto diet at that time, had headaches.now has sciatica pain on the R side. Did not have this reaction with IVIG before.    Late Oct/early Nov, had malar rash, hair loss.    Had LE edema, went up on her prednisone to 30-40 mg a day x few days. Back to her baseline hair loss and baseline prednisone 20 mg every day.      R SI joint pain is better, but still has it, uses topical pain cream. It is the worst in AM.    Still has swollen ankles.    This edema is new for her.    Off birth control pills. Not on IVF tx either.    Her lupus is back to her baseline.    Has not started AZA yet, but not thinks she is ready to try it.      ROS:  A comprehensive ROS was done, positives are per HPI.  Past Medical History:   Diagnosis Date     Allergy, unspecified not elsewhere classified      Endometriosis      Fibromyalgia      Migraine without aura, with intractable migraine, so stated, without mention of status migrainosus      Myalgia and myositis, unspecified      Systemic lupus erythematosus (H)      Past Surgical History:   Procedure Laterality Date     ORTHOPEDIC SURGERY       SURGICAL HISTORY OF -   1986    left elbow fracture repair     Family History   Problem Relation Age of Onset     Diabetes Maternal Grandfather      Lipids Mother      Skin Cancer Paternal Grandmother      Melanoma No family hx of      Social History     Socioeconomic History     Marital status:      Spouse name: Not on file     Number of children: Not on file      Years of education: Not on file     Highest education level: Not on file   Occupational History     Not on file   Social Needs     Financial resource strain: Not on file     Food insecurity     Worry: Not on file     Inability: Not on file     Transportation needs     Medical: Not on file     Non-medical: Not on file   Tobacco Use     Smoking status: Never Smoker     Smokeless tobacco: Never Used   Substance and Sexual Activity     Alcohol use: Yes     Comment: occ.- 2/week     Drug use: No     Sexual activity: Yes     Partners: Male     Birth control/protection: Condom   Lifestyle     Physical activity     Days per week: Not on file     Minutes per session: Not on file     Stress: Not on file   Relationships     Social connections     Talks on phone: Not on file     Gets together: Not on file     Attends Mu-ism service: Not on file     Active member of club or organization: Not on file     Attends meetings of clubs or organizations: Not on file     Relationship status: Not on file     Intimate partner violence     Fear of current or ex partner: Not on file     Emotionally abused: Not on file     Physically abused: Not on file     Forced sexual activity: Not on file   Other Topics Concern     Parent/sibling w/ CABG, MI or angioplasty before 65F 55M? 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)     Adjustment disorder with anxiety     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     Blood coagulation disorder (H)     Chronic headaches     Diminished ovarian reserve     Disorder of connective tissue (H)     Disease of immune system (H)     Endometriosis of uterus     Female infertility     History of environmental allergies     Hyperlipidemia     Irritable bowel syndrome     Major depressive disorder, recurrent episode, in  full remission (H)     Major depressive disorder, recurrent episode, mild (H)     Migraine     Myalgia     Raynaud's disease     Recurrent pregnancy loss without current pregnancy     Seasonal allergies     Primary fibromyalgia syndrome     Uterine leiomyoma     Allergies   Allergen Reactions     Cherry Anaphylaxis     Dairy Aid  [Lactase]      Other reaction(s): Arthralgia (Joint Pain)     Gluten Meal      Other reaction(s): Abdominal Pain     No Clinical Screening - See Comments Anaphylaxis and Itching     Pistachio Nut Extract Skin Test Anaphylaxis     Brassica Oleracea Italica      Other reaction(s): Abdominal Pain  Other reaction(s): Abdominal Pain     Penicillins Hives and Rash     Sulfa Drugs Hives and Rash       Outpatient Encounter Medications as of 4/29/2020   Medication Sig Dispense Refill     RACHNA SYRUP PO        Acetaminophen (TYLENOL PO) Take  by mouth.         albuterol (PROAIR HFA/PROVENTIL HFA/VENTOLIN HFA) 108 (90 Base) MCG/ACT inhaler        AMBIEN 10 MG OR TABS 1 po HS, prn 20 0     calcium carbonate (OS-VAHID 500 MG [place]. CA) 1250 MG tablet Take 1 tablet by mouth daily       diltiazem 2% in PLO gel Apply topically 4 times daily 30 g 3     EPINEPHrine (ANY BX GENERIC EQUIV) 0.3 MG/0.3ML injection 2-pack epinephrine 0.3 mg/0.3 mL injection, auto-injector       fluticasone (FLONASE) 50 MCG/ACT nasal spray        hydroxychloroquine (PLAQUENIL) 200 MG tablet Take 1 tablet (200 mg) by mouth 2 times daily 180 tablet 0     L-Methylfolate-Algae-B12-B6 (METANX PO) Take 1,000 mg by mouth daily       MULTIVITAMIN TABS   OR   0     nifedipine 0.2% in white petrolatum 0.2 % OINT ointment Apply topically 4 times daily 100 g 4     Omega-3 Fatty Acids (OMEGA 3 PO) Take 1,250 mg by mouth daily       predniSONE (DELTASONE) 5 MG tablet Take 3 tablets (15 mg) by mouth See Admin Instructions 90 tablet 3     sertraline (ZOLOFT) 100 MG tablet Take 100 mg by mouth daily       SPIRULINA PO Take by mouth daily        traMADol (ULTRAM) 50 MG tablet Take 1 tablet (50 mg) by mouth every 6 hours as needed 30 tablet 5     VITAMIN D, CHOLECALCIFEROL, PO Take 5,000 Units by mouth daily       azaTHIOprine (IMURAN) 50 MG tablet Take 1 tablet (50 mg) by mouth daily (Patient not taking: Reported on 2020) 30 tablet 1     pregabalin (LYRICA) 25 MG capsule Take 1 capsule (25 mg) by mouth 2 times daily (Patient not taking: Reported on 2020) 60 capsule 2     triamcinolone (KENALOG) 0.1 % paste Take by mouth 2 times daily (Patient not taking: Reported on 2019) 5 g 3     No facility-administered encounter medications on file as of 2020.          Her records were reviewed.    No results found for any visits on 20.  Pregnancy: She is . H/o OCP and HRT use, see HPI    Ph.E:    There were no vitals taken for this visit.    Constitutional: WD/WN. Pleasant, NAD.  Cushingoid  Eyes: EOM intact, PERRLA, sclera anicteric, conj not injected  HEENT: No oral ulcers or thrush. Normal salivary pool, hair thinning.   Neck: No cervical LAP or thyromegaly  Chest: Clear to auscultation bilaterally  CV: RRR, no murmurs/ rubs or gallops. No edema, clubbing or cyanosis.   GI: Abdomen is soft and non tender.   MS: No synovitis. Cool joints. No tenderness of the joints. No  joint deformities. Full ROM of the joints. No nodules. No Jaccoud's deformity. _R SLR test  Skin: Lack of malar rash. No livedo, periungual erythema, alopecia, digital ulcers or nail changes.  Neuro: A&O x 3. Grossly non focal, NL DTR, sensation intact, muscular power 5/5 in all ext  Psych: NL affect    Assessment/Plan (2019):  1 - Known SLE, dx in s (VINITA: 1:80, dsDNA +, Smith negative, normal complements, joint pains, malar rash, oral ulcers), usually flares approximately monthly with joint pains and malar rash. Currently, feels that disease is active. Has been on chronic 20 mg every day prednisone, gained weight, looks cushingoid. Still planning for PGS normal  embryo implantation. Had a reaction to IVIG which was given for infertility tx and is not going to get it again. Her back pain seems to be sciatica and not related to SLE.    Refer to pain management    Try walking clinic for ortho/sciatica pain today    - Continue hydroxychloroquine 200 mg daily, reminded to have eye exam   - Continue prednisone at 20 mg every day  - Again, highly recommend to initiate 50 mg azathioprine daily (TPMT testing complete, NL), will begin drug monitoring labs 4 wk after start of AZA.  This is safe in pregnancy and needed to taper prednisone down    PLAN: RTC 3-4 months    No orders of the defined types were placed in this encounter.    COVID antibody test    Labs 1 month after start of imuran or in June    Return in 3-4 months    3-3:20 pm

## 2020-05-06 ENCOUNTER — MEDICAL CORRESPONDENCE (OUTPATIENT)
Dept: HEALTH INFORMATION MANAGEMENT | Facility: CLINIC | Age: 45
End: 2020-05-06

## 2020-05-06 NOTE — TELEPHONE ENCOUNTER
Cologuard form (signed by Dr. Mendez) placed in outgoing mail for patient's home address.    Miriam Aguayo RN ([facility])

## 2020-05-07 DIAGNOSIS — G89.29 OTHER CHRONIC PAIN: ICD-10-CM

## 2020-05-08 NOTE — TELEPHONE ENCOUNTER
Patient Requested  traMADol (ULTRAM) 50 MG tablet   Last Filled  04/272020  Last Office Visit  04/08/2020  Next Office Visit  05/27/2020   Checked  05/08/2020    DX:     Pharmacy:     BARRY REED CMA at 8:31 AM on 5/8/2020.

## 2020-05-11 RX ORDER — TRAMADOL HYDROCHLORIDE 50 MG/1
TABLET ORAL
Qty: 30 TABLET | Refills: 5 | Status: SHIPPED | OUTPATIENT
Start: 2020-05-11 | End: 2020-07-29

## 2020-05-12 ENCOUNTER — NURSE TRIAGE (OUTPATIENT)
Dept: NURSING | Facility: CLINIC | Age: 45
End: 2020-05-12

## 2020-05-12 NOTE — TELEPHONE ENCOUNTER
Patient calling  To reschedule her antibody test.   Viviana Mariano, QI    Reason for Disposition    General information question, no triage required and triager able to answer question    Additional Information    Negative: [1] Caller is not with the adult (patient) AND [2] reporting urgent symptoms    Negative: Lab result questions    Negative: Medication questions    Negative: Caller can't be reached by phone    Negative: Caller has already spoken to PCP or another triager    Negative: RN needs further essential information from caller in order to complete triage    Negative: Requesting regular office appointment    Negative: [1] Caller requesting NON-URGENT health information AND [2] PCP's office is the best resource    Protocols used: INFORMATION ONLY CALL-A-

## 2020-05-13 ENCOUNTER — TELEPHONE (OUTPATIENT)
Dept: INTERNAL MEDICINE | Facility: CLINIC | Age: 45
End: 2020-05-13

## 2020-05-13 NOTE — TELEPHONE ENCOUNTER
Central Prior Authorization Team   [phone]    PA Initiation    Medication: traMADol (ULTRAM) 50 MG tablet   Insurance Company: [employer] ([facility]) - Phone [phone] Fax [phone]  Pharmacy Filling the Rx: [id number] - [place], [place] - [address] AT [facility] [address]  Filling Pharmacy Phone: [phone]  Filling Pharmacy Fax: [phone]  Start Date: 5/13/2020

## 2020-05-15 NOTE — TELEPHONE ENCOUNTER
Prior Authorization Approval    Authorization Effective Date: 5/14/2020  Authorization Expiration Date: 11/13/2020  Medication: traMADol (ULTRAM) 50 MG tablet-PA APPROVED   Approved Dose/Quantity:   Reference #:     Insurance Company: [employer] ([facility]) - Phone [phone] Fax [phone]  Expected CoPay:       CoPay Card Available:      Foundation Assistance Needed:    Which Pharmacy is filling the prescription (Not needed for infusion/clinic administered): [id number] - [place], [place] - [address] AT [facility] [address]  Pharmacy Notified: Yes- **Instructed pharmacy to notify patient when script is ready to /ship.**   Patient Notified: Yes

## 2020-06-22 ENCOUNTER — TRANSFERRED RECORDS (OUTPATIENT)
Dept: HEALTH INFORMATION MANAGEMENT | Facility: CLINIC | Age: 45
End: 2020-06-22

## 2020-06-22 DIAGNOSIS — M32.9 SYSTEMIC LUPUS ERYTHEMATOSUS, UNSPECIFIED SLE TYPE, UNSPECIFIED ORGAN INVOLVEMENT STATUS (H): ICD-10-CM

## 2020-06-22 LAB — HEMOCCULT STL QL IA: NEGATIVE

## 2020-06-24 NOTE — TELEPHONE ENCOUNTER
Pt will be out of medication by tomorrow. Sent to Dr. Roy to refill.    Mickie Solis RN  Rheumatology Clinic

## 2020-06-25 RX ORDER — PREDNISONE 5 MG/1
TABLET ORAL
Qty: 90 TABLET | Refills: 3 | Status: SHIPPED | OUTPATIENT
Start: 2020-06-25 | End: 2020-10-08

## 2020-06-26 ENCOUNTER — HOSPITAL LABORATORY (OUTPATIENT)
Dept: OTHER | Facility: CLINIC | Age: 45
End: 2020-06-26

## 2020-06-26 LAB
ALBUMIN SERPL-MCNC: 4 G/DL (ref 3.4–5)
ALBUMIN UR-MCNC: NEGATIVE MG/DL
ALP SERPL-CCNC: 68 U/L (ref 40–150)
ALT SERPL W P-5'-P-CCNC: 36 U/L (ref 0–50)
APPEARANCE UR: CLEAR
AST SERPL W P-5'-P-CCNC: 14 U/L (ref 0–45)
BACTERIA #/AREA URNS HPF: ABNORMAL /HPF
BILIRUB DIRECT SERPL-MCNC: <0.1 MG/DL (ref 0–0.2)
BILIRUB SERPL-MCNC: 0.4 MG/DL (ref 0.2–1.3)
BILIRUB UR QL STRIP: NEGATIVE
COLOR UR AUTO: YELLOW
CREAT SERPL-MCNC: 0.81 MG/DL (ref 0.52–1.04)
CREAT UR-MCNC: 56 MG/DL
CRP SERPL-MCNC: <2.9 MG/L (ref 0–8)
ERYTHROCYTE [SEDIMENTATION RATE] IN BLOOD BY WESTERGREN METHOD: 6 MM/H (ref 0–20)
GFR SERPL CREATININE-BSD FRML MDRD: 87 ML/MIN/{1.73_M2}
GLUCOSE UR STRIP-MCNC: NEGATIVE MG/DL
HGB UR QL STRIP: NEGATIVE
KETONES UR STRIP-MCNC: NEGATIVE MG/DL
LEUKOCYTE ESTERASE UR QL STRIP: ABNORMAL
NITRATE UR QL: NEGATIVE
PH UR STRIP: 6.5 PH (ref 5–7)
PROT SERPL-MCNC: 7.3 G/DL (ref 6.8–8.8)
RBC #/AREA URNS AUTO: 1 /HPF (ref 0–2)
SOURCE: ABNORMAL
SP GR UR STRIP: 1.01 (ref 1–1.03)
SQUAMOUS #/AREA URNS AUTO: 1 /HPF (ref 0–1)
UROBILINOGEN UR STRIP-MCNC: NORMAL MG/DL (ref 0–2)
WBC #/AREA URNS AUTO: 1 /HPF (ref 0–5)

## 2020-06-26 NOTE — LETTER
June 29, 2020        Patricia SOLORIO Neto Hall  [address] [place] [address]          COVID-19 Antibody, IgG   Date Value Ref Range Status   06/26/2020 Negative NEG^Negative Final     Comment:     Negative results do not rule out SARS-CoV-2 infection, particularly in those   who have been in contact with the virus.  Follow-up testing with a molecular   diagnostic should be considered to rule out infection in these individuals.  Results from antibody testing should not be used as the sole basis to diagnose   or exclude SARS-CoV-2 infection or to inform infection status.           You have tested NEGATIVE for COVID-19 antibodies. This suggests you have not had or been exposed to COVID-19. But it does not mean that for sure.     The test finds antibodies in most people 10 days after they get sick. For some people, it takes longer than 10 days for antibodies to show up. Others may never show antibodies against COVID-19, especially if they have weak immune systems.    If you have COVID-19 symptoms now, please stay home and away from others.     What is antibody testing?    This is a kind of blood test. We take a small sample of your blood, and then test it for something called  antibodies.      Your body makes antibodies to fight infection. If your blood has antibodies for a certain germ, it means you ve been infected with that germ in the past.     Sometimes, antibodies stay in your body for years after you ve had the infection. They can be there even if the germ didn t make you sick. They are a sign that your body fought off the infection.    Will this test find antibodies in everyone who s had COVID-19?    No. The test finds antibodies in most people 10 days after they get sick. For some people, it takes longer than 10 days for antibodies to show up. Others may never show antibodies against COVID-19, especially if they have weak immune systems.    What does it mean if the test finds COVID-19 antibodies?    If  we find these antibodies, it suggests:     This person has had the virus.     Their body s immune system fought the virus.     We don t know if this will help protect someone from getting COVID-19 again. Scientists are still learning about this.    What are the signs of COVID-19?    Signs of COVID-19 can appear from 2 to 14 days (up to 2 weeks) after you re infected. Some people have no symptoms or only mild symptoms. Others get very sick. The most common symptoms are:          Cough    Shortness of breath or trouble breathing  Or at least 2 of these symptoms:    Fever    Chills    Repeated shaking with chills    Muscle pain    Headache    Sore throat    Losing your sense of taste or smell    You may have other symptoms. Please contact your doctor or clinic for any symptoms that worry you.    Where can I get more information?     To learn the [facility] guidelines for staying home, please visit the [place] Department of Health website at [email or web address]    To learn more about COVID-19 and how to care for yourself at home, please visit the CDC website at https://www.cdc.gov/coronavirus/2019-ncov/about/steps-when-sick.html    For more options for care at [facility], please visit our website at [email or web address]    [facility] ([facility]) COVID-19 Hotline:  [phone]

## 2020-06-26 NOTE — LETTER
June 30, 2020      Patricia Hall  [address] [place] [address]        Dear Ms.Getz Hall,    We are writing to inform you of your test results.    They are stable.    Resulted Orders   Hepatic panel   Result Value Ref Range    Bilirubin Direct <0.1 0.0 - 0.2 mg/dL    Bilirubin Total 0.4 0.2 - 1.3 mg/dL    Albumin 4.0 3.4 - 5.0 g/dL    Protein Total 7.3 6.8 - 8.8 g/dL    Alkaline Phosphatase 68 40 - 150 U/L    ALT 36 0 - 50 U/L    AST 14 0 - 45 U/L   Complement C3   Result Value Ref Range    Complement C3 126 81 - 157 mg/dL   Complement C4   Result Value Ref Range    Complement C4 23 13 - 39 mg/dL   COVID-19 Virus (Coronavirus) Antibody Screen, IgG   Result Value Ref Range    COVID-19 Antibody, IgG Negative NEG^Negative      Comment:      Negative results do not rule out SARS-CoV-2 infection, particularly in those   who have been in contact with the virus.  Follow-up testing with a molecular   diagnostic should be considered to rule out infection in these individuals.  Results from antibody testing should not be used as the sole basis to diagnose   or exclude SARS-CoV-2 infection or to inform infection status.      COVID-19 Antibody, IgG Comment See note       Comment:      This automated Chemiluminescent microparticle immunoassay (CMIA) by Francis on   the  i2000 instrument has been given Emergency Use Authorization   (EUA).  The performance characteristics of this test were determined by the [facility].  The   laboratory is regulated under CLIA as qualified to perform high-complexity   testing.  This test is used for clinical purposes.  It should not be regarded   as investigational or for research.     Creatinine   Result Value Ref Range    Creatinine 0.81 0.52 - 1.04 mg/dL    GFR Estimate 87 >60 mL/min/[1.73_m2]      Comment:      Non  GFR Calc  Starting 12/18/2018, serum creatinine based estimated GFR  (eGFR) will be   calculated using the Chronic Kidney Disease Epidemiology Collaboration   (CKD-EPI) equation.      GFR Estimate If Black >90 >60 mL/min/[1.73_m2]      Comment:       GFR Calc  Starting 12/18/2018, serum creatinine based estimated GFR (eGFR) will be   calculated using the Chronic Kidney Disease Epidemiology Collaboration   (CKD-EPI) equation.     CRP inflammation   Result Value Ref Range    CRP Inflammation <2.9 0.0 - 8.0 mg/L   DNA double stranded antibodies   Result Value Ref Range    DNA-ds 1 <10 IU/mL      Comment:      Negative   Erythrocyte sedimentation rate auto   Result Value Ref Range    Sed Rate 6 0 - 20 mm/h   UA with Microscopic   Result Value Ref Range    Color Urine Yellow     Appearance Urine Clear     Glucose Urine Negative NEG^Negative mg/dL    Bilirubin Urine Negative NEG^Negative    Ketones Urine Negative NEG^Negative mg/dL    Specific Gravity Urine 1.011 1.003 - 1.035    Blood Urine Negative NEG^Negative    pH Urine 6.5 5.0 - 7.0 pH    Protein Albumin Urine Negative NEG^Negative mg/dL    Urobilinogen mg/dL Normal 0.0 - 2.0 mg/dL    Nitrite Urine Negative NEG^Negative    Leukocyte Esterase Urine Small (A) NEG^Negative    Source Midstream Urine     WBC Urine 1 0 - 5 /HPF    RBC Urine 1 0 - 2 /HPF    Bacteria Urine Few (A) NEG^Negative /HPF    Squamous Epithelial /HPF Urine 1 0 - 1 /HPF   Creatinine random urine   Result Value Ref Range    Creatinine Urine Random 56 mg/dL   Urine Culture Aerobic Bacterial   Result Value Ref Range    Specimen Description Midstream Urine     Special Requests Specimen received in preservative     Culture Micro <10,000 colonies/mL  mixed urogenital joyce        Component      Latest Ref Rng & Units 6/26/2020   COVID-19 Antibody, IgG      NEG:Negative Negative     If you have any questions or concerns, please call the clinic at the number listed above.       Sincerely,        Josh Roy MD

## 2020-06-27 LAB
BACTERIA SPEC CULT: NORMAL
Lab: NORMAL
SPECIMEN SOURCE: NORMAL

## 2020-06-29 LAB
C3 SERPL-MCNC: 126 MG/DL (ref 81–157)
C4 SERPL-MCNC: 23 MG/DL (ref 13–39)
COVID-19 ANTIBODY IGG: NEGATIVE
DSDNA AB SER-ACNC: 1 IU/ML
LAB TEST METHOD: NORMAL

## 2020-06-29 NOTE — RESULT ENCOUNTER NOTE
Serology (COVID-19) Notification    You have tested NEGATIVE for COVID-19 antibodies  Letter sent to Patient

## 2020-07-15 ENCOUNTER — MYC MEDICAL ADVICE (OUTPATIENT)
Dept: INTERNAL MEDICINE | Facility: CLINIC | Age: 45
End: 2020-07-15

## 2020-07-20 ENCOUNTER — TELEPHONE (OUTPATIENT)
Dept: INTERNAL MEDICINE | Facility: CLINIC | Age: 45
End: 2020-07-20

## 2020-07-20 NOTE — TELEPHONE ENCOUNTER
Health Call Center    Phone Message    May a detailed message be left on voicemail: yes     Reason for Call: Requesting Results   Name/type of test: Cologuard  Date of test: N/A, Pt doesn't remember, sometime in May  Was test done at a location other than [facility] (Please fill in the location if not [facility])?: Yes, at home  Comments: Pt says the Cologuard results were faxed to the clinic, Pt called them and asked them to refax late last week. Pt's Mitro message from Reny 7/16/20 states they're not in her chart but could be in Dr. Mendez's inbox. Pt is asking if someone else can review the results and call her since Dr. Mendez is not in clinic until 7/22/20 or get back to her. Pt was not happy with the response she got from clinic. Please call her back or message on Mitro if that's fine.        Action Taken: Message routed to:  [facility] ([facility]    Travel Screening: Not Applicable

## 2020-07-21 ENCOUNTER — MYC MEDICAL ADVICE (OUTPATIENT)
Dept: INTERNAL MEDICINE | Facility: CLINIC | Age: 45
End: 2020-07-21

## 2020-07-21 NOTE — TELEPHONE ENCOUNTER
Responded to patient via [employer]. Cologuard results. Negative results, re-screen in 3 years per Cologuard re-screening recommendations. Reny Rios LPN 7/21/2020 7:35 AM

## 2020-07-23 DIAGNOSIS — K60.2 ANAL FISSURE: ICD-10-CM

## 2020-07-28 NOTE — TELEPHONE ENCOUNTER
NIFEDIPINE 0.2% IN WHITE PET OINT    Last Written Prescription Date:  4/8/2020  Last Fill Quantity: 100,   # refills: 4  Last Office Visit : 4/8/2020  Future Office visit:  None    Routing refill request to provider for review/approval because:  Drug not on the [facility] P or [facility] refill protocol or controlled substance    Flora Wilkerson RN  Central Triage Red Flags/Med Refills

## 2020-07-28 NOTE — TELEPHONE ENCOUNTER
Per pharmacy, nifedipine powder remains on back order. Patient can continue to use diltiazem as an alternative medication.    Miriam Aguayo RN ([facility])

## 2020-07-29 ENCOUNTER — TELEPHONE (OUTPATIENT)
Dept: INTERNAL MEDICINE | Facility: CLINIC | Age: 45
End: 2020-07-29

## 2020-07-29 DIAGNOSIS — K60.2 ANAL FISSURE: ICD-10-CM

## 2020-07-29 DIAGNOSIS — G89.29 OTHER CHRONIC PAIN: ICD-10-CM

## 2020-07-29 NOTE — TELEPHONE ENCOUNTER
Fax received from [facility] pharmacy #[id number] stating they are unable to compound the diltiazem.    I called Patricia to determine if the script could be sent to another pharmacy. She is unfamiliar with any compounding pharmacies now that [facility] has closed. We discussed [facility] as an option. She would like to try this if the prescription can be mailed out to her. Rx sent.    Patricia also updated that she will need a tramadol refill soon. She reported she uses approximately 2 tablets per day, stating a supply of 30 tablets will last 10-14 days.     site reviewed today. Tramadol refills received on 7/23/20, 7/06/20, 6/24/20, 6/10/20, 5/25/20, and 5/12/20. Patient also receives zolpidem and alprazolam from her mental health clinic.    I will update PCP regarding the refill request.    Miriam Aguayo RN ([facility])

## 2020-07-31 ENCOUNTER — MYC MEDICAL ADVICE (OUTPATIENT)
Dept: INTERNAL MEDICINE | Facility: CLINIC | Age: 45
End: 2020-07-31

## 2020-07-31 DIAGNOSIS — Z91.018 FOOD ALLERGY: ICD-10-CM

## 2020-07-31 DIAGNOSIS — T78.2XXA ANAPHYLACTIC REACTION: Primary | ICD-10-CM

## 2020-07-31 RX ORDER — TRAMADOL HYDROCHLORIDE 50 MG/1
50 TABLET ORAL EVERY 6 HOURS PRN
Qty: 65 TABLET | Refills: 5 | Status: SHIPPED | OUTPATIENT
Start: 2020-07-31 | End: 2021-01-20

## 2020-08-25 ENCOUNTER — TELEPHONE (OUTPATIENT)
Dept: INTERNAL MEDICINE | Facility: CLINIC | Age: 45
End: 2020-08-25

## 2020-08-25 DIAGNOSIS — K60.2 ANAL FISSURE: ICD-10-CM

## 2020-08-25 RX ORDER — EPINEPHRINE 0.3 MG/.3ML
0.3 INJECTION SUBCUTANEOUS PRN
Qty: 2 EACH | Refills: 1 | Status: SHIPPED | OUTPATIENT
Start: 2020-08-25 | End: 2021-10-26

## 2020-08-25 NOTE — TELEPHONE ENCOUNTER
I called the [facility] to see if diltiazem gel can be filled. Per staff, they did reach out to patient previously, though they reached voicemail which was full. Diltiazem is not covered. Will start a PA to see if coverage can be obtained.     site reviewed today. Tramadol last filled 7/31. Will check with PCP to determine if early refill can be authorized.    Epi pens refilled.    Miriam Aguayo RN ([facility])

## 2020-08-25 NOTE — TELEPHONE ENCOUNTER
Prior Authorization Retail Medication Request    Medication/Dose: diltiazem 2% in PLO gel   ICD code (if different than what is on RX):    Previously Tried and Failed:  OTC remedies, hydrocortisone  Rationale:  Nifedipine powder unavailable. Request to trial diltiazem 2% compound gel as alternative    Insurance Name:    Insurance ID:        Pharmacy Information (if different than what is on RX)  Name:    Phone:

## 2020-08-26 NOTE — TELEPHONE ENCOUNTER
Central Prior Authorization Team   Phone: [phone]      Compound ingredients  Diltiazem HCL Powder    [id number]    0.0600 gm  Liposomal Heavy cream  [id number]    29.400 gm      PA Initiation    Medication: diltiazem 2% in PLO gel   Insurance Company: [employer] ([facility]) - Phone [phone] Fax [phone]  Pharmacy Filling the Rx: [facility] - [place], [place] - [facility] [address]  Filling Pharmacy Phone: [phone]  Filling Pharmacy Fax:    Start Date: 8/26/2020

## 2020-08-26 NOTE — TELEPHONE ENCOUNTER
Per Dr. Mendez, OK for early refill of tramadol, authorized fill date of 8/27. Next refill will be due 9/29.    I called the pharmacy and was on hold for 13 minutes. Message left for the pharmacy to allow early refill of tramadol.    Miriam Westbrook) QI Aguayo

## 2020-09-08 NOTE — TELEPHONE ENCOUNTER
Nifedipine ointment prescription sent to the pharmacy to determine if this can be filled. Will see if Dr. Mendez would like to appeal denial.    Miriam Aguayo RN ([facility])

## 2020-09-08 NOTE — TELEPHONE ENCOUNTER
PRIOR AUTHORIZATION DENIED     Medication: diltiazem 2% in PLO gel (Compound)     Denial Date: 9/5/2020    Denial Rational:         Appeal Information:

## 2020-09-10 ENCOUNTER — MYC MEDICAL ADVICE (OUTPATIENT)
Dept: INTERNAL MEDICINE | Facility: CLINIC | Age: 45
End: 2020-09-10

## 2020-09-10 NOTE — TELEPHONE ENCOUNTER
Message sent to patient regarding diltiazem denial.    Miriam ([facility]) QI Aguayo

## 2020-10-16 ENCOUNTER — DOCUMENTATION ONLY (OUTPATIENT)
Dept: INTERNAL MEDICINE | Facility: CLINIC | Age: 45
End: 2020-10-16

## 2020-11-16 ENCOUNTER — MYC MEDICAL ADVICE (OUTPATIENT)
Dept: RHEUMATOLOGY | Facility: CLINIC | Age: 45
End: 2020-11-16

## 2020-11-16 DIAGNOSIS — R14.0 BLOATED ABDOMEN: ICD-10-CM

## 2020-11-16 DIAGNOSIS — M32.9 SYSTEMIC LUPUS ERYTHEMATOSUS, UNSPECIFIED SLE TYPE, UNSPECIFIED ORGAN INVOLVEMENT STATUS (H): Primary | ICD-10-CM

## 2020-11-17 NOTE — TELEPHONE ENCOUNTER
Josh Roy MD Beard, Madeline, RN   Phone Number: [phone]             Just lupus labs, ok to refer to general GI at [facility].

## 2020-11-19 ENCOUNTER — HOSPITAL LABORATORY (OUTPATIENT)
Dept: OTHER | Facility: CLINIC | Age: 45
End: 2020-11-19

## 2020-11-19 LAB
ALBUMIN SERPL-MCNC: 3.8 G/DL (ref 3.4–5)
ALBUMIN UR-MCNC: NEGATIVE MG/DL
ALT SERPL W P-5'-P-CCNC: 35 U/L (ref 0–50)
APPEARANCE UR: CLEAR
AST SERPL W P-5'-P-CCNC: 19 U/L (ref 0–45)
BACTERIA #/AREA URNS HPF: ABNORMAL /HPF
BASOPHILS # BLD AUTO: 0.1 10E9/L (ref 0–0.2)
BASOPHILS NFR BLD AUTO: 1 %
BILIRUB UR QL STRIP: NEGATIVE
COLOR UR AUTO: YELLOW
CREAT SERPL-MCNC: 0.78 MG/DL (ref 0.52–1.04)
CREAT UR-MCNC: 68 MG/DL
CREAT UR-MCNC: 68 MG/DL
CRP SERPL-MCNC: <2.9 MG/L (ref 0–8)
DIFFERENTIAL METHOD BLD: ABNORMAL
EOSINOPHIL # BLD AUTO: 0.1 10E9/L (ref 0–0.7)
EOSINOPHIL NFR BLD AUTO: 1 %
ERYTHROCYTE [DISTWIDTH] IN BLOOD BY AUTOMATED COUNT: 12.5 % (ref 10–15)
ERYTHROCYTE [SEDIMENTATION RATE] IN BLOOD BY WESTERGREN METHOD: 7 MM/H (ref 0–20)
GFR SERPL CREATININE-BSD FRML MDRD: >90 ML/MIN/{1.73_M2}
GLUCOSE UR STRIP-MCNC: NEGATIVE MG/DL
HCT VFR BLD AUTO: 45.5 % (ref 35–47)
HGB BLD-MCNC: 14.9 G/DL (ref 11.7–15.7)
HGB UR QL STRIP: ABNORMAL
IMM PLASMA CELLS NFR BLD: 11 %
KETONES UR STRIP-MCNC: NEGATIVE MG/DL
LEUKOCYTE ESTERASE UR QL STRIP: NEGATIVE
LYMPHOCYTES # BLD AUTO: 3.2 10E9/L (ref 0.8–5.3)
LYMPHOCYTES NFR BLD AUTO: 25 %
MCH RBC QN AUTO: 31.6 PG (ref 26.5–33)
MCHC RBC AUTO-ENTMCNC: 32.7 G/DL (ref 31.5–36.5)
MCV RBC AUTO: 97 FL (ref 78–100)
MONOCYTES # BLD AUTO: 1.4 10E9/L (ref 0–1.3)
MONOCYTES NFR BLD AUTO: 11 %
MUCOUS THREADS #/AREA URNS LPF: PRESENT /LPF
NEUTROPHILS # BLD AUTO: 6.5 10E9/L (ref 1.6–8.3)
NEUTROPHILS NFR BLD AUTO: 51 %
NITRATE UR QL: NEGATIVE
OVALOCYTES BLD QL SMEAR: SLIGHT
PH UR STRIP: 6.5 PH (ref 5–7)
PLASMA CELLS # BLD MANUAL: 1.4 10E9/L
PLATELET # BLD AUTO: 307 10E9/L (ref 150–450)
PLATELET # BLD EST: ABNORMAL 10*3/UL
PROT UR-MCNC: 0.09 G/L
PROT/CREAT 24H UR: 0.13 G/G CR (ref 0–0.2)
RBC # BLD AUTO: 4.71 10E12/L (ref 3.8–5.2)
RBC #/AREA URNS AUTO: <1 /HPF (ref 0–2)
SMUDGE CELLS BLD QL SMEAR: PRESENT
SOURCE: ABNORMAL
SP GR UR STRIP: 1.01 (ref 1–1.03)
SQUAMOUS #/AREA URNS AUTO: 4 /HPF (ref 0–1)
UROBILINOGEN UR STRIP-MCNC: NORMAL MG/DL (ref 0–2)
WBC # BLD AUTO: 12.7 10E9/L (ref 4–11)
WBC #/AREA URNS AUTO: 1 /HPF (ref 0–5)

## 2020-11-19 NOTE — LETTER
November 26, 2020      Patricia Hall  [address]  [place] [address]        Dear Ms.Getz Hall,    We are writing to inform you of your test results.    Stable labs. Will re-check CBC with smear, smudge cells could be due to old blood sample.    Resulted Orders   Albumin level   Result Value Ref Range    Albumin 3.8 3.4 - 5.0 g/dL   ALT   Result Value Ref Range    ALT 35 0 - 50 U/L   AST   Result Value Ref Range    AST 19 0 - 45 U/L   Complement C3   Result Value Ref Range    Complement C3 128 81 - 157 mg/dL   Complement C4   Result Value Ref Range    Complement C4 25 13 - 39 mg/dL   CBC with platelets differential   Result Value Ref Range    WBC 12.7 (H) 4.0 - 11.0 10e9/L    RBC Count 4.71 3.8 - 5.2 10e12/L    Hemoglobin 14.9 11.7 - 15.7 g/dL    Hematocrit 45.5 35.0 - 47.0 %    MCV 97 78 - 100 fl    MCH 31.6 26.5 - 33.0 pg    MCHC 32.7 31.5 - 36.5 g/dL    RDW 12.5 10.0 - 15.0 %    Platelet Count 307 150 - 450 10e9/L    Diff Method Manual Differential     % Neutrophils 51.0 %    % Lymphocytes 25.0 %    % Monocytes 11.0 %    % Eosinophils 1.0 %    % Basophils 1.0 %    % Plasma Cells 11.0 %    Absolute Neutrophil 6.5 1.6 - 8.3 10e9/L    Absolute Lymphocytes 3.2 0.8 - 5.3 10e9/L    Absolute Monocytes 1.4 (H) 0.0 - 1.3 10e9/L    Absolute Eosinophils 0.1 0.0 - 0.7 10e9/L    Absolute Basophils 0.1 0.0 - 0.2 10e9/L    Absolute Plasma Cells 1.4 (H) 0 10e9/L    Ovalocytes Slight     Smudge Cells Present     Platelet Estimate Confirming automated cell count    Creatinine   Result Value Ref Range    Creatinine 0.78 0.52 - 1.04 mg/dL    GFR Estimate >90 >60 mL/min/[1.73_m2]      Comment:      Non  GFR Calc  Starting 12/18/2018, serum creatinine based estimated GFR (eGFR) will be   calculated using the Chronic Kidney Disease Epidemiology Collaboration   (CKD-EPI) equation.      GFR Estimate If Black >90 >60 mL/min/[1.73_m2]      Comment:       GFR Calc  Starting 12/18/2018, serum  creatinine based estimated GFR (eGFR) will be   calculated using the Chronic Kidney Disease Epidemiology Collaboration   (CKD-EPI) equation.     CRP inflammation   Result Value Ref Range    CRP Inflammation <2.9 0.0 - 8.0 mg/L   DNA double stranded antibodies   Result Value Ref Range    DNA-ds 1 <10 IU/mL      Comment:      Negative   UA with Microscopic reflex to Culture   Result Value Ref Range    Color Urine Yellow     Appearance Urine Clear     Glucose Urine Negative NEG^Negative mg/dL    Bilirubin Urine Negative NEG^Negative    Ketones Urine Negative NEG^Negative mg/dL    Specific Gravity Urine 1.008 1.003 - 1.035    Blood Urine Trace (A) NEG^Negative    pH Urine 6.5 5.0 - 7.0 pH    Protein Albumin Urine Negative NEG^Negative mg/dL    Urobilinogen mg/dL Normal 0.0 - 2.0 mg/dL    Nitrite Urine Negative NEG^Negative    Leukocyte Esterase Urine Negative NEG^Negative    Source Midstream Urine     WBC Urine 1 0 - 5 /HPF    RBC Urine <1 0 - 2 /HPF    Bacteria Urine Many (A) NEG^Negative /HPF    Squamous Epithelial /HPF Urine 4 (H) 0 - 1 /HPF    Mucous Urine Present (A) NEG^Negative /LPF   Creatinine random urine   Result Value Ref Range    Creatinine Urine Random 68 mg/dL   Protein  random urine   Result Value Ref Range    Protein Random Urine 0.09 g/L    Protein Total Urine g/gr Creatinine 0.13 0 - 0.2 g/g Cr   Erythrocyte sedimentation rate auto   Result Value Ref Range    Sed Rate 7 0 - 20 mm/h   Creatinine urine calculation only   Result Value Ref Range    Creatinine Urine 68 mg/dL       If you have any questions or concerns, please call the clinic at the number listed above.       Sincerely,        Josh Roy MD

## 2020-11-20 LAB
C3 SERPL-MCNC: 128 MG/DL (ref 81–157)
C4 SERPL-MCNC: 25 MG/DL (ref 13–39)

## 2020-11-20 NOTE — TELEPHONE ENCOUNTER
Patient is returning a call and can be reached at [phone].    Patient may need a antibiotic before the weekend for a antibiotic.

## 2020-11-23 LAB — DSDNA AB SER-ACNC: 1 IU/ML

## 2020-11-23 NOTE — TELEPHONE ENCOUNTER
Josh Roy MD Beard, Madeline, RN   Caller: Unspecified (Today, 10:55 AM)             Recommend her to contact her OB/GYN. Labs from 4 days ago look good (anti-DNA is still pending though), U/A is clear with no UTI.      Left message letting pt know that I am available until 5 pm to discuss and that I would send my chart message with Dr. Roy's response.  I have sent above message via my chart.    Mickie Solis RN  Rheumatology Clinic

## 2020-11-23 NOTE — TELEPHONE ENCOUNTER
Pt called and we discussed Benlysta. Pt is going to do some research, and see what she wants to do.      Pt is wondering about smudge cells and ovalocytes.  Why would they be checking these?      Will send to Dr. oRy to review and advise.    Pt states insurance is changing in December, so she will likely wait until then to decide about Benlysta.    Mickie Solis RN  Rheumatology Clinic

## 2020-11-23 NOTE — TELEPHONE ENCOUNTER
Called and spoke with pt.     She is having pain in her pelvis, it aches when she needs to go to the bathroom. She is not having any urethral pain when she urinates.  She feels like her bladder hurts and feels like her bladder hurts.  Increased urgency and frequency, but decreased amount.  Feels like she is not emptying her bladder when she goes.  She has a constant feeling of fullness in her bladder.       She also have a herniated disk with is causing pain.      One week ago, she finished her period, does have bad endometriosis.     She feels like she doesn't always pay attention to pain.      She is quite upset that I did not call her last Friday to deal with this issue.  She is struggling that I did not handle this sensitively enough. She would like to know if she has an infection or not.    Mickie Solis RN  Rheumatology Clinic

## 2020-11-23 NOTE — TELEPHONE ENCOUNTER
Josh Roy MD Beard Mickie, RN   Phone Number: [phone]             I am ok trying benlysta sub qweekly inj instead of imuran, she would need hep B/C/TB test 1st.     Josh Roy MD

## 2020-11-24 NOTE — TELEPHONE ENCOUNTER
Josh Roy MD Beard, Madeline, RN   Caller: Unspecified (Yesterday, 10:55 AM)             Old blood and improper handling can produce smudge cells. I ordered another CBC diff with smear to be done at [place] these lbs were outside labs and she did not have them before. The other one is slight, could be seen in low iron situation.      Left message requesting return call.    Mickie Solis RN  Rheumatology Clinic

## 2020-11-24 NOTE — TELEPHONE ENCOUNTER
Pt called into clinic.  Discussed Dr. Roy's response, she would like to have her lab orders faxed to [facility] again.    Orders have been faxed.  She will call if anything needs to be changed.    Mickie Solis RN  Rheumatology Clinic

## 2020-12-01 ENCOUNTER — HOSPITAL PATHOLOGY (OUTPATIENT)
Dept: OTHER | Facility: CLINIC | Age: 45
End: 2020-12-01

## 2020-12-01 ENCOUNTER — HOSPITAL LABORATORY (OUTPATIENT)
Dept: OTHER | Facility: CLINIC | Age: 45
End: 2020-12-01

## 2020-12-01 LAB
ANISOCYTOSIS BLD QL SMEAR: SLIGHT
BASOPHILS # BLD AUTO: 0 10E9/L (ref 0–0.2)
BASOPHILS NFR BLD AUTO: 0.2 %
DACRYOCYTES BLD QL SMEAR: SLIGHT
DIFFERENTIAL METHOD BLD: ABNORMAL
EOSINOPHIL # BLD AUTO: 0.1 10E9/L (ref 0–0.7)
EOSINOPHIL NFR BLD AUTO: 0.8 %
ERYTHROCYTE [DISTWIDTH] IN BLOOD BY AUTOMATED COUNT: 13.4 % (ref 10–15)
HCT VFR BLD AUTO: 44.5 % (ref 35–47)
HGB BLD-MCNC: 14.5 G/DL (ref 11.7–15.7)
IMM GRANULOCYTES # BLD: 0.2 10E9/L (ref 0–0.4)
IMM GRANULOCYTES NFR BLD: 1.8 %
LYMPHOCYTES # BLD AUTO: 5.3 10E9/L (ref 0.8–5.3)
LYMPHOCYTES NFR BLD AUTO: 40.3 %
MCH RBC QN AUTO: 31.7 PG (ref 26.5–33)
MCHC RBC AUTO-ENTMCNC: 32.6 G/DL (ref 31.5–36.5)
MCV RBC AUTO: 97 FL (ref 78–100)
MONOCYTES # BLD AUTO: 1.2 10E9/L (ref 0–1.3)
MONOCYTES NFR BLD AUTO: 9.5 %
NEUTROPHILS # BLD AUTO: 6.2 10E9/L (ref 1.6–8.3)
NEUTROPHILS NFR BLD AUTO: 47.4 %
NRBC # BLD AUTO: 0 10*3/UL
NRBC BLD AUTO-RTO: 0 /100
PLATELET # BLD AUTO: 236 10E9/L (ref 150–450)
PLATELET # BLD EST: ABNORMAL 10*3/UL
RBC # BLD AUTO: 4.58 10E12/L (ref 3.8–5.2)
RETICS # AUTO: 118.2 10E9/L (ref 25–95)
RETICS/RBC NFR AUTO: 2.6 % (ref 0.5–2)
WBC # BLD AUTO: 13.1 10E9/L (ref 4–11)

## 2020-12-01 NOTE — LETTER
January 18, 2021      Alexa Hall  [address] [place] [address]        Dear Ms.Getz Hall,    We are writing to inform you of your test results.    High WBC or leukocytosis is because of prednisone. A rare   myelocyte could be seen in inflammatory conditions. I ordered another set of lupus labs including cell count+smear to be done in early Feb.    Resulted Orders   Bld morphology pathology review   Result Value Ref Range    Copath Report       Patient Name: ALEXA QUIGLEY  MR#: [id number]  Specimen #: [id number]  Collected: 12/1/2020  Received: 12/2/2020  Reported: 12/2/2020 13:21  Ordering Phy(s): GARTH STAFFORD    For improved result formatting, select 'View Enhanced Report Format' under   Linked Documents section.    TEST(S):  Peripheral Smear Morphology    FINAL DIAGNOSIS:  Peripheral blood demonstrating mild leukocytosis with left shift    COMMENT:  The origin of this patient's mild leukocytosis is not apparent from   evaluation of the peripheral blood  specimen.    Electronically signed out by:    Andry Carroll M.D.    PERIPHERAL BLOOD DATA:    PERIPHERAL BLOOD DATA  Patient Value (Reference Range >18 year old female)  13.09 . . .WBC   (4.0-11.0 x 10*9/L)  4.58 . . .RBC   (3.8-5.2 x 10*12/L)  14.5 . . .HGB   (11.7-15.7 g/dL)  44.5 . . .HCT   (35.0-47.0 %)  97.2 . . .MCV   (78-100fL)  31.7 . . .MCH   (26.5-33.0 pg)  32.6 . . .MCHC   (31.5-36.5 g/dL)  13.4 . . .RDW   (10.0-15.0 %)  236 . . .PLT   (150-450  x 10*9/L)  2.58 . . .Retic   (0.5-2.0%)    PERIPHERAL BLOOD DIFFERENTIAL  (Reference ranges >18 year old female)    Percent  49.2. .Neutrophils, segmented and bands   (40 - 75)  40.3. .Lymphocytes   (20 - 48)  9.5. .Monocytes   (0 - 12)  0.8. .Eosinophils   (0 - 6)  0.2. .Basophils   (0 - 2)    Absolute  6.45. .Neutrophils,segmented and bands    (1.6 - 8.3 x 10*9/L)  5.27. .Lymphocytes    (0.8 - 5.3 x 10*9/L)  1.24. .Monocytes    (0 -1.3 x 10*9/L)  0.1. .Eosinophils    (0  - 0.7 x 10*9/L)  0.03. .Basophils    (0 - 0.2 x 10*9/L)    PERIPHERAL MORPHOLOGY:    ERYTHROCYTES: The red blood cells are normal in number and are   normochromic and normocytic.  Anisopoikilocytosis is slight and nonspecific. Polychromasia is borderline   increased. Rouleaux is not evident.    LEUKOCYTES: The white blood cells are mildly increased in number with   neutrophils predominating. A rare  myelocyte is observed. No atypical or dysplastic forms are identified.    PLATELETS: The platelets appear normal in numb er and morphology    CPT Codes:  A: 42389-JYWC    COLLECTION SITE:  Client:  [facility]  Location:  S399(F)         If you have any questions or concerns, please call the clinic at the number listed above.       Sincerely,      Josh Roy MD

## 2020-12-01 NOTE — LETTER
December 1, 2020      Patricia Hall  [address]  [place] [address]        Dear Ms.Getz Hall,    We are writing to inform you of your test results.    Repeat CBC diff is OK and does not show smudge cells.    Resulted Orders   CBC with platelets differential   Result Value Ref Range    WBC 13.1 (H) 4.0 - 11.0 10e9/L    RBC Count 4.58 3.8 - 5.2 10e12/L    Hemoglobin 14.5 11.7 - 15.7 g/dL    Hematocrit 44.5 35.0 - 47.0 %    MCV 97 78 - 100 fl    MCH 31.7 26.5 - 33.0 pg    MCHC 32.6 31.5 - 36.5 g/dL    RDW 13.4 10.0 - 15.0 %    Platelet Count 236 150 - 450 10e9/L    Diff Method Automated Method     % Neutrophils 47.4 %    % Lymphocytes 40.3 %    % Monocytes 9.5 %    % Eosinophils 0.8 %    % Basophils 0.2 %    % Immature Granulocytes 1.8 %    Nucleated RBCs 0 0 /100    Absolute Neutrophil 6.2 1.6 - 8.3 10e9/L    Absolute Lymphocytes 5.3 0.8 - 5.3 10e9/L    Absolute Monocytes 1.2 0.0 - 1.3 10e9/L    Absolute Eosinophils 0.1 0.0 - 0.7 10e9/L    Absolute Basophils 0.0 0.0 - 0.2 10e9/L    Abs Immature Granulocytes 0.2 0 - 0.4 10e9/L    Absolute Nucleated RBC 0.0     Anisocytosis Slight     Teardrop Cells Slight     Platelet Estimate Confirming automated cell count    Reticulocyte count   Result Value Ref Range    % Retic 2.6 (H) 0.5 - 2.0 %    Absolute Retic 118.2 (H) 25 - 95 10e9/L       If you have any questions or concerns, please call the clinic at the number listed above.       Sincerely,      Josh Roy MD

## 2020-12-02 LAB — COPATH REPORT: NORMAL

## 2020-12-10 ENCOUNTER — VIRTUAL VISIT (OUTPATIENT)
Dept: INTERNAL MEDICINE | Facility: CLINIC | Age: 45
End: 2020-12-10
Payer: COMMERCIAL

## 2020-12-10 DIAGNOSIS — M54.50 LUMBAR PAIN: Primary | ICD-10-CM

## 2020-12-10 DIAGNOSIS — M32.8 OTHER FORMS OF SYSTEMIC LUPUS ERYTHEMATOSUS, UNSPECIFIED ORGAN INVOLVEMENT STATUS (H): ICD-10-CM

## 2020-12-10 PROCEDURE — 99214 OFFICE O/P EST MOD 30 MIN: CPT | Mod: 95 | Performed by: PEDIATRICS

## 2020-12-10 RX ORDER — TRAMADOL HYDROCHLORIDE 50 MG/1
100 TABLET ORAL EVERY 6 HOURS PRN
Qty: 168 TABLET | Refills: 0 | Status: SHIPPED | OUTPATIENT
Start: 2020-12-10 | End: 2020-12-31

## 2020-12-10 NOTE — PATIENT INSTRUCTIONS
[facility] Medication Refill Request Information:  * Please contact your pharmacy regarding ANY request for medication refills.  ** [facility] Prescription Fax = [phone]  * Please allow 3 business days for routine medication refills.  * Please allow 5 business days for controlled substance medication refills.     [facility] Test Result notification information:  *You will be notified with in 7-10 days of your appointment day regarding the results of your test.  If you are on MyChart you will be notified as soon as the provider has reviewed the results and signed off on them.    [facility]: [phone]

## 2020-12-10 NOTE — NURSING NOTE
Chief Complaint   Patient presents with     Back Pain     Pt reports herniated disc      NADIYA Rubio at 2:56 PM sign on 12/10/2020

## 2020-12-10 NOTE — PROGRESS NOTES
"Video Visit Technology for this patient: No video technology available to patient, please call patient over the phone     Patricia Hall is a 45 year old female who is being evaluated via a billable telephone visit.      The patient has been notified of following:     \"This telephone visit will be conducted via a call between you and your physician/provider. We have found that certain health care needs can be provided without the need for a physical exam.  This service lets us provide the care you need with a short phone conversation.  If a prescription is necessary we can send it directly to your pharmacy.  If lab work is needed we can place an order for that and you can then stop by our lab to have the test done at a later time.    Telephone visits are billed at different rates depending on your insurance coverage. During this emergency period, for some insurers they may be billed the same as an in-person visit.  Please reach out to your insurance provider with any questions.    If during the course of the call the physician/provider feels a telephone visit is not appropriate, you will not be charged for this service.\"    Patient has given verbal consent for Telephone visit?  Yes    What phone number would you like to be contacted at?[phone]     How would you like to obtain your AVS? MyChart      About this note. I use the medical record to document (to the best of my ability) my understanding of:   - What the patient told me,  - Relevant details from my exam, records review, and/or test results, and  - My assessment and plan  Patients with questions are welcome to contact me; I will reply as soon as time allows.    Format: notes about discussion in virtual visit  Status: established patient, PCP Riaz Bhandari  Visit type: evaluation & management of one or more problems      Virtual Visit Details    Type of service:  Video Visit    Start Time: 3:37 PM    End Time:4:04 PM    Originating " Location (pt. Location): Home    Distant Location (provider location):  [facility] [place]     Platform used for Video Visit: Other: phone doximity      Context    Patricia Hall is a 45 year old woman, with concerns including:  Chief Complaint   Patient presents with     Back Pain     Pt reports herniated disc       There were no vitals taken for this visit.    History, update, and/or problems    Herniated disc in lower lumbar area. Had an MR a year ago (reviewed that report). She has sciatica.   Last weeks the back pain itself is bothering more than usual, even though the sciatica is otherwise better. Since last weekend, it feels like a pain of breaking a bone, like when the pain makes you need to throw up and pass out.  Any walking, bumps, or stairs makes more of a click/popping/grinding sensation. Very hard to deal with.    No recent trauma or falls.  She has tried to be more active, trying to clean for the holidays.    Bowels: constipation and diarrhea back and forth. No incontinence.  She has some burning sensation when she defecates, had some negative testing.    She had a COVID-like illness without testing earlier in Autumn, so wasn't able to get other testing.     She commented about her other problems (lupus, endometrium)     She is worried about COVID, and doesn't want to go to the hospital, ED, etc (counseled about this)    Recommended:   - MRI repeat   - Increase tramadol (prescription sent)   - reduce cleaning strain   - Will contact about PT   - Keep appt with Dr. Mendez, just in case      Comment about data reviewed  I personally reviewed, interpreted, and/or confirmed interpretation of her MR from 12/9/2019.      Time note (e4, 25'): The total time for this visit was 25 minutes, all of which consisted of counseling and/or coordination of care.

## 2020-12-11 ENCOUNTER — TELEPHONE (OUTPATIENT)
Dept: FAMILY MEDICINE | Facility: CLINIC | Age: 45
End: 2020-12-11

## 2020-12-11 ENCOUNTER — MYC MEDICAL ADVICE (OUTPATIENT)
Dept: INTERNAL MEDICINE | Facility: CLINIC | Age: 45
End: 2020-12-11

## 2020-12-11 NOTE — TELEPHONE ENCOUNTER
[facility] Call Center    Phone Message    May a detailed message be left on voicemail: yes     Reason for Call: Medication Question or concern regarding medication   Prescription Clarification  Name of Medication:   * traMADol (ULTRAM) 50 MG tablet  * Nausea medication   Prescribing Provider: Doug Nettles     Pharmacy: [id number] - [place], [place] - [address] AT [facility] [address]     What on the order needs clarification? Per Patient is wanting to get a call back. Patient states the pharmacy has not gotten the medication, Patient states needing medication to be sent over ASAP.  Please advise.     Patient states needing a verbal okay to the pharmacy for refill.       Action Taken: Message routed to:  [facility]    Travel Screening: Not Applicable

## 2020-12-11 NOTE — TELEPHONE ENCOUNTER
[facility] Call [facility]    Phone Message    May a detailed message be left on voicemail: yes     Reason for Call: Medication Question or concern regarding medication   Prescription Clarification  Name of Medication: Tramadol  Prescribing Provider: Dr Nettles   Pharmacy:      [facility] #[id number] - [place], [place] - [address] AT [facility] [address]       What on the order needs clarification? Please call pt when the medication has been refaxed to the pharmacy. She really needs this medication.           Action Taken: Message routed to:  [facility]    Travel Screening: Not Applicable

## 2020-12-11 NOTE — TELEPHONE ENCOUNTER
This was addressed via [employer], medications re faxed to pharmacy, patient informed.  Renata Lora, EMT at 3:40 PM on 12/11/2020.

## 2020-12-14 ENCOUNTER — TELEPHONE (OUTPATIENT)
Dept: FAMILY MEDICINE | Facility: CLINIC | Age: 45
End: 2020-12-14

## 2020-12-15 NOTE — TELEPHONE ENCOUNTER
Central Prior Authorization Team   [phone]    PA Initiation    Medication: TRAMADOL 50MG TAB  Insurance Company: [employer] [place] - Phone [phone] Fax [phone]  Pharmacy Filling the Rx: [facility] #[id number] - [place], [place] - [address] AT [facility] [address]  Filling Pharmacy Phone: [phone]  Filling Pharmacy Fax: [phone]  Start Date: 12/15/2020

## 2020-12-16 NOTE — TELEPHONE ENCOUNTER
Prior Authorization Approval    Authorization Effective Date: 12/11/2020  Authorization Expiration Date: 6/11/2021  Medication: TRAMADOL 50MG TAB-PA APPROVED   Approved Dose/Quantity:  Reference #:     Insurance Company: [employer] [place] - Phone [phone] Fax [phone]  Expected CoPay:       CoPay Card Available:      Foundation Assistance Needed:    Which Pharmacy is filling the prescription (Not needed for infusion/clinic administered): [facility] #[id number] - [place], [place] - [address] AT [facility] [address]  Pharmacy Notified: Yes- **Instructed pharmacy to notify patient when script is ready to /ship.**  Patient Notified: Yes

## 2020-12-24 ENCOUNTER — TELEPHONE (OUTPATIENT)
Dept: INTERNAL MEDICINE | Facility: CLINIC | Age: 45
End: 2020-12-24

## 2020-12-24 NOTE — TELEPHONE ENCOUNTER
Calling, discussed with nurse .    Explained the case to her. This is acute-on-chronic with worsening pain and saddle symptoms after cleaning efforts. She can't have had conservative treatment within the past 3 months (what insurance wants) because of likely COVID (herself) and COVID isolation.    I pulled up old med ranges for her - the azathioprine and prednisone trumps NSAIDs. My 12/10 visit was virtual, so that might be kicking the algorithm.    Authorization [id number] approved through 6/22/21  [id number]

## 2020-12-24 NOTE — TELEPHONE ENCOUNTER
----- Message from Rosa M Montague sent at 12/24/2020 11:12 AM CST -----  Regarding: action needed for MRI scheduled on 12/29/20  Doctor,    I was unable to get the MRI Lumbar spine w/o contrast, CPT 84230 scheduled on 12/29/20 at  6:15 am.    The insurance company is offering an opportunity for you to do a peer to peer in order to get this approved. The peer to peer must be scheduled in advance by calling [phone] option 1. You will need the case # [id number]. This peer should be completed prior to the imaging being completed.    The reason the MRI was denied is no documented physician guided conservative care of at least 6 weeks done in the past 3 month.     I also wanted to be sure that you know that Shavon Joy ordered a CT Abdomen/Pelvis w contrast, CPT 29434. This MRI was approved and is scheduled on 12/29/20 at 7:20 am.     If you are unable to or choose not to complete the peer to peer please advise you patient that she should cancel this appointment.    Thank You,  Rosa M BERNARD,  Pre-Authorizations for MRI's & CT's  Performed at [address] [place] [address] [place]

## 2021-01-10 ENCOUNTER — HEALTH MAINTENANCE LETTER (OUTPATIENT)
Age: 46
End: 2021-01-10

## 2021-01-14 ENCOUNTER — DOCUMENTATION ONLY (OUTPATIENT)
Dept: CARE COORDINATION | Facility: CLINIC | Age: 46
End: 2021-01-14

## 2021-01-18 ENCOUNTER — MYC MEDICAL ADVICE (OUTPATIENT)
Dept: RHEUMATOLOGY | Facility: CLINIC | Age: 46
End: 2021-01-18

## 2021-01-18 DIAGNOSIS — M32.9 SYSTEMIC LUPUS ERYTHEMATOSUS, UNSPECIFIED SLE TYPE, UNSPECIFIED ORGAN INVOLVEMENT STATUS (H): Primary | ICD-10-CM

## 2021-01-18 DIAGNOSIS — M32.9 SYSTEMIC LUPUS ERYTHEMATOSUS, UNSPECIFIED SLE TYPE, UNSPECIFIED ORGAN INVOLVEMENT STATUS (H): ICD-10-CM

## 2021-01-18 NOTE — RESULT ENCOUNTER NOTE
High WBC or leukocytosis is because of prednisone. A rare   myelocyte could be seen in inflammatory conditions. I ordered another set of lupus labs including cell count+smear to be done in early Feb.

## 2021-01-22 ENCOUNTER — VIRTUAL VISIT (OUTPATIENT)
Dept: INTERNAL MEDICINE | Facility: CLINIC | Age: 46
End: 2021-01-22
Payer: COMMERCIAL

## 2021-01-22 DIAGNOSIS — R10.9 ABDOMINAL PAIN, UNSPECIFIED ABDOMINAL LOCATION: Primary | ICD-10-CM

## 2021-01-22 PROCEDURE — 99213 OFFICE O/P EST LOW 20 MIN: CPT | Mod: 95 | Performed by: INTERNAL MEDICINE

## 2021-01-22 NOTE — PATIENT INSTRUCTIONS
If you have questions regarding Covid-19 and the Covid-19 vaccine, please visit this website.    [email or web address]

## 2021-01-22 NOTE — NURSING NOTE
Chief Complaint   Patient presents with     Back Pain     Pt has severe nausea with the pain. Pt has a Herniated disc. MRI on file.      Gastrointestinal Problem     Pt is having GI issues.      Immune Deficiency     Pt is following up on lupus.      Video Visit Technology for this patient: No video technology available to patient, please call patient over the phone     Yoselin Tilley LPN at 8:44 AM on 1/22/2021.

## 2021-01-22 NOTE — PROGRESS NOTES
Gracie is a 45 year old who is being evaluated via a billable telephone visit.      Subjective     Gracie is a 45 year old who presents to clinic today for the following health issues:  has a past medical history of Allergy, unspecified not elsewhere classified, Endometriosis, Fibromyalgia, Migraine without aura, with intractable migraine, so stated, without mention of status migrainosus, Myalgia and myositis, unspecified, and Systemic lupus erythematosus (H). She also has no past medical history of Basal cell carcinoma, Malignant melanoma (H), or Squamous cell carcinoma.     Current Outpatient Medications:      RACHNA SYRUP PO, , Disp: , Rfl:      Acetaminophen (TYLENOL PO), Take  by mouth.  , Disp: , Rfl:      albuterol (PROAIR HFA/PROVENTIL HFA/VENTOLIN HFA) 108 (90 Base) MCG/ACT inhaler, , Disp: , Rfl:      AMBIEN 10 MG OR TABS, 1 po HS, prn, Disp: 20, Rfl: 0     azaTHIOprine (IMURAN) 50 MG tablet, Take 1 tablet (50 mg) by mouth daily, Disp: 30 tablet, Rfl: 1     calcium carbonate (OS-VAHID 500 MG [place]. [place]) 1250 MG tablet, Take 1 tablet by mouth daily, Disp: , Rfl:      diltiazem 2% in PLO gel, Apply topically 4 times daily, Disp: 30 g, Rfl: 3     EPINEPHrine (ANY BX GENERIC EQUIV) 0.3 MG/0.3ML injection 2-pack, Inject 0.3 mLs (0.3 mg) into the muscle as needed for anaphylaxis, Disp: 2 each, Rfl: 1     EPINEPHrine (ANY BX GENERIC EQUIV) 0.3 MG/0.3ML injection 2-pack, epinephrine 0.3 mg/0.3 mL injection, auto-injector, Disp: , Rfl:      fluticasone (FLONASE) 50 MCG/ACT nasal spray, , Disp: , Rfl:      hydroxychloroquine (PLAQUENIL) 200 MG tablet, Take 1 tablet (200 mg) by mouth 2 times daily, Disp: 180 tablet, Rfl: 1     L-Methylfolate-Algae-B12-B6 (METANX PO), Take 1,000 mg by mouth daily, Disp: , Rfl:      MULTIVITAMIN TABS   OR, , Disp: , Rfl: 0     nifedipine 0.2% in white petrolatum 0.2 % OINT ointment, Apply topically 4 times daily, Disp: 100 g, Rfl: 3     Omega-3 Fatty Acids (OMEGA 3 PO), Take 1,250 mg by mouth  "daily, Disp: , Rfl:      predniSONE (DELTASONE) 5 MG tablet, Take 15 mg daily, ok to increase to 20 mg daily for flares, Disp: 120 tablet, Rfl: 3     sertraline (ZOLOFT) 100 MG tablet, Take 100 mg by mouth daily, Disp: , Rfl:      SPIRULINA PO, Take by mouth daily, Disp: , Rfl:      traMADol (ULTRAM) 50 MG tablet, Take 1 tablet (50 mg) by mouth every 6 hours as needed for moderate to severe pain . This is a 30-day supply., Disp: 75 tablet, Rfl: 5     triamcinolone (KENALOG) 0.1 % paste, Take by mouth 2 times daily, Disp: 5 g, Rfl: 3     VITAMIN D, CHOLECALCIFEROL, PO, Take 5,000 Units by mouth daily, Disp: , Rfl:     HPI   In December had a flare of acute back pain likely due to increased physical activity (setting up [employer] decorations in her yard).  Saw Dr. Heath who ordered an MRI and prescribed tramadol.    Needs a refill of tramadol which is working well, much better than tylenol.    Also having GI issues, seemingly from \"top to bottom\" GERD, nausea, actually vomited with the increased back pain, also says that she is having a possible flare of colitis in that she has burning with each bowel movement. Not just externally, but inside the rectum as well. Had a negative cologuard last year.  Has been treated for hemorrhoids in the past.  I ordered a CT abdomen back in April but it was not completed in part due to covid.  Is on medication for SLE including prednisone, plaquenil.  Considering Imuran given she might want to conceive, has one frozen embryo via IVF.  Dr. Roy is her Rheumatologist.  Discussed dietary supplements and also OTC pepto bismol use which seem to be helping her symptoms.  She was wondering about the possibility of Crohn's, but wasn't too excited to schedule a colonoscopy especially during the pandemic.  We discussed an alternative which is IBD Serology testing.  She is more interested in that approach.  Currently, no weight loss, no blood in the stool.    Review of Systems    ROS: 10 " point ROS neg other than the symptoms noted above in the HPI.      Objective           Vitals:  No vitals were obtained today due to virtual visit.    Physical Exam   healthy, alert and no distress  PSYCH: Alert and oriented times 3; coherent speech, normal   rate and volume, able to articulate logical thoughts, able   to abstract reason, no tangential thoughts, no hallucinations   or delusions  Her affect is normal  RESP: No cough, no audible wheezing, able to talk in full sentences  Remainder of exam unable to be completed due to telephone visits    Labs are imaging were reviewed in Epic.    A/P Patricia was seen today for back pain, gastrointestinal problem and SLE      Abdominal pain, unspecified abdominal location  -     IBD Serology ([facility]); Future    Back pain  --encouraged to complete MRI  --refilled Tramadol two days ago      RTC in the fall for routine physical, sooner if symptoms worsen    Phone call duration: 23 minutes    Shavon Guzman MD

## 2021-02-04 ENCOUNTER — MYC MEDICAL ADVICE (OUTPATIENT)
Dept: INTERNAL MEDICINE | Facility: CLINIC | Age: 46
End: 2021-02-04

## 2021-02-05 ENCOUNTER — VIRTUAL VISIT (OUTPATIENT)
Dept: INTERNAL MEDICINE | Facility: CLINIC | Age: 46
End: 2021-02-05
Payer: COMMERCIAL

## 2021-02-05 ENCOUNTER — TELEPHONE (OUTPATIENT)
Dept: INTERNAL MEDICINE | Facility: CLINIC | Age: 46
End: 2021-02-05

## 2021-02-05 DIAGNOSIS — R10.9 ABDOMINAL PAIN, UNSPECIFIED ABDOMINAL LOCATION: Primary | ICD-10-CM

## 2021-02-05 DIAGNOSIS — G89.29 OTHER CHRONIC PAIN: ICD-10-CM

## 2021-02-05 PROCEDURE — 99213 OFFICE O/P EST LOW 20 MIN: CPT | Mod: 95 | Performed by: INTERNAL MEDICINE

## 2021-02-05 RX ORDER — PREDNISONE 5 MG/1
TABLET ORAL
Qty: 120 TABLET | Refills: 3 | Status: SHIPPED | OUTPATIENT
Start: 2021-02-05 | End: 2021-05-20

## 2021-02-05 RX ORDER — TRAMADOL HYDROCHLORIDE 50 MG/1
100 TABLET ORAL 3 TIMES DAILY PRN
Qty: 180 TABLET | Refills: 5 | Status: SHIPPED | OUTPATIENT
Start: 2021-02-05 | End: 2021-08-04

## 2021-02-05 NOTE — PATIENT INSTRUCTIONS
[facility] Medication Refill Request Information:  * Please contact your pharmacy regarding ANY request for medication refills.  ** [facility] Prescription Fax = [phone]  * Please allow 3 business days for routine medication refills.  * Please allow 5 business days for controlled substance medication refills.     [facility] Test Result notification information:  *You will be notified with in 7-10 days of your appointment day regarding the results of your test.  If you are on MyChart you will be notified as soon as the provider has reviewed the results and signed off on them.    [facility]: [phone]

## 2021-02-05 NOTE — TELEPHONE ENCOUNTER
[facility] Call [facility]    Phone Message    May a detailed message be left on voicemail: yes     Reason for Call: Other: . Alexe would like to know when you would like to have the pt's labs drawn for the orders you placed.    Action Taken: Message routed to:  [facility]    Travel Screening: Not Applicable

## 2021-02-05 NOTE — PROGRESS NOTES
Gracie is a 45 year old who is being evaluated via a billable telephone visit.      Subjective     Gracie is a 45 year old who presents to clinic today for the following health issues: a past medical history of Allergy, unspecified not elsewhere classified, Endometriosis, Fibromyalgia, Migraine without aura, with intractable migraine, so stated, without mention of status migrainosus, Myalgia and myositis, unspecified, and Systemic lupus erythematosus (H). She also has no past medical history of Basal cell carcinoma, Malignant melanoma (H), or Squamous cell carcinoma.     HPI       Would like to clarify Rx for tramadol and also, had been thinking about the lab test I mentioned for possible Crohn's vs Ulcerative colitis (the IBD serology 7). Thinks that this would be preferable to scheduling a colonoscopy at this point in time to evaluate abdominal pain.  She has a lab draw coming up with orders from Dr. Roy.   Meanwhile is taking tramadol, two at a time, 2-3 times a day with good pain relief and no side effects.  Would need an increase in the total number of tablets dispensed which is appropriate.  We also discussed transitioning to the long acting tramadol in the future if that would give her more consistent pain control.    Review of Systems    ROS: 10 point ROS neg other than the symptoms noted above in the HPI.      Objective         Vitals:  No vitals were obtained today due to virtual visit.    Physical Exam   healthy, alert and no distress  PSYCH: Alert and oriented times 3; coherent speech, normal   rate and volume, able to articulate logical thoughts, able   to abstract reason, no tangential thoughts, no hallucinations   or delusions  Her affect is normal  RESP: No cough, no audible wheezing, able to talk in full sentences  Remainder of exam unable to be completed due to telephone visits    Reviewed recent labs in [facility].    A/P Patricia was seen today for medication follow-up.    Abdominal pain, unspecified  abdominal location: will add IBD serology 7 to next lab draw    Other chronic pain  -     Refill traMADol (ULTRAM) 50 MG tablet; Take 2 tablets (100 mg) by mouth 3 times daily as needed for severe pain        Phone call duration: 13 minutes    Shavon Guzman MD

## 2021-02-09 NOTE — TELEPHONE ENCOUNTER
I called and let them know, no rush on the lab orders.  No further questions.   Renata Lora, EMT at 2:29 PM on 2/9/2021.

## 2021-02-16 ENCOUNTER — TRANSFERRED RECORDS (OUTPATIENT)
Dept: HEALTH INFORMATION MANAGEMENT | Facility: CLINIC | Age: 46
End: 2021-02-16

## 2021-02-16 ENCOUNTER — HOSPITAL PATHOLOGY (OUTPATIENT)
Dept: OTHER | Facility: CLINIC | Age: 46
End: 2021-02-16

## 2021-02-16 ENCOUNTER — HOSPITAL LABORATORY (OUTPATIENT)
Dept: OTHER | Facility: CLINIC | Age: 46
End: 2021-02-16

## 2021-02-16 LAB
ALBUMIN SERPL-MCNC: 3.9 G/DL (ref 3.4–5)
ALT SERPL W P-5'-P-CCNC: 43 U/L (ref 0–50)
AST SERPL W P-5'-P-CCNC: 22 U/L (ref 0–45)
BASOPHILS # BLD AUTO: 0.1 10E9/L (ref 0–0.2)
BASOPHILS NFR BLD AUTO: 0.6 %
CREAT SERPL-MCNC: 0.8 MG/DL (ref 0.52–1.04)
CRP SERPL-MCNC: 3.5 MG/L (ref 0–8)
DIFFERENTIAL METHOD BLD: ABNORMAL
EOSINOPHIL # BLD AUTO: 0.1 10E9/L (ref 0–0.7)
EOSINOPHIL NFR BLD AUTO: 1.2 %
ERYTHROCYTE [DISTWIDTH] IN BLOOD BY AUTOMATED COUNT: 12.2 % (ref 10–15)
ERYTHROCYTE [SEDIMENTATION RATE] IN BLOOD BY WESTERGREN METHOD: 5 MM/H (ref 0–20)
HCT VFR BLD AUTO: 43.8 % (ref 35–47)
HGB BLD-MCNC: 14.5 G/DL (ref 11.7–15.7)
IMM GRANULOCYTES # BLD: 0.2 10E9/L (ref 0–0.4)
IMM GRANULOCYTES NFR BLD: 1.3 %
LYMPHOCYTES # BLD AUTO: 4.8 10E9/L (ref 0.8–5.3)
LYMPHOCYTES NFR BLD AUTO: 42.3 %
MCH RBC QN AUTO: 31.7 PG (ref 26.5–33)
MCHC RBC AUTO-ENTMCNC: 33.1 G/DL (ref 31.5–36.5)
MCV RBC AUTO: 96 FL (ref 78–100)
MONOCYTES # BLD AUTO: 0.9 10E9/L (ref 0–1.3)
MONOCYTES NFR BLD AUTO: 8.1 %
NEUTROPHILS # BLD AUTO: 5.2 10E9/L (ref 1.6–8.3)
NEUTROPHILS NFR BLD AUTO: 46.5 %
NRBC # BLD AUTO: 0 10*3/UL
NRBC BLD AUTO-RTO: 0 /100
PLATELET # BLD AUTO: 257 10E9/L (ref 150–450)
RBC # BLD AUTO: 4.58 10E12/L (ref 3.8–5.2)
RETICS # AUTO: 102.1 10E9/L (ref 25–95)
RETICS/RBC NFR AUTO: 2.2 % (ref 0.5–2)
WBC # BLD AUTO: 11.3 10E9/L (ref 4–11)

## 2021-02-17 LAB
C4 SERPL-MCNC: 31 MG/DL (ref 13–39)
DSDNA AB SER-ACNC: <1 IU/ML

## 2021-02-18 LAB
C3 SERPL-MCNC: 125 MG/DL (ref 81–157)
COPATH REPORT: NORMAL

## 2021-02-25 DIAGNOSIS — M32.9 SYSTEMIC LUPUS ERYTHEMATOSUS, UNSPECIFIED SLE TYPE, UNSPECIFIED ORGAN INVOLVEMENT STATUS (H): ICD-10-CM

## 2021-02-25 LAB
ALBUMIN UR-MCNC: NEGATIVE MG/DL
APPEARANCE UR: CLEAR
BACTERIA #/AREA URNS HPF: ABNORMAL /HPF
BILIRUB UR QL STRIP: NEGATIVE
COLOR UR AUTO: YELLOW
CREAT UR-MCNC: 34 MG/DL
GLUCOSE UR STRIP-MCNC: NEGATIVE MG/DL
HGB UR QL STRIP: NEGATIVE
KETONES UR STRIP-MCNC: NEGATIVE MG/DL
LEUKOCYTE ESTERASE UR QL STRIP: NEGATIVE
NITRATE UR QL: NEGATIVE
PH UR STRIP: 6 PH (ref 5–7)
RBC #/AREA URNS AUTO: 1 /HPF (ref 0–2)
SOURCE: ABNORMAL
SP GR UR STRIP: 1.01 (ref 1–1.03)
SQUAMOUS #/AREA URNS AUTO: 3 /HPF (ref 0–1)
UROBILINOGEN UR STRIP-MCNC: NORMAL MG/DL (ref 0–2)
WBC #/AREA URNS AUTO: 0 /HPF (ref 0–5)

## 2021-02-25 PROCEDURE — 82570 ASSAY OF URINE CREATININE: CPT | Performed by: INTERNAL MEDICINE

## 2021-02-25 PROCEDURE — 81001 URINALYSIS AUTO W/SCOPE: CPT | Performed by: INTERNAL MEDICINE

## 2021-02-25 NOTE — LETTER
March 1, 2021      Patricia L Neto Hall  [address]  [place] [address]        Dear Ms.Getz Hall,    We are writing to inform you of your test results.    Urine test is ok.    Resulted Orders   Creatinine random urine   Result Value Ref Range    Creatinine Urine Random 34 mg/dL   UA with Microscopic reflex to Culture   Result Value Ref Range    Color Urine Yellow     Appearance Urine Clear     Glucose Urine Negative NEG^Negative mg/dL    Bilirubin Urine Negative NEG^Negative    Ketones Urine Negative NEG^Negative mg/dL    Specific Gravity Urine 1.006 1.003 - 1.035    Blood Urine Negative NEG^Negative    pH Urine 6.0 5.0 - 7.0 pH    Protein Albumin Urine Negative NEG^Negative mg/dL    Urobilinogen mg/dL Normal 0.0 - 2.0 mg/dL    Nitrite Urine Negative NEG^Negative    Leukocyte Esterase Urine Negative NEG^Negative    Source Urine     WBC Urine 0 0 - 5 /HPF    RBC Urine 1 0 - 2 /HPF    Bacteria Urine Few (A) NEG^Negative /HPF    Squamous Epithelial /HPF Urine 3 (H) 0 - 1 /HPF       If you have any questions or concerns, please call the clinic at the number listed above.       Sincerely,      Josh Roy MD

## 2021-03-09 ENCOUNTER — MYC MEDICAL ADVICE (OUTPATIENT)
Dept: INTERNAL MEDICINE | Facility: CLINIC | Age: 46
End: 2021-03-09

## 2021-03-11 ENCOUNTER — IMMUNIZATION (OUTPATIENT)
Dept: NURSING | Facility: CLINIC | Age: 46
End: 2021-03-11
Payer: COMMERCIAL

## 2021-03-11 PROCEDURE — 0001A PR COVID VAC PFIZER DIL RECON 30 MCG/0.3 ML IM: CPT

## 2021-03-11 PROCEDURE — 91300 PR COVID VAC PFIZER DIL RECON 30 MCG/0.3 ML IM: CPT

## 2021-03-13 ENCOUNTER — HEALTH MAINTENANCE LETTER (OUTPATIENT)
Age: 46
End: 2021-03-13

## 2021-03-25 ENCOUNTER — TELEPHONE (OUTPATIENT)
Dept: INTERNAL MEDICINE | Facility: CLINIC | Age: 46
End: 2021-03-25

## 2021-03-25 NOTE — TELEPHONE ENCOUNTER
[facility] Call [facility]    Phone Message    May a detailed message be left on voicemail: yes     Reason for Call: Other: Patient called requesting a call back from her care team. Patient stated her frustration in getting call back in a timely manner. Thank you!      Action Taken: Message routed to:  [facility]    Travel Screening: Not Applicable

## 2021-03-26 ENCOUNTER — APPOINTMENT (OUTPATIENT)
Dept: URGENT CARE | Facility: CLINIC | Age: 46
End: 2021-03-26
Payer: COMMERCIAL

## 2021-03-29 NOTE — TELEPHONE ENCOUNTER
I called, voicemail is full.   She will have to clarify what she needs for us to help her further.  Renata Lora, EMT at 10:18 AM on 3/29/2021.

## 2021-04-01 ENCOUNTER — IMMUNIZATION (OUTPATIENT)
Dept: NURSING | Facility: CLINIC | Age: 46
End: 2021-04-01
Attending: INTERNAL MEDICINE
Payer: COMMERCIAL

## 2021-04-01 PROCEDURE — 0002A PR COVID VAC PFIZER DIL RECON 30 MCG/0.3 ML IM: CPT

## 2021-04-01 PROCEDURE — 91300 PR COVID VAC PFIZER DIL RECON 30 MCG/0.3 ML IM: CPT

## 2021-04-26 ENCOUNTER — TELEPHONE (OUTPATIENT)
Dept: INTERNAL MEDICINE | Facility: CLINIC | Age: 46
End: 2021-04-26

## 2021-04-26 NOTE — TELEPHONE ENCOUNTER
[facility]    Phone Message    May a detailed message be left on voicemail: yes     Reason for Call: Symptoms or Concerns     If patient has red-flag symptoms, warm transfer to triage line    Current symptom or concern: IBD concerns and cold symptoms that won't go away after 2 months.  Pt stated she has already been covid tested and vaccinated but pt is stating she is miserable.  Pt is requesting a call back today    Pt also stated she hasnt had the best of luck leaving messages for the [facility] staff as they don't return her messages in a timely manner    Symptoms have been present for:  2 month(s)    Has patient previously been seen for this?     By Dr. Mendez    Date:     Are there any new or worsening symptoms? Yes:       Action Taken: Message routed to:  [facility]    Travel Screening: Not Applicable

## 2021-04-28 ENCOUNTER — VIRTUAL VISIT (OUTPATIENT)
Dept: INTERNAL MEDICINE | Facility: CLINIC | Age: 46
End: 2021-04-28
Payer: COMMERCIAL

## 2021-04-28 DIAGNOSIS — R11.0 NAUSEA: Primary | ICD-10-CM

## 2021-04-28 DIAGNOSIS — J30.89 SEASONAL ALLERGIC RHINITIS DUE TO OTHER ALLERGIC TRIGGER: ICD-10-CM

## 2021-04-28 PROCEDURE — 99213 OFFICE O/P EST LOW 20 MIN: CPT | Mod: 95 | Performed by: INTERNAL MEDICINE

## 2021-04-28 RX ORDER — TRIAMCINOLONE ACETONIDE 55 UG/1
2 SPRAY, METERED NASAL DAILY
Qty: 16.9 ML | Refills: 11 | Status: SHIPPED | OUTPATIENT
Start: 2021-04-28 | End: 2021-06-09

## 2021-04-28 RX ORDER — MONTELUKAST SODIUM 10 MG/1
10 TABLET ORAL AT BEDTIME
Qty: 30 TABLET | Refills: 11 | Status: SHIPPED | OUTPATIENT
Start: 2021-04-28 | End: 2022-05-17

## 2021-04-28 RX ORDER — FEXOFENADINE HCL 180 MG/1
180 TABLET ORAL DAILY
Qty: 30 TABLET | Refills: 11 | Status: SHIPPED | OUTPATIENT
Start: 2021-04-28 | End: 2023-02-09

## 2021-04-28 RX ORDER — ONDANSETRON 4 MG/1
4 TABLET, ORALLY DISINTEGRATING ORAL ONCE
Status: DISCONTINUED | OUTPATIENT
Start: 2021-04-28 | End: 2021-12-03

## 2021-04-28 NOTE — PROGRESS NOTES
Gracie is a very pleasant 45 year old who is being evaluated via a billable video visit.          Subjective   Gracie is a 45 year old who presents for the following health issues: GI symptoms times many months, including nausea, abdominal pain, diarrhea; nasal congestion/cough for past two months; endometriosis; herniated discs; lupus    Current Outpatient Medications:      RACHNA SYRUP PO, , Disp: , Rfl:      Acetaminophen (TYLENOL PO), Take  by mouth.  , Disp: , Rfl:      albuterol (PROAIR HFA/PROVENTIL HFA/VENTOLIN HFA) 108 (90 Base) MCG/ACT inhaler, , Disp: , Rfl:      AMBIEN 10 MG OR TABS, 1 po HS, prn, Disp: 20, Rfl: 0     azaTHIOprine (IMURAN) 50 MG tablet, Take 1 tablet (50 mg) by mouth daily, Disp: 30 tablet, Rfl: 1     calcium carbonate (OS-VAHID 500 MG [place]. [place]) 1250 MG tablet, Take 1 tablet by mouth daily, Disp: , Rfl:      diltiazem 2% in PLO gel, Apply topically 4 times daily, Disp: 30 g, Rfl: 3     EPINEPHrine (ANY BX GENERIC EQUIV) 0.3 MG/0.3ML injection 2-pack, Inject 0.3 mLs (0.3 mg) into the muscle as needed for anaphylaxis, Disp: 2 each, Rfl: 1     EPINEPHrine (ANY BX GENERIC EQUIV) 0.3 MG/0.3ML injection 2-pack, epinephrine 0.3 mg/0.3 mL injection, auto-injector, Disp: , Rfl:      fluticasone (FLONASE) 50 MCG/ACT nasal spray, , Disp: , Rfl:      hydroxychloroquine (PLAQUENIL) 200 MG tablet, Take 1 tablet (200 mg) by mouth 2 times daily, Disp: 180 tablet, Rfl: 1     L-Methylfolate-Algae-B12-B6 (METANX PO), Take 1,000 mg by mouth daily, Disp: , Rfl:      MULTIVITAMIN TABS   OR, , Disp: , Rfl: 0     nifedipine 0.2% in white petrolatum 0.2 % OINT ointment, Apply topically 4 times daily, Disp: 100 g, Rfl: 3     Omega-3 Fatty Acids (OMEGA 3 PO), Take 1,250 mg by mouth daily, Disp: , Rfl:      predniSONE (DELTASONE) 5 MG tablet, Take 15 mg daily, ok to increase to 20 mg daily for flares, Disp: 120 tablet, Rfl: 3     sertraline (ZOLOFT) 100 MG tablet, Take 100 mg by mouth daily, Disp: , Rfl:      SPIRULINA PO,  "Take by mouth daily, Disp: , Rfl:      traMADol (ULTRAM) 50 MG tablet, Take 2 tablets (100 mg) by mouth 3 times daily as needed for severe pain, Disp: 180 tablet, Rfl: 5     triamcinolone (KENALOG) 0.1 % paste, Take by mouth 2 times daily, Disp: 5 g, Rfl: 3     VITAMIN D, CHOLECALCIFEROL, PO, Take 5,000 Units by mouth daily, Disp: , Rfl:     HPI       Gracie endorses a constellation of symptoms but in particular GI and also ENT/allergy.  She has an appointment upcoming with Dr. Elena Lee and this seems appropriate in light of these symptoms and history of autoimmune disease (lupus).  Meanwhile I offered zofran for nausea as symptomatic relief but would need further evaluation, including an in person visit for physical exam and labs, to do anything further.    She had what sounds like a viral cold about two months ago with now persistent nasal congestion, runny eyes, cough, that could also indicate seasonal allergies.  These symptoms are \"miserable\" and aren't really responding to OTC claritin.  We discussed switching to fexofenadine and adding a nasal steroid, possibly singulair.    Review of Systems         Objective           Vitals:  No vitals were obtained today due to virtual visit.    Physical Exam   GENERAL: Healthy, alert and no distress  EYES: Eyes grossly normal to inspection.  No discharge or erythema, or obvious scleral/conjunctival abnormalities.  RESP: No audible wheeze, cough, or visible cyanosis.  No visible retractions or increased work of breathing.    SKIN: Visible skin clear. No significant rash, abnormal pigmentation or lesions.  NEURO: Cranial nerves grossly intact.  Mentation and speech appropriate for age.  PSYCH: Mentation appears normal, affect normal/bright, judgement and insight intact, normal speech and appearance well-groomed.      A/P Patricia was seen today for gastrointestinal problem and cold/allergy symptoms.  Unable to complete a full evaluation today, will schedule an in " person visit in May to go over all of this prior to her 6/3 appt in GI.      Nausea  -     ondansetron (ZOFRAN-ODT) ODT tab 4 mg    Seasonal allergic rhinitis due to other allergic trigger  -     montelukast (SINGULAIR) 10 MG tablet; Take 1 tablet (10 mg) by mouth At Bedtime  -     fexofenadine (ALLEGRA) 180 MG tablet; Take 1 tablet (180 mg) by mouth daily  -     triamcinolone (NASACORT) 55 MCG/ACT nasal aerosol; Spray 2 sprays into both nostrils daily        Video-Visit Details    Type of service:  Video Visit started 3:19 ended 3:31 with another 8 minutes of chart review and documentation, same day    Originating Location (pt. Location): Home    Distant Location (provider location):  [facility] INTERNAL MEDICINE [place]     Platform used for Video Visit: [email or web address]

## 2021-04-28 NOTE — PATIENT INSTRUCTIONS
[facility] Medication Refill Request Information:  * Please contact your pharmacy regarding ANY request for medication refills.  ** [facility] Prescription Fax = [phone]  * Please allow 3 business days for routine medication refills.  * Please allow 5 business days for controlled substance medication refills.     [facility] Test Result notification information:  *You will be notified with in 7-10 days of your appointment day regarding the results of your test.  If you are on MyChart you will be notified as soon as the provider has reviewed the results and signed off on them.    [facility]: [phone]

## 2021-04-30 DIAGNOSIS — R11.0 NAUSEA: Primary | ICD-10-CM

## 2021-04-30 NOTE — TELEPHONE ENCOUNTER
I called Gracie to assist in scheduling in person appointment after her appointment with ANKIT in June. I reached voicemail. Message left requesting Gracie call back for scheduling. OK to use PIERRE.    Gracie also still has a virtual appointment on Monday, though just had virtual appointment this week. Appointment can be canceled upon patient callback.    Miriam Aguayo RN ([place])

## 2021-05-03 RX ORDER — ONDANSETRON 4 MG/1
4 TABLET, ORALLY DISINTEGRATING ORAL EVERY 8 HOURS PRN
Qty: 30 TABLET | Refills: 1 | Status: SHIPPED | OUTPATIENT
Start: 2021-05-03 | End: 2021-08-23

## 2021-05-04 NOTE — TELEPHONE ENCOUNTER
REFERRAL INFORMATION:    Referring Provider:  Dr. Celia Roy    Referring Clinic:  [facility]     Reason for Visit/Diagnosis: Symptoms of IBD     FUTURE VISIT INFORMATION:    Appointment Date: 6/2/2021    Appointment Time: 1:40 PM      NOTES STATUS DETAILS   OFFICE NOTE from Referring Provider N/A    OFFICE NOTE from Other Specialist Internal 2/5/2021, 1/22/2021, 4/8/2020 Office visit with Dr. Shavon Guzman ([facility])    HOSPITAL DISCHARGE SUMMARY/  ED VISITS N/A    OPERATIVE REPORT N/A    MEDICATION LIST Internal         ENDOSCOPY  N/A    COLONOSCOPY N/A    ERCP N/A    EUS N/A    STOOL TESTING N/A    PERTINENT LABS Internal    PATHOLOGY REPORTS (RELATED) N/A    IMAGING (CT, MRI, EGD, MRCP, Small Bowel Follow Through/SBT, MR/CT Enterography) N/A

## 2021-05-20 DIAGNOSIS — M32.9 SYSTEMIC LUPUS ERYTHEMATOSUS, UNSPECIFIED SLE TYPE, UNSPECIFIED ORGAN INVOLVEMENT STATUS (H): ICD-10-CM

## 2021-05-20 NOTE — TELEPHONE ENCOUNTER
predniSONE (DELTASONE) 5 MG tablet    Last Written Prescription Date:  2/5/2021  Last Fill Quantity: 120,   # refills: 3  Last Office Visit : 4/29/2020  Future Office visit:  None    Routing refill request to provider for review/approval because:  Drug not on the [facility], [facility] or [facility] refill protocol or controlled substance    hydroxychloroquine (PLAQUENIL) 200 MG tablet  Last Written Prescription Date:  6/23/2020  Last Fill Quantity: 180,   # refills: 1  Last Office Visit : 4/29/2020  Future Office visit:  None  Last eye exam:  10/18/2019    Routing refill request to provider for review/approval because:  Over due eye exam   Letter sent   Provider notified      Flora Wilkerson RN  Central Triage Red Flags/Med Refills

## 2021-05-20 NOTE — LETTER
Patricia Hall  [address]  [place] [address]    Dear Patricia Hall,     Regular eye exams are required while taking hydroxychloroquine (Plaquenil). Eye exams should be completed by an eye specialist who is experienced in monitoring for hydroxychloroquine toxicity (a rare effect of the drug that can damage your eyes and vision).  These may be yearly or as determined by your eye specialist.     Although vision problems and loss of sight while taking hydroxychloroquine are very rare, notify your doctor immediately if you notice changes in your vision. The goal of screening is to detect toxicity before your vision is significantly or noticeably impacted. Failing to get proper screening exams puts you at risk of vision changes which may or may not be reversible.    Per the American Academy of Ophthalmology recommendations (2016), screening tests performed may include a 10-2 visual field test, spectral-domain optical coherence tomography (SD OCT), or other screening tests as determined by the eye specialist, including a multifocal electroretinogram (mfERG) or fundus auto-fluorescence (FAF).    We received a refill request from your pharmacy. A copy of your current eye exam was not found in your medical record. Your hydroxychloroquine prescription has been refilled with a limited supply pending confirmation you have had an eye exam to test for hydroxychloroquine toxicity.      We encourage you to bring this letter to your eye exam to discuss the exams that will be performed during your visit. Please request your eye clinic fax or mail a copy of your eye exam report to our clinic indicating that testing was completed for hydroxychloroquine toxicity screening. The exam notes must specifically comment on the potential for hydroxychloroquine toxicity and outline recommended follow-up.    If you have questions about hydroxychloroquine or the information in this letter, please call the clinic at  [phone] or talk to your provider at your next office visit.                        2    Eye Specialist Letter  Dear Eye Specialist,  To ensure safe use of Plaquenil (hydroxychloroquine), we are requesting your assistance in providing the following information. Please return this form to our clinic via mail or fax, or incorporate this information into your visit summary. Your note must indicate whether or not there is evidence of toxicity from Plaquenil use. For questions regarding this form, please call [phone].  Patient Name:   :             Date of Exam:    The following exams were completed during this visit in accordance with the American Academy of Ophthalmology recommendations (2016):  ? 10-2 automated visual field  ? Spectral-domain optical coherence tomography (SD OCT)  ? Multifocal electroretinogram (mfERG)  ? Fundus autofluorescence (FAF)  ? Other (please specify)  Please select from the following:  ? The findings from the above exams are not suggestive of toxicity from Plaquenil (hydroxychloroquine).  ? The findings from the above exams may suggest toxicity from Plaquenil (hydroxychloroquine).  Directly contact the clinic and fax this form.  Please provide additional guidance on whether or not the medication may be continued from your perspective:  Date of next recommended Plaquenil (hydroxychloroquine) screening eye exam:   ? 5 years  ? 1 year  ? 6 months  Other (please specify):   Eye Specialist Name (print):                                                                Date:  Eye Specialist Signature:

## 2021-05-21 RX ORDER — HYDROXYCHLOROQUINE SULFATE 200 MG/1
200 TABLET, FILM COATED ORAL 2 TIMES DAILY
Qty: 180 TABLET | Refills: 0 | Status: SHIPPED | OUTPATIENT
Start: 2021-05-21 | End: 2021-08-17

## 2021-05-21 RX ORDER — PREDNISONE 5 MG/1
TABLET ORAL
Qty: 90 TABLET | Refills: 3 | Status: SHIPPED | OUTPATIENT
Start: 2021-05-21 | End: 2021-08-13

## 2021-05-26 ENCOUNTER — VIRTUAL VISIT (OUTPATIENT)
Dept: GASTROENTEROLOGY | Facility: CLINIC | Age: 46
End: 2021-05-26
Payer: COMMERCIAL

## 2021-05-26 VITALS — BODY MASS INDEX: 27.53 KG/M2 | WEIGHT: 175 LBS

## 2021-05-26 DIAGNOSIS — K62.89 RECTAL PAIN: ICD-10-CM

## 2021-05-26 DIAGNOSIS — K21.9 GASTROESOPHAGEAL REFLUX DISEASE WITHOUT ESOPHAGITIS: Primary | ICD-10-CM

## 2021-05-26 PROCEDURE — 99205 OFFICE O/P NEW HI 60 MIN: CPT | Mod: 95 | Performed by: INTERNAL MEDICINE

## 2021-05-26 RX ORDER — OMEPRAZOLE 40 MG/1
40 CAPSULE, DELAYED RELEASE ORAL DAILY
Qty: 60 CAPSULE | Refills: 1 | Status: SHIPPED | OUTPATIENT
Start: 2021-05-26

## 2021-05-26 NOTE — PROGRESS NOTES
"Patricia Hall is a 46 year old female who is being evaluated via a billable video visit.      Please send link to cell phone:  [phone]    The patient has been notified of following:     \"This video visit will be conducted via a call between you and your physician/provider. We have found that certain health care needs can be provided without the need for an in-person physical exam.  This service lets us provide the care you need with a video conversation.  If a prescription is necessary we can send it directly to your pharmacy.  If lab work is needed we can place an order for that and you can then stop by our lab to have the test done at a later time.    If during the course of the call the physician/provider feels a video visit is not appropriate, you will not be charged for this service.\"     Patient confirmed that they are in [place] for today's visit yes.    Video-Visit Details  Type of service:  Video Visit    Video Start Time: 10:53 AM    Originating Location (pt. Location): [place]    Distant Location (provider location):  [facility] [place]     Platform used: [facility]        GI CLINIC VISIT    CC/REFERRING MD:  No ref. provider found  REASON FOR CONSULTATION:   No ref. provider found for   Chief Complaint   Patient presents with     New Patient       ASSESSMENT/PLAN:    Ms. Neto Hall is a 47 yo woman with endometriosis, fibromyalgia, lupus on 20 mg prednisone daily, migraine without aura presenting for evaluation of nausea, heartburn and rectal pain with defecation. I suspect that her upper symptoms are likely from GERD exacerbated by prednisone. Given her age and lack of response to antacid therapy, however, I think it is prudent to pursue an EGD. We will also proceed with a colonoscopy to evaluate pain with defecation. In the meantime, she will start omeprazole 40 mg BID for heartburn.     PLAN  ---EGD and colonoscopy  ---Start omeprazole 40 mg BID      RTC: " "Return in about 3 months (around 8/26/2021).     60 minutes spent on the date of the encounter performing chart review, history and exam, documentation and further activities as noted above.  .    Elena Castillo MD   of Medicine  Division of Gastroenterology, Hepatology and Nutrition  [facility]        ILAN Bowman is a 47 yo woman with PMHx s/f endometriosis, fibromyalgia, lupus on 20 mg prednisone daily, migraine without aura.     About 1.5 years ago, developed post-prandial abdominal pain, aerophagia. Endorses rectal burning sensation with defecation. Had BRBPR with wiping on TP. Cologuard test was normal.  Endorses intermittent, right sided abd pain that is dull, possibly post-prandial. Lasts for hours. Reports heart burn and an unpleasant taste in her mouth. No weight loss.     Has been on prednisone 20 mg x 2 years for lupus.  Takes zofran and dromamine, not helpful for nausea.  Tried famotadine and Prevacid x 1 month, without help.  Reports improvement with \"super enzymes\"      Has been on a GFD for years. In 20s, had an EGD and cscope in [place].   Has had several surgeries for endometriosis. Had hormonal therapy. Has had IVF x 3 at [facility].     Prior to her last surgery, was noted to have adhesions to the bowel. After surgery, felt much better, with normal evacuations.   8 months later, had LBP due to herniated discs.     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) 02/12/2020     Priority: Medium     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 02/12/2020     Priority: Medium     Diminished ovarian reserve 02/12/2020     Priority: Medium     Disorder of connective tissue (H) 02/12/2020     Priority: Medium     Endometriosis of uterus 02/12/2020     Priority: Medium     Female infertility 02/12/2020     Priority: Medium     History of environmental allergies 02/12/2020     Priority: Medium     Recurrent pregnancy loss without current pregnancy 02/12/2020     Priority: Medium     Seasonal allergies 02/12/2020     Priority: Medium     Adjustment disorder with anxiety 11/18/2019     Priority: Medium     Disease of immune system (H) 12/06/2017     Priority: Medium     Blood coagulation disorder (H) 01/25/2017     Priority: Medium     Raynaud's disease 01/25/2017     Priority: Medium     Primary fibromyalgia syndrome 01/25/2017     Priority: Medium     Major depressive disorder, recurrent episode, in full remission (H) 09/05/2014     Priority: Medium     Major depressive disorder, recurrent episode, mild (H) 06/20/2013     Priority: Medium     Epic       Chronic headaches 06/06/2011     Priority: Medium     Uterine leiomyoma 09/30/2010     Priority: Medium     Hyperlipidemia 06/03/2010     Priority: Medium     Overview:   Not currently medictated.       Irritable bowel syndrome 02/20/2007     Priority: Medium     Overview:   LW Onset:  years       Myalgia 02/20/2007     Priority: Medium     Overview:   LW Onset:  years  ; Fibromyalgia       Systemic lupus erythematosus (H) 03/29/2006     Priority: Medium     Refractory migraine without aura      Priority: Medium     Problem list name updated by automated process. Provider to review       Insomnia 12/15/2004     Priority: Medium     Problem list name updated by automated process. Provider to review       Migraine 03/18/2003     Priority: Medium     Overview:   Epic          PERTINENT PAST MEDICAL HISTORY:  Past  Medical History:   Diagnosis Date     Allergy, unspecified not elsewhere classified      Endometriosis      Fibromyalgia      Migraine without aura, with intractable migraine, so stated, without mention of status migrainosus      Myalgia and myositis, unspecified      Systemic lupus erythematosus (H)        PREVIOUS SURGERIES:  Past Surgical History:   Procedure Laterality Date     ORTHOPEDIC SURGERY       SURGICAL HISTORY OF -   1986    left elbow fracture repair       PREVIOUS ENDOSCOPY:  Had EGD/cscope in 20s. Reports not available to me.     ALLERGIES:     Allergies   Allergen Reactions     Cherry Anaphylaxis     Dairy Aid  [Lactase]      Other reaction(s): Arthralgia (Joint Pain)     Gluten Meal      Other reaction(s): Abdominal Pain     No Clinical Screening - See Comments Anaphylaxis and Itching     Pistachio Nut Extract Skin Test Anaphylaxis     Brassica Oleracea Italica      Other reaction(s): Abdominal Pain  Other reaction(s): Abdominal Pain     Penicillins Hives and Rash     Sulfa Drugs Hives and Rash       PERTINENT MEDICATIONS:    Current Outpatient Medications:      omeprazole (PRILOSEC) 40 MG DR capsule, Take 1 capsule (40 mg) by mouth daily, Disp: 60 capsule, Rfl: 1     RACHNA SYRUP PO, , Disp: , Rfl:      Acetaminophen (TYLENOL PO), Take  by mouth.  , Disp: , Rfl:      albuterol (PROAIR HFA/PROVENTIL HFA/VENTOLIN HFA) 108 (90 Base) MCG/ACT inhaler, , Disp: , Rfl:      AMBIEN 10 MG OR TABS, 1 po HS, prn, Disp: 20, Rfl: 0     azaTHIOprine (IMURAN) 50 MG tablet, Take 1 tablet (50 mg) by mouth daily, Disp: 30 tablet, Rfl: 1     calcium carbonate (OS-VAHID 500 MG Eek. CA) 1250 MG tablet, Take 1 tablet by mouth daily, Disp: , Rfl:      diltiazem 2% in PLO gel, Apply topically 4 times daily, Disp: 30 g, Rfl: 3     EPINEPHrine (ANY BX GENERIC EQUIV) 0.3 MG/0.3ML injection 2-pack, Inject 0.3 mLs (0.3 mg) into the muscle as needed for anaphylaxis, Disp: 2 each, Rfl: 1     EPINEPHrine (ANY BX GENERIC EQUIV) 0.3  MG/0.3ML injection 2-pack, epinephrine 0.3 mg/0.3 mL injection, auto-injector, Disp: , Rfl:      fexofenadine (ALLEGRA) 180 MG tablet, Take 1 tablet (180 mg) by mouth daily, Disp: 30 tablet, Rfl: 11     fluticasone (FLONASE) 50 MCG/ACT nasal spray, , Disp: , Rfl:      hydroxychloroquine (PLAQUENIL) 200 MG tablet, Take 1 tablet (200 mg) by mouth 2 times daily, Disp: 180 tablet, Rfl: 0     L-Methylfolate-Algae-B12-B6 (METANX PO), Take 1,000 mg by mouth daily, Disp: , Rfl:      montelukast (SINGULAIR) 10 MG tablet, Take 1 tablet (10 mg) by mouth At Bedtime, Disp: 30 tablet, Rfl: 11     MULTIVITAMIN TABS   OR, , Disp: , Rfl: 0     nifedipine 0.2% in white petrolatum 0.2 % OINT ointment, Apply topically 4 times daily, Disp: 100 g, Rfl: 3     Omega-3 Fatty Acids (OMEGA 3 PO), Take 1,250 mg by mouth daily, Disp: , Rfl:      ondansetron (ZOFRAN-ODT) 4 MG ODT tab, Take 1 tablet (4 mg) by mouth every 8 hours as needed for nausea, Disp: 30 tablet, Rfl: 1     predniSONE (DELTASONE) 5 MG tablet, Take 15 mg daily, ok to increase to 20 mg daily for flares, Disp: 90 tablet, Rfl: 3     sertraline (ZOLOFT) 100 MG tablet, Take 100 mg by mouth daily, Disp: , Rfl:      SPIRULINA PO, Take by mouth daily, Disp: , Rfl:      traMADol (ULTRAM) 50 MG tablet, Take 2 tablets (100 mg) by mouth 3 times daily as needed for severe pain, Disp: 180 tablet, Rfl: 5     triamcinolone (KENALOG) 0.1 % paste, Take by mouth 2 times daily, Disp: 5 g, Rfl: 3     triamcinolone (NASACORT) 55 MCG/ACT nasal aerosol, Spray 2 sprays into both nostrils daily, Disp: 16.9 mL, Rfl: 11     VITAMIN D, CHOLECALCIFEROL, PO, Take 5,000 Units by mouth daily, Disp: , Rfl:     Current Facility-Administered Medications:      ondansetron (ZOFRAN-ODT) ODT tab 4 mg, 4 mg, Oral, Once, Shavon Raines MD    SOCIAL HISTORY:  Social History     Socioeconomic History     Marital status:      Spouse name: Not on file     Number of children: Not on file     Years of  education: Not on file     Highest education level: Not on file   Occupational History     Not on file   Social Needs     Financial resource strain: Not on file     Food insecurity     Worry: Not on file     Inability: Not on file     Transportation needs     Medical: Not on file     Non-medical: Not on file   Tobacco Use     Smoking status: Never Smoker     Smokeless tobacco: Never Used   Substance and Sexual Activity     Alcohol use: Yes     Comment: occ.- 2/week     Drug use: No     Sexual activity: Yes     Partners: Male     Birth control/protection: Condom   Lifestyle     Physical activity     Days per week: Not on file     Minutes per session: Not on file     Stress: Not on file   Relationships     Social connections     Talks on phone: Not on file     Gets together: Not on file     Attends Sabianist service: Not on file     Active member of club or organization: Not on file     Attends meetings of clubs or organizations: Not on file     Relationship status: Not on file     Intimate partner violence     Fear of current or ex partner: Not on file     Emotionally abused: Not on file     Physically abused: Not on file     Forced sexual activity: Not on file   Other Topics Concern     Parent/sibling w/ CABG, MI or angioplasty before 65F 55M? Not Asked   Social History Narrative     Not on file       FAMILY HISTORY:  Family History   Problem Relation Age of Onset     Diabetes Maternal Grandfather      Lipids Mother      Skin Cancer Paternal Grandmother      Melanoma No family hx of        Past/family/social history reviewed and no changes    PHYSICAL EXAMINATION:  Constitutional: aaox3, cooperative, pleasant, not dyspneic/diaphoretic, no acute distress  Vitals reviewed: Wt 79.4 kg (175 lb)   BMI 27.53 kg/m    Wt:   Wt Readings from Last 2 Encounters:   05/26/21 79.4 kg (175 lb)   02/24/20 92 kg (202 lb 12.8 oz)        General appearance  Healthy appearing adult, in no acute distress     Eyes  Sclera  anicteric  Pupils round and reactive to light     Ears, nose, mouth and throat  No obvious external lesions of ears and nose  Hearing intact     Neck  Symmetric  No obvious external lesions     Respiratory  Normal respiration, no use of accessory muscles      MSK  Gait normal     Skin  No rashes or jaundice      Psychiatric  Oriented to person, place and time  Appropriate mood and affect.     PERTINENT STUDIES:    Orders Only on 02/25/2021   Component Date Value Ref Range Status     Creatinine Urine Random 02/25/2021 34  mg/dL Final     Color Urine 02/25/2021 Yellow   Final     Appearance Urine 02/25/2021 Clear   Final     Glucose Urine 02/25/2021 Negative  NEG^Negative mg/dL Final     Bilirubin Urine 02/25/2021 Negative  NEG^Negative Final     Ketones Urine 02/25/2021 Negative  NEG^Negative mg/dL Final     Specific Gravity Urine 02/25/2021 1.006  1.003 - 1.035 Final     Blood Urine 02/25/2021 Negative  NEG^Negative Final     pH Urine 02/25/2021 6.0  5.0 - 7.0 pH Final     Protein Albumin Urine 02/25/2021 Negative  NEG^Negative mg/dL Final     Urobilinogen mg/dL 02/25/2021 Normal  0.0 - 2.0 mg/dL Final     Nitrite Urine 02/25/2021 Negative  NEG^Negative Final     Leukocyte Esterase Urine 02/25/2021 Negative  NEG^Negative Final     Source 02/25/2021 Urine   Final     WBC Urine 02/25/2021 0  0 - 5 /HPF Final     RBC Urine 02/25/2021 1  0 - 2 /HPF Final     Bacteria Urine 02/25/2021 Few* NEG^Negative /HPF Final     Squamous Epithelial /HPF Urine 02/25/2021 3* 0 - 1 /HPF Final

## 2021-05-26 NOTE — LETTER
"    5/26/2021         RE: Patricia Hall  [address]  [place] [address]        Dear Colleague,    Thank you for referring your patient, Patricia Hall, to the [facility] [place]. Please see a copy of my visit note below.    Patricia Hall is a 46 year old female who is being evaluated via a billable video visit.      Please send link to cell phone:  [phone]    The patient has been notified of following:     \"This video visit will be conducted via a call between you and your physician/provider. We have found that certain health care needs can be provided without the need for an in-person physical exam.  This service lets us provide the care you need with a video conversation.  If a prescription is necessary we can send it directly to your pharmacy.  If lab work is needed we can place an order for that and you can then stop by our lab to have the test done at a later time.    If during the course of the call the physician/provider feels a video visit is not appropriate, you will not be charged for this service.\"     Patient confirmed that they are in [place] for today's visit yes.    Video-Visit Details  Type of service:  Video Visit    Video Start Time: 10:53 AM    Originating Location (pt. Location): Home    Distant Location (provider location):  [facility] [place]     Platform used: [facility]      GI CLINIC VISIT    CC/REFERRING MD:  No ref. provider found  REASON FOR CONSULTATION:   No ref. provider found for   Chief Complaint   Patient presents with     New Patient       ASSESSMENT/PLAN:    Ms. Neto Hall is a 45 yo woman with endometriosis, fibromyalgia, lupus on 20 mg prednisone daily, migraine without aura presenting for evaluation of nausea, heartburn and rectal pain with defecation. I suspect that her upper symptoms are likely from GERD exacerbated by prednisone. Given her age and lack of response to " "antacid therapy, however, I think it is prudent to pursue an EGD. We will also proceed with a colonoscopy to evaluate pain with defecation. In the meantime, she will start omeprazole 40 mg BID for heartburn.     PLAN  ---EGD and colonoscopy  ---Start omeprazole 40 mg BID      RTC: Return in about 3 months (around 8/26/2021).     60 minutes spent on the date of the encounter performing chart review, history and exam, documentation and further activities as noted above.  .    Elena Castillo MD   of Medicine  Division of Gastroenterology, Hepatology and Nutrition  [facility]        ILAN Bowman is a 45 yo woman with PMHx s/f endometriosis, fibromyalgia, lupus on 20 mg prednisone daily, migraine without aura.     About 1.5 years ago, developed post-prandial abdominal pain, aerophagia. Endorses rectal burning sensation with defecation. Had BRBPR with wiping on TP. Cologuard test was normal.  Endorses intermittent, right sided abd pain that is dull, possibly post-prandial. Lasts for hours. Reports heart burn and an unpleasant taste in her mouth. No weight loss.     Has been on prednisone 20 mg x 2 years for lupus.  Takes zofran and dromamine, not helpful for nausea.  Tried famotadine and Prevacid x 1 month, without help.  Reports improvement with \"super enzymes\"      Has been on a GFD for years. In 20s, had an EGD and cscope in [place].   Has had several surgeries for endometriosis. Had hormonal therapy. Has had IVF x 3 at [facility].     Prior to her last surgery, was noted to have adhesions to the bowel. After surgery, felt much better, with normal evacuations.   8 months later, had LBP due to herniated discs.     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) 02/12/2020     Priority: Medium     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 02/12/2020     Priority: Medium     Diminished ovarian reserve 02/12/2020     Priority: Medium     Disorder of connective tissue (H) 02/12/2020     Priority: Medium     Endometriosis of uterus 02/12/2020     Priority: Medium     Female infertility 02/12/2020     Priority: Medium     History of environmental allergies 02/12/2020     Priority: Medium     Recurrent pregnancy loss without current pregnancy 02/12/2020     Priority: Medium     Seasonal allergies 02/12/2020     Priority: Medium     Adjustment disorder with anxiety 11/18/2019     Priority: Medium     Disease of immune system (H) 12/06/2017     Priority: Medium     Blood coagulation disorder (H) 01/25/2017     Priority: Medium     Raynaud's disease 01/25/2017     Priority: Medium     Primary fibromyalgia syndrome 01/25/2017     Priority: Medium     Major depressive disorder, recurrent episode, in full remission (H) 09/05/2014     Priority: Medium     Major depressive disorder, recurrent episode, mild (H) 06/20/2013     Priority: Medium     Epic       Chronic headaches 06/06/2011     Priority: Medium     Uterine leiomyoma 09/30/2010     Priority: Medium     Hyperlipidemia 06/03/2010     Priority: Medium     Overview:   Not currently medictated.       Irritable bowel syndrome 02/20/2007     Priority: Medium     Overview:   LW Onset:  years       Myalgia 02/20/2007     Priority: Medium     Overview:   LW Onset:  years  ; Fibromyalgia       Systemic lupus erythematosus (H) 03/29/2006     Priority: Medium     Refractory migraine without aura      Priority: Medium     Problem list name updated  by automated process. Provider to review       Insomnia 12/15/2004     Priority: Medium     Problem list name updated by automated process. Provider to review       Migraine 03/18/2003     Priority: Medium     Overview:   Epic          PERTINENT PAST MEDICAL HISTORY:  Past Medical History:   Diagnosis Date     Allergy, unspecified not elsewhere classified      Endometriosis      Fibromyalgia      Migraine without aura, with intractable migraine, so stated, without mention of status migrainosus      Myalgia and myositis, unspecified      Systemic lupus erythematosus (H)        PREVIOUS SURGERIES:  Past Surgical History:   Procedure Laterality Date     ORTHOPEDIC SURGERY       SURGICAL HISTORY OF -   1986    left elbow fracture repair       PREVIOUS ENDOSCOPY:  Had EGD/cscope in 20s. Reports not available to me.     ALLERGIES:     Allergies   Allergen Reactions     Cherry Anaphylaxis     Dairy Aid  [Lactase]      Other reaction(s): Arthralgia (Joint Pain)     Gluten Meal      Other reaction(s): Abdominal Pain     No Clinical Screening - See Comments Anaphylaxis and Itching     Pistachio Nut Extract Skin Test Anaphylaxis     Brassica Oleracea Italica      Other reaction(s): Abdominal Pain  Other reaction(s): Abdominal Pain     Penicillins Hives and Rash     Sulfa Drugs Hives and Rash       PERTINENT MEDICATIONS:    Current Outpatient Medications:      omeprazole (PRILOSEC) 40 MG DR capsule, Take 1 capsule (40 mg) by mouth daily, Disp: 60 capsule, Rfl: 1     RACHNA SYRUP PO, , Disp: , Rfl:      Acetaminophen (TYLENOL PO), Take  by mouth.  , Disp: , Rfl:      albuterol (PROAIR HFA/PROVENTIL HFA/VENTOLIN HFA) 108 (90 Base) MCG/ACT inhaler, , Disp: , Rfl:      AMBIEN 10 MG OR TABS, 1 po HS, prn, Disp: 20, Rfl: 0     azaTHIOprine (IMURAN) 50 MG tablet, Take 1 tablet (50 mg) by mouth daily, Disp: 30 tablet, Rfl: 1     calcium carbonate (OS-VAHID 500 MG [place]. CA) 1250 MG tablet, Take 1 tablet by mouth daily, Disp: , Rfl:       diltiazem 2% in PLO gel, Apply topically 4 times daily, Disp: 30 g, Rfl: 3     EPINEPHrine (ANY BX GENERIC EQUIV) 0.3 MG/0.3ML injection 2-pack, Inject 0.3 mLs (0.3 mg) into the muscle as needed for anaphylaxis, Disp: 2 each, Rfl: 1     EPINEPHrine (ANY BX GENERIC EQUIV) 0.3 MG/0.3ML injection 2-pack, epinephrine 0.3 mg/0.3 mL injection, auto-injector, Disp: , Rfl:      fexofenadine (ALLEGRA) 180 MG tablet, Take 1 tablet (180 mg) by mouth daily, Disp: 30 tablet, Rfl: 11     fluticasone (FLONASE) 50 MCG/ACT nasal spray, , Disp: , Rfl:      hydroxychloroquine (PLAQUENIL) 200 MG tablet, Take 1 tablet (200 mg) by mouth 2 times daily, Disp: 180 tablet, Rfl: 0     L-Methylfolate-Algae-B12-B6 (METANX PO), Take 1,000 mg by mouth daily, Disp: , Rfl:      montelukast (SINGULAIR) 10 MG tablet, Take 1 tablet (10 mg) by mouth At Bedtime, Disp: 30 tablet, Rfl: 11     MULTIVITAMIN TABS   OR, , Disp: , Rfl: 0     nifedipine 0.2% in white petrolatum 0.2 % OINT ointment, Apply topically 4 times daily, Disp: 100 g, Rfl: 3     Omega-3 Fatty Acids (OMEGA 3 PO), Take 1,250 mg by mouth daily, Disp: , Rfl:      ondansetron (ZOFRAN-ODT) 4 MG ODT tab, Take 1 tablet (4 mg) by mouth every 8 hours as needed for nausea, Disp: 30 tablet, Rfl: 1     predniSONE (DELTASONE) 5 MG tablet, Take 15 mg daily, ok to increase to 20 mg daily for flares, Disp: 90 tablet, Rfl: 3     sertraline (ZOLOFT) 100 MG tablet, Take 100 mg by mouth daily, Disp: , Rfl:      SPIRULINA PO, Take by mouth daily, Disp: , Rfl:      traMADol (ULTRAM) 50 MG tablet, Take 2 tablets (100 mg) by mouth 3 times daily as needed for severe pain, Disp: 180 tablet, Rfl: 5     triamcinolone (KENALOG) 0.1 % paste, Take by mouth 2 times daily, Disp: 5 g, Rfl: 3     triamcinolone (NASACORT) 55 MCG/ACT nasal aerosol, Spray 2 sprays into both nostrils daily, Disp: 16.9 mL, Rfl: 11     VITAMIN D, CHOLECALCIFEROL, PO, Take 5,000 Units by mouth daily, Disp: , Rfl:     Current Facility-Administered  Medications:      ondansetron (ZOFRAN-ODT) ODT tab 4 mg, 4 mg, Oral, Once, Shavon Raines MD    SOCIAL HISTORY:  Social History     Socioeconomic History     Marital status:      Spouse name: Not on file     Number of children: Not on file     Years of education: Not on file     Highest education level: Not on file   Occupational History     Not on file   Social Needs     Financial resource strain: Not on file     Food insecurity     Worry: Not on file     Inability: Not on file     Transportation needs     Medical: Not on file     Non-medical: Not on file   Tobacco Use     Smoking status: Never Smoker     Smokeless tobacco: Never Used   Substance and Sexual Activity     Alcohol use: Yes     Comment: occ.- 2/week     Drug use: No     Sexual activity: Yes     Partners: Male     Birth control/protection: Condom   Lifestyle     Physical activity     Days per week: Not on file     Minutes per session: Not on file     Stress: Not on file   Relationships     Social connections     Talks on phone: Not on file     Gets together: Not on file     Attends Christianity service: Not on file     Active member of club or organization: Not on file     Attends meetings of clubs or organizations: Not on file     Relationship status: Not on file     Intimate partner violence     Fear of current or ex partner: Not on file     Emotionally abused: Not on file     Physically abused: Not on file     Forced sexual activity: Not on file   Other Topics Concern     Parent/sibling w/ CABG, MI or angioplasty before 65F 55M? Not Asked   Social History Narrative     Not on file       FAMILY HISTORY:  Family History   Problem Relation Age of Onset     Diabetes Maternal Grandfather      Lipids Mother      Skin Cancer Paternal Grandmother      Melanoma No family hx of        Past/family/social history reviewed and no changes    PHYSICAL EXAMINATION:  Constitutional: aaox3, cooperative, pleasant, not dyspneic/diaphoretic, no acute  distress  Vitals reviewed: Wt 79.4 kg (175 lb)   BMI 27.53 kg/m    Wt:   Wt Readings from Last 2 Encounters:   05/26/21 79.4 kg (175 lb)   02/24/20 92 kg (202 lb 12.8 oz)        General appearance  Healthy appearing adult, in no acute distress     Eyes  Sclera anicteric  Pupils round and reactive to light     Ears, nose, mouth and throat  No obvious external lesions of ears and nose  Hearing intact     Neck  Symmetric  No obvious external lesions     Respiratory  Normal respiration, no use of accessory muscles      MSK  Gait normal     Skin  No rashes or jaundice      Psychiatric  Oriented to person, place and time  Appropriate mood and affect.     PERTINENT STUDIES:    Orders Only on 02/25/2021   Component Date Value Ref Range Status     Creatinine Urine Random 02/25/2021 34  mg/dL Final     Color Urine 02/25/2021 Yellow   Final     Appearance Urine 02/25/2021 Clear   Final     Glucose Urine 02/25/2021 Negative  NEG^Negative mg/dL Final     Bilirubin Urine 02/25/2021 Negative  NEG^Negative Final     Ketones Urine 02/25/2021 Negative  NEG^Negative mg/dL Final     Specific Gravity Urine 02/25/2021 1.006  1.003 - 1.035 Final     Blood Urine 02/25/2021 Negative  NEG^Negative Final     pH Urine 02/25/2021 6.0  5.0 - 7.0 pH Final     Protein Albumin Urine 02/25/2021 Negative  NEG^Negative mg/dL Final     Urobilinogen mg/dL 02/25/2021 Normal  0.0 - 2.0 mg/dL Final     Nitrite Urine 02/25/2021 Negative  NEG^Negative Final     Leukocyte Esterase Urine 02/25/2021 Negative  NEG^Negative Final     Source 02/25/2021 Urine   Final     WBC Urine 02/25/2021 0  0 - 5 /HPF Final     RBC Urine 02/25/2021 1  0 - 2 /HPF Final     Bacteria Urine 02/25/2021 Few* NEG^Negative /HPF Final     Squamous Epithelial /HPF Urine 02/25/2021 3* 0 - 1 /HPF Final       Again, thank you for allowing me to participate in the care of your patient.      Sincerely,    Elena Castillo MD

## 2021-05-26 NOTE — NURSING NOTE
Chief Complaint   Patient presents with     New Patient       Vitals:    05/26/21 1021   Weight: 79.4 kg (175 lb)       Body mass index is 27.53 kg/m .    Karin Denton CMA

## 2021-06-02 ENCOUNTER — PRE VISIT (OUTPATIENT)
Dept: GASTROENTEROLOGY | Facility: CLINIC | Age: 46
End: 2021-06-02

## 2021-06-03 ENCOUNTER — TELEPHONE (OUTPATIENT)
Dept: GASTROENTEROLOGY | Facility: CLINIC | Age: 46
End: 2021-06-03

## 2021-06-03 NOTE — TELEPHONE ENCOUNTER
1st attempt - Left voice message with number to call back    Ordering provider: Elena Castillo MD    Procedure: Lower Endoscopy     Upper Endoscopy    Upper Endoscopy Type: EGD    Upper Endoscopy Sedation: Conscious/Moderate    Upper Endoscopy Reason for Procedure: heartburn despite antacid therapy    Lower Endoscopy Type: Colonoscopy    Purpose of Colonoscopy Procedure: Diagnostic    Colonoscopy reason for referral: rectal pain with defecation    Colonoscopy Sedation: Conscious/Moderate    [facility] Location: [facility]      Dx:  Gastroesophageal reflux disease without esophagitis [K21.9]  - Primary       Rectal pain [K62.89]

## 2021-06-04 ENCOUNTER — TELEPHONE (OUTPATIENT)
Dept: GASTROENTEROLOGY | Facility: OUTPATIENT CENTER | Age: 46
End: 2021-06-04

## 2021-06-04 ENCOUNTER — MYC MEDICAL ADVICE (OUTPATIENT)
Dept: INTERNAL MEDICINE | Facility: CLINIC | Age: 46
End: 2021-06-04

## 2021-06-04 DIAGNOSIS — N80.9 ENDOMETRIOSIS: Primary | ICD-10-CM

## 2021-06-04 DIAGNOSIS — Z11.59 ENCOUNTER FOR SCREENING FOR OTHER VIRAL DISEASES: ICD-10-CM

## 2021-06-04 NOTE — TELEPHONE ENCOUNTER
Scheduling Details    Procedure Scheduled: egd/colon  Provider/Surgeon:   Date of Procedure: 6/22  Location: [facility]  Caller (Please ask for phone number if not scheduled by patient): patient      Sedation Type: mac  Conscious Sedation- Needs  for 6 hours after the procedure  MAC/General-Needs  for 24 hours after procedure    Pre-op Required at [facility] and OR for  MAC sedation:   (if yes advise patient they will need a pre-op prior to procedure)      Is patient on blood thinners? -n (If yes- inform patient to follow up with PCP or provider for follow up instructions)     Informed patient they will need an adult  y  Cannot take any type of public or medical transportation alone    Informed Patient of COVID Test Requirement y-sched    Confirmed Nurse will call to complete assessment y    Additional comments:

## 2021-06-09 ENCOUNTER — OFFICE VISIT (OUTPATIENT)
Dept: INTERNAL MEDICINE | Facility: CLINIC | Age: 46
End: 2021-06-09
Payer: COMMERCIAL

## 2021-06-09 VITALS
OXYGEN SATURATION: 99 % | DIASTOLIC BLOOD PRESSURE: 86 MMHG | BODY MASS INDEX: 27.47 KG/M2 | RESPIRATION RATE: 16 BRPM | HEIGHT: 67 IN | SYSTOLIC BLOOD PRESSURE: 135 MMHG | HEART RATE: 109 BPM | WEIGHT: 175 LBS

## 2021-06-09 DIAGNOSIS — G89.29 OTHER CHRONIC PAIN: ICD-10-CM

## 2021-06-09 PROCEDURE — 99214 OFFICE O/P EST MOD 30 MIN: CPT | Performed by: INTERNAL MEDICINE

## 2021-06-09 RX ORDER — TRAMADOL HYDROCHLORIDE 300 MG/1
300 TABLET, EXTENDED RELEASE ORAL
Qty: 30 TABLET | Refills: 5 | Status: SHIPPED | OUTPATIENT
Start: 2021-06-09 | End: 2021-06-09

## 2021-06-09 RX ORDER — TRAMADOL HYDROCHLORIDE 50 MG/1
100 TABLET ORAL 3 TIMES DAILY PRN
Qty: 180 TABLET | Refills: 5 | Status: CANCELLED | OUTPATIENT
Start: 2021-06-09

## 2021-06-09 RX ORDER — TRAMADOL HYDROCHLORIDE 300 MG/1
300 TABLET, EXTENDED RELEASE ORAL AT BEDTIME
Qty: 30 TABLET | Refills: 5 | Status: SHIPPED | OUTPATIENT
Start: 2021-06-09 | End: 2022-01-10

## 2021-06-09 RX ORDER — TRAMADOL HYDROCHLORIDE 50 MG/1
50 TABLET ORAL EVERY 6 HOURS PRN
Qty: 10 TABLET | Refills: 0 | Status: SHIPPED | OUTPATIENT
Start: 2021-06-09 | End: 2021-06-12

## 2021-06-09 ASSESSMENT — MIFFLIN-ST. JEOR: SCORE: 1459.03

## 2021-06-09 ASSESSMENT — PAIN SCALES - GENERAL: PAINLEVEL: NO PAIN (0)

## 2021-06-09 NOTE — NURSING NOTE
Chief Complaint   Patient presents with     Recheck Medication     Patient comes in for a follow up.         Cresencio Mabry MA on 6/9/2021 at 4:09 PM

## 2021-06-09 NOTE — PROGRESS NOTES
"Gracie is a very pleasant 46 year old female here for f/u of several concerns, including pain management; recent GI evaluation for nausea, heartburn and rectal pain with defecation; recent flare of possible autoimmune disease including joint pain and facial rash.    I spent 15 minutes with Gracie in the office and another 15 minutes of chart review including multiple subspecialist notes, and test results, same day.    The EGD and colon were negative for any cause of her abdominal pain, however, a tubular adenoma was found.  Her GI symptoms now seem well managed with a combination of PPI and zofran.    She has also had a recent prednisone increase and taper per Rheumatology.   We then went on to discuss pain management; since her chronic conditions have all been evaluated by appropriate subspecialty services, I view my role as helping to manage symptoms. She did respond to tramadol however is needing to take it round the clock at times.  A better approach might be to switch to long acting tramadol which has worked well for many of my patients. She is willing to give that a try.      /86   Pulse 109   Resp 16   Ht 1.698 m (5' 6.85\")   Wt 79.4 kg (175 lb)   SpO2 99%   BMI 27.53 kg/m     Alert, NAD, no further exam was done    A/P Patricia was seen today for recheck medication.    Other chronic pain  -     traMADol (ULTRAM) 50 MG tablet; Take 1 tablet (50 mg) by mouth every 6 hours as needed for severe pain  -     traMADol (ULTRAM-ER) 300 MG 24 hr tablet; Take 1 tablet (300 mg) by mouth At Bedtime maximum 1 tablet(s) per day    RTC in four week to reassess symptom management    Shavon Guzman MD    "

## 2021-06-11 ENCOUNTER — TELEPHONE (OUTPATIENT)
Dept: INTERNAL MEDICINE | Facility: CLINIC | Age: 46
End: 2021-06-11

## 2021-06-11 NOTE — TELEPHONE ENCOUNTER
PA Initiation    Medication: traMADol (ULTRAM-ER) 300 MG 24 hr tablet   Insurance Company: [employer] - Phone [phone] Fax [phone]  Pharmacy Filling the Rx: [facility] #[id number] - [place], [place] - [facility] [address] AT [facility] [address]  Filling Pharmacy Phone: [phone]  Filling Pharmacy Fax: [phone]  Start Date: 6/11/2021

## 2021-06-11 NOTE — TELEPHONE ENCOUNTER
Prior Authorization Retail Medication Request    Medication/Dose: traMADol (ULTRAM-ER) 300 MG 24 hr tablet  ICD code (if different than what is on RX):  Other chronic pain [G89.29]   Previously Tried and Failed:    Rationale:      Insurance Name:  [employer]  Insurance ID:  [id number]    Pharmacy Information (if different than what is on RX)  Name:  [id number] - [place], [place] - [address] AT [facility] [address]  Phone:  [phone]

## 2021-06-14 ENCOUNTER — TELEPHONE (OUTPATIENT)
Dept: GASTROENTEROLOGY | Facility: OUTPATIENT CENTER | Age: 46
End: 2021-06-14

## 2021-06-14 NOTE — TELEPHONE ENCOUNTER
Prior Authorization Approval    Authorization Effective Date: 6/11/2021  Authorization Expiration Date: 12/11/2021  Medication: traMADol (ULTRAM-ER) 300 MG 24 hr tablet--APPROVED  Approved Dose/Quantity:   Reference #:     Insurance Company: [employer] - Phone [phone] Fax [phone]  Expected CoPay:       CoPay Card Available:      Foundation Assistance Needed:    Which Pharmacy is filling the prescription (Not needed for infusion/clinic administered): [facility] #[id number] - [place], [place] - [address] AT [facility] [address]  Pharmacy Notified: Yes  Patient Notified: Yes **Instructed pharmacy to notify patient when script is ready to /ship.**

## 2021-06-17 ENCOUNTER — TELEPHONE (OUTPATIENT)
Dept: GASTROENTEROLOGY | Facility: OUTPATIENT CENTER | Age: 46
End: 2021-06-17

## 2021-06-18 ENCOUNTER — MYC MEDICAL ADVICE (OUTPATIENT)
Dept: INTERNAL MEDICINE | Facility: CLINIC | Age: 46
End: 2021-06-18

## 2021-06-19 DIAGNOSIS — Z11.59 ENCOUNTER FOR SCREENING FOR OTHER VIRAL DISEASES: ICD-10-CM

## 2021-06-19 DIAGNOSIS — M32.9 SYSTEMIC LUPUS ERYTHEMATOSUS, UNSPECIFIED SLE TYPE, UNSPECIFIED ORGAN INVOLVEMENT STATUS (H): ICD-10-CM

## 2021-06-19 LAB
ALBUMIN SERPL-MCNC: 3.6 G/DL (ref 3.4–5)
ALT SERPL W P-5'-P-CCNC: 36 U/L (ref 0–50)
AST SERPL W P-5'-P-CCNC: 18 U/L (ref 0–45)
BASOPHILS # BLD AUTO: 0 10E9/L (ref 0–0.2)
BASOPHILS NFR BLD AUTO: 0.3 %
CREAT SERPL-MCNC: 0.76 MG/DL (ref 0.52–1.04)
CREAT UR-MCNC: 15 MG/DL
CRP SERPL-MCNC: <2.9 MG/L (ref 0–8)
DIFFERENTIAL METHOD BLD: ABNORMAL
EOSINOPHIL # BLD AUTO: 0.1 10E9/L (ref 0–0.7)
EOSINOPHIL NFR BLD AUTO: 0.5 %
ERYTHROCYTE [DISTWIDTH] IN BLOOD BY AUTOMATED COUNT: 12.4 % (ref 10–15)
ERYTHROCYTE [SEDIMENTATION RATE] IN BLOOD BY WESTERGREN METHOD: 5 MM/H (ref 0–20)
GFR SERPL CREATININE-BSD FRML MDRD: >90 ML/MIN/{1.73_M2}
HCT VFR BLD AUTO: 41.3 % (ref 35–47)
HGB BLD-MCNC: 13.7 G/DL (ref 11.7–15.7)
IMM GRANULOCYTES # BLD: 0.1 10E9/L (ref 0–0.4)
IMM GRANULOCYTES NFR BLD: 0.9 %
LABORATORY COMMENT REPORT: NORMAL
LYMPHOCYTES # BLD AUTO: 3.9 10E9/L (ref 0.8–5.3)
LYMPHOCYTES NFR BLD AUTO: 29.9 %
MCH RBC QN AUTO: 31 PG (ref 26.5–33)
MCHC RBC AUTO-ENTMCNC: 33.2 G/DL (ref 31.5–36.5)
MCV RBC AUTO: 93 FL (ref 78–100)
MONOCYTES # BLD AUTO: 1.1 10E9/L (ref 0–1.3)
MONOCYTES NFR BLD AUTO: 8.4 %
NEUTROPHILS # BLD AUTO: 7.7 10E9/L (ref 1.6–8.3)
NEUTROPHILS NFR BLD AUTO: 60 %
NRBC # BLD AUTO: 0 10*3/UL
NRBC BLD AUTO-RTO: 0 /100
PLATELET # BLD AUTO: 221 10E9/L (ref 150–450)
PROT UR-MCNC: <0.05 G/L
PROT/CREAT 24H UR: NORMAL G/G CR (ref 0–0.2)
RBC # BLD AUTO: 4.42 10E12/L (ref 3.8–5.2)
RETICS # AUTO: 94.6 10E9/L (ref 25–95)
RETICS/RBC NFR AUTO: 2.1 % (ref 0.5–2)
SARS-COV-2 RNA RESP QL NAA+PROBE: NEGATIVE
SARS-COV-2 RNA RESP QL NAA+PROBE: NORMAL
SPECIMEN SOURCE: NORMAL
SPECIMEN SOURCE: NORMAL
WBC # BLD AUTO: 12.9 10E9/L (ref 4–11)

## 2021-06-19 PROCEDURE — 82565 ASSAY OF CREATININE: CPT | Performed by: PATHOLOGY

## 2021-06-19 PROCEDURE — 86140 C-REACTIVE PROTEIN: CPT | Performed by: PATHOLOGY

## 2021-06-19 PROCEDURE — 85025 COMPLETE CBC W/AUTO DIFF WBC: CPT | Performed by: PATHOLOGY

## 2021-06-19 PROCEDURE — 84450 TRANSFERASE (AST) (SGOT): CPT | Performed by: PATHOLOGY

## 2021-06-19 PROCEDURE — 84460 ALANINE AMINO (ALT) (SGPT): CPT | Performed by: PATHOLOGY

## 2021-06-19 PROCEDURE — 85652 RBC SED RATE AUTOMATED: CPT | Performed by: PATHOLOGY

## 2021-06-19 PROCEDURE — 86225 DNA ANTIBODY NATIVE: CPT | Mod: 90 | Performed by: PATHOLOGY

## 2021-06-19 PROCEDURE — U0003 INFECTIOUS AGENT DETECTION BY NUCLEIC ACID (DNA OR RNA); SEVERE ACUTE RESPIRATORY SYNDROME CORONAVIRUS 2 (SARS-COV-2) (CORONAVIRUS DISEASE [COVID-19]), AMPLIFIED PROBE TECHNIQUE, MAKING USE OF HIGH THROUGHPUT TECHNOLOGIES AS DESCRIBED BY CMS-2020-01-R: HCPCS | Mod: 90 | Performed by: PATHOLOGY

## 2021-06-19 PROCEDURE — 85045 AUTOMATED RETICULOCYTE COUNT: CPT | Performed by: PATHOLOGY

## 2021-06-19 PROCEDURE — 36415 COLL VENOUS BLD VENIPUNCTURE: CPT | Performed by: PATHOLOGY

## 2021-06-19 PROCEDURE — 86160 COMPLEMENT ANTIGEN: CPT | Mod: 90 | Performed by: PATHOLOGY

## 2021-06-19 PROCEDURE — 82040 ASSAY OF SERUM ALBUMIN: CPT | Performed by: PATHOLOGY

## 2021-06-19 PROCEDURE — 85060 BLOOD SMEAR INTERPRETATION: CPT | Performed by: PATHOLOGY

## 2021-06-19 PROCEDURE — 84156 ASSAY OF PROTEIN URINE: CPT | Performed by: PATHOLOGY

## 2021-06-19 PROCEDURE — U0005 INFEC AGEN DETEC AMPLI PROBE: HCPCS | Mod: 90 | Performed by: PATHOLOGY

## 2021-06-21 ENCOUNTER — TELEPHONE (OUTPATIENT)
Dept: GASTROENTEROLOGY | Facility: OUTPATIENT CENTER | Age: 46
End: 2021-06-21

## 2021-06-21 LAB
C3 SERPL-MCNC: 108 MG/DL (ref 81–157)
C4 SERPL-MCNC: 25 MG/DL (ref 13–39)
COPATH REPORT: NORMAL
DSDNA AB SER-ACNC: <1 IU/ML

## 2021-06-21 NOTE — TELEPHONE ENCOUNTER
Is patient taking blood thinners/antiplatelet medications? No     Heart Disease? denies    Lung Disease? denies    Sleep Apnea? Possible    Diabetic? denies    Kidney Disease? denies    Electronic Implantable Devices? denies    PTSD? N/a    Prep instructions reviewed with patient? Instructions,  policy, MAC sedation plan reviewed. Instructed patient to have someone stay with her for 24 hours post exam    : Yes    Pharmacy: N/a    Indication for Procedure: rectal pain with defecation, GERD    Referring Provider: Elena Castillo MD,Shavon Raines MD    Arrival Time: 9 AM    COVID test? 6-19 [facility]    Gabriela Krishna RN

## 2021-06-22 ENCOUNTER — TRANSFERRED RECORDS (OUTPATIENT)
Dept: HEALTH INFORMATION MANAGEMENT | Facility: CLINIC | Age: 46
End: 2021-06-22

## 2021-06-22 ENCOUNTER — DOCUMENTATION ONLY (OUTPATIENT)
Dept: GASTROENTEROLOGY | Facility: OUTPATIENT CENTER | Age: 46
End: 2021-06-22
Payer: COMMERCIAL

## 2021-06-22 ENCOUNTER — APPOINTMENT (OUTPATIENT)
Dept: GASTROENTEROLOGY | Facility: OUTPATIENT CENTER | Age: 46
End: 2021-06-22
Payer: COMMERCIAL

## 2021-07-01 LAB — COPATH REPORT: NORMAL

## 2021-07-02 NOTE — RESULT ENCOUNTER NOTE
"Ms. eNto Hall -     I am writing to let you know the results from your recent upper endoscopy and colonoscopy. The biopsies from your upper endoscopy and from your colon were normal. We did remove one polyp from your rectum (last part of your colon)  at the time of your colonoscopy.  The polyp returned as an \"adenomatous polyp\".  An adenoma is considered a pre-cancerous polyp.  There was no cancer in your polyp.  Your polyp was completely removed, however you are at risk to grow more adenomatous polyps in the future.      Current gastroenterology societies recommend a colonoscopy to look for new polyps in 7 to 10 years.  Based on your colonoscopy and personal risk factors, I believe it is okay to have a colonoscopy in 10 years. Of course should you develop any symptoms (such as unintended weight loss, blood in your stool or change in bowel pattern), you may need an earlier procedure. I know that you are scheduled to follow up with Dr Castillo in 2-3 months. If you have any questions before then, please don't hesitate to contact the Gastroenterology nurse at [phone]. Also, if you have access to [employer], your pathology results are available to you directly but I have copied them below.    Katharina Melton MD  [facility], Division of Gastroenterology, Hepatology and Nutrition    A. DUODENAL BIOPSY:   - Duodenal mucosa with no significant histologic abnormality   - No evidence of celiac sprue or peptic duodenitis     B. STOMACH BIOPSY:   - Gastric fundic mucosa, with features of reactive gastropathy   - No H. pylori like organisms identified on routine staining   - Minute detached fragment of small intestinal mucosa (probable   contaminant)   - Negative for dysplasia     C. RANDOM COLON BIOPSY:   - Colonic mucosa with no significant histologic abnormality   - Negative for active inflammation and lymphocytic colitis     D. RECTAL POLYP, POLYPECTOMY:   - Tubular adenoma, negative for high-grade dysplasia"

## 2021-08-12 ENCOUNTER — MYC MEDICAL ADVICE (OUTPATIENT)
Dept: RHEUMATOLOGY | Facility: CLINIC | Age: 46
End: 2021-08-12

## 2021-08-12 DIAGNOSIS — M32.9 SYSTEMIC LUPUS ERYTHEMATOSUS, UNSPECIFIED SLE TYPE, UNSPECIFIED ORGAN INVOLVEMENT STATUS (H): ICD-10-CM

## 2021-08-12 DIAGNOSIS — G43.909 MIGRAINE WITHOUT STATUS MIGRAINOSUS, NOT INTRACTABLE, UNSPECIFIED MIGRAINE TYPE: Primary | ICD-10-CM

## 2021-08-12 NOTE — TELEPHONE ENCOUNTER
[facility] Call Center    Phone Message    May a detailed message be left on voicemail: yes     Reason for Call: Medication Question or concern regarding medication   Prescription Clarification  Name of Medication: IMITREX nasal spray   Prescribing Provider: hasn't had for a couple years    Pharmacy: [facility] #[id number] - [place], [place] - [address] AT [facility] [address]   What on the order needs clarification? Patient would like a prescription for this medication due to a current migraine. Patient states she has taken this in the past. Patient is scheduled to see Dr. Mendez on 8/16/21 via Video visit but states she is really suffering from these symptoms now and would like this prescription asap. Please follow up with patient to advise. Thank you!            Action Taken: Message routed to:  [facility] ([facility]    Travel Screening: Not Applicable

## 2021-08-12 NOTE — TELEPHONE ENCOUNTER
I spoke to Gracie via telephone regarding recent request for IMITREX nasal spray. She said that she has taken both the tablet and used the nasal spray in the past for migraines, but nothing in recent years. She said that she stopped taking it when she was trying to conceive, and that she was put on lupus medication that alleviated her migraines but they have since come back. She has felt the best relief from the nasal spray but is open to whatever Dr. Mendez is willing to prescribe. Will forward to provider for review.  Shaila Perez RN

## 2021-08-13 DIAGNOSIS — M32.9 SYSTEMIC LUPUS ERYTHEMATOSUS, UNSPECIFIED SLE TYPE, UNSPECIFIED ORGAN INVOLVEMENT STATUS (H): ICD-10-CM

## 2021-08-13 RX ORDER — SUMATRIPTAN 20 MG/1
1 SPRAY NASAL
Qty: 1 EACH | Refills: 1 | Status: SHIPPED | OUTPATIENT
Start: 2021-08-13 | End: 2022-05-17

## 2021-08-13 RX ORDER — PREDNISONE 5 MG/1
TABLET ORAL
Qty: 90 TABLET | Refills: 3 | Status: SHIPPED | OUTPATIENT
Start: 2021-08-13 | End: 2021-11-05

## 2021-08-13 NOTE — LETTER
Patricia Hall  [address]  [place] [address]    Dear Patricia Hall,     Regular eye exams are required while taking hydroxychloroquine (Plaquenil). Eye exams should be completed by an eye specialist who is experienced in monitoring for hydroxychloroquine toxicity (a rare effect of the drug that can damage your eyes and vision).  These may be yearly or as determined by your eye specialist.     Although vision problems and loss of sight while taking hydroxychloroquine are very rare, notify your doctor immediately if you notice changes in your vision. The goal of screening is to detect toxicity before your vision is significantly or noticeably impacted. Failing to get proper screening exams puts you at risk of vision changes which may or may not be reversible.    Per the American Academy of Ophthalmology recommendations (2016), screening tests performed may include a 10-2 visual field test, spectral-domain optical coherence tomography (SD OCT), or other screening tests as determined by the eye specialist, including a multifocal electroretinogram (mfERG) or fundus auto-fluorescence (FAF).    We received a refill request from your pharmacy. A copy of your current eye exam was not found in your medical record. Your hydroxychloroquine prescription has been refilled with a limited supply pending confirmation you have had an eye exam to test for hydroxychloroquine toxicity.      We encourage you to bring this letter to your eye exam to discuss the exams that will be performed during your visit. Please request your eye clinic fax or mail a copy of your eye exam report to our clinic indicating that testing was completed for hydroxychloroquine toxicity screening. The exam notes must specifically comment on the potential for hydroxychloroquine toxicity and outline recommended follow-up.    If you have questions about hydroxychloroquine or the information in this letter, please call the clinic at  [phone] or talk to your provider at your next office visit.                        2    Eye Specialist Letter  Dear Eye Specialist,  To ensure safe use of Plaquenil (hydroxychloroquine), we are requesting your assistance in providing the following information. Please return this form to our clinic via mail or fax, or incorporate this information into your visit summary. Your note must indicate whether or not there is evidence of toxicity from Plaquenil use. For questions regarding this form, please call [phone].  Patient Name:   :             Date of Exam:    The following exams were completed during this visit in accordance with the American Academy of Ophthalmology recommendations (2016):  ? 10-2 automated visual field  ? Spectral-domain optical coherence tomography (SD OCT)  ? Multifocal electroretinogram (mfERG)  ? Fundus autofluorescence (FAF)  ? Other (please specify)  Please select from the following:  ? The findings from the above exams are not suggestive of toxicity from Plaquenil (hydroxychloroquine).  ? The findings from the above exams may suggest toxicity from Plaquenil (hydroxychloroquine).  Directly contact the clinic and fax this form.  Please provide additional guidance on whether or not the medication may be continued from your perspective:  Date of next recommended Plaquenil (hydroxychloroquine) screening eye exam:   ? 5 years  ? 1 year  ? 6 months  Other (please specify):   Eye Specialist Name (print):                                                                Date:  Eye Specialist Signature:

## 2021-08-16 ENCOUNTER — VIRTUAL VISIT (OUTPATIENT)
Dept: INTERNAL MEDICINE | Facility: CLINIC | Age: 46
End: 2021-08-16
Payer: COMMERCIAL

## 2021-08-16 DIAGNOSIS — J30.89 SEASONAL ALLERGIC RHINITIS DUE TO OTHER ALLERGIC TRIGGER: ICD-10-CM

## 2021-08-16 DIAGNOSIS — G43.909 MIGRAINE WITHOUT STATUS MIGRAINOSUS, NOT INTRACTABLE, UNSPECIFIED MIGRAINE TYPE: Primary | ICD-10-CM

## 2021-08-16 DIAGNOSIS — M21.612 BUNION, LEFT: ICD-10-CM

## 2021-08-16 PROCEDURE — 99214 OFFICE O/P EST MOD 30 MIN: CPT | Mod: 95 | Performed by: INTERNAL MEDICINE

## 2021-08-16 RX ORDER — TRIAMCINOLONE ACETONIDE 55 UG/1
2 SPRAY, METERED NASAL DAILY
Qty: 16.5 ML | Refills: 11 | Status: SHIPPED | OUTPATIENT
Start: 2021-08-16 | End: 2023-12-18

## 2021-08-16 RX ORDER — RIZATRIPTAN BENZOATE 5 MG/1
5 TABLET, ORALLY DISINTEGRATING ORAL
Qty: 15 TABLET | Refills: 11 | Status: SHIPPED | OUTPATIENT
Start: 2021-08-16 | End: 2022-08-30

## 2021-08-16 RX ORDER — TRAMADOL HYDROCHLORIDE 50 MG/1
TABLET ORAL
Qty: 60 TABLET | Refills: 5 | Status: SHIPPED | OUTPATIENT
Start: 2021-08-16 | End: 2021-08-23

## 2021-08-16 ASSESSMENT — PATIENT HEALTH QUESTIONNAIRE - PHQ9
SUM OF ALL RESPONSES TO PHQ QUESTIONS 1-9: 6
SUM OF ALL RESPONSES TO PHQ QUESTIONS 1-9: 6
10. IF YOU CHECKED OFF ANY PROBLEMS, HOW DIFFICULT HAVE THESE PROBLEMS MADE IT FOR YOU TO DO YOUR WORK, TAKE CARE OF THINGS AT HOME, OR GET ALONG WITH OTHER PEOPLE: SOMEWHAT DIFFICULT

## 2021-08-16 ASSESSMENT — ANXIETY QUESTIONNAIRES
5. BEING SO RESTLESS THAT IT IS HARD TO SIT STILL: NOT AT ALL
7. FEELING AFRAID AS IF SOMETHING AWFUL MIGHT HAPPEN: SEVERAL DAYS
GAD7 TOTAL SCORE: 5
7. FEELING AFRAID AS IF SOMETHING AWFUL MIGHT HAPPEN: SEVERAL DAYS
1. FEELING NERVOUS, ANXIOUS, OR ON EDGE: SEVERAL DAYS
3. WORRYING TOO MUCH ABOUT DIFFERENT THINGS: SEVERAL DAYS
GAD7 TOTAL SCORE: 5
4. TROUBLE RELAXING: SEVERAL DAYS
6. BECOMING EASILY ANNOYED OR IRRITABLE: NOT AT ALL
8. IF YOU CHECKED OFF ANY PROBLEMS, HOW DIFFICULT HAVE THESE MADE IT FOR YOU TO DO YOUR WORK, TAKE CARE OF THINGS AT HOME, OR GET ALONG WITH OTHER PEOPLE?: SOMEWHAT DIFFICULT
GAD7 TOTAL SCORE: 5
2. NOT BEING ABLE TO STOP OR CONTROL WORRYING: SEVERAL DAYS

## 2021-08-16 NOTE — PROGRESS NOTES
Gracie is a very pleasant 46 year old who is being evaluated via a billable video visit.      Subjective   Gracie presents for the following health issues: migraines, lupus, endometriosis, allergies, bunion    HPI     Gracie reports migraines have been more frequent and more severe in the past 6 months.  I had refilled imitrex in the past, but she stopped taking it because she was trying to conceive. Also, she feels it is losing effectiveness.  These migraines are triggered by hormonal changes (mid cycle and during her period). No longer trying to conceive at this point in time.  Reports increased endometriosis pain lately as well.   Dr. Roy has been managing lupus, is currently on prednisone, was considering imuran vs a biologic as next step.  At last visit, I had started extended release tramadol as another modality to help manage pain; at times thinks she should go back to the short acting with more control over the dose.  In addition, she reports that her allergies have been acting up lately and the fluticasone nasal spray doesn't seem to be helping.  Lastly, she wanted to point out a bunion on the left foot that has become darker lately.      Review of Systems    ROS: 10 point ROS neg other than the symptoms noted above in the HPI.      Objective           Vitals:  No vitals were obtained today due to virtual visit.    Physical Exam   GENERAL: Healthy, alert and no distress  EYES: Eyes grossly normal to inspection.  No discharge or erythema, or obvious scleral/conjunctival abnormalities.  RESP: No audible wheeze, cough, or visible cyanosis.  No visible retractions or increased work of breathing.    SKIN: Visible skin clear. No significant rash, abnormal pigmentation or lesions.  NEURO: Cranial nerves grossly intact.  Mentation and speech appropriate for age.  PSYCH: Mentation appears normal, affect normal/bright, judgement and insight intact, normal speech and appearance well-groomed.  Right foot: bunion noted near  great toe 1st MTP joint with a darker discoloration in the center    A/P 46 year old here for f/u    Migraine without status migrainosus, not intractable, unspecified migraine type  -     Trial of rizatriptan (MAXALT-MLT) 5 MG ODT; Take 1 tablet (5 mg) by mouth at onset of headache for migraine May repeat in 2 hours. Max 6 tablets/24 hours.  -     Refill traMADol (ULTRAM) 50 MG tablet; Take 1-2 tablets up to three times a day as needed    Seasonal allergic rhinitis due to other allergic trigger  -     Change to triamcinolone (NASACORT) 55 MCG/ACT nasal aerosol; Spray 2 sprays into both nostrils daily    Bunion, left  -     Orthopedic  Referral; Future    Shavon Guzman MD        Video-Visit Details    Type of service:  Video Visit started 9:58 am ended 10:17 with another 15 minutes chart review and documentation and , same day    Originating Location (pt. Location): Home    Distant Location (provider location):  [facility] [place]     Platform used for Video Visit: Doximity after Amwell failure

## 2021-08-17 RX ORDER — PREDNISONE 5 MG/1
TABLET ORAL
Qty: 90 TABLET | Refills: 3 | OUTPATIENT
Start: 2021-08-17

## 2021-08-17 ASSESSMENT — ANXIETY QUESTIONNAIRES: GAD7 TOTAL SCORE: 5

## 2021-08-17 NOTE — TELEPHONE ENCOUNTER
hydroxychloroquine (PLAQUENIL) 200 MG tablet   Take 1 tablet (200 mg) by mouth 2 times daily      Last Written Prescription Date:  5/21/21  Last Fill Quantity: 180,   # refills: 0  Last Office Visit : 4/29/20  Future Office visit:  10/8/21  Last eye exam: 10/18/19    Routing refill request to provider for review/approval because:  Overdue eye exam. Letter sent

## 2021-08-18 RX ORDER — HYDROXYCHLOROQUINE SULFATE 200 MG/1
200 TABLET, FILM COATED ORAL 2 TIMES DAILY
Qty: 60 TABLET | Refills: 0 | Status: SHIPPED | OUTPATIENT
Start: 2021-08-18 | End: 2021-09-21

## 2021-08-23 ENCOUNTER — TELEPHONE (OUTPATIENT)
Dept: INTERNAL MEDICINE | Facility: CLINIC | Age: 46
End: 2021-08-23

## 2021-08-23 ENCOUNTER — MYC MEDICAL ADVICE (OUTPATIENT)
Dept: INTERNAL MEDICINE | Facility: CLINIC | Age: 46
End: 2021-08-23

## 2021-08-23 DIAGNOSIS — G43.909 MIGRAINE WITHOUT STATUS MIGRAINOSUS, NOT INTRACTABLE, UNSPECIFIED MIGRAINE TYPE: ICD-10-CM

## 2021-08-23 DIAGNOSIS — R11.0 NAUSEA: ICD-10-CM

## 2021-08-23 RX ORDER — ONDANSETRON 4 MG/1
4 TABLET, ORALLY DISINTEGRATING ORAL EVERY 8 HOURS PRN
Qty: 30 TABLET | Refills: 1 | Status: SHIPPED | OUTPATIENT
Start: 2021-08-23 | End: 2021-12-03

## 2021-08-23 RX ORDER — TRAMADOL HYDROCHLORIDE 50 MG/1
TABLET ORAL
Qty: 180 TABLET | Refills: 5 | Status: SHIPPED | OUTPATIENT
Start: 2021-08-23 | End: 2022-01-10

## 2021-08-23 NOTE — TELEPHONE ENCOUNTER
[facility] Call Center    Phone Message    May a detailed message be left on voicemail: yes     Reason for Call: Other: . Patient called about the Shoulder Options message she sent for the medication changes and the anti nausea medication requested. Please call patient back and discuss.     Action Taken: Message routed to:  [facility]    Travel Screening: Not Applicable

## 2021-08-31 ENCOUNTER — TELEPHONE (OUTPATIENT)
Dept: RHEUMATOLOGY | Facility: CLINIC | Age: 46
End: 2021-08-31

## 2021-09-02 NOTE — TELEPHONE ENCOUNTER
PA Initiation    Medication: Benlysta- MICHAEL  Insurance Company: [facility] - Phone [phone] Fax [phone]  Pharmacy Filling the Rx: [facility] - [place], [place] - [facility] [address]  Filling Pharmacy Phone:    Filling Pharmacy Fax:    Start Date: 8/31/2021

## 2021-09-07 NOTE — TELEPHONE ENCOUNTER
PRIOR AUTHORIZATION DENIED    Medication: Benlysta- DENIED     Denial Date: 9/7/2021    Denial Rational:  PATIENT HAS NOT TRIED AND FAILED ONE ADDITIONAL ALTERNATIVE:    1. DEXAMETHASONE  2. HYDROCORTISONE   3. METHYLPREDNISOLONE      Appeal Information:  CAN APPEAL

## 2021-09-08 ENCOUNTER — LAB (OUTPATIENT)
Dept: LAB | Facility: CLINIC | Age: 46
End: 2021-09-08
Payer: COMMERCIAL

## 2021-09-08 ENCOUNTER — ANCILLARY PROCEDURE (OUTPATIENT)
Dept: ULTRASOUND IMAGING | Facility: CLINIC | Age: 46
End: 2021-09-08
Attending: INTERNAL MEDICINE
Payer: COMMERCIAL

## 2021-09-08 DIAGNOSIS — R10.11 ABDOMINAL PAIN, RIGHT UPPER QUADRANT: ICD-10-CM

## 2021-09-08 DIAGNOSIS — M32.9 SYSTEMIC LUPUS ERYTHEMATOSUS, UNSPECIFIED SLE TYPE, UNSPECIFIED ORGAN INVOLVEMENT STATUS (H): ICD-10-CM

## 2021-09-08 LAB
HCV AB SERPL QL IA: NONREACTIVE
TSH SERPL DL<=0.005 MIU/L-ACNC: 3.07 MU/L (ref 0.4–4)

## 2021-09-08 PROCEDURE — 86803 HEPATITIS C AB TEST: CPT | Mod: 90 | Performed by: PATHOLOGY

## 2021-09-08 PROCEDURE — 87340 HEPATITIS B SURFACE AG IA: CPT | Mod: 90 | Performed by: PATHOLOGY

## 2021-09-08 PROCEDURE — 84443 ASSAY THYROID STIM HORMONE: CPT | Performed by: PATHOLOGY

## 2021-09-08 PROCEDURE — 86704 HEP B CORE ANTIBODY TOTAL: CPT | Mod: 90 | Performed by: PATHOLOGY

## 2021-09-08 PROCEDURE — 86769 SARS-COV-2 COVID-19 ANTIBODY: CPT | Mod: 90 | Performed by: PATHOLOGY

## 2021-09-08 PROCEDURE — 86481 TB AG RESPONSE T-CELL SUSP: CPT | Mod: 90 | Performed by: PATHOLOGY

## 2021-09-08 PROCEDURE — 36415 COLL VENOUS BLD VENIPUNCTURE: CPT | Performed by: PATHOLOGY

## 2021-09-08 PROCEDURE — 76705 ECHO EXAM OF ABDOMEN: CPT | Performed by: RADIOLOGY

## 2021-09-08 NOTE — TELEPHONE ENCOUNTER
Appeal letter in letters tab - Emailed required appeal form to [employer] Covering liaison for writer.  Patient needs to sign said form and form needs to be faxed along with appeal letter to insurance.

## 2021-09-09 ENCOUNTER — MYC MEDICAL ADVICE (OUTPATIENT)
Dept: INTERNAL MEDICINE | Facility: CLINIC | Age: 46
End: 2021-09-09

## 2021-09-09 LAB
HBV CORE AB SERPL QL IA: NONREACTIVE
HBV SURFACE AG SERPL QL IA: NONREACTIVE
QUANTIFERON MITOGEN: 10 IU/ML
QUANTIFERON NIL TUBE: 0.7 IU/ML
QUANTIFERON TB1 TUBE: 0.77 IU/ML
QUANTIFERON TB2 TUBE: 0.81
SARS-COV-2 AB SERPL IA-ACNC: 174 U/ML
SARS-COV-2 AB SERPL-IMP: POSITIVE

## 2021-09-10 LAB
GAMMA INTERFERON BACKGROUND BLD IA-ACNC: 0.7 IU/ML
M TB IFN-G BLD-IMP: NEGATIVE
M TB IFN-G CD4+ BCKGRND COR BLD-ACNC: 9.3 IU/ML
MITOGEN IGNF BCKGRD COR BLD-ACNC: 0.07 IU/ML
MITOGEN IGNF BCKGRD COR BLD-ACNC: 0.11 IU/ML

## 2021-09-10 NOTE — TELEPHONE ENCOUNTER
Medication Appeal Initiation    We have initiated an appeal for the requested medication:  Medication: Benlysta- Appeal initiated   Appeal Start Date:  9/8/2021  Insurance Company: [employer] [place] - Phone [phone] Fax [phone]  Comments:   Sent consent form and Appeal letter

## 2021-09-17 NOTE — TELEPHONE ENCOUNTER
Phoned insurance, spoke to Rosa M.  She stated the appeal was overturned (approved) - test claim is not going thru.  Rosa M gave me contact to resolve. Liaison Gila - [phone] and appeal #[id number].  Had to call and leave Gila VM

## 2021-09-18 ENCOUNTER — PATIENT OUTREACH (OUTPATIENT)
Dept: GASTROENTEROLOGY | Facility: CLINIC | Age: 46
End: 2021-09-18

## 2021-09-18 NOTE — PROGRESS NOTES
Attempted to reach patient to schedule follow up in the Gastroenterology Clinic.  No answer,  LM on VM to call office and Pharmap[employer] message sent.    Schedule with Dr. Molly Castillo.

## 2021-09-20 DIAGNOSIS — M32.9 SYSTEMIC LUPUS ERYTHEMATOSUS, UNSPECIFIED SLE TYPE, UNSPECIFIED ORGAN INVOLVEMENT STATUS (H): ICD-10-CM

## 2021-09-20 NOTE — LETTER
Patricia Hall  [address]  [place] [address]    Dear Patricia Hall,     Regular eye exams are required while taking hydroxychloroquine (Plaquenil). Eye exams should be completed by an eye specialist who is experienced in monitoring for hydroxychloroquine toxicity (a rare effect of the drug that can damage your eyes and vision).  These may be yearly or as determined by your eye specialist.     Although vision problems and loss of sight while taking hydroxychloroquine are very rare, notify your doctor immediately if you notice changes in your vision. The goal of screening is to detect toxicity before your vision is significantly or noticeably impacted. Failing to get proper screening exams puts you at risk of vision changes which may or may not be reversible.    Per the American Academy of Ophthalmology recommendations (2016), screening tests performed may include a 10-2 visual field test, spectral-domain optical coherence tomography (SD OCT), or other screening tests as determined by the eye specialist, including a multifocal electroretinogram (mfERG) or fundus auto-fluorescence (FAF).    We received a refill request from your pharmacy. A copy of your current eye exam was not found in your medical record. Your hydroxychloroquine prescription has been refilled with a limited supply pending confirmation you have had an eye exam to test for hydroxychloroquine toxicity.      We encourage you to bring this letter to your eye exam to discuss the exams that will be performed during your visit. Please request your eye clinic fax or mail a copy of your eye exam report to our clinic indicating that testing was completed for hydroxychloroquine toxicity screening. The exam notes must specifically comment on the potential for hydroxychloroquine toxicity and outline recommended follow-up.    If you have questions about hydroxychloroquine or the information in this letter, please call the clinic at  [phone] or talk to your provider at your next office visit.                        2    Eye Specialist Letter  Dear Eye Specialist,  To ensure safe use of Plaquenil (hydroxychloroquine), we are requesting your assistance in providing the following information. Please return this form to our clinic via mail or fax, or incorporate this information into your visit summary. Your note must indicate whether or not there is evidence of toxicity from Plaquenil use. For questions regarding this form, please call [phone].  Patient Name:   :             Date of Exam:    The following exams were completed during this visit in accordance with the American Academy of Ophthalmology recommendations (2016):  ? 10-2 automated visual field  ? Spectral-domain optical coherence tomography (SD OCT)  ? Multifocal electroretinogram (mfERG)  ? Fundus autofluorescence (FAF)  ? Other (please specify)  Please select from the following:  ? The findings from the above exams are not suggestive of toxicity from Plaquenil (hydroxychloroquine).  ? The findings from the above exams may suggest toxicity from Plaquenil (hydroxychloroquine).  Directly contact the clinic and fax this form.  Please provide additional guidance on whether or not the medication may be continued from your perspective:  Date of next recommended Plaquenil (hydroxychloroquine) screening eye exam:   ? 5 years  ? 1 year  ? 6 months  Other (please specify):   Eye Specialist Name (print):                                                                Date:  Eye Specialist Signature:

## 2021-09-21 NOTE — TELEPHONE ENCOUNTER
HYDROXYCHLOROQUINE 200MG TABLETS  Plaquenil      Last Written Prescription Date:   8/18/2021  Last Fill Quantity: 60,   # refills: 0  Last Office Visit:  4/29/2020  Future Office visit: 10/8/2021  Last Eye Exam: 10/18/2020    Routing refill request to provider for review/approval because:  Over due eye exam     Letter sent     Provider notified       Flora Wilkerson RN  Central Triage Red Flags/Med Refills

## 2021-09-21 NOTE — TELEPHONE ENCOUNTER
MEDICATION APPEAL APPROVED    Medication: Benlysta- Appeal Approved   Authorization Effective Date:  09/15/2021  Authorization Expiration Date:  09/14/2022  Approved Dose/Quantity: 4 for 28 days   Reference #:     Insurance Company: [employer] [place] - Phone [phone] Fax [phone]  Expected CoPay: $5       CoPay Card Available:      Foundation Assistance Needed:    Which Pharmacy is filling the prescription (Not needed for infusion/clinic administered): [facility] - [facility]  [address]      * Per call from Gila patel [facility]

## 2021-09-21 NOTE — TELEPHONE ENCOUNTER
My Chart message sent to pt letting her know Dr. Roy would like her to start right away.    Mickie Solis RN  Rheumatology Clinic

## 2021-09-22 RX ORDER — HYDROXYCHLOROQUINE SULFATE 200 MG/1
200 TABLET, FILM COATED ORAL 2 TIMES DAILY
Qty: 180 TABLET | Refills: 1 | Status: SHIPPED | OUTPATIENT
Start: 2021-09-22 | End: 2023-02-09

## 2021-10-08 ENCOUNTER — VIRTUAL VISIT (OUTPATIENT)
Dept: RHEUMATOLOGY | Facility: CLINIC | Age: 46
End: 2021-10-08
Attending: INTERNAL MEDICINE
Payer: COMMERCIAL

## 2021-10-08 DIAGNOSIS — Z51.81 ENCOUNTER FOR MEDICATION MONITORING: ICD-10-CM

## 2021-10-08 DIAGNOSIS — M32.9 SYSTEMIC LUPUS ERYTHEMATOSUS, UNSPECIFIED SLE TYPE, UNSPECIFIED ORGAN INVOLVEMENT STATUS (H): Primary | ICD-10-CM

## 2021-10-08 PROCEDURE — 99214 OFFICE O/P EST MOD 30 MIN: CPT | Mod: 95 | Performed by: INTERNAL MEDICINE

## 2021-10-08 ASSESSMENT — PAIN SCALES - GENERAL: PAINLEVEL: MODERATE PAIN (5)

## 2021-10-08 NOTE — PROGRESS NOTES
Gracie is a 46 year old who is being evaluated via a billable video visit.      How would you like to obtain your AVS? MyChart  If the video visit is dropped, the invitation should be resent by: Text to cell phone: [phone]  Will anyone else be joining your video visit? No      Video Start Time: 3:26 PM  Video-Visit Details    Type of service:  Video Visit    Video End Time: 4:04 PM    Originating Location (pt. Location): Home    Distant Location (provider location):  [facility] [place]     Platform used for Video Visit: CloudAccess      Rheumatology Visit Note    Reason for visit: Lupus    First Consult: 10/12/2017    Last visit: 4/29/2020    DOS: 10/8/2021    Original HPI (10/12/2017):  Patricia Hall is a 42 year old female who was referred to our clinic for evaluation and management of her SLE. The patient has been following with Dr. Mao for her SLE    History obtain from chart review and patient interview    Her lupus symptoms first started in during high school including fatigue and rash. She was diagnosed with lupus in early 2000s and she was in graduate school and presented with a few years of arthralgias involving knees and ankles and hands.  She had developed new onset Raynaud's phenomenon in which her fingers turned white in the cold but no digital sores.  VINITA was 1:160.  All specific autoantibodies and rheumatoid serologies negative.  She had a rash on her face, including cheeks and bridge of her nose.  She followed with Dr. Tejal Mayen in Dermatology.  A biopsy of a lesion from the nose was non-diagnostic.  Diagnosis was earlysigns of cutaneous lupus versus acne rosacea.  She was treated with Elidel cream.  She reported intermittent oral ulcers and significant fatigue as well as intermittent episodes of hair loss.  She was started on prednisone in 2003 along with Plaquenil.  She has been maintained on prednisone 5 mg q day with intermittent bursts up as high  as 30 mg for a short time.  She has found it very difficult to taper off of prednisone because she gets flare-ups of skin rash, arthralgias, and fatigue. Patient reports significant symptoms during the summers and reports significant flares this summer with photosensitivity with face rash,  Fatigue and join pain (knees and toes and hips).  She had fevers, mouth sores  And also reports increased hair loss.  Increased pain with walking and morning stiffness with Fibromyalgia burning in the morning. Currently taking prednisone 10 mg qd and plaquenil 200 mg BID.    Patient reports complicated fertility/OBGYN history with stage four endometriosis, fibroids and one pregnancy resulting in a miscarriage. Three rounds of IVF with one banked viable egg. She is currently undergoing fertility evaluation.  She has been working with infertility specialist for years. She currently has one viable egg and would like to undergo one more episode of IVF. She was seen by an autoimmune OB/GYN specialist in [place] (Daya Frost 02/17) for extensive testing. She was found to be heterozygous for MTHFR mutation. She is now taking Matanx (L-methyl folate). She has noticed less bruising since she started this. Metformin was recommended, but it upsets her stomach. DHEA was recommended, but she is not taking it. Her thyroid studies were normal, but she was started on Synthroid 25  g daily for the TSH to be less than 2.0. It was recommended that she take Lovenox prior to IFV and throughout the pregnancy to avoid risk of miscarriage. It was also recommended that she be treated with IVIG prior to IVF. 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  She plans to proceeded further with in vitro plans, but wants to get better control of her flaring lupus and concerns for IVF treatments/hormones.     Interval HPI (7/20/2018):  Repeatedly ill during the winter with both URIs and  "flares of disease (joint pain, malaise, fatigue). Still undergoing IVF care via [facility] and reproductive immunology. Did not initiate azathioprine following previous visit with Dr. Roy due to worry over negative effects on pregnancy. Currently undergoing medication treatment for IVF, is picking up medications this weekend for stimulation.    Symptomatically, her recent flares include fatigue, flu-like symptoms (fever, chills, sweats), polyarticular joint pain (fingers, toes, feet, ankles, wrists, elbows).    Recently, she has experienced further hair loss (clumps in shower), ulcers on buccal mucosa (now resolved), fatigue, malaise, significant B/L hip pain (worse from previous, approx 1 year). Specifically, it is constant deep joint pain in hips, would say 8/10 in severity when it occurs during movement. Has been seriously disrupting her daily activities. Has been using tylenol to control pain without complete relief.     Has seen Reproductive Immunology in the interim, has undergone two rounds of IVIG (believed to help with NK cell and cytokine activity in embryo implantation), first IVIG May 14th, then second was July 16th. Had flu-like symptoms with first infusion (HA, myalgias, malaise, felt \"gross\", lower back pain, neck pain). During the 2nd infusion, they slowed infusion, took benadryl/tylenol/upped prednisone to 20 mg she did not experience SEs with this. Overall, felt that IVIG improved her lupus symptoms globally. Short-lived relief. Plan is to use IVIG monthly with monitoring of cytokine levels and NK cell counts.     Currently on 15 mg of prednisone chronically, > 1 year. Today, she would say her disease is mild-moderately active in spite of the 15 mg prednisone. Currently on 400-500 U of vitamin D. Taking 1000 mg calcium.     ROS:  (-) pleurisy, sob, cough, hematuria, dysuria, eye redness, nose bleeds, easy bruising, malar rash  (+) alternating diarrhea/constipation, dry eye, dry mouth, palatal " ulcers/petechiae    Is interested in discussing SLE management (Imuran) while pursuing IVF. Will be undergoing planning and another round of IVF stimulation in August.       8/21/2019: She did another round of IVF in 8/2019, no good embryo. IVF caused her flare ups. For flare ups, gets pain over knees, knuckles and toes with extreme fatigue and photosensitivity.    Had laparoscopic surgery for endometriosis in 4/2019.    Since 4/2019, has been on OC and has not been able to taper prednisone own.    Today, she is on prednisone 15 mg every day x 2-3 weeks. Before that, most of the time, she was on 20 mg every day.    No rash today.    Has pain over knuckles, toes, knees. Has morning flu like sx in AM x 2 hours.    Has extreme fatigue.    She was getting IVIG qmonthly, till 1/2019.    She is going to resume monthly IVIG for successful pregnancy.    11/27/2019: Started IVIG on 9/8/2019, her eyes swelled up, 2 days later had severe LBP/SI joint pain. Was doing keto diet at that time, had headaches.now has sciatica pain on the R side. Did not have this reaction with IVIG before.    Late Oct/early Nov, had malar rash, hair loss.    Had LE edema, went up on her prednisone to 30-40 mg a day x few days. Back to her baseline hair loss and baseline prednisone 20 mg every day.      R SI joint pain is better, but still has it, uses topical pain cream. It is the worst in AM.    Still has swollen ankles.    This edema is new for her.    Off birth control pills. Not on IVF tx either.    Her lupus is back to her baseline.    Has not started AZA yet, but not thinks she is ready to try it.      Today 10/8/2021:    Gracie chose not to take AZA with concern for SEs buts he would like to try benlysta. Continues to have active lupus and can not taper prednisone off.    Dealing with back pain, sciatica, gallstones. Gets vol retention, swelling, LE edema. Ankles and eyes swell up. Has gained weight. Feels stiff. Had diarrhea, stomach pain but  now it is improved.      Sciatica is now better. No skin rash or major hair loss today. Takes prednisone 20 mg every day. Has sun sensitivity. Sometimes increases prednisone to 40 mg every day.    Reports small joint pain 5-6/10, has night and 1 hr am stiffness with swollen feet. Gets jaw pain, migraines, pleurisy. Considers surrogate.        ROS:  A comprehensive ROS was done, positives are per HPI.  Past Medical History:   Diagnosis Date     Allergy, unspecified not elsewhere classified      Endometriosis      Fibromyalgia      Migraine without aura, with intractable migraine, so stated, without mention of status migrainosus      Myalgia and myositis, unspecified      Systemic lupus erythematosus (H)      Past Surgical History:   Procedure Laterality Date     ORTHOPEDIC SURGERY       SURGICAL HISTORY OF -   1986    left elbow fracture repair     Family History   Problem Relation Age of Onset     Diabetes Maternal Grandfather      Lipids Mother      Skin Cancer Paternal Grandmother      Melanoma No family hx of      Social History     Socioeconomic History     Marital status:      Spouse name: Not on file     Number of children: Not on file     Years of education: Not on file     Highest education level: Not on file   Occupational History     Not on file   Tobacco Use     Smoking status: Never Smoker     Smokeless tobacco: Never Used   Substance and Sexual Activity     Alcohol use: Yes     Comment: occ.- 2/week     Drug use: No     Sexual activity: Yes     Partners: Male     Birth control/protection: Condom   Other Topics Concern     Parent/sibling w/ CABG, MI or angioplasty before 65F 55M? Not Asked   Social History Narrative     Not on file     Social Determinants of Health     Financial Resource Strain:      Difficulty of Paying Living Expenses:    Food Insecurity:      Worried About Running Out of Food in the Last Year:      Ran Out of Food in the Last Year:    Transportation Needs:      Lack of  Transportation (Medical):      Lack of Transportation (Non-Medical):    Physical Activity:      Days of Exercise per Week:      Minutes of Exercise per Session:    Stress:      Feeling of Stress :    Social Connections:      Frequency of Communication with Friends and Family:      Frequency of Social Gatherings with Friends and Family:      Attends Yarsanism Services:      Active Member of Clubs or Organizations:      Attends Club or Organization Meetings:      Marital Status:    Intimate Partner Violence:      Fear of Current or Ex-Partner:      Emotionally Abused:      Physically Abused:      Sexually Abused:      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)     Adjustment disorder with anxiety     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     Blood coagulation disorder (H)     Chronic headaches     Diminished ovarian reserve     Disorder of connective tissue (H)     Disease of immune system (H)     Endometriosis of uterus     Female infertility     History of environmental allergies     Hyperlipidemia     Irritable bowel syndrome     Major depressive disorder, recurrent episode, in full remission (H)     Major depressive disorder, recurrent episode, mild (H)     Migraine     Myalgia     Raynaud's disease     Recurrent pregnancy loss without current pregnancy     Seasonal allergies     Primary fibromyalgia syndrome     Uterine leiomyoma     Allergies   Allergen Reactions     Cherry Anaphylaxis     Dairy Aid  [Lactase]      Other reaction(s): Arthralgia (Joint Pain)     Gluten Meal      Other reaction(s): Abdominal Pain     No Clinical Screening - See Comments Anaphylaxis and Itching     Pistachio Nut Extract Skin Test Anaphylaxis     Brassica Oleracea Italica      Other reaction(s): Abdominal Pain  Other reaction(s): Abdominal Pain     Penicillins Hives and Rash     Sulfa  Drugs Hives and Rash       Outpatient Encounter Medications as of 10/8/2021   Medication Sig Dispense Refill     RACHNA SYRUP PO        Acetaminophen (TYLENOL PO) Take  by mouth.         albuterol (PROAIR HFA/PROVENTIL HFA/VENTOLIN HFA) 108 (90 Base) MCG/ACT inhaler        AMBIEN 10 MG OR TABS 1 po HS, prn 20 0     azaTHIOprine (IMURAN) 50 MG tablet Take 1 tablet (50 mg) by mouth daily 30 tablet 1     Belimumab 200 MG/ML SOAJ Inject 200 mg Subcutaneous every 7 days Hold for signs of infection and seek medical attention. 4 mL 3     calcium carbonate (OS-VAHID 500 MG [place]. [place]) 1250 MG tablet Take 1 tablet by mouth daily       diltiazem 2% in PLO gel Apply topically 4 times daily 30 g 3     EPINEPHrine (ANY BX GENERIC EQUIV) 0.3 MG/0.3ML injection 2-pack Inject 0.3 mLs (0.3 mg) into the muscle as needed for anaphylaxis 2 each 1     EPINEPHrine (ANY BX GENERIC EQUIV) 0.3 MG/0.3ML injection 2-pack epinephrine 0.3 mg/0.3 mL injection, auto-injector       fexofenadine (ALLEGRA) 180 MG tablet Take 1 tablet (180 mg) by mouth daily 30 tablet 11     fluticasone (FLONASE) 50 MCG/ACT nasal spray        hydroxychloroquine (PLAQUENIL) 200 MG tablet Take 1 tablet (200 mg) by mouth 2 times daily 180 tablet 1     L-Methylfolate-Algae-B12-B6 (METANX PO) Take 1,000 mg by mouth daily       montelukast (SINGULAIR) 10 MG tablet Take 1 tablet (10 mg) by mouth At Bedtime 30 tablet 11     MULTIVITAMIN TABS   OR   0     Omega-3 Fatty Acids (OMEGA 3 PO) Take 1,250 mg by mouth daily       omeprazole (PRILOSEC) 40 MG DR capsule Take 1 capsule (40 mg) by mouth daily 60 capsule 1     ondansetron (ZOFRAN-ODT) 4 MG ODT tab Take 1 tablet (4 mg) by mouth every 8 hours as needed for nausea 30 tablet 1     predniSONE (DELTASONE) 5 MG tablet Take 15 mg daily, ok to increase to 20 mg daily for flares 90 tablet 3     rizatriptan (MAXALT-MLT) 5 MG ODT Take 1 tablet (5 mg) by mouth at onset of headache for migraine May repeat in 2 hours. Max 6 tablets/24  hours. 15 tablet 11     sertraline (ZOLOFT) 100 MG tablet Take 100 mg by mouth daily       SPIRULINA PO Take by mouth daily       SUMAtriptan (IMITREX) 20 MG/ACT nasal spray Spray 1 spray in nostril once as needed for migraine May repeat in 2 hours. Max 2 sprays/24 hours. 1 each 1     traMADol (ULTRAM) 50 MG tablet Take 1-2 tablets up to three times a day as needed 180 tablet 5     traMADol (ULTRAM-ER) 300 MG 24 hr tablet Take 1 tablet (300 mg) by mouth At Bedtime maximum 1 tablet(s) per day 30 tablet 5     triamcinolone (KENALOG) 0.1 % paste Take by mouth 2 times daily 5 g 3     triamcinolone (NASACORT) 55 MCG/ACT nasal aerosol Spray 2 sprays into both nostrils daily 16.5 mL 11     VITAMIN D, CHOLECALCIFEROL, PO Take 5,000 Units by mouth daily       Facility-Administered Encounter Medications as of 10/8/2021   Medication Dose Route Frequency Provider Last Rate Last Admin     ondansetron (ZOFRAN-ODT) ODT tab 4 mg  4 mg Oral Once Shavon Raines MD             Her records were reviewed.    No results found for any visits on 10/08/21.  Pregnancy: She is . H/o OCP and HRT use, see HPI    Ph.E:    There were no vitals taken for this visit.    Constitutional: WD/WN. Pleasant, NAD.  Cushingoid  Eyes: EOM intact, sclera anicteric, conj not injected   MS: No synovitis.   Skin: no rash, thick toenails, ecchymosis under big toe  Neuro: A&O x 3. Grossly non focal  Psych: NL affect    Assessment/Plan (2019):    SLE, dx in s (VINITA: 1:80, dsDNA +, Smith negative, normal complements, joint pains, malar rash, oral ulcers), usually flares approximately monthly with joint pains, pleurisy and malar rash. Currently, feels that disease is active. Has been on chronic 20 mg every day prednisone, gained weight, gets LE edema, looks cushingoid. Still planning for PGS normal embryo implantation, might consider surrogate. Had a reaction to IVIG which was given for infertility tx and is not going to get it again. Her  back pain seems to be sciatica and not related to SLE.    Previous visits, was advised to try AZA as steroid sparing agent (I am concerned about long term steroid SE) but did not start with concern for potential SE but willing to try benlysta, especially with planning for surrogate pregnancy. Labs are stable.    - Continue hydroxychloroquine 200 mg daily, reminded to have eye exam   - Continue prednisone at 20 mg every day      Plan:    Podiatry referral for ecchymosis under big toe    Penlac for nails    Eye exam    Start benlysta, when it shows benefit, will start tapering prednisone down as 17.5-15-12.5-10-7.5 mg a day each for one week then 5 mg a day    Return in 3-4 months (in person preferred)      Josh Roy MD

## 2021-10-08 NOTE — LETTER
10/8/2021       RE: Patricia Hall  [address]  [place] [address]     Dear Colleague,    Thank you for referring your patient, Patricia Hall, to the [facility] at [facility]. Please see a copy of my visit note below.    Left voicemail for patient to call back to set up telemedicine visit, will call again before appointment time.  Lyric Ibanez, CMA on 10/8/2021 at 2:44 PM      Gracie is a 46 year old who is being evaluated via a billable video visit.      How would you like to obtain your AVS? MyChart  If the video visit is dropped, the invitation should be resent by: Text to cell phone: [phone]  Will anyone else be joining your video visit? No      Video Start Time: 3:26 PM  Video-Visit Details    Type of service:  Video Visit    Video End Time: 4:04 PM    Originating Location (pt. Location): Home    Distant Location (provider location):  [facility]     Platform used for Video Visit: Backspaces      Rheumatology Visit Note    Reason for visit: Lupus    First Consult: 10/12/2017    Last visit: 4/29/2020    DOS: 10/8/2021    Original HPI (10/12/2017):  Patricia Hall is a 42 year old female who was referred to our clinic for evaluation and management of her SLE. The patient has been following with Dr. Mao for her SLE    History obtain from chart review and patient interview    Her lupus symptoms first started in during high school including fatigue and rash. She was diagnosed with lupus in early 2000s and she was in graduate school and presented with a few years of arthralgias involving knees and ankles and hands.  She had developed new onset Raynaud's phenomenon in which her fingers turned white in the cold but no digital sores.  VINITA was 1:160.  All specific autoantibodies and rheumatoid serologies negative.  She had a rash on her face, including cheeks  and bridge of her nose.  She followed with Dr. Tejal Mayen in Dermatology.  A biopsy of a lesion from the nose was non-diagnostic.  Diagnosis was earlysigns of cutaneous lupus versus acne rosacea.  She was treated with Elidel cream.  She reported intermittent oral ulcers and significant fatigue as well as intermittent episodes of hair loss.  She was started on prednisone in 2003 along with Plaquenil.  She has been maintained on prednisone 5 mg q day with intermittent bursts up as high as 30 mg for a short time.  She has found it very difficult to taper off of prednisone because she gets flare-ups of skin rash, arthralgias, and fatigue. Patient reports significant symptoms during the summers and reports significant flares this summer with photosensitivity with face rash,  Fatigue and join pain (knees and toes and hips).  She had fevers, mouth sores  And also reports increased hair loss.  Increased pain with walking and morning stiffness with Fibromyalgia burning in the morning. Currently taking prednisone 10 mg qd and plaquenil 200 mg BID.    Patient reports complicated fertility/OBGYN history with stage four endometriosis, fibroids and one pregnancy resulting in a miscarriage. Three rounds of IVF with one banked viable egg. She is currently undergoing fertility evaluation.  She has been working with infertility specialist for years. She currently has one viable egg and would like to undergo one more episode of IVF. She was seen by an autoimmune OB/GYN specialist in [place] (Daya Frost 02/17) for extensive testing. She was found to be heterozygous for MTHFR mutation. She is now taking Matanx (L-methyl folate). She has noticed less bruising since she started this. Metformin was recommended, but it upsets her stomach. DHEA was recommended, but she is not taking it. Her thyroid studies were normal, but she was started on Synthroid 25  g daily for the TSH to be less than 2.0. It was recommended that she take Lovenox  "prior to IFV and throughout the pregnancy to avoid risk of miscarriage. It was also recommended that she be treated with IVIG prior to IVF. 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  She plans to proceeded further with in vitro plans, but wants to get better control of her flaring lupus and concerns for IVF treatments/hormones.     Interval HPI (7/20/2018):  Repeatedly ill during the winter with both URIs and flares of disease (joint pain, malaise, fatigue). Still undergoing IVF care via [facility] and reproductive immunology. Did not initiate azathioprine following previous visit with Dr. Roy due to worry over negative effects on pregnancy. Currently undergoing medication treatment for IVF, is picking up medications this weekend for stimulation.    Symptomatically, her recent flares include fatigue, flu-like symptoms (fever, chills, sweats), polyarticular joint pain (fingers, toes, feet, ankles, wrists, elbows).    Recently, she has experienced further hair loss (clumps in shower), ulcers on buccal mucosa (now resolved), fatigue, malaise, significant B/L hip pain (worse from previous, approx 1 year). Specifically, it is constant deep joint pain in hips, would say 8/10 in severity when it occurs during movement. Has been seriously disrupting her daily activities. Has been using tylenol to control pain without complete relief.     Has seen Reproductive Immunology in the interim, has undergone two rounds of IVIG (believed to help with NK cell and cytokine activity in embryo implantation), first IVIG May 14th, then second was July 16th. Had flu-like symptoms with first infusion (HA, myalgias, malaise, felt \"gross\", lower back pain, neck pain). During the 2nd infusion, they slowed infusion, took benadryl/tylenol/upped prednisone to 20 mg she did not experience SEs with this. Overall, felt that IVIG improved her lupus symptoms globally. Short-lived " relief. Plan is to use IVIG monthly with monitoring of cytokine levels and NK cell counts.     Currently on 15 mg of prednisone chronically, > 1 year. Today, she would say her disease is mild-moderately active in spite of the 15 mg prednisone. Currently on 400-500 U of vitamin D. Taking 1000 mg calcium.     ROS:  (-) pleurisy, sob, cough, hematuria, dysuria, eye redness, nose bleeds, easy bruising, malar rash  (+) alternating diarrhea/constipation, dry eye, dry mouth, palatal ulcers/petechiae    Is interested in discussing SLE management (Imuran) while pursuing IVF. Will be undergoing planning and another round of IVF stimulation in August.       8/21/2019: She did another round of IVF in 8/2019, no good embryo. IVF caused her flare ups. For flare ups, gets pain over knees, knuckles and toes with extreme fatigue and photosensitivity.    Had laparoscopic surgery for endometriosis in 4/2019.    Since 4/2019, has been on OC and has not been able to taper prednisone own.    Today, she is on prednisone 15 mg every day x 2-3 weeks. Before that, most of the time, she was on 20 mg every day.    No rash today.    Has pain over knuckles, toes, knees. Has morning flu like sx in AM x 2 hours.    Has extreme fatigue.    She was getting IVIG qmonthly, till 1/2019.    She is going to resume monthly IVIG for successful pregnancy.    11/27/2019: Started IVIG on 9/8/2019, her eyes swelled up, 2 days later had severe LBP/SI joint pain. Was doing keto diet at that time, had headaches.now has sciatica pain on the R side. Did not have this reaction with IVIG before.    Late Oct/early Nov, had malar rash, hair loss.    Had LE edema, went up on her prednisone to 30-40 mg a day x few days. Back to her baseline hair loss and baseline prednisone 20 mg every day.      R SI joint pain is better, but still has it, uses topical pain cream. It is the worst in AM.    Still has swollen ankles.    This edema is new for her.    Off birth control  pills. Not on IVF tx either.    Her lupus is back to her baseline.    Has not started AZA yet, but not thinks she is ready to try it.      Today 10/8/2021:    Gracie chose not to take AZA with concern for SEs buts he would like to try benlysta. Continues to have active lupus and can not taper prednisone off.    Dealing with back pain, sciatica, gallstones. Gets vol retention, swelling, LE edema. Ankles and eyes swell up. Has gained weight. Feels stiff. Had diarrhea, stomach pain but now it is improved.      Sciatica is now better. No skin rash or major hair loss today. Takes prednisone 20 mg every day. Has sun sensitivity. Sometimes increases prednisone to 40 mg every day.    Reports small joint pain 5-6/10, has night and 1 hr am stiffness with swollen feet. Gets jaw pain, migraines, pleurisy. Considers surrogate.        ROS:  A comprehensive ROS was done, positives are per HPI.  Past Medical History:   Diagnosis Date     Allergy, unspecified not elsewhere classified      Endometriosis      Fibromyalgia      Migraine without aura, with intractable migraine, so stated, without mention of status migrainosus      Myalgia and myositis, unspecified      Systemic lupus erythematosus (H)      Past Surgical History:   Procedure Laterality Date     ORTHOPEDIC SURGERY       SURGICAL HISTORY OF -   1986    left elbow fracture repair     Family History   Problem Relation Age of Onset     Diabetes Maternal Grandfather      Lipids Mother      Skin Cancer Paternal Grandmother      Melanoma No family hx of      Social History     Socioeconomic History     Marital status:      Spouse name: Not on file     Number of children: Not on file     Years of education: Not on file     Highest education level: Not on file   Occupational History     Not on file   Tobacco Use     Smoking status: Never Smoker     Smokeless tobacco: Never Used   Substance and Sexual Activity     Alcohol use: Yes     Comment: occ.- 2/week     Drug use: No      Sexual activity: Yes     Partners: Male     Birth control/protection: Condom   Other Topics Concern     Parent/sibling w/ CABG, MI or angioplasty before 65F 55M? Not Asked   Social History Narrative     Not on file     Social Determinants of Health     Financial Resource Strain:      Difficulty of Paying Living Expenses:    Food Insecurity:      Worried About Running Out of Food in the Last Year:      Ran Out of Food in the Last Year:    Transportation Needs:      Lack of Transportation (Medical):      Lack of Transportation (Non-Medical):    Physical Activity:      Days of Exercise per Week:      Minutes of Exercise per Session:    Stress:      Feeling of Stress :    Social Connections:      Frequency of Communication with Friends and Family:      Frequency of Social Gatherings with Friends and Family:      Attends Orthodox Services:      Active Member of Clubs or Organizations:      Attends Club or Organization Meetings:      Marital Status:    Intimate Partner Violence:      Fear of Current or Ex-Partner:      Emotionally Abused:      Physically Abused:      Sexually Abused:      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)     Adjustment disorder with anxiety     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     Blood coagulation disorder (H)     Chronic headaches     Diminished ovarian reserve     Disorder of connective tissue (H)     Disease of immune system (H)     Endometriosis of uterus     Female infertility     History of environmental allergies     Hyperlipidemia     Irritable bowel syndrome     Major depressive disorder, recurrent episode, in full remission (H)     Major depressive disorder, recurrent episode, mild (H)     Migraine     Myalgia     Raynaud's disease     Recurrent pregnancy loss without current pregnancy     Seasonal allergies     Primary fibromyalgia  syndrome     Uterine leiomyoma     Allergies   Allergen Reactions     Cherry Anaphylaxis     Dairy Aid  [Lactase]      Other reaction(s): Arthralgia (Joint Pain)     Gluten Meal      Other reaction(s): Abdominal Pain     No Clinical Screening - See Comments Anaphylaxis and Itching     Pistachio Nut Extract Skin Test Anaphylaxis     Brassica Oleracea Italica      Other reaction(s): Abdominal Pain  Other reaction(s): Abdominal Pain     Penicillins Hives and Rash     Sulfa Drugs Hives and Rash       Outpatient Encounter Medications as of 10/8/2021   Medication Sig Dispense Refill     RACHNA SYRUP PO        Acetaminophen (TYLENOL PO) Take  by mouth.         albuterol (PROAIR HFA/PROVENTIL HFA/VENTOLIN HFA) 108 (90 Base) MCG/ACT inhaler        AMBIEN 10 MG OR TABS 1 po HS, prn 20 0     azaTHIOprine (IMURAN) 50 MG tablet Take 1 tablet (50 mg) by mouth daily 30 tablet 1     Belimumab 200 MG/ML SOAJ Inject 200 mg Subcutaneous every 7 days Hold for signs of infection and seek medical attention. 4 mL 3     calcium carbonate (OS-VAHID 500 MG [place]. [place]) 1250 MG tablet Take 1 tablet by mouth daily       diltiazem 2% in PLO gel Apply topically 4 times daily 30 g 3     EPINEPHrine (ANY BX GENERIC EQUIV) 0.3 MG/0.3ML injection 2-pack Inject 0.3 mLs (0.3 mg) into the muscle as needed for anaphylaxis 2 each 1     EPINEPHrine (ANY BX GENERIC EQUIV) 0.3 MG/0.3ML injection 2-pack epinephrine 0.3 mg/0.3 mL injection, auto-injector       fexofenadine (ALLEGRA) 180 MG tablet Take 1 tablet (180 mg) by mouth daily 30 tablet 11     fluticasone (FLONASE) 50 MCG/ACT nasal spray        hydroxychloroquine (PLAQUENIL) 200 MG tablet Take 1 tablet (200 mg) by mouth 2 times daily 180 tablet 1     L-Methylfolate-Algae-B12-B6 (METANX PO) Take 1,000 mg by mouth daily       montelukast (SINGULAIR) 10 MG tablet Take 1 tablet (10 mg) by mouth At Bedtime 30 tablet 11     MULTIVITAMIN TABS   OR   0     Omega-3 Fatty Acids (OMEGA 3 PO) Take 1,250 mg by mouth  daily       omeprazole (PRILOSEC) 40 MG DR capsule Take 1 capsule (40 mg) by mouth daily 60 capsule 1     ondansetron (ZOFRAN-ODT) 4 MG ODT tab Take 1 tablet (4 mg) by mouth every 8 hours as needed for nausea 30 tablet 1     predniSONE (DELTASONE) 5 MG tablet Take 15 mg daily, ok to increase to 20 mg daily for flares 90 tablet 3     rizatriptan (MAXALT-MLT) 5 MG ODT Take 1 tablet (5 mg) by mouth at onset of headache for migraine May repeat in 2 hours. Max 6 tablets/24 hours. 15 tablet 11     sertraline (ZOLOFT) 100 MG tablet Take 100 mg by mouth daily       SPIRULINA PO Take by mouth daily       SUMAtriptan (IMITREX) 20 MG/ACT nasal spray Spray 1 spray in nostril once as needed for migraine May repeat in 2 hours. Max 2 sprays/24 hours. 1 each 1     traMADol (ULTRAM) 50 MG tablet Take 1-2 tablets up to three times a day as needed 180 tablet 5     traMADol (ULTRAM-ER) 300 MG 24 hr tablet Take 1 tablet (300 mg) by mouth At Bedtime maximum 1 tablet(s) per day 30 tablet 5     triamcinolone (KENALOG) 0.1 % paste Take by mouth 2 times daily 5 g 3     triamcinolone (NASACORT) 55 MCG/ACT nasal aerosol Spray 2 sprays into both nostrils daily 16.5 mL 11     VITAMIN D, CHOLECALCIFEROL, PO Take 5,000 Units by mouth daily       Facility-Administered Encounter Medications as of 10/8/2021   Medication Dose Route Frequency Provider Last Rate Last Admin     ondansetron (ZOFRAN-ODT) ODT tab 4 mg  4 mg Oral Once Shavon Raines MD             Her records were reviewed.    No results found for any visits on 10/08/21.  Pregnancy: She is . H/o OCP and HRT use, see HPI    Ph.E:    There were no vitals taken for this visit.    Constitutional: WD/WN. Pleasant, NAD.  Cushingoid  Eyes: EOM intact, sclera anicteric, conj not injected   MS: No synovitis.   Skin: no rash, thick toenails, ecchymosis under big toe  Neuro: A&O x 3. Grossly non focal  Psych: NL affect    Assessment/Plan (2019):    SLE, dx in s (VINITA: 1:80,  dsDNA +, Smith negative, normal complements, joint pains, malar rash, oral ulcers), usually flares approximately monthly with joint pains, pleurisy and malar rash. Currently, feels that disease is active. Has been on chronic 20 mg every day prednisone, gained weight, gets LE edema, looks cushingoid. Still planning for PGS normal embryo implantation, might consider surrogate. Had a reaction to IVIG which was given for infertility tx and is not going to get it again. Her back pain seems to be sciatica and not related to SLE.    Previous visits, was advised to try AZA as steroid sparing agent (I am concerned about long term steroid SE) but did not start with concern for potential SE but willing to try benlysta, especially with planning for surrogate pregnancy. Labs are stable.    - Continue hydroxychloroquine 200 mg daily, reminded to have eye exam   - Continue prednisone at 20 mg every day    Plan:    Podiatry referral for ecchymosis under big toe    Penlac for nails    Eye exam    Start benlysta, when it shows benefit, will start tapering prednisone down as 17.5-15-12.5-10-7.5 mg a day each for one week then 5 mg a day    Return in 3-4 months (in person preferred)      Josh Roy MD

## 2021-10-08 NOTE — PROGRESS NOTES
Left voicemail for patient to call back to set up telemedicine visit, will call again before appointment time.  Lyric Ibanez CMA on 10/8/2021 at 2:44 PM

## 2021-10-08 NOTE — PATIENT INSTRUCTIONS
Podiatry referral    Penlac for nails    Eye exam    Start benlysta, when it shows benefit, will start tapering prednisone down as 17.5-15-12.5-10-7.5 mg a day each for one week then 5 mg a day    Return in 3-4 months (in person preferred)

## 2021-10-20 NOTE — TELEPHONE ENCOUNTER
DIAGNOSIS: Herniated disc re injured 4-5 days ago radiating tingling into arms & sciatica , per pt , No images    APPOINTMENT DATE: 10.28.21   NOTES STATUS DETAILS   OFFICE NOTE from referring provider N/A    OFFICE NOTE from other specialist Internal 12.10.20 Dr Doug Nettles, [facility]   DISCHARGE SUMMARY from hospital N/A    DISCHARGE REPORT from the ER N/A    OPERATIVE REPORT N/A    EMG report N/A    MEDICATION LIST Internal    MRI Internal 12.9.19 lumbar spine   DEXA (osteoporosis/bone health) N/A    CT SCAN N/A    XRAYS (IMAGES & REPORTS) Internal 11.27.19 lumbar spine

## 2021-10-23 ENCOUNTER — HEALTH MAINTENANCE LETTER (OUTPATIENT)
Age: 46
End: 2021-10-23

## 2021-10-24 DIAGNOSIS — Z91.018 FOOD ALLERGY: ICD-10-CM

## 2021-10-24 DIAGNOSIS — T78.2XXA ANAPHYLACTIC REACTION: ICD-10-CM

## 2021-10-26 RX ORDER — EPINEPHRINE 0.3 MG/.3ML
INJECTION SUBCUTANEOUS
Qty: 2 EACH | Refills: 2 | Status: SHIPPED | OUTPATIENT
Start: 2021-10-26 | End: 2023-04-04

## 2021-10-26 NOTE — TELEPHONE ENCOUNTER
EPINEPHRINE 0.3MG INJ 2 PACK  2 each, 1 Refill sent to pharm   Last visit:  8/16/2021    Flora Wilkerson RN  Central Triage Red Flags/Med Refills

## 2021-10-28 ENCOUNTER — PRE VISIT (OUTPATIENT)
Dept: ORTHOPEDICS | Facility: CLINIC | Age: 46
End: 2021-10-28

## 2021-11-01 ENCOUNTER — VIRTUAL VISIT (OUTPATIENT)
Dept: DERMATOLOGY | Facility: CLINIC | Age: 46
End: 2021-11-01
Payer: COMMERCIAL

## 2021-11-01 DIAGNOSIS — M32.19 SYSTEMIC LUPUS ERYTHEMATOSUS WITH OTHER ORGAN INVOLVEMENT, UNSPECIFIED SLE TYPE (H): ICD-10-CM

## 2021-11-01 DIAGNOSIS — B35.1 ONYCHOMYCOSIS: ICD-10-CM

## 2021-11-01 DIAGNOSIS — R23.3 TALON NOIR: Primary | ICD-10-CM

## 2021-11-01 DIAGNOSIS — L85.9 HYPERKERATOSIS: ICD-10-CM

## 2021-11-01 PROCEDURE — 99214 OFFICE O/P EST MOD 30 MIN: CPT | Mod: GQ | Performed by: DERMATOLOGY

## 2021-11-01 RX ORDER — CICLOPIROX 80 MG/ML
SOLUTION TOPICAL
Qty: 6.6 ML | Refills: 11 | Status: SHIPPED | OUTPATIENT
Start: 2021-11-01 | End: 2023-12-18

## 2021-11-01 RX ORDER — UREA 200 MG/G
CREAM TOPICAL 2 TIMES DAILY
Qty: 480 G | Refills: 11 | Status: SHIPPED | OUTPATIENT
Start: 2021-11-01 | End: 2023-02-09

## 2021-11-01 NOTE — NURSING NOTE
Dermatology Rooming Note    Patricia Hall's goals for this visit include:   Chief Complaint   Patient presents with     Derm Problem     Gracie is being seen today for a lupus follow up and possible blood under skin on foot, toenail fungus      GENET Drake

## 2021-11-01 NOTE — PATIENT INSTRUCTIONS
"The recipe for \"dilute bleach soaks\" is as follows:    1. Use 1 teaspoon of plain, unscented bleach to a half-gallon of lukewarm water. On the bottle, bleach might be called \"sodium hypochlorite.\" These mean the same thing. The concentration should be about 6 to 8.75%. Do NOT use bleach called \"concentrated,\" \"splashless,\" or having a fragrance. A generic/store brand is okay.  2. Soak feet for about 10 minutes. Pat the areas dry.   3. Repeat this daily.    "

## 2021-11-01 NOTE — LETTER
11/1/2021       RE: Patricia Hall  [address] [facility] [address]     Dear Colleague,    Thank you for referring your patient, Patricia Hall, to the [facility] [place] at [facility]. Please see a copy of my visit note below.    [facility] Dermatology Note  Encounter Date: Nov 1, 2021  Store-and-Forward and Telephone ([phone]). Location of teledermatologist: [facility] [place].  Start time: 12:14-12:30, 12:44-12:57.    Dermatology Problem List:  1. Systemic lupus - belimumab, hydroxychloroquine, intermittent prednisone  - diagnosed in early 2000s  - photosensitivity, VINITA 1:160, fatigue, oral ulcers, arthralgias, recurrent miscarriages (also has MTHFR & EARNEST-1 mutations)  2. David noir: urea  3. Onychomycosis: ciclopirox, dilute bleach soaks  ____________________________________________    Assessment & Plan:     1. David noir: in setting of thickened callus on the medial first MTP. Clinical appearance and history consistent with hemorrhage rather than pigmented neoplasm. Will work to reduce thickness of callus in order to decrease inciting factors. Suspect chronic rubbing due to gait abnormalities from back pain and pelvic floor weakness - seeing pelvic PT for the latter. Can also use urea for bump on the PIP joint of other toe and for callus on contralateral first MTP, which is a bit smaller and not accompanied by david noir.   - start urea for callus  - work with pelvic PT for pelvic floor weakness and low back pain    2. Onychomycosis: widespread, in contest of immunosuppression for SLE. We discussed risks/benefits of treatment options and agree with avoidance of systemic terbinafine as this has been associated with drug-induced lupus. Will start ciclopirox lacquer and dilute bleach soaks. If refractory, would move toward efinaconazole.  - ciclopirox lacquer  -  dilute bleach soaks    3. SLE: following with rheumatology; on belimumab and prednisone. No significant cutaneous lupus at this time.    Procedures Performed:    None    Follow-up: 3 months    Staff:     Jean-Claude Rivas MD, FAAD   of Dermatology  Department of Dermatology  [facility]    ____________________________________________    CC: Derm Problem (Gracie is being seen today for a lupus follow up and possible blood under skin on foot, toenail fungus )    HPI:  Ms. Patricia Hall is a(n) 46 year old female who presents today as a return patient for lupus and david noir    Spot on foot - callus on the great toes - when files down, dark spot appears (only on left foot) - never goes away but does enlarge after filing   - looks like old blood  - on prednisone, just started belimumab  - chronic nail fungus - all 10 toenails  - herniated discs - hard to care for toenails  - red bump overlying right second toe DIP - some numbness in this digit  - neuropathy in bottoms of feet  - lots of lower back problems, sciatica, also has endometriosis - some gait changes due to muscular weakness and pain   - seeing pelvic floor PT    Patient is otherwise feeling well, without additional skin concerns.    Labs Reviewed:  6/2021 C'/dsDNA normal    Physical Exam:  Vitals: There were no vitals taken for this visit.  SKIN: video images were reviewed; image quality and interpretability: acceptable. Image date: n/a.  - purple/brown patch under hyperkeratotic plaque on the left first MTP; hyperkeratotic plaque on right first MTP  - yellowish distal onycholysis with subungual hyperkeratosis on all toenails  - No other lesions of concern on areas examined.     Medications:  Current Outpatient Medications   Medication     RACHNA SYRUP PO     Acetaminophen (TYLENOL PO)     albuterol (PROAIR HFA/PROVENTIL HFA/VENTOLIN HFA) 108 (90 Base) MCG/ACT inhaler     AMBIEN 10 MG OR TABS      Belimumab 200 MG/ML SOAJ     calcium carbonate (OS-VAHID 500 MG [place]. CA) 1250 MG tablet     diltiazem 2% in PLO gel     EPINEPHrine (ANY BX GENERIC EQUIV) 0.3 MG/0.3ML injection 2-pack     EPINEPHrine (ANY BX GENERIC EQUIV) 0.3 MG/0.3ML injection 2-pack     fexofenadine (ALLEGRA) 180 MG tablet     fluticasone (FLONASE) 50 MCG/ACT nasal spray     hydroxychloroquine (PLAQUENIL) 200 MG tablet     L-Methylfolate-Algae-B12-B6 (METANX PO)     montelukast (SINGULAIR) 10 MG tablet     MULTIVITAMIN TABS   OR     Omega-3 Fatty Acids (OMEGA 3 PO)     omeprazole (PRILOSEC) 40 MG DR capsule     ondansetron (ZOFRAN-ODT) 4 MG ODT tab     predniSONE (DELTASONE) 5 MG tablet     rizatriptan (MAXALT-MLT) 5 MG ODT     sertraline (ZOLOFT) 100 MG tablet     SPIRULINA PO     SUMAtriptan (IMITREX) 20 MG/ACT nasal spray     traMADol (ULTRAM) 50 MG tablet     traMADol (ULTRAM-ER) 300 MG 24 hr tablet     triamcinolone (KENALOG) 0.1 % paste     triamcinolone (NASACORT) 55 MCG/ACT nasal aerosol     VITAMIN D, CHOLECALCIFEROL, PO     Current Facility-Administered Medications   Medication     ondansetron (ZOFRAN-ODT) ODT tab 4 mg      Past Medical/Surgical History:   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)     Adjustment disorder with anxiety     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     Blood coagulation disorder (H)     Chronic headaches     Diminished ovarian reserve     Disorder of connective tissue (H)     Disease of immune system (H)     Endometriosis of uterus     Female infertility     History of environmental allergies     Hyperlipidemia     Irritable bowel syndrome     Major depressive disorder, recurrent episode, in full remission (H)     Major depressive disorder, recurrent episode, mild (H)     Migraine     Myalgia     Raynaud's disease     Recurrent pregnancy loss without current  pregnancy     Seasonal allergies     Primary fibromyalgia syndrome     Uterine leiomyoma     Past Medical History:   Diagnosis Date     Allergy, unspecified not elsewhere classified      Endometriosis      Fibromyalgia      Migraine without aura, with intractable migraine, so stated, without mention of status migrainosus      Myalgia and myositis, unspecified      Systemic lupus erythematosus (H)        CC No referring provider defined for this encounter. on close of this encounter.
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
## 2021-11-02 DIAGNOSIS — M32.9 SYSTEMIC LUPUS ERYTHEMATOSUS, UNSPECIFIED SLE TYPE, UNSPECIFIED ORGAN INVOLVEMENT STATUS (H): ICD-10-CM

## 2021-11-05 RX ORDER — PREDNISONE 5 MG/1
TABLET ORAL
Qty: 90 TABLET | Refills: 3 | Status: SHIPPED | OUTPATIENT
Start: 2021-11-05 | End: 2022-01-28

## 2021-12-18 ENCOUNTER — NURSE TRIAGE (OUTPATIENT)
Dept: NURSING | Facility: CLINIC | Age: 46
End: 2021-12-18
Payer: COMMERCIAL
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
## 2021-12-20 ENCOUNTER — MYC MEDICAL ADVICE (OUTPATIENT)
Dept: INTERNAL MEDICINE | Facility: CLINIC | Age: 46
End: 2021-12-20
Payer: COMMERCIAL
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
## 2021-12-21 ENCOUNTER — OFFICE VISIT (OUTPATIENT)
Dept: WOUND CARE | Facility: CLINIC | Age: 46
End: 2021-12-21
Payer: COMMERCIAL

## 2021-12-21 ENCOUNTER — OFFICE VISIT (OUTPATIENT)
Dept: INTERNAL MEDICINE | Facility: CLINIC | Age: 46
End: 2021-12-21
Payer: COMMERCIAL

## 2021-12-21 VITALS
RESPIRATION RATE: 16 BRPM | SYSTOLIC BLOOD PRESSURE: 166 MMHG | DIASTOLIC BLOOD PRESSURE: 91 MMHG | HEART RATE: 92 BPM | TEMPERATURE: 97.8 F | OXYGEN SATURATION: 95 %

## 2021-12-21 DIAGNOSIS — Z00.00 HEALTHCARE MAINTENANCE: ICD-10-CM

## 2021-12-21 DIAGNOSIS — T20.20XA: Primary | ICD-10-CM

## 2021-12-21 DIAGNOSIS — L03.211 CELLULITIS OF FACE: Primary | ICD-10-CM

## 2021-12-21 DIAGNOSIS — T20.20XA PARTIAL THICKNESS BURN OF FACE, INITIAL ENCOUNTER: ICD-10-CM

## 2021-12-21 PROCEDURE — 90471 IMMUNIZATION ADMIN: CPT

## 2021-12-21 PROCEDURE — 90686 IIV4 VACC NO PRSV 0.5 ML IM: CPT

## 2021-12-21 PROCEDURE — 90472 IMMUNIZATION ADMIN EACH ADD: CPT

## 2021-12-21 PROCEDURE — 99207 PR NO BILLABLE SERVICE THIS VISIT: CPT

## 2021-12-21 PROCEDURE — 99213 OFFICE O/P EST LOW 20 MIN: CPT | Mod: 25

## 2021-12-21 PROCEDURE — 90714 TD VACC NO PRESV 7 YRS+ IM: CPT

## 2021-12-21 RX ORDER — CEPHALEXIN 500 MG/1
500 CAPSULE ORAL 4 TIMES DAILY
Qty: 20 CAPSULE | Refills: 0 | Status: SHIPPED | OUTPATIENT
Start: 2021-12-21 | End: 2022-05-17

## 2021-12-21 ASSESSMENT — PAIN SCALES - GENERAL: PAINLEVEL: EXTREME PAIN (8)
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
## 2022-01-07 ENCOUNTER — MYC MEDICAL ADVICE (OUTPATIENT)
Dept: INTERNAL MEDICINE | Facility: CLINIC | Age: 47
End: 2022-01-07
Payer: COMMERCIAL

## 2022-01-07 DIAGNOSIS — G89.29 OTHER CHRONIC PAIN: ICD-10-CM

## 2022-01-10 DIAGNOSIS — G43.909 MIGRAINE WITHOUT STATUS MIGRAINOSUS, NOT INTRACTABLE, UNSPECIFIED MIGRAINE TYPE: ICD-10-CM

## 2022-01-10 DIAGNOSIS — G89.29 OTHER CHRONIC PAIN: ICD-10-CM

## 2022-01-10 NOTE — TELEPHONE ENCOUNTER
[facility] Call Center    Phone Message    May a detailed message be left on voicemail: yes     Reason for Call: Medication Question or concern regarding medication   Prescription Clarification  Name of Medication: Tramadol  Prescribing Provider: Shavon Guzman   Pharmacy: [id number] - [place], [place] - [address] AT [facility] [address]   What on the order needs clarification? Patient calling requesting to have this refilled as soon as possible due to her pain, Patient was informed of turn around time for refill requests but requested a message be sent stating the importatance of this refill to be processed sooner than expected and a call be made to her to inform her of the time it gets sent to her pharmacy. Thank you.           Action Taken: Message routed to:  [facility]    Travel Screening: Not Applicable

## 2022-01-11 RX ORDER — TRAMADOL HYDROCHLORIDE 300 MG/1
TABLET, EXTENDED RELEASE ORAL
Qty: 30 TABLET | OUTPATIENT
Start: 2022-01-11

## 2022-01-11 RX ORDER — TRAMADOL HYDROCHLORIDE 300 MG/1
300 TABLET, EXTENDED RELEASE ORAL AT BEDTIME
Qty: 30 TABLET | Refills: 5 | Status: SHIPPED | OUTPATIENT
Start: 2022-01-11 | End: 2022-06-30

## 2022-01-11 RX ORDER — TRAMADOL HYDROCHLORIDE 50 MG/1
50 TABLET ORAL EVERY 6 HOURS PRN
Qty: 180 TABLET | Refills: 5 | Status: SHIPPED | OUTPATIENT
Start: 2022-01-11 | End: 2022-06-30

## 2022-01-20 ENCOUNTER — TELEPHONE (OUTPATIENT)
Dept: INTERNAL MEDICINE | Facility: CLINIC | Age: 47
End: 2022-01-20

## 2022-01-20 DIAGNOSIS — M32.9 SYSTEMIC LUPUS ERYTHEMATOSUS, UNSPECIFIED SLE TYPE, UNSPECIFIED ORGAN INVOLVEMENT STATUS (H): ICD-10-CM

## 2022-01-20 NOTE — TELEPHONE ENCOUNTER
Prior Authorization Retail Medication Request    Medication/Dose: rizatriptan (MAXALT-MLT) 5 MG ODT  ICD code (if different than what is on RX):  Migraine without status migrainosus, not intractable, unspecified migraine type [G43.909]  Previously Tried and Failed:    Rationale:     Insurance Name:  [facility]  Insurance ID:  [id number]    Pharmacy Information (if different than what is on RX)  Name:  [id number] - [place], [place] - [address] AT [facility] [address]  Phone:  [phone]

## 2022-01-21 NOTE — LETTER
Patient:  Patricia Hall  :   1975  MRN:     [id number]        Ms.Jennifer Marta Hall  [address] [facility] [address]        2018    Dear Ms.Getz Hall,    We are writing to inform you of your test results. Normal TPMT, you could go ahead and start Imuran 50 mg a day. Make sure to do imuran monitoring labs 4 weeks after start of imuran.      Resulted Orders   Thiopurine Methyltransferase Genotyping   Result Value Ref Range    TPMT Genotype Specimen Whole Blood     TPMT Genotype Neg/Neg     TPMT Predicted Phenotype Normal       Comment:      (Note)  Interpretation: None of the analyzed thiopurine   S-methyltransferase (TPMT) gene variants were detected in   this individual. This result predicts normal thiopurine   metabolism and normal risk of drug-related myelotoxicity.  Recommendation: This result does not replace the need for   therapeutic drug or clinical monitoring. TPMT enzyme   activity and risk of adverse reactions to thiopurines may   be affected by additional genetic and non-genetic factors   that are not detected by this test.  This result has been reviewed and approved by Khalida Avila, Ph.D.  Background Information: Thiopurine Methyltransferase (TPMT)                          Genotyping, 4 Variants  Characteristics: Thiopurine therapy is used in the   treatment of autoimmune diseases, inflammatory bowel   disease and acute lymphoblastic leukemia and is also used   to prevent rejection after solid organ transplant.   Thiopurine drugs (eg, Azathioprine, 6-mercaptopurine,   6-thioguanine) are antimetabolites an  d must be metabolized   to 6-thioguanine nucleotides (6-TGN) for activity. The   inactivation of thiopurine drugs is primarily catalyzed by   TPMT. Variants in the TPMT gene can lead to low TPMT enzyme   activity, resulting in accumulation of cytotoxic   metabolites and increased risk for drug-related   myelotoxicity with standard doses of thiopurine  drugs.   Incidence of TPMT deficiency: In the general population,   approximately 0.3 percent of individuals have low TPMT   activity and 10 percent have intermediate TPMT activity.   Allele Frequencies:   TPMT *2:  0.035737,  0.0,  0.75421,   Mediterranean 0.28592, Citizen of the [place] 0.12229,    0.23007  TPMT *3A:  0.30925,  0.1271168,    0.0356, Mediterranean 0.0254, Citizen of the [place] 0.0533, Middle   Eastern 0.0114  TPMT *3B:  0.0,  0.0,  0.939961,   Mediterranean 0.45102, Citizen of the [place] 0.44040,    0.08149  TPMT *3C:  0.0495,  0.0157,  0.719179,   Medit  erranean 0.52207, Citizen of the [place] 0.21034,    0.60831  Inheritance: Autosomal co-dominant.  Cause: TPMT gene variants resulting in enzyme deficiency.   Variants Tested: TPMT deficiency alleles: *2 (c.238G>C;   p.Wgv57Ugp), *3A (c.[460G>A;719A>G];   p.[Qon980Xpa;Nac984Irk]), *3B (c.460G>A; p.Koc007Vla), *3C   (c.719A>G; p.Xfn487Dib).   No variants detected is predictive of *1 functional alleles   and normal TPMT enzyme activity.  (Variants are numbered according to [id number] transcript)  Clinical Sensitivity: 95 percent.   Methodology: Polymerase chain reaction (PCR) and   fluorescence monitoring.  Analytical Sensitivity and Specificity: 99 percent.  Limitations: Only the targeted TPMT variants will be   detected by this panel. Because the complex *3A allele   contains the variants found in the *3B and *3C alleles,   this test cannot distinguish the 3A/Negative genotype   (intermediate enzyme activity) from the rare *3B/*3C   genotype (no or low enzyme activity). This test does no  t   assess for TPMT variants associated with ultra-high enzyme   activity. Genotyping will reflect donor status in patients   who have received allogenic stem cell or bone marrow   transplants. TPMT enzyme activity, drug metabolism and risk   for adverse reactions to thiopurines may be affected by    additional genetic and non-genetic factors not evaluated by   this test. Diagnostic errors can occur due to rare sequence   variations. Genotyping does not replace the need for   therapeutic drug monitoring and clinical observation.   Counseling and informed consent are recommended for genetic   testing. Consent forms are available online at   [email or web address].  Test developed and characteristics determined by [facility]. See Compliance Statement C: [facility]  Performed by [facility],  [address] [place] [address] [phone]  [email or web address], Bert Nelson MD, Lab. Director         Josh Roy MD       No

## 2022-01-22 NOTE — TELEPHONE ENCOUNTER
Prior Authorization Not Needed per Insurance    Medication: rizatriptan (MAXALT-MLT) 5 MG ODT-PA NOT NEEDED   Insurance Company: [employer] [place] - Phone [phone] Fax [phone]  Expected CoPay:      Pharmacy Filling the Rx: [facility] #[id number] - [place], [place] - [facility] AT [facility] [address]  Pharmacy Notified: Yes  Patient Notified: No    Called pharmacy and pharmacy stated that PA is Not Needed and medication is covered. **Instructed pharmacy to notify patient when script is ready to /ship.** Pharmacy stated that they have a paid claim on medication on 1/17/2022 quantity 15 for 15 day supply and patient has picked up medication. Pharmacy stated that patients benefit plan allows quantity limit 18 tablets per 23 days only.

## 2022-01-25 RX ORDER — PREDNISONE 5 MG/1
TABLET ORAL
Qty: 90 TABLET | Refills: 3 | OUTPATIENT
Start: 2022-01-25

## 2022-01-27 NOTE — TELEPHONE ENCOUNTER
[facility] Call Center    Phone Message    May a detailed message be left on voicemail: yes     Reason for Call: Other: Pt requested refill of prednisone from her pharmacy, but was told she does not have any refills on file. They do have the prescription that was sent on 11/5/21, but they're not showing that any refills were sent with that one. Pt is now completely out of her medication, and would like it if someone could call them and discuss this, or please send in a new prescription?     Action Taken: Message routed to:  [facility] ([facility]): [facility]    Travel Screening: Not Applicable

## 2022-01-28 ENCOUNTER — TELEPHONE (OUTPATIENT)
Dept: INTERNAL MEDICINE | Facility: CLINIC | Age: 47
End: 2022-01-28

## 2022-01-28 NOTE — TELEPHONE ENCOUNTER
Prior Authorization Retail Medication Request    Medication/Dose: ondansetron (ZOFRAN-ODT) 4 MG ODT tab  ICD code (if different than what is on RX):  Nausea [R11.0]   Previously Tried and Failed:    Rationale:      Insurance Name:  [facility]  Insurance ID:  [id number]    Pharmacy Information (if different than what is on RX)  Name: [facility] #[id number] - [place], [place] - [address] AT [facility] [address]  Phone: [phone]

## 2022-01-31 NOTE — TELEPHONE ENCOUNTER
Central Prior Authorization Team   Phone: [phone]      PA Initiation via fax    Medication: ondansetron (ZOFRAN-ODT) 4 MG ODT tab  Insurance Company: [employer] - Phone [phone] Fax [phone]  Pharmacy Filling the Rx: [facility] #[id number] - [place], [place] - [facility] AT [facility] [address]  Filling Pharmacy Phone: [phone]  Filling Pharmacy Fax:    Start Date: 1/31/2022

## 2022-01-31 NOTE — TELEPHONE ENCOUNTER
Called insurance for PA form as CoverMyMeds is not able to find patient's coverage. Rep will be faxing form to be completed.

## 2022-02-01 NOTE — TELEPHONE ENCOUNTER
PRIOR AUTHORIZATION DENIED    Medication: ondansetron (ZOFRAN-ODT) 4 MG ODT tab-DENIED    Denial Date: 1/31/2022    Denial Rational:            Appeal Information:

## 2022-02-02 DIAGNOSIS — M32.9 SYSTEMIC LUPUS ERYTHEMATOSUS, UNSPECIFIED SLE TYPE, UNSPECIFIED ORGAN INVOLVEMENT STATUS (H): ICD-10-CM

## 2022-02-03 NOTE — TELEPHONE ENCOUNTER
Belimumab 200 mg/ ml SOAJ - inject 200 mg subcutaneously every 7 days  Last Written Prescription Date:  8/31/2021  Last Fill Quantity: 4 ml ,   # refills: 3  Last Office Visit : 10/8/2021  Future Office visit:  7/1/2022    Routing refill request to provider for review/approval because:  Not on protocol

## 2022-02-10 ENCOUNTER — LAB (OUTPATIENT)
Dept: LAB | Facility: CLINIC | Age: 47
End: 2022-02-10
Payer: COMMERCIAL

## 2022-02-10 DIAGNOSIS — M32.9 SYSTEMIC LUPUS ERYTHEMATOSUS, UNSPECIFIED SLE TYPE, UNSPECIFIED ORGAN INVOLVEMENT STATUS (H): ICD-10-CM

## 2022-02-10 PROCEDURE — 36415 COLL VENOUS BLD VENIPUNCTURE: CPT | Performed by: PATHOLOGY

## 2022-02-10 PROCEDURE — 86140 C-REACTIVE PROTEIN: CPT | Performed by: PATHOLOGY

## 2022-02-10 PROCEDURE — 82565 ASSAY OF CREATININE: CPT | Performed by: PATHOLOGY

## 2022-02-10 PROCEDURE — 86160 COMPLEMENT ANTIGEN: CPT | Mod: 90 | Performed by: PATHOLOGY

## 2022-02-10 PROCEDURE — 85652 RBC SED RATE AUTOMATED: CPT | Performed by: PATHOLOGY

## 2022-02-10 PROCEDURE — 99000 SPECIMEN HANDLING OFFICE-LAB: CPT | Performed by: PATHOLOGY

## 2022-02-10 PROCEDURE — 82040 ASSAY OF SERUM ALBUMIN: CPT | Performed by: PATHOLOGY

## 2022-02-10 PROCEDURE — 86769 SARS-COV-2 COVID-19 ANTIBODY: CPT | Mod: 90 | Performed by: PATHOLOGY

## 2022-02-10 PROCEDURE — 84450 TRANSFERASE (AST) (SGOT): CPT | Performed by: PATHOLOGY

## 2022-02-10 PROCEDURE — 85025 COMPLETE CBC W/AUTO DIFF WBC: CPT | Performed by: PATHOLOGY

## 2022-02-10 PROCEDURE — 84156 ASSAY OF PROTEIN URINE: CPT | Performed by: PATHOLOGY

## 2022-02-10 PROCEDURE — 85045 AUTOMATED RETICULOCYTE COUNT: CPT | Performed by: PATHOLOGY

## 2022-02-10 PROCEDURE — 84460 ALANINE AMINO (ALT) (SGPT): CPT | Performed by: PATHOLOGY

## 2022-02-10 PROCEDURE — 86225 DNA ANTIBODY NATIVE: CPT | Mod: 90 | Performed by: PATHOLOGY

## 2022-02-11 LAB
SARS-COV-2 AB SERPL IA-ACNC: >250 U/ML
SARS-COV-2 AB SERPL-IMP: POSITIVE

## 2022-02-12 ENCOUNTER — HEALTH MAINTENANCE LETTER (OUTPATIENT)
Age: 47
End: 2022-02-12

## 2022-03-10 ENCOUNTER — TRANSCRIBE ORDERS (OUTPATIENT)
Dept: OTHER | Age: 47
End: 2022-03-10
Payer: COMMERCIAL

## 2022-03-10 DIAGNOSIS — M54.31 SCIATICA OF RIGHT SIDE: Primary | ICD-10-CM

## 2022-03-24 DIAGNOSIS — M32.9 SYSTEMIC LUPUS ERYTHEMATOSUS, UNSPECIFIED SLE TYPE, UNSPECIFIED ORGAN INVOLVEMENT STATUS (H): ICD-10-CM

## 2022-03-24 RX ORDER — PREDNISONE 5 MG/1
TABLET ORAL
Qty: 120 TABLET | Refills: 1 | Status: SHIPPED | OUTPATIENT
Start: 2022-03-24 | End: 2022-05-26

## 2022-03-24 NOTE — TELEPHONE ENCOUNTER
Per [employer] message from 3/24/2022, patient is currently taking 20mg of prednisone daily. Routing refill request to provider for review.     Medication: Prednisone 5mg    Last Written Prescription Date:  1/28/2022  Last Fill Quantity: 120,   # refills: 1  Last Office Visit:  10/8/2022  Future Office visit:  7/1/2022    Routing refill request to provider for review/approval because:  Drug not on protocol    Maryellen Olvera CMA   3/24/2022 12:46 PM

## 2022-03-29 ENCOUNTER — MYC MEDICAL ADVICE (OUTPATIENT)
Dept: INTERNAL MEDICINE | Facility: CLINIC | Age: 47
End: 2022-03-29
Payer: COMMERCIAL

## 2022-03-29 DIAGNOSIS — R11.0 NAUSEA: ICD-10-CM

## 2022-03-30 DIAGNOSIS — M32.9 SYSTEMIC LUPUS ERYTHEMATOSUS, UNSPECIFIED SLE TYPE, UNSPECIFIED ORGAN INVOLVEMENT STATUS (H): ICD-10-CM

## 2022-04-01 RX ORDER — ONDANSETRON 4 MG/1
4 TABLET, ORALLY DISINTEGRATING ORAL EVERY 8 HOURS PRN
Qty: 30 TABLET | Refills: 1 | Status: SHIPPED | OUTPATIENT
Start: 2022-04-01 | End: 2022-08-30

## 2022-04-01 RX ORDER — AZATHIOPRINE 50 MG/1
TABLET ORAL
Qty: 90 TABLET | OUTPATIENT
Start: 2022-04-01

## 2022-04-09 ENCOUNTER — HEALTH MAINTENANCE LETTER (OUTPATIENT)
Age: 47
End: 2022-04-09

## 2022-04-11 RX ORDER — ONDANSETRON 8 MG/1
TABLET, FILM COATED ORAL
Qty: 90 TABLET | Refills: 1 | Status: SHIPPED | OUTPATIENT
Start: 2022-04-11 | End: 2023-05-05

## 2022-04-27 DIAGNOSIS — M32.9 SYSTEMIC LUPUS ERYTHEMATOSUS, UNSPECIFIED SLE TYPE, UNSPECIFIED ORGAN INVOLVEMENT STATUS (H): ICD-10-CM

## 2022-04-29 NOTE — TELEPHONE ENCOUNTER
azaTHIOprine (IMURAN) 50 MG tablet  Last Written Prescription Date:3/30/22  Last Fill Quantity:3   # refills: 1  Last Office Visit: 10/8/21  Future Office visit:  7/1/22    CBC RESULTS: Recent Labs   Lab Test 02/10/22  1713   WBC 11.6*   RBC 4.86   HGB 14.8   HCT 44.5   MCV 92   MCH 30.5   MCHC 33.3   RDW 12.8          Creatinine   Date Value Ref Range Status   02/10/2022 0.72 0.52 - 1.04 mg/dL Final   06/19/2021 0.76 0.52 - 1.04 mg/dL Final   ]    Liver Function Studies -   Recent Labs   Lab Test 02/10/22  1713 11/19/20  1130 06/26/20  1100   PROTTOTAL  --   --  7.3   ALBUMIN 3.8   < > 4.0   BILITOTAL  --   --  0.4   ALKPHOS  --   --  68   AST 25   < > 14   ALT 47   < > 36    < > = values in this interval not displayed.       Routing refill request to provider for review/approval because: provider review. Requests 90 day

## 2022-05-02 RX ORDER — AZATHIOPRINE 50 MG/1
TABLET ORAL
Qty: 90 TABLET | OUTPATIENT
Start: 2022-05-02

## 2022-05-13 ENCOUNTER — THERAPY VISIT (OUTPATIENT)
Dept: PHYSICAL THERAPY | Facility: CLINIC | Age: 47
End: 2022-05-13
Payer: COMMERCIAL

## 2022-05-13 DIAGNOSIS — M54.31 SCIATICA OF RIGHT SIDE: ICD-10-CM

## 2022-05-13 PROCEDURE — 97162 PT EVAL MOD COMPLEX 30 MIN: CPT | Mod: GP | Performed by: PHYSICAL THERAPIST

## 2022-05-13 PROCEDURE — 97110 THERAPEUTIC EXERCISES: CPT | Mod: GP | Performed by: PHYSICAL THERAPIST

## 2022-05-13 PROCEDURE — 97530 THERAPEUTIC ACTIVITIES: CPT | Mod: GP | Performed by: PHYSICAL THERAPIST

## 2022-05-13 NOTE — PROGRESS NOTES
"Physical Therapy Initial Evaluation  Subjective:  The history is provided by the patient. No  was used.   Therapist Generated HPI Evaluation  Problem details: CC: 12/9/2022 LBP limiting functional mobility with increase in exacerbation after going through treatments for fertility. Currently she describes significant tightness and \"quaking\" in lower extremetries (quads, hamstrings).  Sciatica used to be on right side but now is on both.  She notes a grinding feeling in the sacrum when walking on unlevel surfaces, then notes she will have several days of \"inflammation\", difficulty with urination, swelling in extremities.  She is unable to travel, lift, basic household activity without modifications.  Currently she is trying to complete some exercises during the day; did pelvic floor physical therapy online but has not been able to complete exercises.  Notes she does \"stretches\" all day long- nothing specific; is not currently working but will use standing desk for emails.  MR:  Impression: Synovial cyst at L5-S1 the right, mildly displacing  nerve roots, but not clearly causing impingement. Mild to moderate  neural foraminal narrowing at L5-S1, mild at L4-5.  Due to immunocomprised system, she delayed treatment during the pandemic and reports gradual worsening of symptoms complicated by other chronic pain issues with GI, Lupus, Endometriosis.  GI testing has been inconclusive for cause of pain, and diet is very restricted.  Takes prednisone regularly, and is starting trial with lupus medications. Describes constant and fluctuate daily- she has not had treatment for this, but rheumatology thinks it is due to chronic prednisone, patient thinks it is due to kidney function.  Her kidney function testing is normal.   SMHx: Lupus, endometriosis w 3 laproscopic surgeries; IVF, DNC; Left foot ulcer (current)..         Type of problem:  Lumbar.    This is a chronic condition.  Condition occurred with:  " Insidious onset.  Where condition occurred: other.  Patient reports pain:  Lower lumbar spine (also describes neck pain, abdominal pain).  Pain is described as aching, sharp and shooting and is constant.  Pain radiates to:  Gluteals right. Pain is the same all the time (difficulty sleeping since hard to get comfortable.).    Associated symptoms:  Numbness and edema. Symptoms are exacerbated by certain positions, lifting and standing                                Objective:  System    Physical Exam      [place] Lumbar Evaluation    Posture:  Sitting: poor  Standing: poor  Lordosis: Accentuated  Lateral Shift: left    Other Observations: guarded flexion, uses hands to brace to retrun to standing  Movement Loss:  Flexion (Flex): major  Extension (EXT): mod  Side Glide R (SG R): mod  Side Glide L (SG L): min  Test Movements:          SGIS R: During: increases  After: no worse    Repeat SGIS R: During: increases  After: no worse  Mechanical Response: IncROM      Conclusion: other  Principle of Treatment:        Lateral: trial                                         ROS    Assessment/Plan:    Patient is a 46 year old female with lumbar complaints.    Patient has the following significant findings with corresponding treatment plan.                Diagnosis 1:  Sciatica right  Pain -  manual therapy, self management, education, directional preference exercise and home program  Decreased ROM/flexibility - manual therapy and therapeutic exercise  Decreased strength - therapeutic exercise and therapeutic activities  Impaired gait - gait training  Impaired muscle performance - neuro re-education  Decreased function - therapeutic activities  Impaired posture - neuro re-education    Therapy Evaluation Codes:   1) History comprised of:   Personal factors that impact the plan of care:      Anxiety, Coping style and Overall behavior pattern.    Comorbidity factors that impact the plan of care are:      SLE, endometriosis.      Medications impacting care: Steroids.  2) Examination of Body Systems comprised of:   Body structures and functions that impact the plan of care:      Lumbar spine.   Activity limitations that impact the plan of care are:      Walking.  3) Clinical presentation characteristics are:   Evolving/Changing.  4) Decision-Making    Moderate complexity using standardized patient assessment instrument and/or measureable assessment of functional outcome.  Cumulative Therapy Evaluation is: Moderate complexity.    Previous and current functional limitations:  (See Goal Flow Sheet for this information)    Short term and Long term goals: (See Goal Flow Sheet for this information)     Communication ability:  Patient appears to be able to clearly communicate and understand verbal and written communication and follow directions correctly.  Treatment Explanation - The following has been discussed with the patient:   RX ordered/plan of care  Anticipated outcomes  Possible risks and side effects  This patient would benefit from PT intervention to resume normal activities.   Rehab potential is good.    Frequency:  1 X week, once daily  Duration:  for 12 weeks  Discharge Plan:  Achieve all LTG.  Independent in home treatment program.  Reach maximal therapeutic benefit.    Please refer to the daily flowsheet for treatment today, total treatment time and time spent performing 1:1 timed codes.

## 2022-05-15 PROBLEM — M54.31 SCIATICA OF RIGHT SIDE: Status: ACTIVE | Noted: 2022-05-15

## 2022-05-16 NOTE — PROGRESS NOTES
Physical Therapy Initial Evaluation  Subjective:    Patient Health History             Pertinent medical history includes: fibromyalgia, migraines/headaches, numbness/tingling, osteoarthritis, overweight and other (lupus).   Red flags:  Calf pain-swelling-warmth, changes in bowel and bladder habits, cold/hot extremity, foot drop, non-healing wounds, numbness in perianal region, pain at rest/night, severe dizziness, severe headaches and significant weakness.  Medical allergies: none.   Surgeries include:  Orthopedic surgery.    Current medications:  Pain medication, sleep medication and steroids.    Current occupation is .   Primary job tasks include:  Computer work, lifting/carrying, prolonged sitting, prolonged standing, pushing/pulling and repetitive tasks.

## 2022-05-17 ENCOUNTER — OFFICE VISIT (OUTPATIENT)
Dept: INTERNAL MEDICINE | Facility: CLINIC | Age: 47
End: 2022-05-17
Payer: COMMERCIAL

## 2022-05-17 ENCOUNTER — LAB (OUTPATIENT)
Dept: LAB | Facility: CLINIC | Age: 47
End: 2022-05-17

## 2022-05-17 VITALS — DIASTOLIC BLOOD PRESSURE: 81 MMHG | SYSTOLIC BLOOD PRESSURE: 137 MMHG | OXYGEN SATURATION: 94 % | HEART RATE: 99 BPM

## 2022-05-17 DIAGNOSIS — M54.42 BILATERAL LOW BACK PAIN WITH LEFT-SIDED SCIATICA, UNSPECIFIED CHRONICITY: ICD-10-CM

## 2022-05-17 DIAGNOSIS — L97.521 SKIN ULCER OF TOE OF LEFT FOOT, LIMITED TO BREAKDOWN OF SKIN (H): ICD-10-CM

## 2022-05-17 DIAGNOSIS — M79.601 PAIN OF RIGHT UPPER EXTREMITY: ICD-10-CM

## 2022-05-17 DIAGNOSIS — G43.001 MIGRAINE WITHOUT AURA AND WITH STATUS MIGRAINOSUS, NOT INTRACTABLE: Primary | ICD-10-CM

## 2022-05-17 LAB
ALBUMIN SERPL-MCNC: 3.9 G/DL (ref 3.4–5)
ALP SERPL-CCNC: 73 U/L (ref 40–150)
ALT SERPL W P-5'-P-CCNC: 44 U/L (ref 0–50)
ANION GAP SERPL CALCULATED.3IONS-SCNC: 7 MMOL/L (ref 3–14)
AST SERPL W P-5'-P-CCNC: 23 U/L (ref 0–45)
BILIRUB SERPL-MCNC: 0.3 MG/DL (ref 0.2–1.3)
BUN SERPL-MCNC: 19 MG/DL (ref 7–30)
CALCIUM SERPL-MCNC: 9.6 MG/DL (ref 8.5–10.1)
CHLORIDE BLD-SCNC: 102 MMOL/L (ref 94–109)
CO2 SERPL-SCNC: 32 MMOL/L (ref 20–32)
CREAT SERPL-MCNC: 0.8 MG/DL (ref 0.52–1.04)
ERYTHROCYTE [DISTWIDTH] IN BLOOD BY AUTOMATED COUNT: 12.5 % (ref 10–15)
GFR SERPL CREATININE-BSD FRML MDRD: >90 ML/MIN/1.73M2
GLUCOSE BLD-MCNC: 128 MG/DL (ref 70–99)
HBA1C MFR BLD: 5.8 % (ref 0–5.6)
HCT VFR BLD AUTO: 45.9 % (ref 35–47)
HGB BLD-MCNC: 14.9 G/DL (ref 11.7–15.7)
MCH RBC QN AUTO: 29.6 PG (ref 26.5–33)
MCHC RBC AUTO-ENTMCNC: 32.5 G/DL (ref 31.5–36.5)
MCV RBC AUTO: 91 FL (ref 78–100)
PLATELET # BLD AUTO: 267 10E3/UL (ref 150–450)
POTASSIUM BLD-SCNC: 4.3 MMOL/L (ref 3.4–5.3)
PROT SERPL-MCNC: 7.4 G/DL (ref 6.8–8.8)
RBC # BLD AUTO: 5.03 10E6/UL (ref 3.8–5.2)
SODIUM SERPL-SCNC: 141 MMOL/L (ref 133–144)
WBC # BLD AUTO: 10.7 10E3/UL (ref 4–11)

## 2022-05-17 PROCEDURE — 36415 COLL VENOUS BLD VENIPUNCTURE: CPT | Performed by: PATHOLOGY

## 2022-05-17 PROCEDURE — 99215 OFFICE O/P EST HI 40 MIN: CPT | Performed by: NURSE PRACTITIONER

## 2022-05-17 PROCEDURE — 83036 HEMOGLOBIN GLYCOSYLATED A1C: CPT | Performed by: PATHOLOGY

## 2022-05-17 PROCEDURE — 85027 COMPLETE CBC AUTOMATED: CPT | Performed by: PATHOLOGY

## 2022-05-17 PROCEDURE — 80053 COMPREHEN METABOLIC PANEL: CPT | Performed by: PATHOLOGY

## 2022-05-17 ASSESSMENT — PAIN SCALES - GENERAL: PAINLEVEL: EXTREME PAIN (8)

## 2022-05-17 NOTE — PROGRESS NOTES
S:Patricia Hall is a 46 year old female   She is here because she is having chronic abdominal pain, nausea, vomiting.   She is also experiencing sciatica and is concerned that she could have endometriosis on the sacral nerve. She has an appt. scheduled with GYN Dr. Mccall to further discuss.  She has had only one menses since November. She has a hx of endometriosis diagnosed since 2019.  She has had three surgeries for pelvic endometriosis.     She is followed by Dr. Garrett in Rheumatology. She is followed there for her SLE, fibromyalgia, connective tissue disease and Raynauds.      She has a hx of LBP with sciatica, evaluated by Dr. Gilman in 2019.     She has a shallow ulcer on her first toe of the left foot.  She has been seeing a Podiatrist who trims her callus.  She is concerned she might have diabetes.  No known elevated glucose levels.   Study Result    Narrative & Impression   MR LUMBAR SPINE W/O CONTRAST 12/9/2019 5:42 PM     Provided History: Abn xray, L/S-spine, DJD; Pain     ICD-10: Pain     Comparison: Radiographs 11/27/2019     Technique: Sagittal T1-weighted, sagittal STIR, 3D volumetric axial  and sagittal reconstructed T2-weighted images of the lumbar spine were  obtained without intravenous contrast.      Findings: There are 5 lumbar-type vertebrae assumed for the purposes  of this dictation.  The tip of the conus medullaris is at L1.  Normal  lumbar vertebral alignment.  There is no significant disc height  narrowing . Hemangioma in the L5 vertebral body.     On a level by level basis:     T12-L1: No spinal canal or neuroforaminal stenosis.     L1-2: No spinal canal or neuroforaminal stenosis.     L2-3: Mild disc bulge. No spinal canal or neuroforaminal stenosis.  L3-4: Minimal disc bulge. No spinal canal or neuroforaminal stenosis.     L4-5: Mild disc bulge without significant spinal canal narrowing. Mild  bilateral neural foraminal narrowing. Bilateral facet joint  "effusions.     L5-S1: Mild to moderate bilateral neural foraminal narrowing. Synovial  cyst projecting medially from the right facet joint, displacing the  descending right S2 nerve root, without clear nerve root impingement.  Mild disc bulge with minimal lateral recess narrowing. Marked facet  joint irregularity and effusions.     Paraspinous tissues are within normal limits.                                                                      Impression: Synovial cyst at L5-S1 on the right, mildly displacing  nerve roots, but not clearly causing impingement. Mild to moderate  neural foraminal narrowing at L5-S1, mild at L4-5.     JOSE LUIS AHUJA MD     He back pain is exacerbated by walking on grass or uneven ground. She states she feels something \"pop out\" in hr lower spine and the day after this activity she can barely walk.  It also affects her urination.  She feels bloated because of this.    She also has frequent migraines.  They can last for 4 days on Relpax each day.        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)     Adjustment disorder with anxiety     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     Blood coagulation disorder (H)     Chronic headaches     Diminished ovarian reserve     Disorder of connective tissue (H)     Disease of immune system (H)     Endometriosis of uterus     Female infertility     History of environmental allergies     Hyperlipidemia     Irritable bowel syndrome     Major depressive disorder, recurrent episode, in full remission (H)     Major depressive disorder, recurrent episode, mild (H)     Migraine     Myalgia     Raynaud's disease     Recurrent pregnancy loss without current pregnancy     Seasonal allergies     Primary fibromyalgia syndrome     Uterine leiomyoma     Sciatica of right side            Past Medical History:   Diagnosis " Date     Allergy, unspecified not elsewhere classified      Endometriosis      Fibromyalgia      Migraine without aura, with intractable migraine, so stated, without mention of status migrainosus      Myalgia and myositis, unspecified      Systemic lupus erythematosus (H)             Past Surgical History:   Procedure Laterality Date     ORTHOPEDIC SURGERY       SURGICAL HISTORY OF -   1986    left elbow fracture repair            Social History     Tobacco Use     Smoking status: Never Smoker     Smokeless tobacco: Never Used   Substance Use Topics     Alcohol use: Yes     Comment: occ.- 2/week            Family History   Problem Relation Age of Onset     Diabetes Maternal Grandfather      Lipids Mother      Skin Cancer Paternal Grandmother      Melanoma No family hx of                Allergies   Allergen Reactions     Cherry Anaphylaxis     Dairy Aid  [Lactase]      Other reaction(s): Arthralgia (Joint Pain)     Gluten Meal      Other reaction(s): Abdominal Pain     No Clinical Screening - See Comments Anaphylaxis and Itching     Pistachio Nut Extract Skin Test Anaphylaxis     Brassica Oleracea Italica      Other reaction(s): Abdominal Pain  Other reaction(s): Abdominal Pain     Penicillins Hives and Rash     Sulfa Drugs Hives and Rash            Current Outpatient Medications   Medication Sig Dispense Refill     RACHNA SYRUP PO        Acetaminophen (TYLENOL PO)         albuterol (PROAIR HFA/PROVENTIL HFA/VENTOLIN HFA) 108 (90 Base) MCG/ACT inhaler        AMBIEN 10 MG OR TABS 1 po HS, prn 20 0     azaTHIOprine (IMURAN) 50 MG tablet Take 1 tablet (50 mg) by mouth daily 30 tablet 1     Belimumab 200 MG/ML SOAJ Inject 200 mg Subcutaneous every 7 days Hold for signs of infection and seek medical attention. 12 mL 3     calcium carbonate (OS-VAHID 500 MG [place]) 1250 MG tablet Take 1 tablet by mouth daily       ciclopirox (PENLAC) 8 % external solution Apply to adjacent skin and affected nails daily.  Remove with  alcohol every 7 days, then repeat. 6.6 mL 11     diltiazem 2% in PLO gel Apply topically 4 times daily 30 g 3     EPINEPHrine (ANY BX GENERIC EQUIV) 0.3 MG/0.3ML injection 2-pack Inject 0.3 mLs (0.3 mg) into the muscle as needed for anaphlaxis 2 each 2     EPINEPHrine (ANY BX GENERIC EQUIV) 0.3 MG/0.3ML injection 2-pack epinephrine 0.3 mg/0.3 mL injection, auto-injector       fexofenadine (ALLEGRA) 180 MG tablet Take 1 tablet (180 mg) by mouth daily 30 tablet 11     fluticasone (FLONASE) 50 MCG/ACT nasal spray        hydroxychloroquine (PLAQUENIL) 200 MG tablet Take 1 tablet (200 mg) by mouth 2 times daily 180 tablet 1     L-Methylfolate-Algae-B12-B6 (METANX PO) Take 1,000 mg by mouth daily       MULTIVITAMIN TABS   OR   0     Omega-3 Fatty Acids (OMEGA 3 PO) Take 1,250 mg by mouth daily       omeprazole (PRILOSEC) 40 MG DR capsule Take 1 capsule (40 mg) by mouth daily 60 capsule 1     ondansetron (ZOFRAN) 8 MG tablet Take one tablet up to three times daily for nausea. 90 tablet 1     ondansetron (ZOFRAN-ODT) 4 MG ODT tab Take 1 tablet (4 mg) by mouth every 8 hours as needed for nausea 30 tablet 1     predniSONE (DELTASONE) 5 MG tablet TAKE 4 TABLETS(20 MG) BY MOUTH DAILY 120 tablet 1     rizatriptan (MAXALT-MLT) 5 MG ODT Take 1 tablet (5 mg) by mouth at onset of headache for migraine May repeat in 2 hours. Max 6 tablets/24 hours. 15 tablet 11     sertraline (ZOLOFT) 100 MG tablet Take 100 mg by mouth daily       SPIRULINA PO Take by mouth daily       traMADol (ULTRAM) 50 MG tablet Take 1 tablet (50 mg) by mouth every 6 hours as needed for severe pain Take 1-2 tablets up to three times a day as needed 180 tablet 5     traMADol (ULTRAM-ER) 300 MG 24 hr tablet Take 1 tablet (300 mg) by mouth At Bedtime maximum 1 tablet(s) per day 30 tablet 5     triamcinolone (KENALOG) 0.1 % paste Take by mouth 2 times daily 5 g 3     triamcinolone (NASACORT) 55 MCG/ACT nasal aerosol Spray 2 sprays into both nostrils daily 16.5 mL 11      urea (GORMEL) 20 % external cream Apply topically 2 times daily 480 g 11     VITAMIN D, CHOLECALCIFEROL, PO Take 5,000 Units by mouth daily       cephALEXin (KEFLEX) 500 MG capsule Take 1 capsule (500 mg) by mouth 4 times daily 20 capsule 0     montelukast (SINGULAIR) 10 MG tablet Take 1 tablet (10 mg) by mouth At Bedtime 30 tablet 11     SUMAtriptan (IMITREX) 20 MG/ACT nasal spray Spray 1 spray in nostril once as needed for migraine May repeat in 2 hours. Max 2 sprays/24 hours. 1 each 1       REVIEW OF SYSTEMS:  See above.    O:   /81 (BP Location: Right arm, Patient Position: Sitting, Cuff Size: Adult Large)   Pulse 99   LMP  (Approximate)   SpO2 94%   GENERAL APPEARANCE: healthy, alert and no distress  HENT: ear canals and TM's normal, and mouth without ulcers or lesions  RESP: lungs clear to auscultation - no rales, rhonchi or wheezes  CV: regular rates and rhythm, normal S1 S2, no S3 or S4 and no murmur, click or rub   ABDOMEN:  soft, tender, no HSM or masses and bowel sounds normal  NEURO: Grossly normal:  SKIN:left medial surface of first toe shallow skin ulceration.  Underlying toe has good circulation, good venous return  EXT: warm.  Edema: none  PSYCHE: crying and stressed.     A/P:  Patricia was seen today for back pain, recheck medication and autoimmune disease.    Diagnoses and all orders for this visit:    Migraine without aura and with status migrainosus, not intractable  -     Adult Neurology  Referral    Bilateral low back pain with left-sided sciatica, unspecified chronicity  -     Pain Management Referral; Future  -     Comprehensive metabolic panel; Future  -     CBC with platelets; Future    Pain of right upper extremity  -     XR Finger Right G/E 2 Views; Future    Skin ulcer of toe of left foot, limited to breakdown of skin (H)  -     Hemoglobin A1c; Future      The patient voiced understanding of the information discussed and all questions were answered.     Total time  spent today with this patient including chart review, exam time with patient and documentation : 65 minutes      Hue FERRER, CNP

## 2022-05-17 NOTE — NURSING NOTE
Chief Complaint   Patient presents with     Back Pain     Pt here to discuss chronic leg and back issue - due for repeat imaging     Recheck Medication     Pt would like to discuss chronic prednisone use due to lupus - weaning off     Autoimmune Disease     Pt would like due discuss foot ulcer, swelling, bladder/urine (no protein in urine), migraines, sinuses, ears, nausea, period stopped       Bhavin Martin CMA, EMT at 4:04 PM on 5/17/2022.

## 2022-05-17 NOTE — LETTER
Patricia Hall  [address]  [place] [address]  : 1975  MRN:  [id number]      May 17, 2022          To Whom It May Concern,    Patricia Hall was contacted for jury duty for May 31, 2022.    Ms. Neto Hall has the following health problems:  Patient Active Problem List   Diagnosis     Insomnia     Systemic lupus erythematosus (H)     Refractory migraine without aura     Adjustment disorder with anxiety     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     Blood coagulation disorder (H)     Chronic headaches     Disease of immune system (H)     Endometriosis of uterus     Migraine     Myalgia     Primary fibromyalgia syndrome     Sciatica of right side       She is currently seeking medical care for the above issues but would not be able to reliably serve as a juror at this time due to chronic pain issues.      Hue FERRER, CNP  Primary Care Clinic  [facility]

## 2022-05-23 ENCOUNTER — VIRTUAL VISIT (OUTPATIENT)
Dept: INTERNAL MEDICINE | Facility: CLINIC | Age: 47
End: 2022-05-23

## 2022-05-23 ENCOUNTER — TELEPHONE (OUTPATIENT)
Dept: INTERNAL MEDICINE | Facility: CLINIC | Age: 47
End: 2022-05-23

## 2022-05-23 DIAGNOSIS — R73.03 PREDIABETES: ICD-10-CM

## 2022-05-23 DIAGNOSIS — M54.41 ACUTE BILATERAL LOW BACK PAIN WITH BILATERAL SCIATICA: Primary | ICD-10-CM

## 2022-05-23 DIAGNOSIS — M54.42 ACUTE BILATERAL LOW BACK PAIN WITH BILATERAL SCIATICA: Primary | ICD-10-CM

## 2022-05-23 PROCEDURE — 99215 OFFICE O/P EST HI 40 MIN: CPT | Mod: 95 | Performed by: INTERNAL MEDICINE

## 2022-05-23 RX ORDER — METFORMIN HCL 500 MG
500 TABLET, EXTENDED RELEASE 24 HR ORAL
Qty: 30 TABLET | Refills: 11 | Status: SHIPPED | OUTPATIENT
Start: 2022-05-23 | End: 2023-02-09

## 2022-05-23 NOTE — TELEPHONE ENCOUNTER
Left message for patient to inform that MRI lumber spine is schedule for tomorrow May 24 at 11:45 AM in [facility].

## 2022-05-23 NOTE — PROGRESS NOTES
"Gracie is a very pleasant 47 year old female with lupus, migraines, and endometriosis here via a billable video visit for follow up multiple symptoms.    Saw Hue Francois last week:  I reviewed her note and results.    She reports recently, 'sciatica' type pain radiating down both legs; mobility is impaired particularly on uneven or soft surfaces.  Also having neurological symptoms, shooting pain into toes, associated with numbness tingling, both feet. Reports muscles in both lower legs are atrophied and will \"shake\" and hurt/ache with prolonged use.    She mentioned a herniated disc in the past.  Last imaging was 2019 and I reviewed this report. I cannot really see a significant disc bulge that I would attribute to symptoms like this. There was also a synovial cyst at S2 again unclear significance.     Gracie reports fluid buildup, edema both legs, and some presacral edema as well.  This is above and beyond her usual puffiness due to prednisone. Feels she is not emptying her bladder effectively.  Is going less frequently and smaller amounts.  She also reports intermittent numbness in the perineum.    Gracie's having also worsening endometriosis symptoms. It is possible the symptoms are related to this but other neurologic causes would need to be excluded.  I mentioned the diagnosis of transverse myelitis which can happen in patients with lupus.  I think we need to exclude this with a stat MRI and Neurologic consultation.    On exam, I can see there is new periorbital edema; no facial rash is noted; she also showed me on video a nonhealing foot ulcer, left great toe.    A/P  47 year old with lupus and concerning neurologic signs/symptoms    Acute bilateral low back pain with bilateral sciatica but also urinary retention and perineal numbness, concerning for transverse myelitis or other cord involvement  -     Adult Neurology  Referral, marked urgent  -     MRI Lumbar spine w/o contrast; ordered Stat  --I added on " the patient to the end of my Thursday morning clinic (resident clinic) to follow up    Prediabetes  -    Start metFORMIN (GLUCOPHAGE XR) 500 MG 24 hr tablet; Take 1 tablet (500 mg) by mouth daily (with dinner)    Shavon Guzman MD      How would you like to obtain your AVS? MyChart  If the video visit is dropped, the invitation should be resent by: Text to cell phone: [phone]  Will anyone else be joining your video visit? No        Video-Visit Details    Type of service:  Video Visit started 11:18 ended 11:45 with another 15 minutes chart review, documentation, and care coordination same day.    Originating Location (pt. Location): Home    Distant Location (provider location):  [facility] [place] [address]     Platform used for Video Visit: [employer]

## 2022-05-24 ENCOUNTER — HOSPITAL ENCOUNTER (INPATIENT)
Dept: MRI IMAGING | Facility: HOSPITAL | Age: 47
Discharge: HOME | End: 2022-05-24
Attending: INTERNAL MEDICINE
Payer: COMMERCIAL

## 2022-05-24 DIAGNOSIS — N80.9 ENDOMETRIOSIS: ICD-10-CM

## 2022-05-24 DIAGNOSIS — M54.42 ACUTE BILATERAL LOW BACK PAIN WITH BILATERAL SCIATICA: ICD-10-CM

## 2022-05-24 DIAGNOSIS — M54.41 ACUTE BILATERAL LOW BACK PAIN WITH BILATERAL SCIATICA: ICD-10-CM

## 2022-05-24 DIAGNOSIS — M32.9 SYSTEMIC LUPUS ERYTHEMATOSUS, UNSPECIFIED SLE TYPE, UNSPECIFIED ORGAN INVOLVEMENT STATUS (H): ICD-10-CM

## 2022-05-24 PROCEDURE — 255N000002 HC RX 255 OP 636: Performed by: INTERNAL MEDICINE

## 2022-05-24 PROCEDURE — 72148 MRI LUMBAR SPINE W/O DYE: CPT

## 2022-05-24 PROCEDURE — A9585 GADOBUTROL INJECTION: HCPCS | Performed by: INTERNAL MEDICINE

## 2022-05-24 PROCEDURE — 72197 MRI PELVIS W/O & W/DYE: CPT

## 2022-05-24 RX ORDER — GADOBUTROL 604.72 MG/ML
0.1 INJECTION INTRAVENOUS ONCE
Status: COMPLETED | OUTPATIENT
Start: 2022-05-24 | End: 2022-05-24

## 2022-05-24 RX ADMIN — GADOBUTROL 9 ML: 604.72 INJECTION INTRAVENOUS at 20:16

## 2022-05-26 ENCOUNTER — OFFICE VISIT (OUTPATIENT)
Dept: INTERNAL MEDICINE | Facility: CLINIC | Age: 47
End: 2022-05-26
Payer: COMMERCIAL

## 2022-05-26 ENCOUNTER — ANCILLARY PROCEDURE (OUTPATIENT)
Dept: MAMMOGRAPHY | Facility: CLINIC | Age: 47
End: 2022-05-26
Attending: INTERNAL MEDICINE
Payer: COMMERCIAL

## 2022-05-26 ENCOUNTER — NURSE TRIAGE (OUTPATIENT)
Dept: NURSING | Facility: CLINIC | Age: 47
End: 2022-05-26

## 2022-05-26 DIAGNOSIS — Z12.31 VISIT FOR SCREENING MAMMOGRAM: ICD-10-CM

## 2022-05-26 DIAGNOSIS — M32.9 SYSTEMIC LUPUS ERYTHEMATOSUS, UNSPECIFIED SLE TYPE, UNSPECIFIED ORGAN INVOLVEMENT STATUS (H): ICD-10-CM

## 2022-05-26 DIAGNOSIS — M99.23 SUBLUXATION STENOSIS OF NEURAL CANAL OF LUMBAR REGION: Primary | ICD-10-CM

## 2022-05-26 PROCEDURE — 77063 BREAST TOMOSYNTHESIS BI: CPT | Mod: GC | Performed by: STUDENT IN AN ORGANIZED HEALTH CARE EDUCATION/TRAINING PROGRAM

## 2022-05-26 PROCEDURE — 99213 OFFICE O/P EST LOW 20 MIN: CPT | Performed by: INTERNAL MEDICINE

## 2022-05-26 PROCEDURE — 77067 SCR MAMMO BI INCL CAD: CPT | Mod: GC | Performed by: STUDENT IN AN ORGANIZED HEALTH CARE EDUCATION/TRAINING PROGRAM

## 2022-05-26 RX ORDER — GABAPENTIN 300 MG/1
300 CAPSULE ORAL AT BEDTIME
Qty: 90 CAPSULE | Refills: 3 | Status: SHIPPED | OUTPATIENT
Start: 2022-05-26 | End: 2022-08-30

## 2022-05-26 RX ORDER — CYCLOBENZAPRINE HCL 10 MG
10 TABLET ORAL 3 TIMES DAILY PRN
Qty: 30 TABLET | Refills: 11 | Status: SHIPPED | OUTPATIENT
Start: 2022-05-26 | End: 2023-12-18

## 2022-05-26 RX ORDER — AZATHIOPRINE 50 MG/1
TABLET ORAL
Qty: 90 TABLET | Refills: 1 | Status: SHIPPED | OUTPATIENT
Start: 2022-05-26 | End: 2022-07-01

## 2022-05-26 RX ORDER — PREDNISONE 5 MG/1
20 TABLET ORAL DAILY
Qty: 120 TABLET | Refills: 5 | Status: SHIPPED | OUTPATIENT
Start: 2022-05-26 | End: 2022-07-01

## 2022-05-26 NOTE — NURSING NOTE
Chief Complaint   Patient presents with     Recheck Medication     Discuss MRI and other sciatica concerns      Patient declined vitals stating she was just in recently and had vitals taken.     Kosta Judge, EMT at 10:48 AM on 5/26/2022.

## 2022-05-26 NOTE — TELEPHONE ENCOUNTER
PREDNISONE 5MG TABLETS  Last Written Prescription Date:   3/24/2022  Last Fill Quantity: 120,   # refills: 1  Last Office Visit :  10/8/2021  Future Office visit:   7/1/2022    Routing refill request to provider for review/approval because:  Greater then 10 Mg's a day.   Needing to send to provider for review and refills per Protocol.       Flora Wilkerson RN  Central Triage Red Flags/Med Refills

## 2022-05-26 NOTE — TELEPHONE ENCOUNTER
[facility] Call Center    Phone Message    May a detailed message be left on voicemail: yes     Reason for Call: Medication Refill Request    Has the patient contacted the pharmacy for the refill? Yes   Name of medication being requested: Prednisone 5 mg  Provider who prescribed the medication: Dr. Roy  Pharmacy: [facility]  Date medication is needed: ASAP    Call received from pt reporting that she made a mistake when she initially went to request this medication, and as a result, she is now on a time crunch to get this refilled because she is completely out now. She is anxious and worried about the potential side effects of stopping this abruptly if she doesn't have any throughout the weekend, is really hoping this can be refilled today.     FYI-she reports she is aware that she will need to have lab tests done before she can have her azathioprine refilled, says it's just the prednisone she needs urgently     Action Taken: Message routed to:  Other: [facility] MED REFILLS TEAM    Travel Screening: Not Applicable

## 2022-05-27 NOTE — TELEPHONE ENCOUNTER
"Patient has lupus. She takes prednisone but she is out. Has not taken dose today. She would like at least a one dose order sent to pharmacy, but if a provider can fill for 5 days to cover the potential 3 business and 2 weekend days of waiting for her refill from the rheumatologist.    Medication:    Disp Refills Start End KARO    predniSONE (DELTASONE) 5 MG tablet 120 tablet 1 3/24/2022  No   Sig: TAKE 4 TABLETS(20 MG) BY MOUTH DAILY   Sent to pharmacy as: predniSONE 5 MG Oral Tablet (DELTASONE)     Pharmacy:    [id number] - [place], [place] - [address] AT [facility] [address]    Paging on-call at 8:17 pm. Received call back at 8:18 pm that Dr. Roy will send refill now.    Hue Mi RN  [place] Nurse Advisor  8:20 PM  5/26/2022    COVID 19 Nurse Triage Plan/Patient Instructions    Please be aware that novel coronavirus (COVID-19) may be circulating in the community. If you develop symptoms such as fever, cough, or SOB or if you have concerns about the presence of another infection including coronavirus (COVID-19), please contact your health care provider or visit [email or web address].     Disposition/Instructions    Additional COVID19 information to add for patients.   How can I protect others?  If you have symptoms (fever, cough, body aches or trouble breathing): Stay home and away from others (self-isolate) until:    At least 10 days have passed since your symptoms started, And     You ve had no fever--and no medicine that reduces fever--for 1 full day (24 hours), And      Your other symptoms have resolved (gotten better).     If you don t have symptoms, but a test showed that you have COVID-19 (you tested positive):    Stay home and away from others (self-isolate). Follow the tips under \"How do I self-isolate?\" below for 10 days (20 days if you have a weak immune system).    You don't need to be retested for COVID-19 before going back to school or work. As long as " you're fever-free and feeling better, you can go back to school, work and other activities after waiting the 10 or 20 days.     How do I self-isolate?    Stay in your own room, even for meals. Use your own bathroom if you can.     Stay away from others in your home. No hugging, kissing or shaking hands. No visitors.    Don t go to work, school or anywhere else.     Clean  high touch  surfaces often (doorknobs, counters, handles, etc.). Use a household cleaning spray or wipes. You ll find a full list on the EPA website:  www.epa.gov/pesticide-registration/list-n-disinfectants-use-against-sars-cov-2.    Cover your mouth and nose with a mask, tissue or washcloth to avoid spreading germs.    Wash your hands and face often. Use soap and water.    Caregivers in these groups are at risk for severe illness due to COVID-19:  o People 65 years and older  o People who live in a nursing home or long-term care facility  o People with chronic disease (lung, heart, cancer, diabetes, kidney, liver, immunologic)  o People who have a weakened immune system, including those who:  - Are in cancer treatment  - Take medicine that weakens the immune system, such as corticosteroids  - Had a bone marrow or organ transplant  - Have an immune deficiency  - Have poorly controlled HIV or AIDS  - Are obese (body mass index of 40 or higher)  - Smoke regularly    Caregivers should wear gloves while washing dishes, handling laundry and cleaning bedrooms and bathrooms.    Use caution when washing and drying laundry: Don t shake dirty laundry, and use the warmest water setting that you can.    For more tips, go to www.cdc.gov/coronavirus/2019-ncov/downloads/10Things.pdf.    How can I take care of myself?  1. Get lots of rest. Drink extra fluids (unless a doctor has told you not to).     2. Take Tylenol (acetaminophen) for fever or pain. If you have liver or kidney problems, ask your family doctor if it s okay to take Tylenol.     Adults can take  either:     650 mg (two 325 mg pills) every 4 to 6 hours, or     1,000 mg (two 500 mg pills) every 8 hours as needed.     Note: Don t take more than 3,000 mg in one day.   Acetaminophen is found in many medicines (both prescribed and over-the-counter medicines). Read all labels to be sure you don t take too much.     For children, check the Tylenol bottle for the right dose. The dose is based on the child s age or weight.    3. If you have other health problems (like cancer, heart failure, an organ transplant or severe kidney disease): Call your specialty clinic if you don t feel better in the next 2 days.    4. Know when to call 911: Emergency warning signs include:    Trouble breathing or shortness of breath    Pain or pressure in the chest that doesn t go away    Feeling confused like you haven t felt before, or not being able to wake up    Bluish-colored lips or face    What are the symptoms of COVID-19?     The most common symptoms are cough, fever and trouble breathing.     Less common symptoms include body aches, chills, diarrhea (loose, watery poops), fatigue (feeling very tired), headache, runny nose, sore throat and loss of smell.    COVID-19 can cause severe coughing (bronchitis) and lung infection (pneumonia).    How does it spread?     The virus may spread when a person coughs or sneezes into the air. The virus can travel about 6 feet this way, and it can live on surfaces.      Common  (household disinfectants) will kill the virus.    Who is at risk?  Anyone can catch COVID-19 if they re around someone who has the virus.    How can others protect themselves?     Stay away from people who have COVID-19 (or symptoms of COVID-19).    Wash hands often with soap and water. Or, use hand  with at least 60% alcohol.    Avoid touching the eyes, nose or mouth.     Wear a face mask when you go out in public, when sick or when caring for a sick person.    Where can I get more information?    [facility] [place]: About COVID-19: [email or web address]    CDC: What to Do If You re Sick: www.cdc.gov/coronavirus/2019-ncov/about/steps-when-sick.html    CDC: Ending Home Isolation: www.cdc.gov/coronavirus/2019-ncov/hcp/disposition-in-home-patients.html     CDC: Caring for Someone: www.cdc.gov/coronavirus/2019-ncov/if-you-are-sick/care-for-someone.html     [facility]: Interim Guidance for Hospital Discharge to Home: www.[facility]/diseases/coronavirus/hcp/hospdischarge.pdf    [facility] clinical trials (COVID-19 research studies): [email or web address].[email or web address]-clinical-trials     Below are the COVID-19 hotlines at the [place] [facility] ([facility]). Interpreters are available.   o For health questions: Call 925-884-7176 or 1-723.332.7540 (7 a.m. to 7 p.m.)  o For questions about schools and childcare: Call 858-505-2815 or 1-817.177.8170 (7 a.m. to 7 p.m.)          Thank you for taking steps to prevent the spread of this virus.  o Limit your contact with others.  o Wear a simple mask to cover your cough.  o Wash your hands well and often.    Resources    [facility]: About COVID-19: [email or web address]    CDC: What to Do If You're Sick: www.cdc.gov/coronavirus/2019-ncov/about/steps-when-sick.html    CDC: Ending Home Isolation: www.cdc.gov/coronavirus/2019-ncov/hcp/disposition-in-home-patients.html     CDC: Caring for Someone: www.cdc.gov/coronavirus/2019-ncov/if-you-are-sick/care-for-someone.html     [facility]: Interim Guidance for Hospital Discharge to Home: www.[facility]/diseases/coronavirus/hcp/hospdischarge.pdf    [facility] clinical trials (COVID-19 research studies): [email or web address]-clinical-trials     Below are the COVID-19 hotlines at the [place] Department of [facility] ([facility]). Interpreters are available.   o For health questions: Call 031-660-0517 or 1-772.125.8328 (7 a.m. to 7 p.m.)  o For questions about schools and childcare: Call  731.296.6760 or 1-109.513.8389 (7 a.m. to 7 p.m.)                   Reason for Disposition    [1] Caller has medication question about med not prescribed by PCP AND [2] triager unable to answer question (e.g., compatibility with other med, storage)    Protocols used: MEDICATION QUESTION CALL-A-AH

## 2022-05-31 ENCOUNTER — THERAPY VISIT (OUTPATIENT)
Dept: PHYSICAL THERAPY | Facility: CLINIC | Age: 47
End: 2022-05-31
Payer: COMMERCIAL

## 2022-05-31 DIAGNOSIS — M54.31 SCIATICA OF RIGHT SIDE: Primary | ICD-10-CM

## 2022-05-31 PROCEDURE — 97110 THERAPEUTIC EXERCISES: CPT | Mod: GP

## 2022-05-31 PROCEDURE — 97530 THERAPEUTIC ACTIVITIES: CPT | Mod: GP

## 2022-06-13 ENCOUNTER — THERAPY VISIT (OUTPATIENT)
Dept: PHYSICAL THERAPY | Facility: CLINIC | Age: 47
End: 2022-06-13
Payer: COMMERCIAL

## 2022-06-13 DIAGNOSIS — M54.31 SCIATICA OF RIGHT SIDE: Primary | ICD-10-CM

## 2022-06-13 PROCEDURE — 97140 MANUAL THERAPY 1/> REGIONS: CPT | Mod: GP

## 2022-06-13 PROCEDURE — 97110 THERAPEUTIC EXERCISES: CPT | Mod: GP

## 2022-06-14 NOTE — PROGRESS NOTES
Patricia was seen today for follow up of MRI results earlier this week.  See also my virtual visit note from Monday 5/23/22 for more details regarding her symptoms which have not changed.    The MRI was negative for my concern of transverse myelitis, but still shows some significant findings:    IMPRESSION:  1.  Degenerative changes lower lumbar spine most marked L4-L5 and L5-S1. No severe spinal stenosis or severe foraminal compromise at any lumbar level.     2.  No spinal stenosis with mild L4-L5 and L5-S1 foraminal compromise inferiorly. Facet joint arthropathy and increased joint space fluid is noted at these levels.     3.  Consider flexion-extension views if there is concern for instability as increased joint space fluid in the facet joints has been described in association with instability.     4.  Since previous MR 12/9/2019, L5-S1 anterior subluxation has progressed, along with interspace narrowing. Previously identified synovial cyst arising from the medial aspect of the degenerated right L5-S1 facet joint is not present on today's study   with decreased mass effect upon the right lateral thecal sac and mass effect upon the traversing right-sided nerve roots S1-S2.    I reviewed these findings with Gracie and also discussed symptomatic management for pain relief.   Will initiate gabapentin at bedtime, flexeril during the day for muscle spasms.      A/P 47 year old with mechanical back pain and signs/symptoms of instability regarding subluxation.     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    Shavon Guzman MD

## 2022-06-30 ENCOUNTER — THERAPY VISIT (OUTPATIENT)
Dept: PHYSICAL THERAPY | Facility: CLINIC | Age: 47
End: 2022-06-30
Payer: COMMERCIAL

## 2022-06-30 ENCOUNTER — MYC MEDICAL ADVICE (OUTPATIENT)
Dept: INTERNAL MEDICINE | Facility: CLINIC | Age: 47
End: 2022-06-30

## 2022-06-30 DIAGNOSIS — M54.31 SCIATICA OF RIGHT SIDE: Primary | ICD-10-CM

## 2022-06-30 DIAGNOSIS — G89.29 OTHER CHRONIC PAIN: ICD-10-CM

## 2022-06-30 DIAGNOSIS — G43.909 MIGRAINE WITHOUT STATUS MIGRAINOSUS, NOT INTRACTABLE, UNSPECIFIED MIGRAINE TYPE: ICD-10-CM

## 2022-06-30 PROCEDURE — 97116 GAIT TRAINING THERAPY: CPT | Mod: GP | Performed by: PHYSICAL THERAPIST

## 2022-06-30 PROCEDURE — 97112 NEUROMUSCULAR REEDUCATION: CPT | Mod: GP | Performed by: PHYSICAL THERAPIST

## 2022-06-30 PROCEDURE — 97110 THERAPEUTIC EXERCISES: CPT | Mod: GP | Performed by: PHYSICAL THERAPIST

## 2022-06-30 RX ORDER — TRAMADOL HYDROCHLORIDE 50 MG/1
TABLET ORAL
Qty: 180 TABLET | OUTPATIENT
Start: 2022-06-30

## 2022-06-30 RX ORDER — TRAMADOL HYDROCHLORIDE 300 MG/1
300 TABLET, EXTENDED RELEASE ORAL AT BEDTIME
Qty: 30 TABLET | Refills: 5 | Status: SHIPPED | OUTPATIENT
Start: 2022-06-30 | End: 2023-01-09

## 2022-06-30 RX ORDER — TRAMADOL HYDROCHLORIDE 50 MG/1
50 TABLET ORAL EVERY 6 HOURS PRN
Qty: 180 TABLET | Refills: 5 | Status: SHIPPED | OUTPATIENT
Start: 2022-06-30 | End: 2022-12-18

## 2022-07-01 ENCOUNTER — VIRTUAL VISIT (OUTPATIENT)
Dept: RHEUMATOLOGY | Facility: CLINIC | Age: 47
End: 2022-07-01
Attending: INTERNAL MEDICINE
Payer: COMMERCIAL

## 2022-07-01 DIAGNOSIS — M32.9 SYSTEMIC LUPUS ERYTHEMATOSUS, UNSPECIFIED SLE TYPE, UNSPECIFIED ORGAN INVOLVEMENT STATUS (H): ICD-10-CM

## 2022-07-01 DIAGNOSIS — Z51.81 ENCOUNTER FOR MEDICATION MONITORING: Primary | ICD-10-CM

## 2022-07-01 PROCEDURE — 99215 OFFICE O/P EST HI 40 MIN: CPT | Mod: 95 | Performed by: INTERNAL MEDICINE

## 2022-07-01 PROCEDURE — G0463 HOSPITAL OUTPT CLINIC VISIT: HCPCS | Mod: PN,RTG | Performed by: INTERNAL MEDICINE

## 2022-07-01 RX ORDER — PREDNISONE 5 MG/1
TABLET ORAL
Qty: 120 TABLET | Refills: 5
Start: 2022-07-01 | End: 2022-12-20

## 2022-07-01 RX ORDER — PREDNISONE 1 MG/1
TABLET ORAL
Qty: 90 TABLET | Refills: 1 | Status: SHIPPED | OUTPATIENT
Start: 2022-07-01 | End: 2023-02-09

## 2022-07-01 RX ORDER — AZATHIOPRINE 50 MG/1
75 TABLET ORAL DAILY
Qty: 135 TABLET | Refills: 0 | Status: SHIPPED | OUTPATIENT
Start: 2022-07-01 | End: 2022-10-04

## 2022-07-01 NOTE — PROGRESS NOTES
Gracie is a 47 year old who is being evaluated via a billable video visit.      How would you like to obtain your AVS? MyChart  If the video visit is dropped, the invitation should be resent by: Text to cell phone: [phone]  Will anyone else be joining your video visit? No        Video-Visit Details    Video Start Time: 2:04 PM    Type of service:  Video Visit    Video End Time:2:34 PM    Originating Location (pt. Location): Home    Distant Location (provider location):  [facility] [place]     Platform used for Video Visit: [facility]         Rheumatology Visit Note    Reason for visit: Lupus    First Consult: 10/12/2017    Last visit: 10/8/2021    DOS: 7/1/2022    Original HPI (10/12/2017):  Patricia Hall is a 42 year old female who was referred to our clinic for evaluation and management of her SLE. The patient has been following with Dr. Mao for her SLE    History obtain from chart review and patient interview    Her lupus symptoms first started in during high school including fatigue and rash. She was diagnosed with lupus in early 2000s and she was in graduate school and presented with a few years of arthralgias involving knees and ankles and hands.  She had developed new onset Raynaud's phenomenon in which her fingers turned white in the cold but no digital sores.  VINITA was 1:160.  All specific autoantibodies and rheumatoid serologies negative.  She had a rash on her face, including cheeks and bridge of her nose.  She followed with Dr. Tejal Mayen in Dermatology.  A biopsy of a lesion from the nose was non-diagnostic.  Diagnosis was earlysigns of cutaneous lupus versus acne rosacea.  She was treated with Elidel cream.  She reported intermittent oral ulcers and significant fatigue as well as intermittent episodes of hair loss.  She was started on prednisone in 2003 along with Plaquenil.  She has been maintained on prednisone 5 mg q day with intermittent bursts up as  high as 30 mg for a short time.  She has found it very difficult to taper off of prednisone because she gets flare-ups of skin rash, arthralgias, and fatigue. Patient reports significant symptoms during the summers and reports significant flares this summer with photosensitivity with face rash,  Fatigue and join pain (knees and toes and hips).  She had fevers, mouth sores  And also reports increased hair loss.  Increased pain with walking and morning stiffness with Fibromyalgia burning in the morning. Currently taking prednisone 10 mg qd and plaquenil 200 mg BID.    Patient reports complicated fertility/OBGYN history with stage four endometriosis, fibroids and one pregnancy resulting in a miscarriage. Three rounds of IVF with one banked viable egg. She is currently undergoing fertility evaluation.  She has been working with infertility specialist for years. She currently has one viable egg and would like to undergo one more episode of IVF. She was seen by an autoimmune OB/GYN specialist in [place] (Daya Frost 02/17) for extensive testing. She was found to be heterozygous for MTHFR mutation. She is now taking Matanx (L-methyl folate). She has noticed less bruising since she started this. Metformin was recommended, but it upsets her stomach. DHEA was recommended, but she is not taking it. Her thyroid studies were normal, but she was started on Synthroid 25  g daily for the TSH to be less than 2.0. It was recommended that she take Lovenox prior to IFV and throughout the pregnancy to avoid risk of miscarriage. It was also recommended that she be treated with IVIG prior to IVF. 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  She plans to proceeded further with in vitro plans, but wants to get better control of her flaring lupus and concerns for IVF treatments/hormones.     Interval HPI (7/20/2018):  Repeatedly ill during the winter with both URIs  "and flares of disease (joint pain, malaise, fatigue). Still undergoing IVF care via [facility] and reproductive immunology. Did not initiate azathioprine following previous visit with Dr. Roy due to worry over negative effects on pregnancy. Currently undergoing medication treatment for IVF, is picking up medications this weekend for stimulation.    Symptomatically, her recent flares include fatigue, flu-like symptoms (fever, chills, sweats), polyarticular joint pain (fingers, toes, feet, ankles, wrists, elbows).    Recently, she has experienced further hair loss (clumps in shower), ulcers on buccal mucosa (now resolved), fatigue, malaise, significant B/L hip pain (worse from previous, approx 1 year). Specifically, it is constant deep joint pain in hips, would say 8/10 in severity when it occurs during movement. Has been seriously disrupting her daily activities. Has been using tylenol to control pain without complete relief.     Has seen Reproductive Immunology in the interim, has undergone two rounds of IVIG (believed to help with NK cell and cytokine activity in embryo implantation), first IVIG May 14th, then second was July 16th. Had flu-like symptoms with first infusion (HA, myalgias, malaise, felt \"gross\", lower back pain, neck pain). During the 2nd infusion, they slowed infusion, took benadryl/tylenol/upped prednisone to 20 mg she did not experience SEs with this. Overall, felt that IVIG improved her lupus symptoms globally. Short-lived relief. Plan is to use IVIG monthly with monitoring of cytokine levels and NK cell counts.     Currently on 15 mg of prednisone chronically, > 1 year. Today, she would say her disease is mild-moderately active in spite of the 15 mg prednisone. Currently on 400-500 U of vitamin D. Taking 1000 mg calcium.     ROS:  (-) pleurisy, sob, cough, hematuria, dysuria, eye redness, nose bleeds, easy bruising, malar rash  (+) alternating diarrhea/constipation, dry eye, dry mouth, palatal " ulcers/petechiae    Is interested in discussing SLE management (Imuran) while pursuing IVF. Will be undergoing planning and another round of IVF stimulation in August.       8/21/2019: She did another round of IVF in 8/2019, no good embryo. IVF caused her flare ups. For flare ups, gets pain over knees, knuckles and toes with extreme fatigue and photosensitivity.    Had laparoscopic surgery for endometriosis in 4/2019.    Since 4/2019, has been on OC and has not been able to taper prednisone own.    Today, she is on prednisone 15 mg every day x 2-3 weeks. Before that, most of the time, she was on 20 mg every day.    No rash today.    Has pain over knuckles, toes, knees. Has morning flu like sx in AM x 2 hours.    Has extreme fatigue.    She was getting IVIG qmonthly, till 1/2019.    She is going to resume monthly IVIG for successful pregnancy.    11/27/2019: Started IVIG on 9/8/2019, her eyes swelled up, 2 days later had severe LBP/SI joint pain. Was doing keto diet at that time, had headaches.now has sciatica pain on the R side. Did not have this reaction with IVIG before.    Late Oct/early Nov, had malar rash, hair loss.    Had LE edema, went up on her prednisone to 30-40 mg a day x few days. Back to her baseline hair loss and baseline prednisone 20 mg every day.      R SI joint pain is better, but still has it, uses topical pain cream. It is the worst in AM.    Still has swollen ankles.    This edema is new for her.    Off birth control pills. Not on IVF tx either.    Her lupus is back to her baseline.    Has not started AZA yet, but not thinks she is ready to try it.      10/8/2021:    Gracie chose not to take AZA with concern for SEs buts he would like to try benlysta. Continues to have active lupus and can not taper prednisone off.    Dealing with back pain, sciatica, gallstones. Gets vol retention, swelling, LE edema. Ankles and eyes swell up. Has gained weight. Feels stiff. Had diarrhea, stomach pain but now it  is improved.      Sciatica is now better. No skin rash or major hair loss today. Takes prednisone 20 mg every day. Has sun sensitivity. Sometimes increases prednisone to 40 mg every day.    Reports small joint pain 5-6/10, has night and 1 hr am stiffness with swollen feet. Gets jaw pain, migraines, pleurisy. Considers surrogate.      Today 7/1/2022:     Gracie presents for follow up.    Had pelvis, L spine MRI because of having LBP x years. every time she walked over soft grass, injured herself. Was found to have degenerative changes, full MRI report is below. Going to do follow up with her spine doctor. Doing physical therapy. The top of her spine is better. Has A1C elevated.    She is on prednisone 17 mg every day, tries to work through it . Today is a good day, yesterday was a rough day. Has hard time tapering prednisone down.    Dr. Mendez put her on gabapentin.    She is now on benlysta since 11/2021, thinks it is helping her. Thinks benlysta affected her menses, had it twice. Reports edema, weight gain on it. She did not have this extra weight, LEs edema before benlysta even on prednisone 20 mg every day. It helps with energy, when she takes it, feels better for couple of days.    Urine frequency, urgency is less on benlysta, has fluid retention.    Smelling chemical feeling is better on benlsyta.    Took AZA 50 mg every day x 2 months, no SEs and she would estrella to re-try it. When she started gabapentin, stopped AZA abut wants to re-try it.    Has pain, fatigue in her legs, it is better now. She thinks that it might be caused by benlysta. Had the most intense muscle pain, took muscle relaxant and it helped.    No skin rash or oral ulcers.    Takes prednisone for pain in small joints, fingers, toes and elbows. It is different from back pain.    Feels like her whole body is in a flare by going down on prednisone, feels getting a fever and diffuse pain along with fatigue.    Has bump over R 3rd finger which hurts,  swells up.    Has unhealed ulcer over big toe, does follow up with a provider.    Toenails are the same.    Looking for surrogate.    Had eye exam.        ROS:  A comprehensive ROS was done, positives are per HPI.  Past Medical History:   Diagnosis Date     Allergy, unspecified not elsewhere classified      Endometriosis      Fibromyalgia      Migraine without aura, with intractable migraine, so stated, without mention of status migrainosus      Myalgia and myositis, unspecified      Systemic lupus erythematosus (H)      Past Surgical History:   Procedure Laterality Date     ORTHOPEDIC SURGERY       SURGICAL HISTORY OF -   1986    left elbow fracture repair     Family History   Problem Relation Age of Onset     Diabetes Maternal Grandfather      Lipids Mother      Skin Cancer Paternal Grandmother      Melanoma No family hx of      Social History     Socioeconomic History     Marital status:      Spouse name: Not on file     Number of children: Not on file     Years of education: Not on file     Highest education level: Not on file   Occupational History     Not on file   Tobacco Use     Smoking status: Never Smoker     Smokeless tobacco: Never Used   Substance and Sexual Activity     Alcohol use: Yes     Comment: occ.- 2/week     Drug use: No     Sexual activity: Yes     Partners: Male     Birth control/protection: Condom   Other Topics Concern     Parent/sibling w/ CABG, MI or angioplasty before 65F 55M? 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)     Adjustment disorder with anxiety     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     Blood coagulation disorder (H)     Chronic headaches     Diminished ovarian reserve     Disorder of connective tissue (H)     Disease of immune system (H)     Endometriosis of uterus     Female infertility     History of environmental allergies     Hyperlipidemia     Irritable bowel syndrome     Major depressive disorder, recurrent episode, in full remission (H)     Major depressive disorder, recurrent episode, mild (H)     Migraine     Myalgia     Raynaud's disease     Recurrent pregnancy loss without current pregnancy     Seasonal allergies     Primary fibromyalgia syndrome     Uterine leiomyoma     Sciatica of right side     Allergies   Allergen Reactions     Cherry Anaphylaxis     Dairy Aid  [Lactase]      Other reaction(s): Arthralgia (Joint Pain)     Gluten Meal      Other reaction(s): Abdominal Pain     No Clinical Screening - See Comments Anaphylaxis and Itching     Pistachio Nut Extract Skin Test Anaphylaxis     Brassica Oleracea Italica      Other reaction(s): Abdominal Pain  Other reaction(s): Abdominal Pain     Penicillins Hives and Rash     Sulfa Drugs Hives and Rash       Outpatient Encounter Medications as of 7/1/2022   Medication Sig Dispense Refill     RACHNA SYRUP PO        Acetaminophen (TYLENOL PO)         albuterol (PROAIR HFA/PROVENTIL HFA/VENTOLIN HFA) 108 (90 Base) MCG/ACT inhaler        AMBIEN 10 MG OR TABS 1 po HS, prn 20 0     azaTHIOprine (IMURAN) 50 MG tablet TAKE 1 TABLET(50 MG) BY MOUTH DAILY 90 tablet 1     Belimumab 200 MG/ML SOAJ Inject 200 mg Subcutaneous every 7 days Hold for signs of infection and seek medical attention. 12 mL 3     calcium carbonate (OS-VAHID 500 MG [place]. [place]) 1250 MG tablet Take 1 tablet by mouth daily       ciclopirox (PENLAC) 8 % external solution Apply to adjacent skin and affected nails daily.  Remove with alcohol every 7  days, then repeat. 6.6 mL 11     cyclobenzaprine (FLEXERIL) 10 MG tablet Take 1 tablet (10 mg) by mouth 3 times daily as needed for muscle spasms 30 tablet 11     diltiazem 2% in PLO gel Apply topically 4 times daily 30 g 3     EPINEPHrine (ANY BX GENERIC EQUIV) 0.3 MG/0.3ML injection 2-pack Inject 0.3 mLs (0.3 mg) into the muscle as needed for anaphlaxis 2 each 2     EPINEPHrine (ANY BX GENERIC EQUIV) 0.3 MG/0.3ML injection 2-pack epinephrine 0.3 mg/0.3 mL injection, auto-injector       fexofenadine (ALLEGRA) 180 MG tablet Take 1 tablet (180 mg) by mouth daily 30 tablet 11     fluticasone (FLONASE) 50 MCG/ACT nasal spray        hydroxychloroquine (PLAQUENIL) 200 MG tablet Take 1 tablet (200 mg) by mouth 2 times daily 180 tablet 1     L-Methylfolate-Algae-B12-B6 (METANX PO) Take 1,000 mg by mouth daily       metFORMIN (GLUCOPHAGE XR) 500 MG 24 hr tablet Take 1 tablet (500 mg) by mouth daily (with dinner) 30 tablet 11     MULTIVITAMIN TABS   OR   0     Omega-3 Fatty Acids (OMEGA 3 PO) Take 1,250 mg by mouth daily       omeprazole (PRILOSEC) 40 MG DR capsule Take 1 capsule (40 mg) by mouth daily 60 capsule 1     ondansetron (ZOFRAN) 8 MG tablet Take one tablet up to three times daily for nausea. 90 tablet 1     ondansetron (ZOFRAN-ODT) 4 MG ODT tab Take 1 tablet (4 mg) by mouth every 8 hours as needed for nausea 30 tablet 1     predniSONE (DELTASONE) 5 MG tablet Take 4 tablets (20 mg) by mouth daily 120 tablet 5     rizatriptan (MAXALT-MLT) 5 MG ODT Take 1 tablet (5 mg) by mouth at onset of headache for migraine May repeat in 2 hours. Max 6 tablets/24 hours. 15 tablet 11     sertraline (ZOLOFT) 100 MG tablet Take 100 mg by mouth daily       SPIRULINA PO Take by mouth daily       traMADol (ULTRAM) 50 MG tablet Take 1 tablet (50 mg) by mouth every 6 hours as needed for severe pain Take 1-2 tablets up to three times a day as needed 180 tablet 5     traMADol (ULTRAM-ER) 300 MG 24 hr tablet Take 1 tablet (300 mg) by mouth  At Bedtime maximum 1 tablet(s) per day 30 tablet 5     triamcinolone (KENALOG) 0.1 % paste Take by mouth 2 times daily 5 g 3     triamcinolone (NASACORT) 55 MCG/ACT nasal aerosol Spray 2 sprays into both nostrils daily 16.5 mL 11     urea (GORMEL) 20 % external cream Apply topically 2 times daily 480 g 11     VITAMIN D, CHOLECALCIFEROL, PO Take 5,000 Units by mouth daily       gabapentin (NEURONTIN) 300 MG capsule Take 1 capsule (300 mg) by mouth At Bedtime (Patient not taking: Reported on 7/1/2022) 90 capsule 3     No facility-administered encounter medications on file as of 7/1/2022.         Her records were reviewed.    Component      Latest Ref Rng & Units 2/10/2022 5/17/2022   WBC      4.0 - 11.0 10e3/uL 11.6 (H) 10.7   RBC Count      3.80 - 5.20 10e6/uL 4.86 5.03   Hemoglobin      11.7 - 15.7 g/dL 14.8 14.9   Hematocrit      35.0 - 47.0 % 44.5 45.9   MCV      78 - 100 fL 92 91   MCH      26.5 - 33.0 pg 30.5 29.6   MCHC      31.5 - 36.5 g/dL 33.3 32.5   RDW      10.0 - 15.0 % 12.8 12.5   Platelet Count      150 - 450 10e3/uL 242 267   % Neutrophils      % 76    % Lymphocytes      % 16    % Monocytes      % 5    % Eosinophils      % 0    % Basophils      % 1    % Immature Granulocytes      % 2    NRBCs per 100 WBC      <1 /100 0    Absolute Neutrophils      1.6 - 8.3 10e3/uL 8.9 (H)    Absolute Lymphocytes      0.8 - 5.3 10e3/uL 1.8    Absolute Monocytes      0.0 - 1.3 10e3/uL 0.6    Absolute Eosinophils      0.0 - 0.7 10e3/uL 0.0    Absolute Basophils      0.0 - 0.2 10e3/uL 0.1    Absolute Immature Granulocytes      <=0.4 10e3/uL 0.2    Absolute NRBCs      10e3/uL 0.0    Sodium      133 - 144 mmol/L  141   Potassium      3.4 - 5.3 mmol/L  4.3   Chloride      94 - 109 mmol/L  102   Carbon Dioxide      20 - 32 mmol/L  32   Anion Gap      3 - 14 mmol/L  7   Urea Nitrogen      7 - 30 mg/dL  19   Creatinine      0.52 - 1.04 mg/dL 0.72 0.80   Calcium      8.5 - 10.1 mg/dL  9.6   Glucose      70 - 99 mg/dL  128 (H)    Alkaline Phosphatase      40 - 150 U/L  73   AST      0 - 45 U/L 25 23   ALT      0 - 50 U/L 47 44   Protein Total      6.8 - 8.8 g/dL  7.4   Albumin      3.4 - 5.0 g/dL 3.8 3.9   Bilirubin Total      0.2 - 1.3 mg/dL  0.3   GFR Estimate      >60 mL/min/1.73m2 >90 >90   SARS-CoV-2 Rigo Ab, Interp, S       Positive    SARS-CoV-2 Rigo Ab, Quant, S      U/mL >250    Patient's Race       Unknown    Patient's Ethnicity       Unknown    Protein Random Urine      g/L <0.05    Protein Total Urine g/gr Creatinine           Creatinine Urine      mg/dL 13    % Retic      0.5 - 2.0 % 1.6    Absolute Retic      0.025 - 0.095 10e6/uL 0.080    DNA-ds      <10.0 IU/mL <0.6    Complement C4      13 - 39 mg/dL 34    Complement C3      81 - 157 mg/dL 135    Sed Rate      0 - 20 mm/hr 7    CRP Inflammation      0.0 - 8.0 mg/L 3.0    Hemoglobin A1C      0.0 - 5.6 %  5.8 (H)     Pregnancy: She is . H/o OCP and HRT use, see HPI    Ph.E:      Constitutional: WD/WN. Pleasant, NAD.  Cushingoid  Eyes: EOM intact, sclera anicteric, conj not injected   MS: No active synovitis over hands, could make full fist. + [place] nodes  Skin: no rash.  Neuro: A&O x 3. Grossly non focal  Psych: NL affect    Assessment/Plan (2019):    1-SLE FLARE/active  2-HCQ monitoring  3-AZA monitoring  4-Benlysta monitoring    SLE, dx in s (VINITA: 1:80, dsDNA +, Smith negative, normal complements, joint pains, malar rash, oral ulcers), usually flares approximately monthly with joint pains, pleurisy and malar rash. Currently, feels that disease is active. Has been on chronic 20 mg every day prednisone, gained weight, gets LE edema, looks cushingoid. Still planning for PGS normal embryo implantation, might consider surrogate. Had a reaction to IVIG which was given for infertility tx and is not going to get it again. Her back pain seems to be sciatica and not related to SLE.    Previous visits, was advised to try AZA as steroid sparing agent (I am concerned  about long term steroid SE) but did not start with concern for potential SE but willing to try benlysta, especially with planning for surrogate pregnancy. Labs are stable.    Gracie is on benlysta since last visit with some benefit, but can not taper prednisone below 17 mg every day due to increased joint pain by taper. Briefly tried AZA 1 tab a day, no SEs, willing to re-try it, will resume it at higher dose.        Plan:    Continue  mg bid with yearly eye exam    Continue weekly benlysta shots    Imuran 1.5 tabs a day    Labs in Aug 2022 then q3mo    Taper prednisone 0.5 mg down every week till you reach 10 mg a day    Return in 6 months (video ok)    Josh Roy MD

## 2022-07-01 NOTE — PATIENT INSTRUCTIONS
Imuran 1.5 tabs a day    Labs in Aug 2022    Taper prednisone 0.5 mg down every week till you reach 10 mg a day    Return in 6 months (video ok)

## 2022-07-01 NOTE — LETTER
7/1/2022       RE: Patricia Hall  [address]  [place] [address]     Dear Colleague,    Thank you for referring your patient, Patricia Hall, to the [facility] RHEUMATOLOGY CLINIC [place] at [facility]. Please see a copy of my visit note below.    Gracie is a 47 year old who is being evaluated via a billable video visit.      How would you like to obtain your AVS? MyChart  If the video visit is dropped, the invitation should be resent by: Text to cell phone: [phone]  Will anyone else be joining your video visit? No        Video-Visit Details    Video Start Time: 2:04 PM    Type of service:  Video Visit    Video End Time:2:34 PM    Originating Location (pt. Location): Home    Distant Location (provider location):  [facility] RHEUMATOLOGY [facility]     Platform used for Video Visit: AmWell         Rheumatology Visit Note    Reason for visit: Lupus    First Consult: 10/12/2017    Last visit: 10/8/2021    DOS: 7/1/2022    Original HPI (10/12/2017):  Patricia Hall is a 42 year old female who was referred to our clinic for evaluation and management of her SLE. The patient has been following with Dr. Mao for her SLE    History obtain from chart review and patient interview    Her lupus symptoms first started in during high school including fatigue and rash. She was diagnosed with lupus in early 2000s and she was in graduate school and presented with a few years of arthralgias involving knees and ankles and hands.  She had developed new onset Raynaud's phenomenon in which her fingers turned white in the cold but no digital sores.  VINITA was 1:160.  All specific autoantibodies and rheumatoid serologies negative.  She had a rash on her face, including cheeks and bridge of her nose.  She followed with Dr. Tejal Mayen in Dermatology.  A biopsy of a lesion from the nose was non-diagnostic.  Diagnosis was earlysigns  of cutaneous lupus versus acne rosacea.  She was treated with Elidel cream.  She reported intermittent oral ulcers and significant fatigue as well as intermittent episodes of hair loss.  She was started on prednisone in 2003 along with Plaquenil.  She has been maintained on prednisone 5 mg q day with intermittent bursts up as high as 30 mg for a short time.  She has found it very difficult to taper off of prednisone because she gets flare-ups of skin rash, arthralgias, and fatigue. Patient reports significant symptoms during the summers and reports significant flares this summer with photosensitivity with face rash,  Fatigue and join pain (knees and toes and hips).  She had fevers, mouth sores  And also reports increased hair loss.  Increased pain with walking and morning stiffness with Fibromyalgia burning in the morning. Currently taking prednisone 10 mg qd and plaquenil 200 mg BID.    Patient reports complicated fertility/OBGYN history with stage four endometriosis, fibroids and one pregnancy resulting in a miscarriage. Three rounds of IVF with one banked viable egg. She is currently undergoing fertility evaluation.  She has been working with infertility specialist for years. She currently has one viable egg and would like to undergo one more episode of IVF. She was seen by an autoimmune OB/GYN specialist in [place] (Daya Frost 02/17) for extensive testing. She was found to be heterozygous for MTHFR mutation. She is now taking Matanx (L-methyl folate). She has noticed less bruising since she started this. Metformin was recommended, but it upsets her stomach. DHEA was recommended, but she is not taking it. Her thyroid studies were normal, but she was started on Synthroid 25  g daily for the TSH to be less than 2.0. It was recommended that she take Lovenox prior to IFV and throughout the pregnancy to avoid risk of miscarriage. It was also recommended that she be treated with IVIG prior to IVF. 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  She plans to proceeded further with in vitro plans, but wants to get better control of her flaring lupus and concerns for IVF treatments/hormones.     Interval HPI (7/20/2018):  Repeatedly ill during the winter with both URIs and flares of disease (joint pain, malaise, fatigue). Still undergoing IVF care via [facility] and reproductive immunology. Did not initiate azathioprine following previous visit with Dr. Roy due to worry over negative effects on pregnancy. Currently undergoing medication treatment for IVF, is picking up medications this weekend for stimulation.    Symptomatically, her recent flares include fatigue, flu-like symptoms (fever, chills, sweats), polyarticular joint pain (fingers, toes, feet, ankles, wrists, elbows).    Recently, she has experienced further hair loss (clumps in shower), ulcers on buccal mucosa (now resolved), fatigue, malaise, significant B/L hip pain (worse from previous, approx 1 year). Specifically, it is constant deep joint pain in hips, would say 8/10 in severity when it occurs during movement. Has been seriously disrupting her daily activities. Has been using tylenol to control pain without complete relief.     Has seen Reproductive Immunology in the interim, has undergone two rounds of IVIG (believed to help with NK cell and cytokine activity in embryo implantation), first IVIG May 14th, then second was July 16th. Had flu-like symptoms with first infusion (HA, myalgias, malaise, felt \"gross\", lower back pain, neck pain). During the 2nd infusion, they slowed infusion, took benadryl/tylenol/upped prednisone to 20 mg she did not experience SEs with this. Overall, felt that IVIG improved her lupus symptoms globally. Short-lived relief. Plan is to use IVIG monthly with monitoring of cytokine levels and NK cell counts.     Currently on 15 mg of prednisone chronically, > 1 year. Today, " she would say her disease is mild-moderately active in spite of the 15 mg prednisone. Currently on 400-500 U of vitamin D. Taking 1000 mg calcium.     ROS:  (-) pleurisy, sob, cough, hematuria, dysuria, eye redness, nose bleeds, easy bruising, malar rash  (+) alternating diarrhea/constipation, dry eye, dry mouth, palatal ulcers/petechiae    Is interested in discussing SLE management (Imuran) while pursuing IVF. Will be undergoing planning and another round of IVF stimulation in August.       8/21/2019: She did another round of IVF in 8/2019, no good embryo. IVF caused her flare ups. For flare ups, gets pain over knees, knuckles and toes with extreme fatigue and photosensitivity.    Had laparoscopic surgery for endometriosis in 4/2019.    Since 4/2019, has been on OC and has not been able to taper prednisone own.    Today, she is on prednisone 15 mg every day x 2-3 weeks. Before that, most of the time, she was on 20 mg every day.    No rash today.    Has pain over knuckles, toes, knees. Has morning flu like sx in AM x 2 hours.    Has extreme fatigue.    She was getting IVIG qmonthly, till 1/2019.    She is going to resume monthly IVIG for successful pregnancy.    11/27/2019: Started IVIG on 9/8/2019, her eyes swelled up, 2 days later had severe LBP/SI joint pain. Was doing keto diet at that time, had headaches.now has sciatica pain on the R side. Did not have this reaction with IVIG before.    Late Oct/early Nov, had malar rash, hair loss.    Had LE edema, went up on her prednisone to 30-40 mg a day x few days. Back to her baseline hair loss and baseline prednisone 20 mg every day.      R SI joint pain is better, but still has it, uses topical pain cream. It is the worst in AM.    Still has swollen ankles.    This edema is new for her.    Off birth control pills. Not on IVF tx either.    Her lupus is back to her baseline.    Has not started AZA yet, but not thinks she is ready to try it.      10/8/2021:    Gracie chose  not to take AZA with concern for SEs buts he would like to try benlysta. Continues to have active lupus and can not taper prednisone off.    Dealing with back pain, sciatica, gallstones. Gets vol retention, swelling, LE edema. Ankles and eyes swell up. Has gained weight. Feels stiff. Had diarrhea, stomach pain but now it is improved.      Sciatica is now better. No skin rash or major hair loss today. Takes prednisone 20 mg every day. Has sun sensitivity. Sometimes increases prednisone to 40 mg every day.    Reports small joint pain 5-6/10, has night and 1 hr am stiffness with swollen feet. Gets jaw pain, migraines, pleurisy. Considers surrogate.      Today 7/1/2022:     Gracie presents for follow up.    Had pelvis, L spine MRI because of having LBP x years. every time she walked over soft grass, injured herself. Was found to have degenerative changes, full MRI report is below. Going to do follow up with her spine doctor. Doing physical therapy. The top of her spine is better. Has A1C elevated.    She is on prednisone 17 mg every day, tries to work through it . Today is a good day, yesterday was a rough day. Has hard time tapering prednisone down.    Dr. Mendez put her on gabapentin.    She is now on benlysta since 11/2021, thinks it is helping her. Thinks benlysta affected her menses, had it twice. Reports edema, weight gain on it. She did not have this extra weight, LEs edema before benlysta even on prednisone 20 mg every day. It helps with energy, when she takes it, feels better for couple of days.    Urine frequency, urgency is less on benlysta, has fluid retention.    Smelling chemical feeling is better on benlsyta.    Took AZA 50 mg every day x 2 months, no SEs and she would estrella to re-try it. When she started gabapentin, stopped AZA abut wants to re-try it.    Has pain, fatigue in her legs, it is better now. She thinks that it might be caused by benlysta. Had the most intense muscle pain, took muscle relaxant  and it helped.    No skin rash or oral ulcers.    Takes prednisone for pain in small joints, fingers, toes and elbows. It is different from back pain.    Feels like her whole body is in a flare by going down on prednisone, feels getting a fever and diffuse pain along with fatigue.    Has bump over R 3rd finger which hurts, swells up.    Has unhealed ulcer over big toe, does follow up with a provider.    Toenails are the same.    Looking for surrogate.    Had eye exam.        ROS:  A comprehensive ROS was done, positives are per HPI.  Past Medical History:   Diagnosis Date     Allergy, unspecified not elsewhere classified      Endometriosis      Fibromyalgia      Migraine without aura, with intractable migraine, so stated, without mention of status migrainosus      Myalgia and myositis, unspecified      Systemic lupus erythematosus (H)      Past Surgical History:   Procedure Laterality Date     ORTHOPEDIC SURGERY       SURGICAL HISTORY OF -   1986    left elbow fracture repair     Family History   Problem Relation Age of Onset     Diabetes Maternal Grandfather      Lipids Mother      Skin Cancer Paternal Grandmother      Melanoma No family hx of      Social History     Socioeconomic History     Marital status:      Spouse name: Not on file     Number of children: Not on file     Years of education: Not on file     Highest education level: Not on file   Occupational History     Not on file   Tobacco Use     Smoking status: Never Smoker     Smokeless tobacco: Never Used   Substance and Sexual Activity     Alcohol use: Yes     Comment: occ.- 2/week     Drug use: No     Sexual activity: Yes     Partners: Male     Birth control/protection: Condom   Other Topics Concern     Parent/sibling w/ CABG, MI or angioplasty before 65F 55M? 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)     Adjustment disorder with anxiety     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     Blood coagulation disorder (H)     Chronic headaches     Diminished ovarian reserve     Disorder of connective tissue (H)     Disease of immune system (H)     Endometriosis of uterus     Female infertility     History of environmental allergies     Hyperlipidemia     Irritable bowel syndrome     Major depressive disorder, recurrent episode, in full remission (H)     Major depressive disorder, recurrent episode, mild (H)     Migraine     Myalgia     Raynaud's disease     Recurrent pregnancy loss without current pregnancy     Seasonal allergies     Primary fibromyalgia syndrome     Uterine leiomyoma     Sciatica of right side     Allergies   Allergen Reactions     Cherry Anaphylaxis     Dairy Aid  [Lactase]      Other reaction(s): Arthralgia (Joint Pain)     Gluten Meal      Other reaction(s): Abdominal Pain     No Clinical Screening - See Comments Anaphylaxis and Itching     Pistachio Nut Extract Skin Test Anaphylaxis     Brassica Oleracea Italica      Other reaction(s): Abdominal Pain  Other reaction(s): Abdominal Pain     Penicillins Hives and Rash     Sulfa Drugs Hives and Rash       Outpatient Encounter Medications as of 7/1/2022   Medication Sig Dispense Refill     RACHNA SYRUP PO        Acetaminophen (TYLENOL PO)         albuterol (PROAIR HFA/PROVENTIL HFA/VENTOLIN HFA) 108 (90 Base) MCG/ACT inhaler        AMBIEN 10 MG OR TABS 1 po HS, prn 20 0     azaTHIOprine (IMURAN) 50 MG tablet TAKE 1 TABLET(50 MG) BY MOUTH DAILY 90 tablet 1     Belimumab 200 MG/ML SOAJ Inject 200 mg  Subcutaneous every 7 days Hold for signs of infection and seek medical attention. 12 mL 3     calcium carbonate (OS-VAHID 500 MG [place]. [place]) 1250 MG tablet Take 1 tablet by mouth daily       ciclopirox (PENLAC) 8 % external solution Apply to adjacent skin and affected nails daily.  Remove with alcohol every 7 days, then repeat. 6.6 mL 11     cyclobenzaprine (FLEXERIL) 10 MG tablet Take 1 tablet (10 mg) by mouth 3 times daily as needed for muscle spasms 30 tablet 11     diltiazem 2% in PLO gel Apply topically 4 times daily 30 g 3     EPINEPHrine (ANY BX GENERIC EQUIV) 0.3 MG/0.3ML injection 2-pack Inject 0.3 mLs (0.3 mg) into the muscle as needed for anaphlaxis 2 each 2     EPINEPHrine (ANY BX GENERIC EQUIV) 0.3 MG/0.3ML injection 2-pack epinephrine 0.3 mg/0.3 mL injection, auto-injector       fexofenadine (ALLEGRA) 180 MG tablet Take 1 tablet (180 mg) by mouth daily 30 tablet 11     fluticasone (FLONASE) 50 MCG/ACT nasal spray        hydroxychloroquine (PLAQUENIL) 200 MG tablet Take 1 tablet (200 mg) by mouth 2 times daily 180 tablet 1     L-Methylfolate-Algae-B12-B6 (METANX PO) Take 1,000 mg by mouth daily       metFORMIN (GLUCOPHAGE XR) 500 MG 24 hr tablet Take 1 tablet (500 mg) by mouth daily (with dinner) 30 tablet 11     MULTIVITAMIN TABS   OR   0     Omega-3 Fatty Acids (OMEGA 3 PO) Take 1,250 mg by mouth daily       omeprazole (PRILOSEC) 40 MG DR capsule Take 1 capsule (40 mg) by mouth daily 60 capsule 1     ondansetron (ZOFRAN) 8 MG tablet Take one tablet up to three times daily for nausea. 90 tablet 1     ondansetron (ZOFRAN-ODT) 4 MG ODT tab Take 1 tablet (4 mg) by mouth every 8 hours as needed for nausea 30 tablet 1     predniSONE (DELTASONE) 5 MG tablet Take 4 tablets (20 mg) by mouth daily 120 tablet 5     rizatriptan (MAXALT-MLT) 5 MG ODT Take 1 tablet (5 mg) by mouth at onset of headache for migraine May repeat in 2 hours. Max 6 tablets/24 hours. 15 tablet 11     sertraline (ZOLOFT) 100 MG tablet Take 100  mg by mouth daily       SPIRULINA PO Take by mouth daily       traMADol (ULTRAM) 50 MG tablet Take 1 tablet (50 mg) by mouth every 6 hours as needed for severe pain Take 1-2 tablets up to three times a day as needed 180 tablet 5     traMADol (ULTRAM-ER) 300 MG 24 hr tablet Take 1 tablet (300 mg) by mouth At Bedtime maximum 1 tablet(s) per day 30 tablet 5     triamcinolone (KENALOG) 0.1 % paste Take by mouth 2 times daily 5 g 3     triamcinolone (NASACORT) 55 MCG/ACT nasal aerosol Spray 2 sprays into both nostrils daily 16.5 mL 11     urea (GORMEL) 20 % external cream Apply topically 2 times daily 480 g 11     VITAMIN D, CHOLECALCIFEROL, PO Take 5,000 Units by mouth daily       gabapentin (NEURONTIN) 300 MG capsule Take 1 capsule (300 mg) by mouth At Bedtime (Patient not taking: Reported on 7/1/2022) 90 capsule 3     No facility-administered encounter medications on file as of 7/1/2022.         Her records were reviewed.    Component      Latest Ref Rng & Units 2/10/2022 5/17/2022   WBC      4.0 - 11.0 10e3/uL 11.6 (H) 10.7   RBC Count      3.80 - 5.20 10e6/uL 4.86 5.03   Hemoglobin      11.7 - 15.7 g/dL 14.8 14.9   Hematocrit      35.0 - 47.0 % 44.5 45.9   MCV      78 - 100 fL 92 91   MCH      26.5 - 33.0 pg 30.5 29.6   MCHC      31.5 - 36.5 g/dL 33.3 32.5   RDW      10.0 - 15.0 % 12.8 12.5   Platelet Count      150 - 450 10e3/uL 242 267   % Neutrophils      % 76    % Lymphocytes      % 16    % Monocytes      % 5    % Eosinophils      % 0    % Basophils      % 1    % Immature Granulocytes      % 2    NRBCs per 100 WBC      <1 /100 0    Absolute Neutrophils      1.6 - 8.3 10e3/uL 8.9 (H)    Absolute Lymphocytes      0.8 - 5.3 10e3/uL 1.8    Absolute Monocytes      0.0 - 1.3 10e3/uL 0.6    Absolute Eosinophils      0.0 - 0.7 10e3/uL 0.0    Absolute Basophils      0.0 - 0.2 10e3/uL 0.1    Absolute Immature Granulocytes      <=0.4 10e3/uL 0.2    Absolute NRBCs      10e3/uL 0.0    Sodium      133 - 144 mmol/L  141    Potassium      3.4 - 5.3 mmol/L  4.3   Chloride      94 - 109 mmol/L  102   Carbon Dioxide      20 - 32 mmol/L  32   Anion Gap      3 - 14 mmol/L  7   Urea Nitrogen      7 - 30 mg/dL  19   Creatinine      0.52 - 1.04 mg/dL 0.72 0.80   Calcium      8.5 - 10.1 mg/dL  9.6   Glucose      70 - 99 mg/dL  128 (H)   Alkaline Phosphatase      40 - 150 U/L  73   AST      0 - 45 U/L 25 23   ALT      0 - 50 U/L 47 44   Protein Total      6.8 - 8.8 g/dL  7.4   Albumin      3.4 - 5.0 g/dL 3.8 3.9   Bilirubin Total      0.2 - 1.3 mg/dL  0.3   GFR Estimate      >60 mL/min/1.73m2 >90 >90   SARS-CoV-2 Rigo Ab, Interp, S       Positive    SARS-CoV-2 Rigo Ab, Quant, S      U/mL >250    Patient's Race       Unknown    Patient's Ethnicity       Unknown    Protein Random Urine      g/L <0.05    Protein Total Urine g/gr Creatinine           Creatinine Urine      mg/dL 13    % Retic      0.5 - 2.0 % 1.6    Absolute Retic      0.025 - 0.095 10e6/uL 0.080    DNA-ds      <10.0 IU/mL <0.6    Complement C4      13 - 39 mg/dL 34    Complement C3      81 - 157 mg/dL 135    Sed Rate      0 - 20 mm/hr 7    CRP Inflammation      0.0 - 8.0 mg/L 3.0    Hemoglobin A1C      0.0 - 5.6 %  5.8 (H)     Pregnancy: She is . H/o OCP and HRT use, see HPI    Ph.E:      Constitutional: WD/WN. Pleasant, NAD.  Cushingoid  Eyes: EOM intact, sclera anicteric, conj not injected   MS: No active synovitis over hands, could make full fist. + [place] nodes  Skin: no rash.  Neuro: A&O x 3. Grossly non focal  Psych: NL affect    Assessment/Plan (2019):    1-SLE FLARE/active  2-HCQ monitoring  3-AZA monitoring  4-Benlysta monitoring    SLE, dx in s (VINITA: 1:80, dsDNA +, Smith negative, normal complements, joint pains, malar rash, oral ulcers), usually flares approximately monthly with joint pains, pleurisy and malar rash. Currently, feels that disease is active. Has been on chronic 20 mg every day prednisone, gained weight, gets LE edema, looks cushingoid.  Still planning for PGS normal embryo implantation, might consider surrogate. Had a reaction to IVIG which was given for infertility tx and is not going to get it again. Her back pain seems to be sciatica and not related to SLE.    Previous visits, was advised to try AZA as steroid sparing agent (I am concerned about long term steroid SE) but did not start with concern for potential SE but willing to try benlysta, especially with planning for surrogate pregnancy. Labs are stable.    Gracie is on benlysta since last visit with some benefit, but can not taper prednisone below 17 mg every day due to increased joint pain by taper. Briefly tried AZA 1 tab a day, no SEs, willing to re-try it, will resume it at higher dose.        Plan:    Continue  mg bid with yearly eye exam    Continue weekly benlysta shots    Imuran 1.5 tabs a day    Labs in Aug 2022 then q3mo    Taper prednisone 0.5 mg down every week till you reach 10 mg a day    Return in 6 months (video ok)    Josh Roy MD

## 2022-07-28 NOTE — TELEPHONE ENCOUNTER
FUTURE VISIT INFORMATION      FUTURE VISIT INFORMATION:    Date: 8/30/2022    Time: 11am    [place] [facility] [place]:    Referring provider:  Hue FERRER CNP     Referring providers clinic:  [facility]      Reason for visit/diagnosis  Migraines     RECORDS REQUESTED FROM:       Clinic name Comments Records Status Imaging Status   [facility]-5/17/2022    MR Lumbar Spine-5/24/2022, 12/9/2019 Epic  PACS

## 2022-07-29 ENCOUNTER — THERAPY VISIT (OUTPATIENT)
Dept: PHYSICAL THERAPY | Facility: CLINIC | Age: 47
End: 2022-07-29
Payer: COMMERCIAL

## 2022-07-29 DIAGNOSIS — M54.31 SCIATICA OF RIGHT SIDE: Primary | ICD-10-CM

## 2022-07-29 PROCEDURE — 97530 THERAPEUTIC ACTIVITIES: CPT | Mod: GP | Performed by: PHYSICAL THERAPIST

## 2022-07-29 PROCEDURE — 97110 THERAPEUTIC EXERCISES: CPT | Mod: GP | Performed by: PHYSICAL THERAPIST

## 2022-07-29 NOTE — PROGRESS NOTES
PROGRESS  REPORT    Progress reporting period is from 5/13/2022 to 7/29/2022.       SUBJECTIVE  Subjective changes noted by patient:  yes.  Subjective: Pt reports she had a huge change and was doing very well, functioning semi-normal and able to walk more, continues to feel more stable. She then reports stepping down a step and missing and having a fall.  Since then she is having bilateral radiating pain in both legs, possible some lupus flare with small joint pain.  Still reports she is better than IE. Having some night waking.    Current pain level is 4/10 Current Pain level: 4/10.     Previous pain level was  6/10  .   Changes in function:  Yes (See Goal flowsheet attached for changes in current functional level)  Adverse reaction to treatment or activity: None    OBJECTIVE  Changes noted in objective findings:  Yes  Objective: Current activity: walking with weights, TrA ex; deferred UE row due to inc in arm pain.  Describes better self management/les ELVIS with verbalization of feeling ability to cont to move after exacerbation. MARQUITA improvement from 60 to 40%.     ASSESSMENT/PLAN  Updated problem list and treatment plan: Diagnosis 1:  Sciatica  Pain -  manual therapy, self management, education and home program  Decreased strength - therapeutic exercise and therapeutic activities  Impaired gait - gait training  Impaired muscle performance - neuro re-education  Decreased function - therapeutic activities  STG/LTGs have been met or progress has been made towards goals:  Yes (See Goal flow sheet completed today.)  Assessment of Progress: The patient's condition is improving.  The patient has had set backs in their progress.  Self Management Plans:  Patient has been instructed in a home treatment program.  I have re-evaluated this patient and find that the nature, scope, duration and intensity of the therapy is appropriate for the medical condition of the patient.  Patricia continues to require the following  intervention to meet STG and LTG's:  PT    Recommendations:  This patient would benefit from continued therapy.     Frequency:  1 X week, once daily every 3 weeks  Duration:  for 12 weeks        Please refer to the daily flowsheet for treatment today, total treatment time and time spent performing 1:1 timed codes.

## 2022-08-30 ENCOUNTER — VIRTUAL VISIT (OUTPATIENT)
Dept: NEUROLOGY | Facility: CLINIC | Age: 47
End: 2022-08-30
Attending: NURSE PRACTITIONER
Payer: COMMERCIAL

## 2022-08-30 ENCOUNTER — PRE VISIT (OUTPATIENT)
Dept: NEUROLOGY | Facility: CLINIC | Age: 47
End: 2022-08-30

## 2022-08-30 DIAGNOSIS — G43.909 MIGRAINE WITHOUT STATUS MIGRAINOSUS, NOT INTRACTABLE, UNSPECIFIED MIGRAINE TYPE: ICD-10-CM

## 2022-08-30 DIAGNOSIS — G43.009 MIGRAINE WITHOUT AURA AND WITHOUT STATUS MIGRAINOSUS, NOT INTRACTABLE: Primary | ICD-10-CM

## 2022-08-30 PROCEDURE — 99215 OFFICE O/P EST HI 40 MIN: CPT | Mod: 95 | Performed by: NURSE PRACTITIONER

## 2022-08-30 RX ORDER — CEFUROXIME AXETIL 250 MG/1
6 TABLET ORAL
Qty: 3 KIT | Refills: 11 | Status: SHIPPED | OUTPATIENT
Start: 2022-08-30 | End: 2022-10-12

## 2022-08-30 RX ORDER — SUMATRIPTAN 20 MG/1
20 SPRAY NASAL PRN
COMMUNITY
Start: 2022-07-25 | End: 2023-04-04

## 2022-08-30 RX ORDER — PROCHLORPERAZINE MALEATE 5 MG
5-10 TABLET ORAL EVERY 6 HOURS PRN
Qty: 20 TABLET | Refills: 3 | Status: SHIPPED | OUTPATIENT
Start: 2022-08-30 | End: 2022-11-15

## 2022-08-30 RX ORDER — DULOXETIN HYDROCHLORIDE 20 MG/1
20 CAPSULE, DELAYED RELEASE ORAL DAILY
COMMUNITY
Start: 2022-08-16 | End: 2023-08-10

## 2022-08-30 RX ORDER — SUMATRIPTAN 20 MG/1
1 SPRAY NASAL
Qty: 1 EACH | Refills: 11 | Status: SHIPPED | OUTPATIENT
Start: 2022-08-30 | End: 2023-05-18

## 2022-08-30 RX ORDER — RIZATRIPTAN BENZOATE 5 MG/1
5 TABLET, ORALLY DISINTEGRATING ORAL
Qty: 15 TABLET | Refills: 11 | Status: SHIPPED | OUTPATIENT
Start: 2022-08-30 | End: 2023-02-09

## 2022-08-30 NOTE — LETTER
8/30/2022       RE: Patricia Hall  [address]  [place] [address]     Dear Colleague,    Thank you for referring your patient, Patricia Hall, to the [facility] [place] [facility]. Please see a copy of my visit note below.    ASSESSMENT AND PLAN:  Headaches chronic and in the spring had a new headache symptoms but back to the baseline now. Headaches are more frequent. History of immunosuppression and SLE not unreasonable to recommend brain MRI for any structural causes of headaches increased frequency.   Last MRI in 2013 and no updated images since. Persistent neck pain and paresthesia in hands and feet -not unreasonable to add cervical MRI for any lesions/structural causes.   Might be suffering chronic migraine headaches and meeting criteria and not unreasonable to recommend migraine prevention -given already extensive medication list -would recommend Botox injections with chronic migraine headache protocol  Rescue treatment -do not use rizatriptan and sumatriptan the same day  Limit use to no more than 9 days per month both of them. Stop rizatriptan Try to stay with  sumatriptan nasal and a trial of sumatriptan injection at headache onset   May try Nurtec as rescue and Ok to take as needed the same day with sumatriptan -may cause nausea   May try prochlorperazine +benedryl as needed nausea or/and vomiting with migraine headaches -side effects-anxiaty, muscle tension, drowsiness  Follow up after starting Botox     Chronic pain -patient is starting duloxetine    Nausea daily  -was on omeprazole in the past and stopped. 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  "Was hospitalized for migraine headaches with days and days of vomiting due to severe migraine headaches. Migraine headaches since than.   Severe endometriosis stage 4 and a horrible pain and surgeries for that.   Periods of 1-2/months and some months severe.   Often wakes up with headaches   Fr -15 or more headache days per month and duration 5 days is there   Loc-back and behind the eyes, \"sinus component\"   Associated with nausea, vomiting, light and noise sensitivity  Visual aura -once in a while -\"sees a light shadow\" sometimes but once visual aura when was pregnant back in 1463-9375-\"blinking lights to the side\" what she remebers the most.   Can have \"staffed up nose\" and \"can cause a migraine.\"  An episode of right sided pain weeks and weeks in the spring and kept thinking \"muscles\" and finally took rizatriptan and helped -was a different headache and had vision changes and saw an ophthalmologist and was told eyes are Ok and worsen with bending over.     Headache Treatment rizatriptan or sumatriptan and somedays both the same day  Sumatriptan works when catches quick enough   NSAIDs -a couple of tablets of ibuprofen in the week and does not help with the pain and does not tylenol in the last 6 month   Ondansetron 8 mg helps and   Has been seeing PT -jaw pain, clenching, oral appliance -caused worsening  Any changes in muscular symptoms may trigger pain for weeks.     Prednisone has been tapering it off -has 5 mg and 1 mg tabs     FH-cousin's kids -niece has migraine headaches     Ophthalmology exam in the Spring 2022 presumed stable     Chronic pain takes tramadol daily for chronic /body/lower back pain and joint pain-severe    SH:  , painting    Review of Systems   Severe disc disease L4-5 and severe pain and reinjury takes 2-3 weeks to heal in addition to endometriosis and feet neuropathy  A constant pain in the neck   Objective    Vitals - Patient Reported  Weight (Patient Reported): 81.6 " "kg (180 lb)  Height (Patient Reported): 170.2 cm (5' 7\")  BMI (Based on Pt Reported Ht/Wt): 28.19  Pain Score: Severe Pain (7)  Pain Loc: Head (Joints, low back, feet)    Physical Exam   GENERAL: Healthy, alert and no distress  EYES: Eyes grossly normal to inspection. RESP: No audible wheeze, cough, or visible cyanosis.  No visible retractions or increased work of breathing.    SKIN: Visible skin clear. NEURO: Face is symmetrical and symmetrical facial expressions.  Mentation and speech appropriate for age.  PSYCH: Mentation appears normal, affect normal, judgement and insight intact, normal speech and appearance well-groomed.    Diagnostic Test Results   CONCLUSION:   1. No evidence for intracranial mass, hemorrhage, hydrocephalus, or   infarct.   2. Slight inferior position of the cerebellar tonsils noted at the   craniovertebral junction which is unchanged from prior study. This is   likely an incidental finding.   3. Mastoid air cells clear. Trace of mucosal thickening in the right   ethmoid sinuses.      I discussed all my recommendations with Patricia Hall who verbalizes understanding and comfortable with the plan.  All of patient's questions were answered from the best of my knowledge.  Patient is in agreement with the plan.     75 minutes spent on the date of the encounter doing video access, chart  review, exam, results review,  meds review, treatment plan, documentation and further activities as noted above    CARISSA Johnson, CNP [facility]  Headache certified  [facility]  "

## 2022-08-30 NOTE — NURSING NOTE
Chief Complaint   Patient presents with     Video Visit     New Headaches/migraines      Patient declined individual allergy and medication review by support staff because of time of check in. I didn't have enough time to go over them prior to her appt.

## 2022-08-30 NOTE — PATIENT INSTRUCTIONS
Rescue treatment -do not use rizatriptan and sumatriptan the same day  Limit use to no more than 9 days per month both of them. Stop rizatriptan Try to stay with  sumatriptan nasal and a trial of sumatriptan injection at headache onset   May try Nurtec as rescue and Ok to take as needed the same day with sumatriptan -may cause nausea   May try prochlorperazine +benedryl as needed nausea or/and vomiting with migraine headaches -side effects-anxiaty, muscle tension, drowsiness    Chronic pain -patient is starting duloxetine    Nausea daily  -was on omeprazole in the past and stopped. We'll reinstate and see if helps with nausea

## 2022-08-30 NOTE — PROGRESS NOTES
Gracie is a 47 year old who is being evaluated via a billable video visit.      How would you like to obtain your AVS? MyChart  If the video visit is dropped, the invitation should be resent by: Text to cell phone: [phone]  Will anyone else be joining your video visit? No        Video-Visit Details    Video Start Time: 11:12 AM    Type of service:  Video Visit    Video End Time:12:27 PM    Originating Location (pt. Location): Home    Distant Location (provider location):  [facility] NEUROLOGY [facility]     Platform used for Video Visit: [employer]     ASSESSMENT AND PLAN:  Headaches chronic and in the spring had a new headache symptoms but back to the baseline now. Headaches are more frequent. History of immunosuppression and SLE not unreasonable to recommend brain MRI for any structural causes of headaches increased frequency.   Last MRI in 2013 and no updated images since. Persistent neck pain and paresthesia in hands and feet -not unreasonable to add cervical MRI for any lesions/structural causes.   Might be suffering chronic migraine headaches and meeting criteria and not unreasonable to recommend migraine prevention -given already extensive medication list -would recommend Botox injections with chronic migraine headache protocol  Rescue treatment -do not use rizatriptan and sumatriptan the same day  Limit use to no more than 9 days per month both of them. Stop rizatriptan Try to stay with  sumatriptan nasal and a trial of sumatriptan injection at headache onset   May try Nurtec as rescue and Ok to take as needed the same day with sumatriptan -may cause nausea   May try prochlorperazine +benedryl as needed nausea or/and vomiting with migraine headaches -side effects-anxiaty, muscle tension, drowsiness  Follow up after starting Botox     Chronic pain -patient is starting duloxetine    Nausea daily  -was on omeprazole in the past and stopped. 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 Was hospitalized for migraine headaches with days and days of vomiting due to severe migraine headaches. Migraine headaches since than.   Severe endometriosis stage 4 and a horrible pain and surgeries for that.   Periods of 1-2/months and some months severe.   Often wakes up with headaches   Fr -15 or more headache days per month and duration 5 days is there   Loc-back and behind the eyes, \"sinus component\"   Associated with nausea, vomiting, light and noise sensitivity  Visual aura -once in a while -\"sees a light shadow\" sometimes but once visual aura when was pregnant back in [id number]-\"blinking lights to the side\" what she remebers the most.   Can have \"staffed up nose\" and \"can cause a migraine.\"  An episode of right sided pain weeks and weeks in the spring and kept thinking \"muscles\" and finally took rizatriptan and helped -was a different headache and had vision changes and saw an ophthalmologist and was told eyes are Ok and worsen with bending over.     Headache Treatment rizatriptan or sumatriptan and somedays both the same day  Sumatriptan works when catches quick enough   NSAIDs -a couple of tablets of ibuprofen in the week and does not help with the pain and does not tylenol in the last 6 month   Ondansetron 8 mg helps and   Has been seeing PT -jaw pain, clenching, oral appliance -caused worsening  Any changes in muscular symptoms may trigger pain for weeks.     Prednisone has been tapering it off -has 5 mg and 1 mg tabs     FH-cousin's kids -niece has migraine headaches     Ophthalmology exam in the Spring 2022 presumed stable     Chronic pain takes tramadol daily for chronic /body/lower back pain and joint pain-severe    SH:  , " "painting    Review of Systems   Severe disc disease L4-5 and severe pain and reinjury takes 2-3 weeks to heal in addition to endometriosis and feet neuropathy  A constant pain in the neck   Objective    Vitals - Patient Reported  Weight (Patient Reported): 81.6 kg (180 lb)  Height (Patient Reported): 170.2 cm (5' 7\")  BMI (Based on Pt Reported Ht/Wt): 28.19  Pain Score: Severe Pain (7)  Pain Loc: Head (Joints, low back, feet)    Physical Exam   GENERAL: Healthy, alert and no distress  EYES: Eyes grossly normal to inspection. RESP: No audible wheeze, cough, or visible cyanosis.  No visible retractions or increased work of breathing.    SKIN: Visible skin clear. NEURO: Face is symmetrical and symmetrical facial expressions.  Mentation and speech appropriate for age.  PSYCH: Mentation appears normal, affect normal, judgement and insight intact, normal speech and appearance well-groomed.    Diagnostic Test Results   CONCLUSION:   1. No evidence for intracranial mass, hemorrhage, hydrocephalus, or   infarct.   2. Slight inferior position of the cerebellar tonsils noted at the   craniovertebral junction which is unchanged from prior study. This is   likely an incidental finding.   3. Mastoid air cells clear. Trace of mucosal thickening in the right   ethmoid sinuses.      I discussed all my recommendations with Patricia Hall who verbalizes understanding and comfortable with the plan.  All of patient's questions were answered from the best of my knowledge.  Patient is in agreement with the plan.     75 minutes spent on the date of the encounter doing video access, chart  review, exam, results review,  meds review, treatment plan, documentation and further activities as noted above    CARISSA Johnson, CNP [facility]  Headache certified  [facility]    "

## 2022-08-30 NOTE — TELEPHONE ENCOUNTER
rizatriptan (MAXALT-MLT) 5 MG ODT  Last Written Prescription Date:   8/16/2021  Last Fill Quantity: 15,   # refills: 11  Last Office Visit :  8/30/2022  Future Office visit:  None  Routing refill request to provider for review/approval because:  Refer to clinic for review     SUMAtriptan (IMITREX) 20 MG/ACT nasal spray  Last Written Prescription Date:  ?  Last Fill Quantity: ?,   # refills: ?  Last Office Visit :  8/30/2022  Future Office visit:  None  Routing refill request to provider for review/approval because:  Not on updated med list     Flora Wilkerson RN  Central Triage Red Flags/Med Refills

## 2022-08-31 ENCOUNTER — TELEPHONE (OUTPATIENT)
Dept: NEUROLOGY | Facility: CLINIC | Age: 47
End: 2022-08-31

## 2022-08-31 NOTE — TELEPHONE ENCOUNTER
"Prior Authorization Retail Medication Request    Medication/Dose: Nurtec 75 mg  ICD code (if different than what is on RX):  Chronic migraine  Previously Tried and Failed:  rizatriptan or sumatriptan and somedays both the same day  Sumatriptan works when catches quick enough   NSAIDs -a couple of tablets of ibuprofen in the week and does not help with the pain and does not tylenol in the last 6 month   Ondansetron 8 mg helps and   Has been seeing PT     Rationale:  Fr -15 or more headache days per month and duration 5 days is there   Associated with nausea, vomiting, light and noise sensitivity  Visual aura -once in a while -\"sees a light shadow\" sometimes but once     Insurance Name:  [facility]  Insurance ID:  [id number]    Pharmacy Information (if different than what is on RX)  Name:    Phone:    "

## 2022-08-31 NOTE — TELEPHONE ENCOUNTER
Central Prior Authorization Team   Phone: [phone]      PA Initiation    Medication: Nurtec 75 mg  Insurance Company: [employer] - Phone [phone] Fax [phone]  Pharmacy Filling the Rx: "[facility] #[id number] - [place], [place] - [address] AT [facility] [address]  Filling Pharmacy Phone: [phone]  Filling Pharmacy Fax:    Start Date: 8/31/2022

## 2022-09-02 NOTE — TELEPHONE ENCOUNTER
PRIOR AUTHORIZATION DENIED    Medication: Nurtec 75 mg - DENIED    Denial Date: 9/1/2022    Denial Rational: PATIENT DID NOT MEET CRITERIA -          Appeal Information:  IF YOU WOULD LIKE TO APPEAL PLEASE SUPPLY PA TEAM WITH A LETTER OF MEDICAL NECESSITY WITH CLINICAL REASON.

## 2022-09-06 NOTE — TELEPHONE ENCOUNTER
.Medication Appeal Initiation    We have initiated an appeal for the requested medication:  Medication: Nurtec 75 mg -APPEAL Pending  Appeal Start Date:  9/6/2022  Insurance Company: [employer] [place] - Phone [phone] Fax [phone]  Comments:  Appeal and denial letter faxed

## 2022-09-13 NOTE — TELEPHONE ENCOUNTER
Called [facility] [phone] to check on appeal status. Appeal was denied and a letter will be faxed over. Case# [id number].

## 2022-09-13 NOTE — TELEPHONE ENCOUNTER
MEDICATION APPEAL DENIED    Medication: Nurtec 75 mg -APPEAL DENIED    Denial Date: 9/12/2022    Denial Rational:                Second Level Appeal Information: Second level appeals will be managed by the clinic staff and provider.

## 2022-09-15 ENCOUNTER — THERAPY VISIT (OUTPATIENT)
Dept: PHYSICAL THERAPY | Facility: CLINIC | Age: 47
End: 2022-09-15
Payer: COMMERCIAL

## 2022-09-15 DIAGNOSIS — M54.31 SCIATICA OF RIGHT SIDE: Primary | ICD-10-CM

## 2022-09-15 PROCEDURE — 97110 THERAPEUTIC EXERCISES: CPT | Mod: GP | Performed by: PHYSICAL THERAPIST

## 2022-09-15 PROCEDURE — 97530 THERAPEUTIC ACTIVITIES: CPT | Mod: GP | Performed by: PHYSICAL THERAPIST

## 2022-09-21 ENCOUNTER — TELEPHONE (OUTPATIENT)
Dept: NEUROLOGY | Facility: CLINIC | Age: 47
End: 2022-09-21

## 2022-09-21 DIAGNOSIS — R51.9 WORSENING HEADACHES: ICD-10-CM

## 2022-09-21 DIAGNOSIS — G43.719 INTRACTABLE CHRONIC MIGRAINE WITHOUT AURA AND WITHOUT STATUS MIGRAINOSUS: Primary | ICD-10-CM

## 2022-09-21 RX ORDER — METOCLOPRAMIDE 5 MG/1
5 TABLET ORAL EVERY 6 HOURS PRN
Qty: 20 TABLET | Refills: 3 | Status: SHIPPED | OUTPATIENT
Start: 2022-09-21 | End: 2023-02-09

## 2022-09-21 NOTE — TELEPHONE ENCOUNTER
Prior Authorization Not Needed per Insurance    Medication: SUMAtriptan (IMITREX STATDOSE) 6 MG/0.5ML pen injector kit-PA NOT NEEDED   Insurance Company: [employer] [place] - Phone [phone] Fax [phone]  Expected CoPay: $150    Pharmacy Filling the Rx: [facility] #[id number] - [place], [place] - [address] AT [facility] [address]  Pharmacy Notified: Yes  Patient Notified: No    Called pharmacy and pharmacy stated that PA is Not Needed and medication is covered. **Instructed pharmacy to notify patient when script is ready to /ship.** Pharmacy stated that they have a paid claim on medication quantity 3 boxes for 15 day supply. Pharmacy stated that they will notify the patient on cost and when medication is ready for . Cash price Quantity 3 boxes is $650.

## 2022-09-21 NOTE — TELEPHONE ENCOUNTER
Prior Authorization Retail Medication Request    Medication/Dose: SUMAtriptan (IMITREX STATDOSE) 6 MG/0.5ML pen injector kit  ICD code (if different than what is on RX):      Previously Tried and Failed:    Rationale:  Acute migraine    Insurance Name:  [facility]  Insurance ID:  [id number]       Pharmacy Information (if different than what is on RX)  Name:    Phone:

## 2022-09-29 DIAGNOSIS — M32.9 SYSTEMIC LUPUS ERYTHEMATOSUS, UNSPECIFIED SLE TYPE, UNSPECIFIED ORGAN INVOLVEMENT STATUS (H): ICD-10-CM

## 2022-09-30 ENCOUNTER — OFFICE VISIT (OUTPATIENT)
Dept: PHYSICAL MEDICINE AND REHAB | Facility: CLINIC | Age: 47
End: 2022-09-30
Payer: COMMERCIAL

## 2022-09-30 VITALS — DIASTOLIC BLOOD PRESSURE: 67 MMHG | HEART RATE: 102 BPM | SYSTOLIC BLOOD PRESSURE: 144 MMHG

## 2022-09-30 DIAGNOSIS — M79.18 MYOFASCIAL PAIN: ICD-10-CM

## 2022-09-30 DIAGNOSIS — M47.816 LUMBAR FACET ARTHROPATHY: Primary | ICD-10-CM

## 2022-09-30 DIAGNOSIS — R20.2 BILATERAL LEG PARESTHESIA: ICD-10-CM

## 2022-09-30 DIAGNOSIS — M54.50 LUMBAR SPINE PAIN: ICD-10-CM

## 2022-09-30 PROCEDURE — 99204 OFFICE O/P NEW MOD 45 MIN: CPT | Performed by: PHYSICAL MEDICINE & REHABILITATION

## 2022-09-30 RX ORDER — BACLOFEN 10 MG/1
5-10 TABLET ORAL 3 TIMES DAILY PRN
Qty: 60 TABLET | Refills: 1 | Status: SHIPPED | OUTPATIENT
Start: 2022-09-30 | End: 2023-02-09

## 2022-09-30 ASSESSMENT — PAIN SCALES - GENERAL: PAINLEVEL: SEVERE PAIN (7)

## 2022-09-30 NOTE — PROGRESS NOTES
Assessment/Plan:      Patricia was seen today for back pain.    Diagnoses and all orders for this visit:    Lumbar facet arthropathy  -     Pain Medial Branch Block Lumbar Two Lvls Michael; Future  -     XR Lumbar Bending Only 2/3 Views; Future    Lumbar spine pain  -     XR Lumbar Bending Only 2/3 Views; Future    Myofascial pain  -     baclofen (LIORESAL) 10 MG tablet; Take 0.5-1 tablets (5-10 mg) by mouth 3 times daily as needed for muscle spasms    Bilateral leg paresthesia         Assessment: Pleasant 47 year old female with a history of SLE, irritable bowel syndrome, acid reflux, endometriosis status post 3 laparoscopic surgeries, prediabetic,, migraines, fibromyalgia with:    1.  3-year history of lumbar spine pain lumbosacral junction into the gluteal region with intermittent pain towards the coccyx deep in the sacrum with intermittent paresthesias and sense of weakness of the lower extremities.  She has severe facet arthropathy L5-S1 significant facet joint edema/significant increase joint space fluid.  What appears to be mild to moderate facet arthropathy at L4-5 with increased joint space fluid.  May indicate instability.    2.  Myofascial pain lumbar spine and gluteal region.    3.  Bilateral leg paresthesias and sense of weakness in cardiology no significant central stenosis or foraminal stenosis on MRI.      Discussion:    1.  I discussed the diagnosis and treatment options the patient and her .  We discussed options of further imaging/diagnostics, interventions medications.  She is already in physical therapy working on pelvic floor.    2.  Flexion-extension x-rays lumbar spine evaluate for instability.  If there is instability will need to refer to neurosurgery.    3.  Recommend bilateral L3, 4, 5 medial branch blocks for diagnostic purposes and proceed to confirmatory blocks and RFA if indicated.    4.  Trial baclofen 5 to 10 mg 3 times daily as needed in place of cyclobenzaprine for myofascial  pain.    5.  Can consider EMG to evaluate for neuropathy or other cause for her leg weakness we will trial medial branch blocks initially.    6.  Follow-up at her earliest convenience for medial branch blocks of the MyChart with recommendations after plain films.      It was our pleasure caring for your patient today, if there any questions or concerns please do not hesitate to contact us.    Over 60 minutes were spent on the date of the encounter performing chart review, patient visit and documentation in addition to any procedure.    Subjective:   Patient ID: Patricia Hall is a 47 year old female.    History of Present Illness: Patient presents at the request of Dr. Shavon Guzman for evaluation of low back pain.  She has a longstanding history of SLE dealing with fibromyalgia.  She reports endometriosis and has had 3 surgeries laparoscopic.  The last surgery was in 2019 very difficult with bowel attached to her uterus which was  and subsequently began having low back pain.  The lumbosacral junction burning deep into the sacrum and coccyx.  She gets pain down the back of both legs to the calves at times along with anterior thigh pain and numbness.  She reports the burning pain deep into the sacrum or coccyx with walking on uneven ground and will bother her later on like the next day with burning down the legs.  Better with ice heat or rest.  She does also have some numbness and tingling down the legs to the feet which waxes and wanes in intensity again typically worse with activity.  She also has clicking and popping at the lumbosacral junction which is very painful for her rated a 10/10 at worst 6/10 today 3/10 at best.  Right low back and leg is worse than left and has tightness through the gluteal region.  Working with physical therapy and some pelvic floor work without significant benefit as of yet.  She has had a couple of MRIs over the past 3years.  She has another curious symptom  that her legs do feel weak at times difficult for her to lift them up.  This occurs after doing activities such as stairs.    Labs: Recent hemoglobin A1c 5.8.CMP mildly elevated glucose 128.  AST ALT normal calcium 9.6 sodium 141 potassium 4.3 creatinine 0.8 BUN 19.    Imaging: MRI report and images were personally reviewed and discussed with the patient.  A plastic model was utilized during the discussion.  MRI of the lumbar spine personally reviewed.  Relatively normal disc heights throughout the lumbar spine with only minimal disc height loss L2-3 L4-5 and 5-S1 is mild to moderate.  L5-S1 increased fluid joint space of the facet joints with no central canal or foraminal stenosis.  L4-5 there is mild to moderate facet arthropathy with increased joint space fluid.  No synovial cyst.  Prior MRI which was also reviewed from 2019 there is a right synovial cyst off the L5-S1 facet contacting the S2 nerve.      MRI of the pelvis does not show any significant inflammation of the SI joints         Review of Systems: Patient complains of multiple positive review of systems including waking, headache, ringing in the ears, eye pain or change in vision, palpitations, feet swelling, color change significant abdominal pain, diarrhea, constipation, nausea, vomiting, bowel control issues, bladder difficulty with urinating, pain with urination no bladder incontinence.  She has joint pain muscle pain, muscle fatigue.  She has open wound on her left foot no skin itching.  She has poor balance and dizziness no falls fainting or seizures.  She has easy bruising.  She has poor sleep and excessive tiredness.  Denies depression or suicidal thoughts.  Denies shortness of breath cough wheeze.  Denies eye blindness. Remainder of 12 point review systems negative unless listed above.    Past Medical History:   Diagnosis Date     Allergy, unspecified not elsewhere classified      Endometriosis      Fibromyalgia      Migraine without aura, with  intractable migraine, so stated, without mention of status migrainosus      Myalgia and myositis, unspecified      Systemic lupus erythematosus (H)        Family History   Problem Relation Age of Onset     Diabetes Maternal Grandfather      Lipids Mother      Skin Cancer Paternal Grandmother      Melanoma No family hx of          Social History     Socioeconomic History     Marital status:      Spouse name: None     Number of children: None     Years of education: None     Highest education level: None   Tobacco Use     Smoking status: Never Smoker     Smokeless tobacco: Never Used   Substance and Sexual Activity     Alcohol use: Yes     Comment: occ.- 2/week     Drug use: No     Sexual activity: Yes     Partners: Male     Birth control/protection: Condom     Social history: .  Works as a .  Has a dog.  No tobacco or alcohol.      The following portions of the patient's history were reviewed and updated as appropriate: allergies, current medications, past family history, past medical history, past social history, past surgical history and problem list.    Oswestry (MARQUITA) Questionnaire    OSWESTRY DISABILITY INDEX 7/29/2022   Count 9   Sum 18   Oswestry Score (%) 40   Some recent data might be hidden       Neck Disability Index:  No flowsheet data found.       PHQ-2 Score:     PHQ-2 ( 1999 [employer]) 7/1/2022 2/12/2020   Q1: Little interest or pleasure in doing things 0 1   Q2: Feeling down, depressed or hopeless 1 1   PHQ-2 Score 1 2   PHQ-2 Total Score (12-17 Years)- Positive if 3 or more points; Administer PHQ-A if positive - 2   Q1: Little interest or pleasure in doing things - Several days   Q2: Feeling down, depressed or hopeless - Several days   PHQ-2 Score - 2                  Objective:   Physical Exam:    BP (!) 144/67   Pulse 102   There is no height or weight on file to calculate BMI.      General:  Well-appearing female in no acute distress.  Pleasant, cooperative, and interactive  throughout the examination and interview.  CV: No lower extremity edema on inspection or paltation.  Lymphatics: No cervical lymphadenopathy palpated. Eyes: sclera clear. Skin: No rashes or lesions seen over the head/neck,   arms, legs.  Band-Aid over left great toe.   Respirations unlabored.  MSK: Gait is cautious, nonantalgic, mild pelvic drop with single-leg stance on the right..  Able to heel-toe stand without difficulty.  Negative Romberg.  Spine: normal AP curves of the C, T, and L spine.  Decreased lumbar flexion finger to floor testing mild.  Minimal extension with pain.  Palpation: Tenderness to palpation over L4-5 L5-S1 paraspinals as well as gluteal tissues (with hypertonic tissue textures.  Severe pain on palpation with patient prone at the L5-S1 facet region.  Extremities: Full range of motion of the elbows, and wrists with no effusions or tenderness to palpation.   Full range of motion of the hips, knees, and ankles with no effusions or tenderness to palpation.  Negative scour maneuver and Sánchez's test bilaterally. No hypermobility of the upper or lower extremities.  Neurologic exam: Mental status: Patient is alert and oriented with normal affect.  Attention, knowledge, memory, and language are intact.  Normal coordination throughout the examination.  Reflexes are 2+ and symmetric biceps, triceps, brachioradialis, patellar, and 0 Achilles with down-going toes and Negative Rico's.  Sensation is intact to light touch throughout the upper and lower extremities bilaterally.  Manual muscle testing reveals 5 out of 5 in the hip flexors, knee flexors/extensors, ankle plantar flexors, ankle  dorsiflexors, and EHL.  Upper extremities: Grossly normal strength . Normal muscle bulk and tone in the arms and legs.    Negative seated and supine straight leg raise bilaterally.

## 2022-09-30 NOTE — LETTER
9/30/2022         RE: Patricia Hall  [address]  [place] [address]        Dear Colleague,    Thank you for referring your patient, Patricia Hall, to the [facility]. Please see a copy of my visit note below.    Assessment/Plan:      Patricia was seen today for back pain.    Diagnoses and all orders for this visit:    Lumbar facet arthropathy  -     Pain Medial Branch Block Lumbar Two Lvls Michael; Future  -     XR Lumbar Bending Only 2/3 Views; Future    Lumbar spine pain  -     XR Lumbar Bending Only 2/3 Views; Future    Myofascial pain  -     baclofen (LIORESAL) 10 MG tablet; Take 0.5-1 tablets (5-10 mg) by mouth 3 times daily as needed for muscle spasms    Bilateral leg paresthesia         Assessment: Pleasant 47 year old female with a history of SLE, irritable bowel syndrome, acid reflux, endometriosis status post 3 laparoscopic surgeries, prediabetic,, migraines, fibromyalgia with:    1.  3-year history of lumbar spine pain lumbosacral junction into the gluteal region with intermittent pain towards the coccyx deep in the sacrum with intermittent paresthesias and sense of weakness of the lower extremities.  She has severe facet arthropathy L5-S1 significant facet joint edema/significant increase joint space fluid.  What appears to be mild to moderate facet arthropathy at L4-5 with increased joint space fluid.  May indicate instability.    2.  Myofascial pain lumbar spine and gluteal region.    3.  Bilateral leg paresthesias and sense of weakness in cardiology no significant central stenosis or foraminal stenosis on MRI.      Discussion:    1.  I discussed the diagnosis and treatment options the patient and her .  We discussed options of further imaging/diagnostics, interventions medications.  She is already in physical therapy working on pelvic floor.    2.  Flexion-extension x-rays lumbar spine evaluate for instability.  If there is instability will  need to refer to neurosurgery.    3.  Recommend bilateral L3, 4, 5 medial branch blocks for diagnostic purposes and proceed to confirmatory blocks and RFA if indicated.    4.  Trial baclofen 5 to 10 mg 3 times daily as needed in place of cyclobenzaprine for myofascial pain.    5.  Can consider EMG to evaluate for neuropathy or other cause for her leg weakness we will trial medial branch blocks initially.    6.  Follow-up at her earliest convenience for medial branch blocks of the MyChart with recommendations after plain films.      It was our pleasure caring for your patient today, if there any questions or concerns please do not hesitate to contact us.    Over 60 minutes were spent on the date of the encounter performing chart review, patient visit and documentation in addition to any procedure.    Subjective:   Patient ID: Patricia Hall is a 47 year old female.    History of Present Illness: Patient presents at the request of Dr. Shavon Guzman for evaluation of low back pain.  She has a longstanding history of SLE dealing with fibromyalgia.  She reports endometriosis and has had 3 surgeries laparoscopic.  The last surgery was in 2019 very difficult with bowel attached to her uterus which was  and subsequently began having low back pain.  The lumbosacral junction burning deep into the sacrum and coccyx.  She gets pain down the back of both legs to the calves at times along with anterior thigh pain and numbness.  She reports the burning pain deep into the sacrum or coccyx with walking on uneven ground and will bother her later on like the next day with burning down the legs.  Better with ice heat or rest.  She does also have some numbness and tingling down the legs to the feet which waxes and wanes in intensity again typically worse with activity.  She also has clicking and popping at the lumbosacral junction which is very painful for her rated a 10/10 at worst 6/10 today 3/10 at best.   Right low back and leg is worse than left and has tightness through the gluteal region.  Working with physical therapy and some pelvic floor work without significant benefit as of yet.  She has had a couple of MRIs over the past 3years.  She has another curious symptom that her legs do feel weak at times difficult for her to lift them up.  This occurs after doing activities such as stairs.    Labs: Recent hemoglobin A1c 5.8.CMP mildly elevated glucose 128.  AST ALT normal calcium 9.6 sodium 141 potassium 4.3 creatinine 0.8 BUN 19.    Imaging: MRI report and images were personally reviewed and discussed with the patient.  A plastic model was utilized during the discussion.  MRI of the lumbar spine personally reviewed.  Relatively normal disc heights throughout the lumbar spine with only minimal disc height loss L2-3 L4-5 and 5-S1 is mild to moderate.  L5-S1 increased fluid joint space of the facet joints with no central canal or foraminal stenosis.  L4-5 there is mild to moderate facet arthropathy with increased joint space fluid.  No synovial cyst.  Prior MRI which was also reviewed from 2019 there is a right synovial cyst off the L5-S1 facet contacting the S2 nerve.      MRI of the pelvis does not show any significant inflammation of the SI joints         Review of Systems: Patient complains of multiple positive review of systems including waking, headache, ringing in the ears, eye pain or change in vision, palpitations, feet swelling, color change significant abdominal pain, diarrhea, constipation, nausea, vomiting, bowel control issues, bladder difficulty with urinating, pain with urination no bladder incontinence.  She has joint pain muscle pain, muscle fatigue.  She has open wound on her left foot no skin itching.  She has poor balance and dizziness no falls fainting or seizures.  She has easy bruising.  She has poor sleep and excessive tiredness.  Denies depression or suicidal thoughts.  Denies shortness of  breath cough wheeze.  Denies eye blindness. Remainder of 12 point review systems negative unless listed above.    Past Medical History:   Diagnosis Date     Allergy, unspecified not elsewhere classified      Endometriosis      Fibromyalgia      Migraine without aura, with intractable migraine, so stated, without mention of status migrainosus      Myalgia and myositis, unspecified      Systemic lupus erythematosus (H)        Family History   Problem Relation Age of Onset     Diabetes Maternal Grandfather      Lipids Mother      Skin Cancer Paternal Grandmother      Melanoma No family hx of          Social History     Socioeconomic History     Marital status:      Spouse name: None     Number of children: None     Years of education: None     Highest education level: None   Tobacco Use     Smoking status: Never Smoker     Smokeless tobacco: Never Used   Substance and Sexual Activity     Alcohol use: Yes     Comment: occ.- 2/week     Drug use: No     Sexual activity: Yes     Partners: Male     Birth control/protection: Condom     Social history: .  Works as a .  Has a dog.  No tobacco or alcohol.      The following portions of the patient's history were reviewed and updated as appropriate: allergies, current medications, past family history, past medical history, past social history, past surgical history and problem list.    Oswestry (MARQUITA) Questionnaire    OSWESTRY DISABILITY INDEX 7/29/2022   Count 9   Sum 18   Oswestry Score (%) 40   Some recent data might be hidden       Neck Disability Index:  No flowsheet data found.       PHQ-2 Score:     PHQ-2 ( 1999 [employer]) 7/1/2022 2/12/2020   Q1: Little interest or pleasure in doing things 0 1   Q2: Feeling down, depressed or hopeless 1 1   PHQ-2 Score 1 2   PHQ-2 Total Score (12-17 Years)- Positive if 3 or more points; Administer PHQ-A if positive - 2   Q1: Little interest or pleasure in doing things - Several days   Q2: Feeling down, depressed or  hopeless - Several days   PHQ-2 Score - 2                  Objective:   Physical Exam:    BP (!) 144/67   Pulse 102   There is no height or weight on file to calculate BMI.      General:  Well-appearing female in no acute distress.  Pleasant, cooperative, and interactive throughout the examination and interview.  CV: No lower extremity edema on inspection or paltation.  Lymphatics: No cervical lymphadenopathy palpated. Eyes: sclera clear. Skin: No rashes or lesions seen over the head/neck,   arms, legs.  Band-Aid over left great toe.   Respirations unlabored.  MSK: Gait is cautious, nonantalgic, mild pelvic drop with single-leg stance on the right..  Able to heel-toe stand without difficulty.  Negative Romberg.  Spine: normal AP curves of the C, T, and L spine.  Decreased lumbar flexion finger to floor testing mild.  Minimal extension with pain.  Palpation: Tenderness to palpation over L4-5 L5-S1 paraspinals as well as gluteal tissues (with hypertonic tissue textures.  Severe pain on palpation with patient prone at the L5-S1 facet region.  Extremities: Full range of motion of the elbows, and wrists with no effusions or tenderness to palpation.   Full range of motion of the hips, knees, and ankles with no effusions or tenderness to palpation.  Negative scour maneuver and Sánchez's test bilaterally. No hypermobility of the upper or lower extremities.  Neurologic exam: Mental status: Patient is alert and oriented with normal affect.  Attention, knowledge, memory, and language are intact.  Normal coordination throughout the examination.  Reflexes are 2+ and symmetric biceps, triceps, brachioradialis, patellar, and 0 Achilles with down-going toes and Negative Rico's.  Sensation is intact to light touch throughout the upper and lower extremities bilaterally.  Manual muscle testing reveals 5 out of 5 in the hip flexors, knee flexors/extensors, ankle plantar flexors, ankle  dorsiflexors, and EHL.  Upper extremities:  Grossly normal strength . Normal muscle bulk and tone in the arms and legs.    Negative seated and supine straight leg raise bilaterally.          Again, thank you for allowing me to participate in the care of your patient.        Sincerely,        Bill Gutiérrez, DO

## 2022-09-30 NOTE — PATIENT INSTRUCTIONS
An XRAY was ordered for you today.  Please call Radiology at [phone].       2. A Lumbar medial branch block has been ordered today. Please schedule this injection at least  2 weeks from now to allow time for insurance prior authorization. On the day of your injection, you cannot be sick or taking antibiotics. If you become sick and are prescribed, please call the clinic so your injection can be rescheduled for once you have completed your antibiotics. You will need to bring a  with you for your injection. If you have any questions or concerns prior to your injection, please do not hesitate to call the nurse navigation line at [phone].     3. Baclofen (muscle relaxant medication) has been prescribed today. Please take 5-10mg three times daily as needed. This medication may cause drowsiness. Please do not work or drive while taking this medication until you know how it effects you. If it does make you drowsy, you should only take it before bedtime or at times that you do not have to work/drive.

## 2022-10-03 ENCOUNTER — OFFICE VISIT (OUTPATIENT)
Dept: NEUROLOGY | Facility: CLINIC | Age: 47
End: 2022-10-03
Payer: COMMERCIAL

## 2022-10-03 ENCOUNTER — CARE COORDINATION (OUTPATIENT)
Dept: NEUROLOGY | Facility: CLINIC | Age: 47
End: 2022-10-03

## 2022-10-03 DIAGNOSIS — M32.19 SYSTEMIC LUPUS ERYTHEMATOSUS WITH OTHER ORGAN INVOLVEMENT, UNSPECIFIED SLE TYPE (H): ICD-10-CM

## 2022-10-03 DIAGNOSIS — G43.719 INTRACTABLE CHRONIC MIGRAINE WITHOUT AURA AND WITHOUT STATUS MIGRAINOSUS: Primary | ICD-10-CM

## 2022-10-03 DIAGNOSIS — G89.29 CHRONIC MYOFASCIAL PAIN: ICD-10-CM

## 2022-10-03 DIAGNOSIS — M79.18 CHRONIC MYOFASCIAL PAIN: ICD-10-CM

## 2022-10-03 PROCEDURE — 64615 CHEMODENERV MUSC MIGRAINE: CPT | Performed by: NURSE PRACTITIONER

## 2022-10-03 NOTE — LETTER
10/3/2022       RE: Patricia Hall  [address]  [place] [address]     Dear Colleague,    Thank you for referring your patient, Patricia Hall, to the [facility] [place] [facility]. Please see a copy of my visit note below.    BOTULINUM NEUROTOXIN INJECTION PROCEDURE:  DATA:10/3/2022  INDICATIONS FOR PROCEDURES:   chronic migraine headaches. Fr -15 or more headache days per month and duration 5 days is there    baseline symptoms have been recalcitrant to oral medications and conservative therapy. Patient is here today for an initial injection with Botox.given already extensive medication list -would recommend Botox injections with chronic migraine headache protocol    GOAL OF PROCEDURE:  The goal of this procedure is to decrease pain and enhance functional independence.    TOTAL DOSE ADMINISTERED:  Dose Administered:  155 units  Botox (Botulinum Toxin Type A)       2:1 Dilution    Wasted 45 units  Medication guide was offered to patient   CONSENT:  The risks, benefits, and treatment options were discussed with the patient who agreed to proceed.    Written consent was obtained     EQUIPMENT USED:  Needles-30 gauge, 0.5 inches for injections  Four 1-ml tuberculin syringes for injections  One sodium chloride 10 ml vial preservative free  Alcohol swabs    SKIN PREPARATION:  Skin preparation was performed using an alcohol wipe.      AREA/MUSCLE INJECTED:  155 units of Botox    Right upper Trapezius (upper cervical) - 5 units of Botox at 3 site/s.   Left upper Trapezius (upper cervical) - 5 units of Botox at 3 site/s.     Right cervical paraspinals - 5 units of Botox at 2 site/s.   Left cervical paraspinals - 5 units of Botox at 2 site/s.     Left occipitalis - 5 units of Botox at 3 site/s.  Right occipitalis -5 units of Botox at 3 site/s    Right Frontalis - 5 units of Botox at 2 site/s.  Left Frontalis - 5 units of Botox  at 2 site/s.    Right Temporalis - 5 units of Botox at 4 site/s.  Left Temporalis - 5 units of Botox at 4 site/s.    Right  - 5 units of Botox at 1 site/s.              Left  - 5 units of Botox at 1 site/s.    Procerus - 5 units of Botox at 1 site/s.    RESPONSE TO PROCEDURE:  tolerated the procedure well and there were no immediate complications.  Patient was allowed to recover for an appropriate period of time and was discharged home in stable condition.    FOLLOW UP:    Repeat Botox injections in 12 weeks      This procedure was performed under a hospital privileging agreement with CARISSA Li, [facility]

## 2022-10-03 NOTE — PROGRESS NOTES
BOTULINUM NEUROTOXIN INJECTION PROCEDURE:  DATA:10/3/2022  INDICATIONS FOR PROCEDURES:   chronic migraine headaches. Fr -15 or more headache days per month and duration 5 days is there    baseline symptoms have been recalcitrant to oral medications and conservative therapy. Patient is here today for an initial injection with Botox.given already extensive medication list -would recommend Botox injections with chronic migraine headache protocol    GOAL OF PROCEDURE:  The goal of this procedure is to decrease pain and enhance functional independence.    TOTAL DOSE ADMINISTERED:  Dose Administered:  155 units  Botox (Botulinum Toxin Type A)       2:1 Dilution    Wasted 45 units  Medication guide was offered to patient   CONSENT:  The risks, benefits, and treatment options were discussed with the patient who agreed to proceed.    Written consent was obtained     EQUIPMENT USED:  Needles-30 gauge, 0.5 inches for injections  Four 1-ml tuberculin syringes for injections  One sodium chloride 10 ml vial preservative free  Alcohol swabs    SKIN PREPARATION:  Skin preparation was performed using an alcohol wipe.      AREA/MUSCLE INJECTED:  155 units of Botox    Right upper Trapezius (upper cervical) - 5 units of Botox at 3 site/s.   Left upper Trapezius (upper cervical) - 5 units of Botox at 3 site/s.     Right cervical paraspinals - 5 units of Botox at 2 site/s.   Left cervical paraspinals - 5 units of Botox at 2 site/s.     Left occipitalis - 5 units of Botox at 3 site/s.  Right occipitalis -5 units of Botox at 3 site/s    Right Frontalis - 5 units of Botox at 2 site/s.  Left Frontalis - 5 units of Botox at 2 site/s.    Right Temporalis - 5 units of Botox at 4 site/s.  Left Temporalis - 5 units of Botox at 4 site/s.    Right  - 5 units of Botox at 1 site/s.              Left  - 5 units of Botox at 1 site/s.    Procerus - 5 units of Botox at 1 site/s.    RESPONSE TO PROCEDURE:  tolerated the procedure well  and there were no immediate complications.  Patient was allowed to recover for an appropriate period of time and was discharged home in stable condition.    FOLLOW UP:    Repeat Botox injections in 12 weeks      This procedure was performed under a hospital privileging agreement with CARISSA Li, [facility]

## 2022-10-04 RX ORDER — AZATHIOPRINE 50 MG/1
75 TABLET ORAL DAILY
Qty: 135 TABLET | Refills: 0 | Status: SHIPPED | OUTPATIENT
Start: 2022-10-04 | End: 2022-12-20

## 2022-10-04 NOTE — TELEPHONE ENCOUNTER
azaTHIOprine (IMURAN) 50 MG tablet      Last Written Prescription Date:  7/1/20222  Last Fill Quantity: 135,   # refills: 0  Last Office Visit: 7/1/2022  [facility] [place] [facility]  Future Office visit:  1/12/2023    CBC RESULTS: Recent Labs   Lab Test 05/17/22  1726   WBC 10.7   RBC 5.03   HGB 14.9   HCT 45.9   MCV 91   MCH 29.6   MCHC 32.5   RDW 12.5          Creatinine   Date Value Ref Range Status   05/17/2022 0.80 0.52 - 1.04 mg/dL Final   06/19/2021 0.76 0.52 - 1.04 mg/dL Final   ]    Liver Function Studies -   Recent Labs   Lab Test 05/17/22  1726   PROTTOTAL 7.4   ALBUMIN 3.9   BILITOTAL 0.3   ALKPHOS 73   AST 23   ALT 44       Routing refill request to provider for review/approval because:  DMARD medication.  - labs due (last done 5/17/2022)  - plan last visit 7/1/2022 labs in Aug then q3 mo  - future visit scheduled for 1/12/2023 6 month follow up

## 2022-10-10 ENCOUNTER — HEALTH MAINTENANCE LETTER (OUTPATIENT)
Age: 47
End: 2022-10-10

## 2022-10-10 DIAGNOSIS — G43.009 MIGRAINE WITHOUT AURA AND WITHOUT STATUS MIGRAINOSUS, NOT INTRACTABLE: ICD-10-CM

## 2022-10-10 NOTE — TELEPHONE ENCOUNTER
Rx Authorization:    Requested Medication/ Dose SUMAtriptan (IMITREX STATDOSE) 6 MG/0.5ML pen injector kit    Date last refill ordered: 8/30/22    Quantity ordered: 3 kit    # refills: 11    Date of last clinic visit with ordering provider: 10/3/22    Date of next clinic visit with ordering provider:     All pertinent protocol data (lab date/result):     Include pertinent information from patients message:

## 2022-10-12 RX ORDER — CEFUROXIME AXETIL 250 MG/1
6 TABLET ORAL
Qty: 3 KIT | Refills: 11 | Status: SHIPPED | OUTPATIENT
Start: 2022-10-12 | End: 2023-05-16

## 2022-10-14 ENCOUNTER — TELEPHONE (OUTPATIENT)
Dept: RHEUMATOLOGY | Facility: CLINIC | Age: 47
End: 2022-10-14

## 2022-10-14 NOTE — TELEPHONE ENCOUNTER
[facility] Prior Authorization Team   Phone: [phone]  Fax: [phone]    PA Initiation    Medication: Benlysta  Insurance Company:  [facility]  Pharmacy Filling the Rx:  [facility] - [place], [place] - [address]   Filling Pharmacy Phone:  [phone]  Filling Pharmacy Fax:  [phone]  Start Date:  10/14/2022

## 2022-10-25 NOTE — TELEPHONE ENCOUNTER
Prior Authorization Approval    Authorization Effective Date: 9/16/2022  Authorization Expiration Date: 10/16/2023  Medication: Benlysta  Approved Dose/Quantity: 4ml per 28 days  Reference #:     Insurance Company: [employer] [place] - Phone [phone] Fax [phone]  Expected CoPay:       CoPay Card Available:      Foundation Assistance Needed:    Which Pharmacy is filling the prescription (Not needed for infusion/clinic administered): [facility] - [place] [address]  Pharmacy Notified: Yes  Patient Notified: Yes

## 2022-10-26 ENCOUNTER — LAB (OUTPATIENT)
Dept: LAB | Facility: CLINIC | Age: 47
End: 2022-10-26
Payer: COMMERCIAL

## 2022-10-26 DIAGNOSIS — M32.9 SYSTEMIC LUPUS ERYTHEMATOSUS, UNSPECIFIED SLE TYPE, UNSPECIFIED ORGAN INVOLVEMENT STATUS (H): ICD-10-CM

## 2022-10-26 DIAGNOSIS — Z51.81 ENCOUNTER FOR MEDICATION MONITORING: ICD-10-CM

## 2022-10-26 DIAGNOSIS — R73.03 PREDIABETES: ICD-10-CM

## 2022-10-26 LAB
ALBUMIN MFR UR ELPH: <6 MG/DL
ALBUMIN SERPL BCG-MCNC: 4.3 G/DL (ref 3.5–5.2)
ALBUMIN UR-MCNC: NEGATIVE MG/DL
ALT SERPL W P-5'-P-CCNC: 23 U/L (ref 10–35)
APPEARANCE UR: CLEAR
AST SERPL W P-5'-P-CCNC: 21 U/L (ref 10–35)
BASOPHILS # BLD AUTO: 0.1 10E3/UL (ref 0–0.2)
BASOPHILS NFR BLD AUTO: 0 %
BILIRUB UR QL STRIP: NEGATIVE
COLOR UR AUTO: YELLOW
CREAT SERPL-MCNC: 0.77 MG/DL (ref 0.51–0.95)
CREAT UR-MCNC: 10.2 MG/DL
CRP SERPL-MCNC: 4.96 MG/L
EOSINOPHIL # BLD AUTO: 0.1 10E3/UL (ref 0–0.7)
EOSINOPHIL NFR BLD AUTO: 0 %
ERYTHROCYTE [DISTWIDTH] IN BLOOD BY AUTOMATED COUNT: 13.4 % (ref 10–15)
ERYTHROCYTE [SEDIMENTATION RATE] IN BLOOD BY WESTERGREN METHOD: 7 MM/HR (ref 0–20)
GFR SERPL CREATININE-BSD FRML MDRD: >90 ML/MIN/1.73M2
GLUCOSE UR STRIP-MCNC: NEGATIVE MG/DL
HBA1C MFR BLD: 5.8 %
HCT VFR BLD AUTO: 43.2 % (ref 35–47)
HGB BLD-MCNC: 14.2 G/DL (ref 11.7–15.7)
HGB UR QL STRIP: NEGATIVE
IMM GRANULOCYTES # BLD: 0.2 10E3/UL
IMM GRANULOCYTES NFR BLD: 1 %
KETONES UR STRIP-MCNC: NEGATIVE MG/DL
LEUKOCYTE ESTERASE UR QL STRIP: NEGATIVE
LYMPHOCYTES # BLD AUTO: 2.1 10E3/UL (ref 0.8–5.3)
LYMPHOCYTES NFR BLD AUTO: 13 %
MCH RBC QN AUTO: 29.6 PG (ref 26.5–33)
MCHC RBC AUTO-ENTMCNC: 32.9 G/DL (ref 31.5–36.5)
MCV RBC AUTO: 90 FL (ref 78–100)
MONOCYTES # BLD AUTO: 0.7 10E3/UL (ref 0–1.3)
MONOCYTES NFR BLD AUTO: 4 %
NEUTROPHILS # BLD AUTO: 13.3 10E3/UL (ref 1.6–8.3)
NEUTROPHILS NFR BLD AUTO: 82 %
NITRATE UR QL: NEGATIVE
NRBC # BLD AUTO: 0 10E3/UL
NRBC BLD AUTO-RTO: 0 /100
PH UR STRIP: 7 [PH] (ref 5–7)
PLATELET # BLD AUTO: 255 10E3/UL (ref 150–450)
PROT/CREAT 24H UR: NORMAL MG/G{CREAT}
RBC # BLD AUTO: 4.8 10E6/UL (ref 3.8–5.2)
RBC URINE: 0 /HPF
SP GR UR STRIP: 1.01 (ref 1–1.03)
SQUAMOUS EPITHELIAL: <1 /HPF
UROBILINOGEN UR STRIP-MCNC: NORMAL MG/DL
WBC # BLD AUTO: 16.5 10E3/UL (ref 4–11)
WBC URINE: <1 /HPF

## 2022-10-26 PROCEDURE — 84450 TRANSFERASE (AST) (SGOT): CPT | Performed by: PATHOLOGY

## 2022-10-26 PROCEDURE — 85025 COMPLETE CBC W/AUTO DIFF WBC: CPT | Performed by: PATHOLOGY

## 2022-10-26 PROCEDURE — 86160 COMPLEMENT ANTIGEN: CPT | Performed by: PATHOLOGY

## 2022-10-26 PROCEDURE — 84156 ASSAY OF PROTEIN URINE: CPT | Performed by: PATHOLOGY

## 2022-10-26 PROCEDURE — 86225 DNA ANTIBODY NATIVE: CPT | Performed by: PATHOLOGY

## 2022-10-26 PROCEDURE — 84460 ALANINE AMINO (ALT) (SGPT): CPT | Performed by: PATHOLOGY

## 2022-10-26 PROCEDURE — 81001 URINALYSIS AUTO W/SCOPE: CPT | Performed by: PATHOLOGY

## 2022-10-26 PROCEDURE — 83036 HEMOGLOBIN GLYCOSYLATED A1C: CPT | Performed by: PATHOLOGY

## 2022-10-26 PROCEDURE — 82040 ASSAY OF SERUM ALBUMIN: CPT | Performed by: PATHOLOGY

## 2022-10-26 PROCEDURE — 82565 ASSAY OF CREATININE: CPT | Performed by: PATHOLOGY

## 2022-10-26 PROCEDURE — 86140 C-REACTIVE PROTEIN: CPT | Performed by: PATHOLOGY

## 2022-10-26 PROCEDURE — 85652 RBC SED RATE AUTOMATED: CPT | Performed by: PATHOLOGY

## 2022-10-26 PROCEDURE — 36415 COLL VENOUS BLD VENIPUNCTURE: CPT | Performed by: PATHOLOGY

## 2022-10-26 PROCEDURE — 99000 SPECIMEN HANDLING OFFICE-LAB: CPT | Performed by: PATHOLOGY

## 2022-10-27 LAB
C3 SERPL-MCNC: 134 MG/DL (ref 81–157)
C4 SERPL-MCNC: 32 MG/DL (ref 13–39)
DSDNA AB SER-ACNC: <0.6 IU/ML

## 2022-11-15 DIAGNOSIS — G43.009 MIGRAINE WITHOUT AURA AND WITHOUT STATUS MIGRAINOSUS, NOT INTRACTABLE: ICD-10-CM

## 2022-11-15 RX ORDER — PROCHLORPERAZINE MALEATE 5 MG
5-10 TABLET ORAL EVERY 6 HOURS PRN
Qty: 20 TABLET | Refills: 3 | Status: SHIPPED | OUTPATIENT
Start: 2022-11-15 | End: 2023-02-09

## 2022-11-15 NOTE — TELEPHONE ENCOUNTER
Rx Authorization:    Requested Medication/ Dose prochlorperazine (COMPAZINE) 5 MG tablet    Date last refill ordered: 8/30/22    Quantity ordered: 20    # refills: 3    Date of last clinic visit with ordering provider: 10/3/22    Date of next clinic visit with ordering provider:     All pertinent protocol data (lab date/result):     Include pertinent information from patients message:

## 2022-11-25 DIAGNOSIS — G43.719 INTRACTABLE CHRONIC MIGRAINE WITHOUT AURA AND WITHOUT STATUS MIGRAINOSUS: Primary | ICD-10-CM

## 2022-12-16 ENCOUNTER — MYC MEDICAL ADVICE (OUTPATIENT)
Dept: INTERNAL MEDICINE | Facility: CLINIC | Age: 47
End: 2022-12-16

## 2022-12-16 DIAGNOSIS — M32.9 SYSTEMIC LUPUS ERYTHEMATOSUS, UNSPECIFIED SLE TYPE, UNSPECIFIED ORGAN INVOLVEMENT STATUS (H): ICD-10-CM

## 2022-12-16 DIAGNOSIS — G43.909 MIGRAINE WITHOUT STATUS MIGRAINOSUS, NOT INTRACTABLE, UNSPECIFIED MIGRAINE TYPE: ICD-10-CM

## 2022-12-18 NOTE — TELEPHONE ENCOUNTER
traMADol (ULTRAM) 50 MG tablet  Last Written Prescription Date: 6/30/22  Last Fill Quantity: 180,   # refills: 5  Last Office Visit : 5/26/22  Future Office visit: 1/23/23    Routing refill request to provider for review/approval because:controlled

## 2022-12-20 RX ORDER — TRAMADOL HYDROCHLORIDE 50 MG/1
TABLET ORAL
Qty: 180 TABLET | Refills: 5 | Status: SHIPPED | OUTPATIENT
Start: 2022-12-20 | End: 2023-05-05

## 2022-12-20 RX ORDER — PREDNISONE 5 MG/1
TABLET ORAL
Qty: 120 TABLET | Refills: 5 | Status: SHIPPED | OUTPATIENT
Start: 2022-12-20 | End: 2023-02-09

## 2022-12-20 RX ORDER — AZATHIOPRINE 50 MG/1
TABLET ORAL
Qty: 135 TABLET | Refills: 0 | Status: SHIPPED | OUTPATIENT
Start: 2022-12-20 | End: 2023-02-09

## 2022-12-20 NOTE — TELEPHONE ENCOUNTER
Medication/Dose: Azathioprine 50 mg tab    Last Written : 10/4/22  Last Quantity: 135, # refills: 0  Last Office Visit :  7/11/22  Pending appointment: 1/12/23    WBC   Date Value Ref Range Status   06/19/2021 12.9 (H) 4.0 - 11.0 10e9/L Final     WBC Count   Date Value Ref Range Status   10/26/2022 16.5 (H) 4.0 - 11.0 10e3/uL Final     RBC Count   Date Value Ref Range Status   10/26/2022 4.80 3.80 - 5.20 10e6/uL Final   06/19/2021 4.42 3.8 - 5.2 10e12/L Final     Hemoglobin   Date Value Ref Range Status   10/26/2022 14.2 11.7 - 15.7 g/dL Final   06/19/2021 13.7 11.7 - 15.7 g/dL Final     Hematocrit   Date Value Ref Range Status   10/26/2022 43.2 35.0 - 47.0 % Final   06/19/2021 41.3 35.0 - 47.0 % Final     MCV   Date Value Ref Range Status   10/26/2022 90 78 - 100 fL Final   06/19/2021 93 78 - 100 fl Final     MCH   Date Value Ref Range Status   10/26/2022 29.6 26.5 - 33.0 pg Final   06/19/2021 31.0 26.5 - 33.0 pg Final     MCHC   Date Value Ref Range Status   10/26/2022 32.9 31.5 - 36.5 g/dL Final   06/19/2021 33.2 31.5 - 36.5 g/dL Final     RDW   Date Value Ref Range Status   10/26/2022 13.4 10.0 - 15.0 % Final   06/19/2021 12.4 10.0 - 15.0 % Final     Platelet Count   Date Value Ref Range Status   10/26/2022 255 150 - 450 10e3/uL Final   06/19/2021 221 150 - 450 10e9/L Final     AST   Date Value Ref Range Status   10/26/2022 21 10 - 35 U/L Final   06/19/2021 18 0 - 45 U/L Final     ALT   Date Value Ref Range Status   10/26/2022 23 10 - 35 U/L Final   06/19/2021 36 0 - 50 U/L Final     Creatinine   Date Value Ref Range Status   10/26/2022 0.77 0.51 - 0.95 mg/dL Final   06/19/2021 0.76 0.52 - 1.04 mg/dL Final     Albumin   Date Value Ref Range Status   10/26/2022 4.3 3.5 - 5.2 g/dL Final   05/17/2022 3.9 3.4 - 5.0 g/dL Final   06/19/2021 3.6 3.4 - 5.0 g/dL Final     CRP Inflammation   Date Value Ref Range Status   10/26/2022 4.96 <5.00 mg/L Final   02/10/2022 3.0 0.0 - 8.0 mg/L Final   06/19/2021 <2.9 0.0 - 8.0  mg/L Final     No components found for: ESR    Prescription set up and routed to provider per Refill Protocol    Medication/Dose: prednisone 5 mg tab    Last Written : 7/1/22  Last Quantity: 120, # refills: 5  Last Office Visit :  7/1/22  Pending appointment: 1/12/23    Prescription did not meet protocol, and routed to provider/team. Pt is to tapering to 10 mg dose, unclear where she is on that taper.    NOBLE BlevinsN, RN  [facility] Refill Team

## 2022-12-23 DIAGNOSIS — M32.9 SYSTEMIC LUPUS ERYTHEMATOSUS, UNSPECIFIED SLE TYPE, UNSPECIFIED ORGAN INVOLVEMENT STATUS (H): Primary | ICD-10-CM

## 2022-12-27 RX ORDER — BELIMUMAB 200 MG/ML
200 SOLUTION SUBCUTANEOUS
Qty: 12 ML | Refills: 3 | Status: SHIPPED | OUTPATIENT
Start: 2022-12-27 | End: 2023-12-08

## 2022-12-27 NOTE — TELEPHONE ENCOUNTER
BENLYSTA 200MG/ML SOSY  Last Written Prescription Date:   2/3/2022  Last Fill Quantity: 12,   # refills: 3  Last Office Visit :  7/1/2022  Future Office visit:   1/12/2023    Routing refill request to provider for review/approval because:  Drug not on the [facility], [facility] or [facility] refill protocol or controlled substance      Flora Wilkerson RN  Central Triage Red Flags/Med Refills

## 2022-12-30 ENCOUNTER — NURSE TRIAGE (OUTPATIENT)
Dept: NURSING | Facility: CLINIC | Age: 47
End: 2022-12-30

## 2022-12-30 NOTE — TELEPHONE ENCOUNTER
Calling because she's had 4 days of cold. Covid negative. History of lupus and migraines and herniated disks.     Forgot her tramadol. It's after clinic hours, and her primary provider is not a [facility] provider. No triage.    Patient encouraged to seek care in [place], which is where she is at this time.     Bee Granado RN on 12/30/2022 at 5:43 PM    Reason for Disposition    Health Information question, no triage required and triager able to answer question    Additional Information    Negative: [1] Caller is not with the adult (patient) AND [2] reporting urgent symptoms    Negative: Lab result questions    Negative: Medication questions    Negative: Caller can't be reached by phone    Negative: Caller has already spoken to PCP or another triager    Negative: RN needs further essential information from caller in order to complete triage    Negative: Requesting regular office appointment    Negative: [1] Caller requesting NON-URGENT health information AND [2] PCP's office is the best resource    Protocols used: INFORMATION ONLY CALL - NO TRIAGE[facility]

## 2023-01-08 ENCOUNTER — MYC MEDICAL ADVICE (OUTPATIENT)
Dept: INTERNAL MEDICINE | Facility: CLINIC | Age: 48
End: 2023-01-08

## 2023-01-08 DIAGNOSIS — G89.29 OTHER CHRONIC PAIN: ICD-10-CM

## 2023-01-09 ENCOUNTER — TELEPHONE (OUTPATIENT)
Dept: INTERNAL MEDICINE | Facility: CLINIC | Age: 48
End: 2023-01-09

## 2023-01-09 RX ORDER — TRAMADOL HYDROCHLORIDE 300 MG/1
300 TABLET, EXTENDED RELEASE ORAL AT BEDTIME
Qty: 30 TABLET | Refills: 5 | Status: SHIPPED | OUTPATIENT
Start: 2023-01-09 | End: 2023-06-19

## 2023-01-09 NOTE — TELEPHONE ENCOUNTER
Patient called through call center- Patient needs refill of 300 mg XR Tramadol. Patient still having sinus symptoms and finished zpak last week, wants to follow up. Appointment scheduled tomorrow with Dr. Eagle. Patient quite upset that this rx was not renewed middle of December, writer informed patient that only the 50 mg was sent. Writer will contact Dr. Mendez.    Hugo Arana RN on 1/9/2023 at 3:20 PM

## 2023-01-09 NOTE — TELEPHONE ENCOUNTER
[facility] Call Center    Phone Message    May a detailed message be left on voicemail: yes     Reason for Call: Other: Patient calling about her medication. She states it should have been sent to pharmacy and she has sent my chart messages regarding this. Please have Dr Andrea Guzman's nurse call her back to discuss further. .     Action Taken: Message routed to:  [facility] P[facility]    Travel Screening: Not Applicable

## 2023-01-12 ENCOUNTER — MYC MEDICAL ADVICE (OUTPATIENT)
Dept: RHEUMATOLOGY | Facility: CLINIC | Age: 48
End: 2023-01-12

## 2023-01-23 ENCOUNTER — TELEPHONE (OUTPATIENT)
Dept: INTERNAL MEDICINE | Facility: CLINIC | Age: 48
End: 2023-01-23

## 2023-01-23 ENCOUNTER — VIRTUAL VISIT (OUTPATIENT)
Dept: INTERNAL MEDICINE | Facility: CLINIC | Age: 48
End: 2023-01-23
Payer: COMMERCIAL

## 2023-01-23 DIAGNOSIS — K21.00 GASTROESOPHAGEAL REFLUX DISEASE WITH ESOPHAGITIS WITHOUT HEMORRHAGE: Primary | ICD-10-CM

## 2023-01-23 PROCEDURE — 99214 OFFICE O/P EST MOD 30 MIN: CPT | Mod: 95 | Performed by: INTERNAL MEDICINE

## 2023-01-23 RX ORDER — PANTOPRAZOLE SODIUM 40 MG/1
40 TABLET, DELAYED RELEASE ORAL DAILY
Qty: 90 TABLET | Refills: 3 | Status: SHIPPED | OUTPATIENT
Start: 2023-01-23 | End: 2023-02-09

## 2023-01-23 NOTE — TELEPHONE ENCOUNTER
Left Voicemail (1st Attempt) for the patient to call back and schedule the following:    Appointment type: In person, [facility] Return  Provider: Shavon Raines MD  Return date: 4/23/23 (3 mo)  Specialty phone number: [phone]    Brie JACKSON

## 2023-01-23 NOTE — PROGRESS NOTES
"Gracie is a 47 year old who is being evaluated via a billable video visit to discuss med refills and other health issues.    She did end up getting the refill of tramadol after several messages and says it is very helpful for pain management.  She is taking it appropriately, on the low end of the dosing so I authorized 5 refills beyond the initial Rx.     Was ill with some sort of respiratory virus after [place]; covid tested negative.  Was on antibiotics (zpack) for sinus shortly after.  Days after that, developed different symptoms more consistent with viral gastroenteritis; nausea, vomiting, diarrhea.  Since then, \"nonstop acid reflux\" returned and persisted. 20 mg omeprazole has not been very helpful.  I will recommend switching to pantoprazole, 40 mg once daily with a plan to increase to twice daily if symptoms still bothersome.    We also discussed lumbar back pain, infertility, and migraines.  She's getting another opinion from Ortho on what to do about low back pain and MRI findings.  Migraines responded very well to botox.  She will continue to follow with Neurology.  Gracie happily mentioned that she found a surrogate to match with her frozen IVF embryo and they have started the process to become parents.   I will be getting a form to fill out regarding her health status as part of this process.    On exam, she is alert, in NAD.  No cough or audible wheezing noted.  Visible skin is without rash or erythema.  Mood/affect seem upbeat.    A/P Patricia was seen today for video visit.    Gastroesophageal reflux disease with esophagitis without hemorrhage  -     pantoprazole (PROTONIX) 40 MG EC tablet; Take 1 tablet (40 mg) by mouth daily    RTC in 3 months for in person visit    Shavon Guzman MD        Pt is in [place]    How would you like to obtain your AVS? MyChart  If the video visit is dropped, the invitation should be resent by: Text to cell phone: [phone]  Will anyone else be joining your video " [facility]  [address] [place] [address]  [phone]      Jeaneth Sadler :1953 MRN:[id number]    2023 Time Session Began: 1305  Time Session Ended: 1354    Session Type:Therapy 38-52 minutes (82782)    Others Present:     Intervention: Behavioral, Cognitive    Suicide/Homicide/Violence Ideation: No    If Yes, explain: none reported    Current Outpatient Medications   Medication Sig   • aspirin 81 MG EC tablet Take 81 mg by mouth daily.   • VITAMIN D, CHOLECALCIFEROL, PO Take 1,000 mg by mouth.   • Multiple Vitamins-Minerals (MULTI COMPLETE PO)    • atorvastatin (Lipitor) 10 MG tablet Take 1 tablet by mouth daily.   • levothyroxine 75 MCG tablet TAKE 1 TABLET BY MOUTH DAILY IN THE MORNING BEFORE BREAKFAST   • estriol 0.05 mg/g (0.005 %) cream (in natural base) Apply 1 gram (1 mL) into the vagina once weekly.     No current facility-administered medications for this visit.       Change in Medication(s) Reported: No  If Yes, explain:     Patient/Family Education Provided: Yes  Patient/Family Displays Understanding: Yes    If No, explain:     Chief complaint in patient's own words: \"I don't think we ever really grieved fully.\"    Progress Note containing chief complaint and symptoms/problems related to the complaint:    (Data/Action/Response/Plan)    D: Patient reports the holidays went well but then we did get sick so on the mend from that; I've been staying in contact with Cris and the girls.  We talked to Cris about the path being ready that igoing to the [place] so we can spread Yosi' ashes; Cris just said when they will do it when they can get together.  I did notice that nobody brought up Yosi' name; it was a few days later that I realized that we hadn't talked about Yosi.  Marvin and I were talking about my mom the other day because her death anniversary is happening; I was with her when she passed; I had gone to put her jacket  on to take her to rehab and she was gone, I called for help and she passed; when I was putting on her jacket and sh had just started shaking and I asked what was wrong and her last words were 'I don't know'.  Then Marvin's mom  at the start of things shutting down during covid and then 3 mo later Yosi passed, so I don't think we ever really grieved for his mom, or grieved fully at all.  Just thinking 'why' and being so angry about all of it.    Action of the provider: Patient was provided with support. Patient's report of symptom improvement was processed and reframed to build insight. Cognitions shared by patient were acknowledged and exploration was encouraged. Provider reviewed observe skills, new beginnings, loss adjustment, words matter, acceptance of change, and validation. Provider recommended patient work on using S.T.O.P skill and self-soothing with 6 senses.  Offered compassionate presence and active listening while patient was given opportunity to practice self-calming.       Response of patient: Jeaneth was alert and oriented x4. Patient presented motivated. Patient presented as calm and cooperative. Mood appeared sad with congruent affect. Eye contact was appropriate. Speech was normal in rate, sad in tone, and normal in volume. Motor activity was unremarkable. Thought process was future oriented and goal directed. Patient does not report any active suicidal ideation, homicidal ideation or self-harm ideation.      Plan: Jeaneth will return in 8 weeks or sooner if needed. Patient will practice rediscovery, S.T.O.P, and self-soothing with 6 senses skills discussed in session.    Need for Community Resources Assessed: Yes    Resources Needed: No    If Yes, what resources:     Primary Diagnosis: Major Depression Single Episode and Complicated Bereavement  : 2 Moderate, 2 Episodic and Acute    Treatment Plan: Unchanged, deferred until next session due to patient deciding toay to continue therapy  visit? No      Brie JACKSON    Video-Visit Details    Type of service:  Video Visit started 11:02 ended 11:20 with another 13 minutes chart review and documentation same day    Originating Location (pt. Location): Home    Distant Location (provider location):  On-site  Platform used for Video Visit: Padmini   through summer    Discharge Plan: Strategies Discussed to Maintain Gains, Titrate Sessions    Next Appointment: 3/16/2023  Radha Andersen LCSW

## 2023-01-23 NOTE — PATIENT INSTRUCTIONS
Take pantoprazole first thing in the morning on an empty stomach; don't eat for 30 minutes after for best effect.  After 1-2 weeks if this isn't helping, increase to twice a day.    Thank you for visiting the Primary Care Center today at the [facility]! The following is some information about our clinic:     Primary Care Center Frequently-Asked Questions    (1) How do I schedule appointments at the [facility]?     Primary Care--to schedule or make changes to an existing appointment, please call our primary care line at [phone].    Labs--to schedule a lab appointment at the [facility] you can use [employer] or call [phone]. If you have a [place] location that is closer to home, you can reach out to that location for scheduling options.     Imaging--if you need to schedule a CT, X-ray, MRI, ultrasound, or other imaging study you can call [phone] to schedule at the [facility] or any other [facility] imaging location.     Referrals--if a referral to another specialty was ordered you can expect a phone call from their scheduling team. If you have not heard from them in a week, please call us or send us a [employer] message to check the status or get a scheduling number. Please note that this only applies to internal [facility] referrals. If the referral is external you would need to contact their office for scheduling.     (2) I have a question about my visit, who do I contact?     You can call us at the primary care line at [phone] to ask questions about your visit. You can also send a secure message through [employer], which is reviewed by clinic staff. Please note that [employer] messages have a twenty-four to forty-eight business hour turnaround time and should not be used for urgent concerns.    (3) How will I get the results of my tests?    If you are signed up for [employer] all tests will be released to you within twenty-four hours  of resulting. Please allow three to five days for your doctor to review your results and place a note interpreting the results. If you do not have [employer] you will receive your results through mail seven to ten business days following the return of the tests. Please note that if there should be any urgent or concerning results that your doctor or their registered nurse will reach out to you the same day as the tests come back. If you have follow up questions about your results or would like to discuss the results in detail please schedule a follow up with your provider either in person or virtually.     (4) How do I get refills of my prescriptions?     You should always first contact your pharmacy for refills of your medications. If submitting a refill request on [employer], please be sure to submit the request only once--repeat requests can cause delays in refill. If you are requesting a NEW medication or a medication related to new symptoms you will need to schedule an appointment with a provider prior to approval. Please note: Routine medication refills have up to one to three business day turnaround whereas controlled substances refills have up to five to seven business day turnaround.    (5) I have new symptoms, what do I do?     If you are having an immediate medical emergency, you should dial 911 for assistance.   For anything urgent that needs to be seen within a few hours to one day you should visit a local urgent care for assistance.  For non-urgent symptoms that need to be seen within a few days to a week you can schedule with an available provider in primary care by going to [employer] or calling [phone].   If you are not sure how serious your symptoms are or you would like to receive medical advice you can always call [phone] to speak with a triage nurse.

## 2023-01-27 ENCOUNTER — TELEPHONE (OUTPATIENT)
Dept: RHEUMATOLOGY | Facility: CLINIC | Age: 48
End: 2023-01-27
Payer: COMMERCIAL

## 2023-01-27 NOTE — TELEPHONE ENCOUNTER
Message left for the patient to call this RN back. Will also send a [employer] message. Dr. Roy has an appointment at 1330 today, could do video or in-person.    LISA Michaels, RN  RN Care Coordinator Rheumatology

## 2023-01-30 ENCOUNTER — MYC MEDICAL ADVICE (OUTPATIENT)
Dept: RHEUMATOLOGY | Facility: CLINIC | Age: 48
End: 2023-01-30
Payer: COMMERCIAL

## 2023-01-31 ENCOUNTER — TELEPHONE (OUTPATIENT)
Dept: NEUROLOGY | Facility: CLINIC | Age: 48
End: 2023-01-31
Payer: COMMERCIAL

## 2023-01-31 DIAGNOSIS — G43.009 MIGRAINE WITHOUT AURA AND WITHOUT STATUS MIGRAINOSUS, NOT INTRACTABLE: Primary | ICD-10-CM

## 2023-01-31 NOTE — TELEPHONE ENCOUNTER
Prior Authorization Retail Medication Request    Medication/Dose: ubrogepant (UBRELVY) 50 MG tablet  ICD code (if different than what is on RX):    Previously Tried and Failed:rizatriptan or sumatriptan and somedays both the same day  Sumatriptan works when catches quick enough   NSAIDs -a couple of tablets of ibuprofen in the week and does not help with the pain and does not tylenol in the last 6 month   Ondansetron helps and   Has had PT     Rationale: migraine    Insurance Name: [facility]  Insurance ID: [id number]        Pharmacy Information (if different than what is on RX)  Name:    Phone:

## 2023-02-03 NOTE — TELEPHONE ENCOUNTER
PA Initiation    Medication: ubrogepant (UBRELVY) 50 MG tablet   Insurance Company: [employer] - Phone [phone] Fax [phone]  Pharmacy Filling the Rx: [id number] - [place], [place] - [address] [facility] [address] [facility] [address]  Filling Pharmacy Phone: [phone]  Filling Pharmacy Fax: [phone]  Start Date: 2/2/2023

## 2023-02-06 ENCOUNTER — MYC MEDICAL ADVICE (OUTPATIENT)
Dept: RHEUMATOLOGY | Facility: CLINIC | Age: 48
End: 2023-02-06
Payer: COMMERCIAL

## 2023-02-07 ENCOUNTER — MYC MEDICAL ADVICE (OUTPATIENT)
Dept: INTERNAL MEDICINE | Facility: CLINIC | Age: 48
End: 2023-02-07
Payer: COMMERCIAL

## 2023-02-07 DIAGNOSIS — R73.03 PREDIABETES: Primary | ICD-10-CM

## 2023-02-07 NOTE — TELEPHONE ENCOUNTER
Contacted insurance plan to follow up on request.  Per rep no case was received.  Resent in case via [employer]fax.

## 2023-02-09 ENCOUNTER — VIRTUAL VISIT (OUTPATIENT)
Dept: RHEUMATOLOGY | Facility: CLINIC | Age: 48
End: 2023-02-09
Attending: INTERNAL MEDICINE
Payer: COMMERCIAL

## 2023-02-09 DIAGNOSIS — M32.9 SYSTEMIC LUPUS ERYTHEMATOSUS, UNSPECIFIED SLE TYPE, UNSPECIFIED ORGAN INVOLVEMENT STATUS (H): Primary | ICD-10-CM

## 2023-02-09 DIAGNOSIS — Z51.81 ENCOUNTER FOR MEDICATION MONITORING: ICD-10-CM

## 2023-02-09 PROCEDURE — G0463 HOSPITAL OUTPT CLINIC VISIT: HCPCS | Mod: PN,GT | Performed by: INTERNAL MEDICINE

## 2023-02-09 PROCEDURE — 99214 OFFICE O/P EST MOD 30 MIN: CPT | Mod: VID | Performed by: INTERNAL MEDICINE

## 2023-02-09 RX ORDER — PREDNISONE 5 MG/1
TABLET ORAL
Qty: 150 TABLET | Refills: 5 | Status: SHIPPED | OUTPATIENT
Start: 2023-02-09 | End: 2024-02-27

## 2023-02-09 RX ORDER — HYDROXYCHLOROQUINE SULFATE 200 MG/1
200 TABLET, FILM COATED ORAL 2 TIMES DAILY
Qty: 180 TABLET | Refills: 0 | Status: SHIPPED | OUTPATIENT
Start: 2023-02-09 | End: 2023-05-17

## 2023-02-09 RX ORDER — MYCOPHENOLATE MOFETIL 500 MG/1
500 TABLET ORAL 2 TIMES DAILY
Qty: 60 TABLET | Refills: 1 | Status: SHIPPED | OUTPATIENT
Start: 2023-02-09 | End: 2023-12-18

## 2023-02-09 RX ORDER — PREDNISONE 1 MG/1
TABLET ORAL
Qty: 360 TABLET | Refills: 1 | Status: SHIPPED | OUTPATIENT
Start: 2023-02-09 | End: 2023-12-18

## 2023-02-09 NOTE — NURSING NOTE
Patient denies any changes since echeck-in regarding medication and allergies and states all information entered during echeck-in remains accurate.     Is the patient currently in the state of [place]? YES    Visit mode:VIDEO    If the visit is dropped, the patient can be reconnected by: VIDEO VISIT: Text to cell phone: [phone]    Will anyone else be joining the visit? NO      How would you like to obtain your AVS? MyChart    Are changes needed to the allergy or medication list? NO    Comments or concerns regarding today's visit: PT will update photos

## 2023-02-09 NOTE — PATIENT INSTRUCTIONS
Go back on plaquenil 200 mg twice a day    Prednisone taper: 12.5-10 mg a day, each course x 1 week then 9-8-7-6 mg a day, each course x 1 month then 5 mg a day    Start cellcept 500 mg twice a day    Labs in 4 weeks    Return in person in about 4-5 months

## 2023-02-09 NOTE — LETTER
2/9/2023       RE: Patricia Hall  [address]  [place] [address]     Dear Colleague,    Thank you for referring your patient, Patricia Hall, to the [facility] RHEUMATOLOGY CLINIC [place] at [facility]. Please see a copy of my visit note below.    Video-Visit Details    Type of service:  Video Visit    Video Start Time (time video started):  3:33 pm    Video End Time (time video stopped):4:02 pm    Originating Location (pt. Location): Home        Distant Location (provider location):  On-site    Mode of Communication:  Video Conference via [facility]      Rheumatology Visit Note    Reason for visit: Lupus    First Consult: 10/12/2017    Last visit: 7/1/2022  DOS: 2/9/2023    Original HPI (10/12/2017):  Patricia Hall is a 42 year old female who was referred to our clinic for evaluation and management of her SLE. The patient has been following with Dr. Mao for her SLE    History obtain from chart review and patient interview    Her lupus symptoms first started in during high school including fatigue and rash. She was diagnosed with lupus in early 2000s and she was in graduate school and presented with a few years of arthralgias involving knees and ankles and hands.  She had developed new onset Raynaud's phenomenon in which her fingers turned white in the cold but no digital sores.  VINITA was 1:160.  All specific autoantibodies and rheumatoid serologies negative.  She had a rash on her face, including cheeks and bridge of her nose.  She followed with Dr. Tejal Mayen in Dermatology.  A biopsy of a lesion from the nose was non-diagnostic.  Diagnosis was earlysigns of cutaneous lupus versus acne rosacea.  She was treated with Elidel cream.  She reported intermittent oral ulcers and significant fatigue as well as intermittent episodes of hair loss.  She was started on prednisone in 2003 along with Plaquenil.  She has been  maintained on prednisone 5 mg q day with intermittent bursts up as high as 30 mg for a short time.  She has found it very difficult to taper off of prednisone because she gets flare-ups of skin rash, arthralgias, and fatigue. Patient reports significant symptoms during the summers and reports significant flares this summer with photosensitivity with face rash,  Fatigue and join pain (knees and toes and hips).  She had fevers, mouth sores  And also reports increased hair loss.  Increased pain with walking and morning stiffness with Fibromyalgia burning in the morning. Currently taking prednisone 10 mg qd and plaquenil 200 mg BID.    Patient reports complicated fertility/OBGYN history with stage four endometriosis, fibroids and one pregnancy resulting in a miscarriage. Three rounds of IVF with one banked viable egg. She is currently undergoing fertility evaluation.  She has been working with infertility specialist for years. She currently has one viable egg and would like to undergo one more episode of IVF. She was seen by an autoimmune OB/GYN specialist in [place] (Daya Frost 02/17) for extensive testing. She was found to be heterozygous for MTHFR mutation. She is now taking Matanx (L-methyl folate). She has noticed less bruising since she started this. Metformin was recommended, but it upsets her stomach. DHEA was recommended, but she is not taking it. Her thyroid studies were normal, but she was started on Synthroid 25  g daily for the TSH to be less than 2.0. It was recommended that she take Lovenox prior to IFV and throughout the pregnancy to avoid risk of miscarriage. It was also recommended that she be treated with IVIG prior to IVF. 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  She plans to proceeded further with in vitro plans, but wants to get better control of her flaring lupus and concerns for IVF treatments/hormones.     Interval  "HPI (7/20/2018):  Repeatedly ill during the winter with both URIs and flares of disease (joint pain, malaise, fatigue). Still undergoing IVF care via [facility] and reproductive immunology. Did not initiate azathioprine following previous visit with Dr. Roy due to worry over negative effects on pregnancy. Currently undergoing medication treatment for IVF, is picking up medications this weekend for stimulation.    Symptomatically, her recent flares include fatigue, flu-like symptoms (fever, chills, sweats), polyarticular joint pain (fingers, toes, feet, ankles, wrists, elbows).    Recently, she has experienced further hair loss (clumps in shower), ulcers on buccal mucosa (now resolved), fatigue, malaise, significant B/L hip pain (worse from previous, approx 1 year). Specifically, it is constant deep joint pain in hips, would say 8/10 in severity when it occurs during movement. Has been seriously disrupting her daily activities. Has been using tylenol to control pain without complete relief.     Has seen Reproductive Immunology in the interim, has undergone two rounds of IVIG (believed to help with NK cell and cytokine activity in embryo implantation), first IVIG May 14th, then second was July 16th. Had flu-like symptoms with first infusion (HA, myalgias, malaise, felt \"gross\", lower back pain, neck pain). During the 2nd infusion, they slowed infusion, took benadryl/tylenol/upped prednisone to 20 mg she did not experience SEs with this. Overall, felt that IVIG improved her lupus symptoms globally. Short-lived relief. Plan is to use IVIG monthly with monitoring of cytokine levels and NK cell counts.     Currently on 15 mg of prednisone chronically, > 1 year. Today, she would say her disease is mild-moderately active in spite of the 15 mg prednisone. Currently on 400-500 U of vitamin D. Taking 1000 mg calcium.     ROS:  (-) pleurisy, sob, cough, hematuria, dysuria, eye redness, nose bleeds, easy bruising, malar rash  (+) " alternating diarrhea/constipation, dry eye, dry mouth, palatal ulcers/petechiae    Is interested in discussing SLE management (Imuran) while pursuing IVF. Will be undergoing planning and another round of IVF stimulation in August.       8/21/2019: She did another round of IVF in 8/2019, no good embryo. IVF caused her flare ups. For flare ups, gets pain over knees, knuckles and toes with extreme fatigue and photosensitivity.    Had laparoscopic surgery for endometriosis in 4/2019.    Since 4/2019, has been on OC and has not been able to taper prednisone own.    Today, she is on prednisone 15 mg every day x 2-3 weeks. Before that, most of the time, she was on 20 mg every day.    No rash today.    Has pain over knuckles, toes, knees. Has morning flu like sx in AM x 2 hours.    Has extreme fatigue.    She was getting IVIG qmonthly, till 1/2019.    She is going to resume monthly IVIG for successful pregnancy.    11/27/2019: Started IVIG on 9/8/2019, her eyes swelled up, 2 days later had severe LBP/SI joint pain. Was doing keto diet at that time, had headaches.now has sciatica pain on the R side. Did not have this reaction with IVIG before.    Late Oct/early Nov, had malar rash, hair loss.    Had LE edema, went up on her prednisone to 30-40 mg a day x few days. Back to her baseline hair loss and baseline prednisone 20 mg every day.      R SI joint pain is better, but still has it, uses topical pain cream. It is the worst in AM.    Still has swollen ankles.    This edema is new for her.    Off birth control pills. Not on IVF tx either.    Her lupus is back to her baseline.    Has not started AZA yet, but not thinks she is ready to try it.      10/8/2021:    Gracie chose not to take AZA with concern for SEs buts he would like to try benlysta. Continues to have active lupus and can not taper prednisone off.    Dealing with back pain, sciatica, gallstones. Gets vol retention, swelling, LE edema. Ankles and eyes swell up. Has  gained weight. Feels stiff. Had diarrhea, stomach pain but now it is improved.      Sciatica is now better. No skin rash or major hair loss today. Takes prednisone 20 mg every day. Has sun sensitivity. Sometimes increases prednisone to 40 mg every day.    Reports small joint pain 5-6/10, has night and 1 hr am stiffness with swollen feet. Gets jaw pain, migraines, pleurisy. Considers surrogate.      7/1/2022:     Gracie presents for follow up.    Had pelvis, L spine MRI because of having LBP x years. every time she walked over soft grass, injured herself. Was found to have degenerative changes, full MRI report is below. Going to do follow up with her spine doctor. Doing physical therapy. The top of her spine is better. Has A1C elevated.    She is on prednisone 17 mg every day, tries to work through it . Today is a good day, yesterday was a rough day. Has hard time tapering prednisone down.    Dr. Mendez put her on gabapentin.    She is now on benlysta since 11/2021, thinks it is helping her. Thinks benlysta affected her menses, had it twice. Reports edema, weight gain on it. She did not have this extra weight, LEs edema before benlysta even on prednisone 20 mg every day. It helps with energy, when she takes it, feels better for couple of days.    Urine frequency, urgency is less on benlysta, has fluid retention.    Smelling chemical feeling is better on benlsyta.    Took AZA 50 mg every day x 2 months, no SEs and she would estrella to re-try it. When she started gabapentin, stopped AZA abut wants to re-try it.    Has pain, fatigue in her legs, it is better now. She thinks that it might be caused by benlysta. Had the most intense muscle pain, took muscle relaxant and it helped.    No skin rash or oral ulcers.    Takes prednisone for pain in small joints, fingers, toes and elbows. It is different from back pain.    Feels like her whole body is in a flare by going down on prednisone, feels getting a fever and diffuse pain  along with fatigue.    Has bump over R 3rd finger which hurts, swells up.    Has unhealed ulcer over big toe, does follow up with a provider.    Toenails are the same.    Looking for surrogate.    Had eye exam.      Today 2/9/2023: Gracie is here for follow up, she likes to try cellcept given the fact that she has found a surrogate and will not get pregnant herself. Also would like to taper prednisone down.    Stopped AZA and benlysta around [place]etano.    Resumed benlysta after 2 wk, not AZA. Feels AZA is not helping, would like to try cellcept.    Dropped prednisone down to 15 mg every day, about 3 mo ago.    Off HCQ x 1 year.    2-3 months of ankle swelling has improved.    Feels swollen in her face, chest, arms, but better by going down on prednisone.    Tolerates benlysta ok.    Still Sun sensitive.    Has fatigue, joint pain affecting fingers and ankles.    Has endometriosis pain, migraines, spine pain.    Would like to try ozempic.        ROS:  A comprehensive ROS was done, positives are per HPI.  Past Medical History:   Diagnosis Date     Allergy, unspecified not elsewhere classified      Endometriosis      Fibromyalgia      Migraine without aura, with intractable migraine, so stated, without mention of status migrainosus      Myalgia and myositis, unspecified      Systemic lupus erythematosus (H)      Past Surgical History:   Procedure Laterality Date     ORTHOPEDIC SURGERY       SURGICAL HISTORY OF -   1986    left elbow fracture repair     Family History   Problem Relation Age of Onset     Diabetes Maternal Grandfather      Lipids Mother      Skin Cancer Paternal Grandmother      Melanoma No family hx of      Social History     Socioeconomic History     Marital status:      Spouse name: Not on file     Number of children: Not on file     Years of education: Not on file     Highest education level: Not on file   Occupational History     Not on file   Tobacco Use     Smoking status: Never     Smokeless  tobacco: Never   Substance and Sexual Activity     Alcohol use: Yes     Comment: occ.- 2/week     Drug use: No     Sexual activity: Yes     Partners: Male     Birth control/protection: Condom   Other Topics Concern     Parent/sibling w/ CABG, MI or angioplasty before 65F 55M? 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)     Adjustment disorder with anxiety     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     Blood coagulation disorder (H)     Chronic headaches     Diminished ovarian reserve     Disorder of connective tissue (H)     Disease of immune system (H)     Endometriosis of uterus     Female infertility     History of environmental allergies     Hyperlipidemia     Irritable bowel syndrome     Major depressive disorder, recurrent episode, in full remission (H)     Major depressive disorder, recurrent episode, mild (H)     Migraine     Myalgia     Raynaud's disease     Recurrent pregnancy loss without current pregnancy     Seasonal allergies     Primary fibromyalgia syndrome     Uterine leiomyoma     Sciatica of right side     Allergies   Allergen Reactions     Cherry Anaphylaxis     Dairy Aid  [Lactase]      Other reaction(s): Arthralgia (Joint Pain)     Gluten Meal      Other reaction(s): Abdominal Pain     No Clinical Screening - See Comments Anaphylaxis and Itching     Pistachio Nut Extract Skin Test Anaphylaxis     Brassica Oleracea Italica      Other reaction(s): Abdominal Pain  Other reaction(s): Abdominal Pain      Penicillins Hives and Rash     Sulfa Drugs Hives and Rash       Outpatient Encounter Medications as of 2/9/2023   Medication Sig Dispense Refill     RACHNA SYRUP PO  (Patient not taking: Reported on 1/23/2023)       albuterol (PROAIR HFA/PROVENTIL HFA/VENTOLIN HFA) 108 (90 Base) MCG/ACT inhaler  (Patient not taking: Reported on 1/23/2023)       AMBIEN 10 MG OR TABS 1 po HS, prn 20 0     azaTHIOprine (IMURAN) 50 MG tablet TAKE 1 AND 1/2 TABLETS(75 MG) BY MOUTH DAILY (Patient not taking: Reported on 1/23/2023) 135 tablet 0     baclofen (LIORESAL) 10 MG tablet Take 0.5-1 tablets (5-10 mg) by mouth 3 times daily as needed for muscle spasms (Patient not taking: Reported on 1/23/2023) 60 tablet 1     Belimumab (BENLYSTA) 200 MG/ML SOSY Inject 200 mg Subcutaneous every 7 days (Patient not taking: Reported on 1/23/2023) 12 mL 3     Belimumab 200 MG/ML SOAJ Inject 200 mg Subcutaneous every 7 days Hold for signs of infection and seek medical attention. (Patient not taking: Reported on 1/23/2023) 12 mL 3     calcium carbonate (OS-VAHID 500 MG [place]. [place]) 1250 MG tablet Take 1 tablet by mouth daily       ciclopirox (PENLAC) 8 % external solution Apply to adjacent skin and affected nails daily.  Remove with alcohol every 7 days, then repeat. (Patient not taking: Reported on 1/23/2023) 6.6 mL 11     cyclobenzaprine (FLEXERIL) 10 MG tablet Take 1 tablet (10 mg) by mouth 3 times daily as needed for muscle spasms 30 tablet 11     DULoxetine (CYMBALTA) 20 MG capsule Take 20 mg by mouth daily       EPINEPHrine (ANY BX GENERIC EQUIV) 0.3 MG/0.3ML injection 2-pack Inject 0.3 mLs (0.3 mg) into the muscle as needed for anaphlaxis 2 each 2     fexofenadine (ALLEGRA) 180 MG tablet Take 1 tablet (180 mg) by mouth daily (Patient not taking: Reported on 1/23/2023) 30 tablet 11     hydroxychloroquine (PLAQUENIL) 200 MG tablet Take 1 tablet (200 mg) by mouth 2 times daily (Patient not taking: Reported on 1/23/2023) 180 tablet 1      L-Methylfolate-Algae-B12-B6 (METANX PO) Take 1,000 mg by mouth daily       metFORMIN (GLUCOPHAGE XR) 500 MG 24 hr tablet Take 1 tablet (500 mg) by mouth daily (with dinner) (Patient not taking: Reported on 1/23/2023) 30 tablet 11     metoclopramide (REGLAN) 5 MG tablet Take 1 tablet (5 mg) by mouth every 6 hours as needed (nausea/vomiting) (Patient not taking: Reported on 1/23/2023) 20 tablet 3     MULTIVITAMIN TABS   OR   0     Omega-3 Fatty Acids (OMEGA 3 PO) Take 1,250 mg by mouth daily       omeprazole (PRILOSEC) 40 MG DR capsule Take 1 capsule (40 mg) by mouth daily 60 capsule 1     ondansetron (ZOFRAN) 8 MG tablet Take one tablet up to three times daily for nausea. 90 tablet 1     pantoprazole (PROTONIX) 40 MG EC tablet Take 1 tablet (40 mg) by mouth daily 90 tablet 3     predniSONE (DELTASONE) 1 MG tablet Taper prednisone 0.5 mg down every week till you reach 10 mg a day (current dose 17 mg a day), use with 5 mg size tabs (Patient not taking: Reported on 1/23/2023) 90 tablet 1     predniSONE (DELTASONE) 5 MG tablet TAKE 4 TABLETS(20 MG) BY MOUTH DAILY 120 tablet 5     prochlorperazine (COMPAZINE) 5 MG tablet Take 1-2 tablets (5-10 mg) by mouth every 6 hours as needed for nausea or vomiting (Patient not taking: Reported on 1/23/2023) 20 tablet 3     rizatriptan (MAXALT-MLT) 5 MG ODT Take 1 tablet (5 mg) by mouth at onset of headache for migraine May repeat in 2 hours. Max 6 tablets/24 hours. 15 tablet 11     semaglutide (OZEMPIC) 2 MG/1.5ML SOPN pen Inject 0.25 mg Subcutaneous every 7 days Increase to 0.5 mg after 2-3 weeks if tolerating. 1.5 mL 11     SPIRULINA PO Take by mouth daily       SUMAtriptan (IMITREX STATDOSE) 6 MG/0.5ML pen injector kit Inject 0.5 mLs (6 mg) Subcutaneous at onset of headache for migraine May repeat in 1 hour. Max 12 mg/24 hours. 3 kit 11     SUMAtriptan (IMITREX) 20 MG/ACT nasal spray Spray 1 spray in nostril once as needed for migraine May repeat in 2 hours. Max 2 sprays/24 hours.  1 each 11     SUMAtriptan (IMITREX) 20 MG/ACT nasal spray Spray 20 mg in nostril as needed       traMADol (ULTRAM) 50 MG tablet Take 1 tablet (50 mg) by mouth every 6 hours as needed for severe pain Take 1-2 tablets up to three times a day as needed - Oral 180 tablet 5     traMADol (ULTRAM-ER) 300 MG 24 hr tablet Take 1 tablet (300 mg) by mouth At Bedtime maximum 1 tablet(s) per day 30 tablet 5     triamcinolone (KENALOG) 0.1 % paste Take by mouth 2 times daily (Patient not taking: Reported on 1/23/2023) 5 g 3     triamcinolone (NASACORT) 55 MCG/ACT nasal aerosol Spray 2 sprays into both nostrils daily 16.5 mL 11     ubrogepant (UBRELVY) 50 MG tablet Take 1-2 tablets ( mg) by mouth at onset of headache (may repeat in 2 hours as needed. Max 4 tabs in 24 hours) 16 tablet 11     urea (GORMEL) 20 % external cream Apply topically 2 times daily (Patient not taking: Reported on 1/23/2023) 480 g 11     VITAMIN D, CHOLECALCIFEROL, PO Take 5,000 Units by mouth daily       Facility-Administered Encounter Medications as of 2/9/2023   Medication Dose Route Frequency Provider Last Rate Last Admin     Botulinum Toxin Type A (BOTOX) 200 units injection 155 Units  155 Units Intramuscular See Admin Instructions Debora Chin, CARISSA CNP   155 Units at 10/03/22 1346         Her records were reviewed.    Component      Latest Ref Rng & Units 2/10/2022 5/17/2022   WBC      4.0 - 11.0 10e3/uL 11.6 (H) 10.7   RBC Count      3.80 - 5.20 10e6/uL 4.86 5.03   Hemoglobin      11.7 - 15.7 g/dL 14.8 14.9   Hematocrit      35.0 - 47.0 % 44.5 45.9   MCV      78 - 100 fL 92 91   MCH      26.5 - 33.0 pg 30.5 29.6   MCHC      31.5 - 36.5 g/dL 33.3 32.5   RDW      10.0 - 15.0 % 12.8 12.5   Platelet Count      150 - 450 10e3/uL 242 267   % Neutrophils      % 76    % Lymphocytes      % 16    % Monocytes      % 5    % Eosinophils      % 0    % Basophils      % 1    % Immature Granulocytes      % 2    NRBCs per 100 WBC      <1 /100 0     Absolute Neutrophils      1.6 - 8.3 10e3/uL 8.9 (H)    Absolute Lymphocytes      0.8 - 5.3 10e3/uL 1.8    Absolute Monocytes      0.0 - 1.3 10e3/uL 0.6    Absolute Eosinophils      0.0 - 0.7 10e3/uL 0.0    Absolute Basophils      0.0 - 0.2 10e3/uL 0.1    Absolute Immature Granulocytes      <=0.4 10e3/uL 0.2    Absolute NRBCs      10e3/uL 0.0    Sodium      133 - 144 mmol/L  141   Potassium      3.4 - 5.3 mmol/L  4.3   Chloride      94 - 109 mmol/L  102   Carbon Dioxide      20 - 32 mmol/L  32   Anion Gap      3 - 14 mmol/L  7   Urea Nitrogen      7 - 30 mg/dL  19   Creatinine      0.52 - 1.04 mg/dL 0.72 0.80   Calcium      8.5 - 10.1 mg/dL  9.6   Glucose      70 - 99 mg/dL  128 (H)   Alkaline Phosphatase      40 - 150 U/L  73   AST      0 - 45 U/L 25 23   ALT      0 - 50 U/L 47 44   Protein Total      6.8 - 8.8 g/dL  7.4   Albumin      3.4 - 5.0 g/dL 3.8 3.9   Bilirubin Total      0.2 - 1.3 mg/dL  0.3   GFR Estimate      >60 mL/min/1.73m2 >90 >90   SARS-CoV-2 [place] Ab, Interp, S       Positive    SARS-CoV-2 [place] Ab, Quant, S      U/mL >250    Patient's Race       Unknown    Patient's Ethnicity       Unknown    Protein Random Urine      g/L <0.05    Protein Total Urine g/gr Creatinine           Creatinine Urine      mg/dL 13    % Retic      0.5 - 2.0 % 1.6    Absolute Retic      0.025 - 0.095 10e6/uL 0.080    DNA-ds      <10.0 IU/mL <0.6    Complement C4      13 - 39 mg/dL 34    Complement C3      81 - 157 mg/dL 135    Sed Rate      0 - 20 mm/hr 7    CRP Inflammation      0.0 - 8.0 mg/L 3.0    Hemoglobin A1C      0.0 - 5.6 %  5.8 (H)     Pregnancy: She is . H/o OCP and HRT use, see HPI    Ph.E:      Constitutional: WD/WN. Pleasant, NAD.    Eyes: EOM intact, sclera anicteric, conj not injected   MS: No active synovitis over hands, could make full fist. + [place] nodes  Skin: no rash.  Neuro: A&O x 3. Grossly non focal  Psych: NL affect    Assessment/Plan (2019):    1-SLE FLARE/active  2-HCQ  monitoring  3-AZA monitoring  4-Benlysta monitoring    SLE, dx in 2000s (VINITA: 1:80, dsDNA +, Smith negative, normal complements, joint pains, malar rash, oral ulcers), usually flares approximately monthly with joint pains, pleurisy and malar rash. Currently, feels that disease is active. Has been on chronic 20 mg every day prednisone, gained weight, gets LE edema, looks cushingoid. Still planning for PGS normal embryo implantation, might consider surrogate. Had a reaction to IVIG which was given for infertility tx and is not going to get it again. Her back pain seems to be sciatica and not related to SLE.    Previous visits, was advised to try AZA as steroid sparing agent (I am concerned about long term steroid SE) but did not start with concern for potential SE but willing to try benlysta, especially with planning for surrogate pregnancy. Labs are stable.    7/2022: Gracie is on benlysta since last visit with some benefit, but can not taper prednisone below 17 mg every day due to increased joint pain by taper. Briefly tried AZA 1 tab a day, no SEs, willing to re-try it, will resume it at higher dose.        Today 2/9/2023: Failed AZA, which was chosen over cellcept due to pregnancy planning via IVF. Will try cellcept as steroid sparing agent to allow tapering prednisone down; risks were discussed. She going to proceed with pregnancy via surrogate; therefore she will not get pregnant herself. She misunderstood and stopped HCQ, thought we were switching tx, will resume. Discussed its benefits.     Plan:    Go back on plaquenil 200 mg twice a day    Continue benlysta sub q inj qwk    Prednisone taper from 15 mg qd: 12.5-10 mg a day, each course x 1 week then 9-8-7-6 mg a day, each course x 1 month then 5 mg a day    Start cellcept 500 mg twice a day    Labs in 4 weeks    Return in person in about 4-5 months    Josh Roy MD

## 2023-02-09 NOTE — PROGRESS NOTES
Video-Visit Details    Type of service:  Video Visit    Video Start Time (time video started):  3:33 pm    Video End Time (time video stopped):4:02 pm    Originating Location (pt. Location): Home        Distant Location (provider location):  On-site    Mode of Communication:  Video Conference via [facility]      Rheumatology Visit Note    Reason for visit: Lupus    First Consult: 10/12/2017    Last visit: 7/1/2022  DOS: 2/9/2023    Original HPI (10/12/2017):  Patricia Hall is a 42 year old female who was referred to our clinic for evaluation and management of her SLE. The patient has been following with Dr. Mao for her SLE    History obtain from chart review and patient interview    Her lupus symptoms first started in during high school including fatigue and rash. She was diagnosed with lupus in early 2000s and she was in graduate school and presented with a few years of arthralgias involving knees and ankles and hands.  She had developed new onset Raynaud's phenomenon in which her fingers turned white in the cold but no digital sores.  VINITA was 1:160.  All specific autoantibodies and rheumatoid serologies negative.  She had a rash on her face, including cheeks and bridge of her nose.  She followed with Dr. Tejal Mayen in Dermatology.  A biopsy of a lesion from the nose was non-diagnostic.  Diagnosis was earlysigns of cutaneous lupus versus acne rosacea.  She was treated with Elidel cream.  She reported intermittent oral ulcers and significant fatigue as well as intermittent episodes of hair loss.  She was started on prednisone in 2003 along with Plaquenil.  She has been maintained on prednisone 5 mg q day with intermittent bursts up as high as 30 mg for a short time.  She has found it very difficult to taper off of prednisone because she gets flare-ups of skin rash, arthralgias, and fatigue. Patient reports significant symptoms during the summers and reports significant flares this summer with  photosensitivity with face rash,  Fatigue and join pain (knees and toes and hips).  She had fevers, mouth sores  And also reports increased hair loss.  Increased pain with walking and morning stiffness with Fibromyalgia burning in the morning. Currently taking prednisone 10 mg qd and plaquenil 200 mg BID.    Patient reports complicated fertility/OBGYN history with stage four endometriosis, fibroids and one pregnancy resulting in a miscarriage. Three rounds of IVF with one banked viable egg. She is currently undergoing fertility evaluation.  She has been working with infertility specialist for years. She currently has one viable egg and would like to undergo one more episode of IVF. She was seen by an autoimmune OB/GYN specialist in [place] (Daya Frost 02/17) for extensive testing. She was found to be heterozygous for MTHFR mutation. She is now taking Matanx (L-methyl folate). She has noticed less bruising since she started this. Metformin was recommended, but it upsets her stomach. DHEA was recommended, but she is not taking it. Her thyroid studies were normal, but she was started on Synthroid 25  g daily for the TSH to be less than 2.0. It was recommended that she take Lovenox prior to IFV and throughout the pregnancy to avoid risk of miscarriage. It was also recommended that she be treated with IVIG prior to IVF. 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  She plans to proceeded further with in vitro plans, but wants to get better control of her flaring lupus and concerns for IVF treatments/hormones.     Interval HPI (7/20/2018):  Repeatedly ill during the winter with both URIs and flares of disease (joint pain, malaise, fatigue). Still undergoing IVF care via MFM and reproductive immunology. Did not initiate azathioprine following previous visit with Dr. Roy due to worry over negative effects on pregnancy. Currently undergoing  "medication treatment for IVF, is picking up medications this weekend for stimulation.    Symptomatically, her recent flares include fatigue, flu-like symptoms (fever, chills, sweats), polyarticular joint pain (fingers, toes, feet, ankles, wrists, elbows).    Recently, she has experienced further hair loss (clumps in shower), ulcers on buccal mucosa (now resolved), fatigue, malaise, significant B/L hip pain (worse from previous, approx 1 year). Specifically, it is constant deep joint pain in hips, would say 8/10 in severity when it occurs during movement. Has been seriously disrupting her daily activities. Has been using tylenol to control pain without complete relief.     Has seen Reproductive Immunology in the interim, has undergone two rounds of IVIG (believed to help with NK cell and cytokine activity in embryo implantation), first IVIG May 14th, then second was July 16th. Had flu-like symptoms with first infusion (HA, myalgias, malaise, felt \"gross\", lower back pain, neck pain). During the 2nd infusion, they slowed infusion, took benadryl/tylenol/upped prednisone to 20 mg she did not experience SEs with this. Overall, felt that IVIG improved her lupus symptoms globally. Short-lived relief. Plan is to use IVIG monthly with monitoring of cytokine levels and NK cell counts.     Currently on 15 mg of prednisone chronically, > 1 year. Today, she would say her disease is mild-moderately active in spite of the 15 mg prednisone. Currently on 400-500 U of vitamin D. Taking 1000 mg calcium.     ROS:  (-) pleurisy, sob, cough, hematuria, dysuria, eye redness, nose bleeds, easy bruising, malar rash  (+) alternating diarrhea/constipation, dry eye, dry mouth, palatal ulcers/petechiae    Is interested in discussing SLE management (Imuran) while pursuing IVF. Will be undergoing planning and another round of IVF stimulation in August.       8/21/2019: She did another round of IVF in 8/2019, no good embryo. IVF caused her flare " ups. For flare ups, gets pain over knees, knuckles and toes with extreme fatigue and photosensitivity.    Had laparoscopic surgery for endometriosis in 4/2019.    Since 4/2019, has been on OC and has not been able to taper prednisone own.    Today, she is on prednisone 15 mg every day x 2-3 weeks. Before that, most of the time, she was on 20 mg every day.    No rash today.    Has pain over knuckles, toes, knees. Has morning flu like sx in AM x 2 hours.    Has extreme fatigue.    She was getting IVIG qmonthly, till 1/2019.    She is going to resume monthly IVIG for successful pregnancy.    11/27/2019: Started IVIG on 9/8/2019, her eyes swelled up, 2 days later had severe LBP/SI joint pain. Was doing keto diet at that time, had headaches.now has sciatica pain on the R side. Did not have this reaction with IVIG before.    Late Oct/early Nov, had malar rash, hair loss.    Had LE edema, went up on her prednisone to 30-40 mg a day x few days. Back to her baseline hair loss and baseline prednisone 20 mg every day.      R SI joint pain is better, but still has it, uses topical pain cream. It is the worst in AM.    Still has swollen ankles.    This edema is new for her.    Off birth control pills. Not on IVF tx either.    Her lupus is back to her baseline.    Has not started AZA yet, but not thinks she is ready to try it.      10/8/2021:    Gracie chose not to take AZA with concern for SEs buts he would like to try benlysta. Continues to have active lupus and can not taper prednisone off.    Dealing with back pain, sciatica, gallstones. Gets vol retention, swelling, LE edema. Ankles and eyes swell up. Has gained weight. Feels stiff. Had diarrhea, stomach pain but now it is improved.      Sciatica is now better. No skin rash or major hair loss today. Takes prednisone 20 mg every day. Has sun sensitivity. Sometimes increases prednisone to 40 mg every day.    Reports small joint pain 5-6/10, has night and 1 hr am stiffness with  swollen feet. Gets jaw pain, migraines, pleurisy. Considers surrogate.      7/1/2022:     Gracie presents for follow up.    Had pelvis, L spine MRI because of having LBP x years. every time she walked over soft grass, injured herself. Was found to have degenerative changes, full MRI report is below. Going to do follow up with her spine doctor. Doing physical therapy. The top of her spine is better. Has A1C elevated.    She is on prednisone 17 mg every day, tries to work through it . Today is a good day, yesterday was a rough day. Has hard time tapering prednisone down.    Dr. Mendez put her on gabapentin.    She is now on benlysta since 11/2021, thinks it is helping her. Thinks benlysta affected her menses, had it twice. Reports edema, weight gain on it. She did not have this extra weight, LEs edema before benlysta even on prednisone 20 mg every day. It helps with energy, when she takes it, feels better for couple of days.    Urine frequency, urgency is less on benlysta, has fluid retention.    Smelling chemical feeling is better on benlsyta.    Took AZA 50 mg every day x 2 months, no SEs and she would estrella to re-try it. When she started gabapentin, stopped AZA abut wants to re-try it.    Has pain, fatigue in her legs, it is better now. She thinks that it might be caused by benlysta. Had the most intense muscle pain, took muscle relaxant and it helped.    No skin rash or oral ulcers.    Takes prednisone for pain in small joints, fingers, toes and elbows. It is different from back pain.    Feels like her whole body is in a flare by going down on prednisone, feels getting a fever and diffuse pain along with fatigue.    Has bump over R 3rd finger which hurts, swells up.    Has unhealed ulcer over big toe, does follow up with a provider.    Toenails are the same.    Looking for surrogate.    Had eye exam.      Today 2/9/2023: Gracie is here for follow up, she likes to try cellcept given the fact that she has found a  surrogate and will not get pregnant herself. Also would like to taper prednisone down.    Stopped AZA and benlysta around [place]etano.    Resumed benlysta after 2 wk, not AZA. Feels AZA is not helping, would like to try cellcept.    Dropped prednisone down to 15 mg every day, about 3 mo ago.    Off HCQ x 1 year.    2-3 months of ankle swelling has improved.    Feels swollen in her face, chest, arms, but better by going down on prednisone.    Tolerates benlysta ok.    Still Sun sensitive.    Has fatigue, joint pain affecting fingers and ankles.    Has endometriosis pain, migraines, spine pain.    Would like to try ozempic.        ROS:  A comprehensive ROS was done, positives are per HPI.  Past Medical History:   Diagnosis Date     Allergy, unspecified not elsewhere classified      Endometriosis      Fibromyalgia      Migraine without aura, with intractable migraine, so stated, without mention of status migrainosus      Myalgia and myositis, unspecified      Systemic lupus erythematosus (H)      Past Surgical History:   Procedure Laterality Date     ORTHOPEDIC SURGERY       SURGICAL HISTORY OF -   1986    left elbow fracture repair     Family History   Problem Relation Age of Onset     Diabetes Maternal Grandfather      Lipids Mother      Skin Cancer Paternal Grandmother      Melanoma No family hx of      Social History     Socioeconomic History     Marital status:      Spouse name: Not on file     Number of children: Not on file     Years of education: Not on file     Highest education level: Not on file   Occupational History     Not on file   Tobacco Use     Smoking status: Never     Smokeless tobacco: Never   Substance and Sexual Activity     Alcohol use: Yes     Comment: occ.- 2/week     Drug use: No     Sexual activity: Yes     Partners: Male     Birth control/protection: Condom   Other Topics Concern     Parent/sibling w/ CABG, MI or angioplasty before 65F 55M? 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)     Adjustment disorder with anxiety     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     Blood coagulation disorder (H)     Chronic headaches     Diminished ovarian reserve     Disorder of connective tissue (H)     Disease of immune system (H)     Endometriosis of uterus     Female infertility     History of environmental allergies     Hyperlipidemia     Irritable bowel syndrome     Major depressive disorder, recurrent episode, in full remission (H)     Major depressive disorder, recurrent episode, mild (H)     Migraine     Myalgia     Raynaud's disease     Recurrent pregnancy loss without current pregnancy     Seasonal allergies     Primary fibromyalgia syndrome     Uterine leiomyoma     Sciatica of right side     Allergies   Allergen Reactions     Cherry Anaphylaxis     Dairy Aid  [Lactase]      Other reaction(s): Arthralgia (Joint Pain)     Gluten Meal      Other reaction(s): Abdominal Pain     No Clinical Screening - See Comments Anaphylaxis and Itching     Pistachio Nut Extract Skin Test Anaphylaxis     Brassica Oleracea Italica      Other reaction(s): Abdominal Pain  Other reaction(s): Abdominal Pain     Penicillins Hives and Rash     Sulfa Drugs Hives and Rash       Outpatient Encounter Medications as of 2/9/2023   Medication Sig Dispense Refill     RACHNA SYRUP PO  (Patient not taking: Reported on 1/23/2023)       albuterol (PROAIR HFA/PROVENTIL HFA/VENTOLIN HFA) 108 (90 Base) MCG/ACT inhaler  (Patient not taking:  Reported on 1/23/2023)       AMBIEN 10 MG OR TABS 1 po HS, prn 20 0     azaTHIOprine (IMURAN) 50 MG tablet TAKE 1 AND 1/2 TABLETS(75 MG) BY MOUTH DAILY (Patient not taking: Reported on 1/23/2023) 135 tablet 0     baclofen (LIORESAL) 10 MG tablet Take 0.5-1 tablets (5-10 mg) by mouth 3 times daily as needed for muscle spasms (Patient not taking: Reported on 1/23/2023) 60 tablet 1     Belimumab (BENLYSTA) 200 MG/ML SOSY Inject 200 mg Subcutaneous every 7 days (Patient not taking: Reported on 1/23/2023) 12 mL 3     Belimumab 200 MG/ML SOAJ Inject 200 mg Subcutaneous every 7 days Hold for signs of infection and seek medical attention. (Patient not taking: Reported on 1/23/2023) 12 mL 3     calcium carbonate (OS-VAIHD 500 MG [place]. [place]) 1250 MG tablet Take 1 tablet by mouth daily       ciclopirox (PENLAC) 8 % external solution Apply to adjacent skin and affected nails daily.  Remove with alcohol every 7 days, then repeat. (Patient not taking: Reported on 1/23/2023) 6.6 mL 11     cyclobenzaprine (FLEXERIL) 10 MG tablet Take 1 tablet (10 mg) by mouth 3 times daily as needed for muscle spasms 30 tablet 11     DULoxetine (CYMBALTA) 20 MG capsule Take 20 mg by mouth daily       EPINEPHrine (ANY BX GENERIC EQUIV) 0.3 MG/0.3ML injection 2-pack Inject 0.3 mLs (0.3 mg) into the muscle as needed for anaphlaxis 2 each 2     fexofenadine (ALLEGRA) 180 MG tablet Take 1 tablet (180 mg) by mouth daily (Patient not taking: Reported on 1/23/2023) 30 tablet 11     hydroxychloroquine (PLAQUENIL) 200 MG tablet Take 1 tablet (200 mg) by mouth 2 times daily (Patient not taking: Reported on 1/23/2023) 180 tablet 1     L-Methylfolate-Algae-B12-B6 (METANX PO) Take 1,000 mg by mouth daily       metFORMIN (GLUCOPHAGE XR) 500 MG 24 hr tablet Take 1 tablet (500 mg) by mouth daily (with dinner) (Patient not taking: Reported on 1/23/2023) 30 tablet 11     metoclopramide (REGLAN) 5 MG tablet Take 1 tablet (5 mg) by mouth every 6 hours as needed  (nausea/vomiting) (Patient not taking: Reported on 1/23/2023) 20 tablet 3     MULTIVITAMIN TABS   OR   0     Omega-3 Fatty Acids (OMEGA 3 PO) Take 1,250 mg by mouth daily       omeprazole (PRILOSEC) 40 MG DR capsule Take 1 capsule (40 mg) by mouth daily 60 capsule 1     ondansetron (ZOFRAN) 8 MG tablet Take one tablet up to three times daily for nausea. 90 tablet 1     pantoprazole (PROTONIX) 40 MG EC tablet Take 1 tablet (40 mg) by mouth daily 90 tablet 3     predniSONE (DELTASONE) 1 MG tablet Taper prednisone 0.5 mg down every week till you reach 10 mg a day (current dose 17 mg a day), use with 5 mg size tabs (Patient not taking: Reported on 1/23/2023) 90 tablet 1     predniSONE (DELTASONE) 5 MG tablet TAKE 4 TABLETS(20 MG) BY MOUTH DAILY 120 tablet 5     prochlorperazine (COMPAZINE) 5 MG tablet Take 1-2 tablets (5-10 mg) by mouth every 6 hours as needed for nausea or vomiting (Patient not taking: Reported on 1/23/2023) 20 tablet 3     rizatriptan (MAXALT-MLT) 5 MG ODT Take 1 tablet (5 mg) by mouth at onset of headache for migraine May repeat in 2 hours. Max 6 tablets/24 hours. 15 tablet 11     semaglutide (OZEMPIC) 2 MG/1.5ML SOPN pen Inject 0.25 mg Subcutaneous every 7 days Increase to 0.5 mg after 2-3 weeks if tolerating. 1.5 mL 11     SPIRULINA PO Take by mouth daily       SUMAtriptan (IMITREX STATDOSE) 6 MG/0.5ML pen injector kit Inject 0.5 mLs (6 mg) Subcutaneous at onset of headache for migraine May repeat in 1 hour. Max 12 mg/24 hours. 3 kit 11     SUMAtriptan (IMITREX) 20 MG/ACT nasal spray Spray 1 spray in nostril once as needed for migraine May repeat in 2 hours. Max 2 sprays/24 hours. 1 each 11     SUMAtriptan (IMITREX) 20 MG/ACT nasal spray Spray 20 mg in nostril as needed       traMADol (ULTRAM) 50 MG tablet Take 1 tablet (50 mg) by mouth every 6 hours as needed for severe pain Take 1-2 tablets up to three times a day as needed - Oral 180 tablet 5     traMADol (ULTRAM-ER) 300 MG 24 hr tablet Take 1  tablet (300 mg) by mouth At Bedtime maximum 1 tablet(s) per day 30 tablet 5     triamcinolone (KENALOG) 0.1 % paste Take by mouth 2 times daily (Patient not taking: Reported on 1/23/2023) 5 g 3     triamcinolone (NASACORT) 55 MCG/ACT nasal aerosol Spray 2 sprays into both nostrils daily 16.5 mL 11     ubrogepant (UBRELVY) 50 MG tablet Take 1-2 tablets ( mg) by mouth at onset of headache (may repeat in 2 hours as needed. Max 4 tabs in 24 hours) 16 tablet 11     urea (GORMEL) 20 % external cream Apply topically 2 times daily (Patient not taking: Reported on 1/23/2023) 480 g 11     VITAMIN D, CHOLECALCIFEROL, PO Take 5,000 Units by mouth daily       Facility-Administered Encounter Medications as of 2/9/2023   Medication Dose Route Frequency Provider Last Rate Last Admin     Botulinum Toxin Type A (BOTOX) 200 units injection 155 Units  155 Units Intramuscular See Admin Instructions Debora Chin APRN CNP   155 Units at 10/03/22 1346         Her records were reviewed.    Component      Latest Ref Rng & Units 2/10/2022 5/17/2022   WBC      4.0 - 11.0 10e3/uL 11.6 (H) 10.7   RBC Count      3.80 - 5.20 10e6/uL 4.86 5.03   Hemoglobin      11.7 - 15.7 g/dL 14.8 14.9   Hematocrit      35.0 - 47.0 % 44.5 45.9   MCV      78 - 100 fL 92 91   MCH      26.5 - 33.0 pg 30.5 29.6   MCHC      31.5 - 36.5 g/dL 33.3 32.5   RDW      10.0 - 15.0 % 12.8 12.5   Platelet Count      150 - 450 10e3/uL 242 267   % Neutrophils      % 76    % Lymphocytes      % 16    % Monocytes      % 5    % Eosinophils      % 0    % Basophils      % 1    % Immature Granulocytes      % 2    NRBCs per 100 WBC      <1 /100 0    Absolute Neutrophils      1.6 - 8.3 10e3/uL 8.9 (H)    Absolute Lymphocytes      0.8 - 5.3 10e3/uL 1.8    Absolute Monocytes      0.0 - 1.3 10e3/uL 0.6    Absolute Eosinophils      0.0 - 0.7 10e3/uL 0.0    Absolute Basophils      0.0 - 0.2 10e3/uL 0.1    Absolute Immature Granulocytes      <=0.4 10e3/uL 0.2     Absolute NRBCs      10e3/uL 0.0    Sodium      133 - 144 mmol/L  141   Potassium      3.4 - 5.3 mmol/L  4.3   Chloride      94 - 109 mmol/L  102   Carbon Dioxide      20 - 32 mmol/L  32   Anion Gap      3 - 14 mmol/L  7   Urea Nitrogen      7 - 30 mg/dL  19   Creatinine      0.52 - 1.04 mg/dL 0.72 0.80   Calcium      8.5 - 10.1 mg/dL  9.6   Glucose      70 - 99 mg/dL  128 (H)   Alkaline Phosphatase      40 - 150 U/L  73   AST      0 - 45 U/L 25 23   ALT      0 - 50 U/L 47 44   Protein Total      6.8 - 8.8 g/dL  7.4   Albumin      3.4 - 5.0 g/dL 3.8 3.9   Bilirubin Total      0.2 - 1.3 mg/dL  0.3   GFR Estimate      >60 mL/min/1.73m2 >90 >90   SARS-CoV-2 Rigo Ab, Interp, S       Positive    SARS-CoV-2 Rigo Ab, Quant, S      U/mL >250    Patient's Race       Unknown    Patient's Ethnicity       Unknown    Protein Random Urine      g/L <0.05    Protein Total Urine g/gr Creatinine           Creatinine Urine      mg/dL 13    % Retic      0.5 - 2.0 % 1.6    Absolute Retic      0.025 - 0.095 10e6/uL 0.080    DNA-ds      <10.0 IU/mL <0.6    Complement C4      13 - 39 mg/dL 34    Complement C3      81 - 157 mg/dL 135    Sed Rate      0 - 20 mm/hr 7    CRP Inflammation      0.0 - 8.0 mg/L 3.0    Hemoglobin A1C      0.0 - 5.6 %  5.8 (H)     Pregnancy: She is . H/o OCP and HRT use, see HPI    Ph.E:      Constitutional: WD/WN. Pleasant, NAD.    Eyes: EOM intact, sclera anicteric, conj not injected   MS: No active synovitis over hands, could make full fist. + [place] nodes  Skin: no rash.  Neuro: A&O x 3. Grossly non focal  Psych: NL affect    Assessment/Plan (2019):    1-SLE FLARE/active  2-HCQ monitoring  3-AZA monitoring  4-Benlysta monitoring    SLE, dx in s (VINITA: 1:80, dsDNA +, Smith negative, normal complements, joint pains, malar rash, oral ulcers), usually flares approximately monthly with joint pains, pleurisy and malar rash. Currently, feels that disease is active. Has been on chronic 20 mg every day  prednisone, gained weight, gets LE edema, looks cushingoid. Still planning for PGS normal embryo implantation, might consider surrogate. Had a reaction to IVIG which was given for infertility tx and is not going to get it again. Her back pain seems to be sciatica and not related to SLE.    Previous visits, was advised to try AZA as steroid sparing agent (I am concerned about long term steroid SE) but did not start with concern for potential SE but willing to try benlysta, especially with planning for surrogate pregnancy. Labs are stable.    7/2022: Gracie is on benlysta since last visit with some benefit, but can not taper prednisone below 17 mg every day due to increased joint pain by taper. Briefly tried AZA 1 tab a day, no SEs, willing to re-try it, will resume it at higher dose.        Today 2/9/2023: Failed AZA, which was chosen over cellcept due to pregnancy planning via IVF. Will try cellcept as steroid sparing agent to allow tapering prednisone down; risks were discussed. She going to proceed with pregnancy via surrogate; therefore she will not get pregnant herself. She misunderstood and stopped HCQ, thought we were switching tx, will resume. Discussed its benefits.     Plan:    Go back on plaquenil 200 mg twice a day    Continue benlysta sub q inj qwk    Prednisone taper from 15 mg qd: 12.5-10 mg a day, each course x 1 week then 9-8-7-6 mg a day, each course x 1 month then 5 mg a day    Start cellcept 500 mg twice a day    Labs in 4 weeks    Return in person in about 4-5 months    Josh Roy MD

## 2023-02-14 NOTE — TELEPHONE ENCOUNTER
Prior Authorization Not Needed per Insurance    Medication: ubrogepant (UBRELVY) 50 MG tablet--NO PA NEEDED  Insurance Company: [employer] Clinical [employer] - Phone [phone] Fax [phone]  Expected CoPay:      Pharmacy Filling the Rx: [id number] - [place], [place] - [address] AT [facility] [address]  Pharmacy Notified: Yes  Patient Notified: Yes **Instructed pharmacy to notify patient when script is ready to /ship.**    Per pharmacy no PA is needed

## 2023-02-17 ENCOUNTER — MYC MEDICAL ADVICE (OUTPATIENT)
Dept: INTERNAL MEDICINE | Facility: CLINIC | Age: 48
End: 2023-02-17

## 2023-02-18 ENCOUNTER — HEALTH MAINTENANCE LETTER (OUTPATIENT)
Age: 48
End: 2023-02-18

## 2023-02-20 ENCOUNTER — TELEPHONE (OUTPATIENT)
Dept: INTERNAL MEDICINE | Facility: CLINIC | Age: 48
End: 2023-02-20
Payer: COMMERCIAL

## 2023-02-20 NOTE — TELEPHONE ENCOUNTER
Prior Authorization Retail Medication Request    Medication/Dose: semaglutide (OZEMPIC) 2 MG/1.5ML SOPN pen    Insurance Name:    Coverage information:     Subscriber: [id number] GINA SOLO     Rel to sub: 02 - Spouse     Member ID: [id number]     Payor: [employer] Ph: [phone]     Benefit plan: [id number] [place] Ph: [phone]     Group number: [id number]     Member effective dates: from 12/01/20

## 2023-02-21 ENCOUNTER — TELEPHONE (OUTPATIENT)
Dept: NEUROLOGY | Facility: CLINIC | Age: 48
End: 2023-02-21
Payer: COMMERCIAL

## 2023-02-21 NOTE — TELEPHONE ENCOUNTER
----- Message from Jaylene Lorenzo sent at 2/21/2023 10:04 AM CST -----  Alden Bateman,    The PA was approved for Debora Chin for G43.719 for 155 units every 12 weeks from 9/21/22 - 3/19/23, so this patient is good to go.    Thank you,    Jaylene     ----- Message -----  From: Bhavana Marina RN  Sent: 1/31/2023   3:25 PM CST  To: Ed     Hi Team,  Any update on pt's botox authorization?  Thank you

## 2023-02-22 NOTE — TELEPHONE ENCOUNTER
Central Prior Authorization Team   Phone: [phone]      PA Initiation    Medication: semaglutide (OZEMPIC) 2 MG/1.5ML SOPN pen  Insurance Company: [employer] - Phone [phone] Fax [phone]  Pharmacy Filling the Rx: [id number] - [place], [place] - [facility] AT [facility] [address]  Filling Pharmacy Phone: [phone]  Filling Pharmacy Fax:    Start Date: 2/22/2023

## 2023-02-25 NOTE — TELEPHONE ENCOUNTER
PRIOR AUTHORIZATION DENIED    Medication: semaglutide (OZEMPIC) 2 MG/1.5ML SOPN pen    Denial Date: 2/24/2023    Denial Rational:           Appeal Information:

## 2023-02-27 ENCOUNTER — OFFICE VISIT (OUTPATIENT)
Dept: NEUROLOGY | Facility: CLINIC | Age: 48
End: 2023-02-27
Payer: COMMERCIAL

## 2023-02-27 DIAGNOSIS — G43.719 INTRACTABLE CHRONIC MIGRAINE WITHOUT AURA AND WITHOUT STATUS MIGRAINOSUS: Primary | ICD-10-CM

## 2023-02-27 PROCEDURE — 64615 CHEMODENERV MUSC MIGRAINE: CPT | Performed by: NURSE PRACTITIONER

## 2023-02-27 PROCEDURE — 99207 PR SATISFY VISIT NUMBER: CPT | Performed by: NURSE PRACTITIONER

## 2023-02-27 NOTE — LETTER
2/27/2023       RE: Patricia Hall  [address]  [place] [address]     Dear Colleague,    Thank you for referring your patient, Patricia Hall, to the [facility] [place] at [facility]. Please see a copy of my visit note below.    BOTULINUM NEUROTOXIN INJECTION PROCEDURE:  DATA:2/27/2023  INDICATIONS FOR PROCEDURES:   chronic migraine headaches. baseline symptoms have been recalcitrant to oral medications and conservative therapy. Patient is here today for a repeat  injection with Botox. Last Botox 10/3/2022 and no problems reported except left eyebrow droop but resolved and no there concerns  Finds botox 50% or more reduction in headaches and headache free days. Headaches worsen when Botox wears off      GOAL OF PROCEDURE:  The goal of this procedure is to decrease pain and enhance functional independence.     TOTAL DOSE ADMINISTERED:  Dose Administered:  155 units  Botox (Botulinum Toxin Type A)       2:1 Dilution    Wasted 45 units  Medication guide was offered to patient   CONSENT:  The risks, benefits, and treatment options were discussed with the patient who agreed to proceed.     Written consent was obtained      EQUIPMENT USED:  Needles-30 gauge, 0.5 inches for injections  Four 1-ml tuberculin syringes for injections  One sodium chloride 10 ml vial preservative free  Alcohol swabs     SKIN PREPARATION:  Skin preparation was performed using an alcohol wipe.        AREA/MUSCLE INJECTED:  155 units of Botox     Right upper Trapezius (upper cervical) - 5 units of Botox at 3 site/s.   Left upper Trapezius (upper cervical) - 5 units of Botox at 3 site/s.      Right cervical paraspinals - 5 units of Botox at 2 site/s.   Left cervical paraspinals - 5 units of Botox at 2 site/s.      Left occipitalis - 5 units of Botox at 3 site/s.  Right occipitalis -5 units of Botox at 3 site/s     Right Frontalis - 5 units of Botox at  2 site/s.  Left Frontalis - 5 units of Botox at 2 site/s.     Right Temporalis - 5 units of Botox at 4 site/s.  Left Temporalis - 5 units of Botox at 4 site/s.     Right  - 5 units of Botox at 1 site/s.              Left  - 5 units of Botox at 1 site/s.     Procerus - 5 units of Botox at 1 site/s.     RESPONSE TO PROCEDURE:  tolerated the procedure well and there were no immediate complications.  Patient was allowed to recover for an appropriate period of time and was discharged home in stable condition.     FOLLOW UP:     Repeat Botox injections in 12 weeks        This procedure was performed under a hospital privileging agreement with Dr. Vanegas           Again, thank you for allowing me to participate in the care of your patient.      Sincerely,    CARISSA Tang CNP

## 2023-02-27 NOTE — PROGRESS NOTES
BOTULINUM NEUROTOXIN INJECTION PROCEDURE:  DATA:2/27/2023  INDICATIONS FOR PROCEDURES:   chronic migraine headaches. baseline symptoms have been recalcitrant to oral medications and conservative therapy. Patient is here today for a repeat  injection with Botox. Last Botox 10/3/2022 and no problems reported except left eyebrow droop but resolved and no there concerns  Finds botox 50% or more reduction in headaches and headache free days. Headaches worsen when Botox wears off      GOAL OF PROCEDURE:  The goal of this procedure is to decrease pain and enhance functional independence.     TOTAL DOSE ADMINISTERED:  Dose Administered:  155 units  Botox (Botulinum Toxin Type A)       2:1 Dilution    Wasted 45 units  Medication guide was offered to patient   CONSENT:  The risks, benefits, and treatment options were discussed with the patient who agreed to proceed.     Written consent was obtained      EQUIPMENT USED:  Needles-30 gauge, 0.5 inches for injections  Four 1-ml tuberculin syringes for injections  One sodium chloride 10 ml vial preservative free  Alcohol swabs     SKIN PREPARATION:  Skin preparation was performed using an alcohol wipe.        AREA/MUSCLE INJECTED:  155 units of Botox     Right upper Trapezius (upper cervical) - 5 units of Botox at 3 site/s.   Left upper Trapezius (upper cervical) - 5 units of Botox at 3 site/s.      Right cervical paraspinals - 5 units of Botox at 2 site/s.   Left cervical paraspinals - 5 units of Botox at 2 site/s.      Left occipitalis - 5 units of Botox at 3 site/s.  Right occipitalis -5 units of Botox at 3 site/s     Right Frontalis - 5 units of Botox at 2 site/s.  Left Frontalis - 5 units of Botox at 2 site/s.     Right Temporalis - 5 units of Botox at 4 site/s.  Left Temporalis - 5 units of Botox at 4 site/s.     Right  - 5 units of Botox at 1 site/s.              Left  - 5 units of Botox at 1 site/s.     Procerus - 5 units of Botox at 1  site/s.     RESPONSE TO PROCEDURE:  tolerated the procedure well and there were no immediate complications.  Patient was allowed to recover for an appropriate period of time and was discharged home in stable condition.     FOLLOW UP:     Repeat Botox injections in 12 weeks        This procedure was performed under a hospital privileging agreement with CARISSA Li, [facility]

## 2023-03-10 ENCOUNTER — TELEPHONE (OUTPATIENT)
Dept: RHEUMATOLOGY | Facility: CLINIC | Age: 48
End: 2023-03-10
Payer: COMMERCIAL

## 2023-03-10 NOTE — TELEPHONE ENCOUNTER
DK HAN 3/10 to sched lab appt (due around now; 4 weeks) and 4-5 month follow up (around June-July) with Dr. Roy via in-person per checkout notes from 2/9.

## 2023-03-14 ENCOUNTER — TELEPHONE (OUTPATIENT)
Dept: RHEUMATOLOGY | Facility: CLINIC | Age: 48
End: 2023-03-14
Payer: COMMERCIAL

## 2023-03-14 NOTE — TELEPHONE ENCOUNTER
DK DAYM 3/14 to sched lab appt (due around now; 4 weeks) and 4-5 month follow up (around June-July) with Dr. Roy via in-person per checkout notes from 2/9. --2nd attempt.

## 2023-04-04 ENCOUNTER — LAB (OUTPATIENT)
Dept: LAB | Facility: CLINIC | Age: 48
End: 2023-04-04
Attending: FAMILY MEDICINE
Payer: COMMERCIAL

## 2023-04-04 ENCOUNTER — OFFICE VISIT (OUTPATIENT)
Dept: FAMILY MEDICINE | Facility: CLINIC | Age: 48
End: 2023-04-04
Attending: FAMILY MEDICINE
Payer: COMMERCIAL

## 2023-04-04 VITALS
WEIGHT: 202.4 LBS | DIASTOLIC BLOOD PRESSURE: 96 MMHG | BODY MASS INDEX: 31.77 KG/M2 | SYSTOLIC BLOOD PRESSURE: 149 MMHG | HEART RATE: 90 BPM | HEIGHT: 67 IN

## 2023-04-04 DIAGNOSIS — Z12.4 SCREENING FOR CERVICAL CANCER: ICD-10-CM

## 2023-04-04 DIAGNOSIS — Z91.018 FOOD ALLERGY: ICD-10-CM

## 2023-04-04 DIAGNOSIS — E78.01 FAMILIAL HYPERCHOLESTEROLEMIA: ICD-10-CM

## 2023-04-04 DIAGNOSIS — R73.03 PREDIABETES: ICD-10-CM

## 2023-04-04 DIAGNOSIS — R03.0 ELEVATED BP WITHOUT DIAGNOSIS OF HYPERTENSION: ICD-10-CM

## 2023-04-04 DIAGNOSIS — Z00.00 ROUTINE GENERAL MEDICAL EXAMINATION AT A HEALTH CARE FACILITY: Primary | ICD-10-CM

## 2023-04-04 PROBLEM — R20.0 NUMBNESS AND TINGLING OF BOTH LOWER EXTREMITIES: Status: ACTIVE | Noted: 2023-04-04

## 2023-04-04 PROBLEM — M26.609 TMJ (TEMPOROMANDIBULAR JOINT SYNDROME): Status: ACTIVE | Noted: 2023-04-04

## 2023-04-04 PROBLEM — G47.30 SLEEP APNEA: Status: ACTIVE | Noted: 2023-04-04

## 2023-04-04 PROBLEM — R20.2 NUMBNESS AND TINGLING OF BOTH LOWER EXTREMITIES: Status: ACTIVE | Noted: 2023-04-04

## 2023-04-04 LAB
CHOLEST SERPL-MCNC: 332 MG/DL
HBA1C MFR BLD: 5.9 %
HDLC SERPL-MCNC: 68 MG/DL
LDLC SERPL CALC-MCNC: 233 MG/DL
NONHDLC SERPL-MCNC: 264 MG/DL
TRIGL SERPL-MCNC: 157 MG/DL

## 2023-04-04 PROCEDURE — 83036 HEMOGLOBIN GLYCOSYLATED A1C: CPT

## 2023-04-04 PROCEDURE — 87624 HPV HI-RISK TYP POOLED RSLT: CPT | Performed by: FAMILY MEDICINE

## 2023-04-04 PROCEDURE — 36415 COLL VENOUS BLD VENIPUNCTURE: CPT

## 2023-04-04 PROCEDURE — 99213 OFFICE O/P EST LOW 20 MIN: CPT | Performed by: FAMILY MEDICINE

## 2023-04-04 PROCEDURE — 99386 PREV VISIT NEW AGE 40-64: CPT | Performed by: FAMILY MEDICINE

## 2023-04-04 PROCEDURE — G0145 SCR C/V CYTO,THINLAYER,RESCR: HCPCS | Performed by: FAMILY MEDICINE

## 2023-04-04 PROCEDURE — 80061 LIPID PANEL: CPT

## 2023-04-04 RX ORDER — EPINEPHRINE 0.3 MG/.3ML
INJECTION SUBCUTANEOUS
Qty: 2 EACH | Refills: 0 | Status: SHIPPED | OUTPATIENT
Start: 2023-04-04 | End: 2024-04-22
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
## 2023-04-06 LAB
BKR LAB AP GYN ADEQUACY: NORMAL
BKR LAB AP GYN INTERPRETATION: NORMAL
BKR LAB AP HPV REFLEX: NORMAL
BKR LAB AP PREVIOUS ABNORMAL: NORMAL
PATH REPORT.COMMENTS IMP SPEC: NORMAL
PATH REPORT.COMMENTS IMP SPEC: NORMAL
PATH REPORT.RELEVANT HX SPEC: NORMAL

## 2023-04-10 LAB
HUMAN PAPILLOMA VIRUS 16 DNA: NEGATIVE
HUMAN PAPILLOMA VIRUS 18 DNA: NEGATIVE
HUMAN PAPILLOMA VIRUS FINAL DIAGNOSIS: NORMAL
HUMAN PAPILLOMA VIRUS OTHER HR: NEGATIVE

## 2023-04-12 PROBLEM — Z12.4 CERVICAL CANCER SCREENING: Status: ACTIVE | Noted: 2023-04-12

## 2023-04-14 DIAGNOSIS — M32.9 SYSTEMIC LUPUS ERYTHEMATOSUS, UNSPECIFIED SLE TYPE, UNSPECIFIED ORGAN INVOLVEMENT STATUS (H): ICD-10-CM

## 2023-04-18 RX ORDER — MYCOPHENOLATE MOFETIL 500 MG/1
500 TABLET ORAL 2 TIMES DAILY
Qty: 60 TABLET | OUTPATIENT
Start: 2023-04-18

## 2023-04-18 NOTE — TELEPHONE ENCOUNTER
mycophenolate (GENERIC EQUIVALENT) 500 MG tablet  Last Written Prescription Date:  2/9/23  Last Fill Quantity: 60   # refills: 1  Last Office Visit:  2/9/23  Future Office visit:  none    CBC RESULTS: Recent Labs   Lab Test 10/26/22  1555   WBC 16.5*   RBC 4.80   HGB 14.2   HCT 43.2   MCV 90   MCH 29.6   MCHC 32.9   RDW 13.4          Creatinine   Date Value Ref Range Status   10/26/2022 0.77 0.51 - 0.95 mg/dL Final   06/19/2021 0.76 0.52 - 1.04 mg/dL Final   ]    Liver Function Studies -   Recent Labs   Lab Test 10/26/22  1555 05/17/22  1726   PROTTOTAL  --  7.4   ALBUMIN 4.3 3.9   BILITOTAL  --  0.3   ALKPHOS  --  73   AST 21 23   ALT 23 44       Routing refill request to provider for review/approval because: labs past due. Ordered, not drawn.

## 2023-04-19 ENCOUNTER — OFFICE VISIT (OUTPATIENT)
Dept: INTERNAL MEDICINE | Facility: CLINIC | Age: 48
End: 2023-04-19
Payer: COMMERCIAL

## 2023-04-19 VITALS
SYSTOLIC BLOOD PRESSURE: 136 MMHG | WEIGHT: 202.5 LBS | BODY MASS INDEX: 31.86 KG/M2 | DIASTOLIC BLOOD PRESSURE: 83 MMHG | OXYGEN SATURATION: 99 % | HEART RATE: 104 BPM

## 2023-04-19 DIAGNOSIS — M35.9 DISORDER OF CONNECTIVE TISSUE (H): Primary | ICD-10-CM

## 2023-04-19 PROCEDURE — 99214 OFFICE O/P EST MOD 30 MIN: CPT | Performed by: INTERNAL MEDICINE

## 2023-04-19 NOTE — PROGRESS NOTES
Patricia is a wonderful 46 y/o woman coming in today for a 3 month follow up from her last appointment at the women's clinic, in regards to her high cholesterol and pre diabetes diagnosis.     Rosacea/lupus rash:   Rash flares up, especially after wearing masks, more on right side of nose. Referred to dermatology to confirm and discuss further treatment.    Depression:   Mental health has improved through being consistant with her meds     Neuropathy:  She has been more active and can walk around with decreased pain. Patricia has decreased her prednisone with no negative symptoms. Tramadol continues to work well for diffuse joint pain secondary to lupus and we will continue that at present.    Weight:  Insurance denied coverage for semaglutide.  We discussed diet and exercise strategies instead.    Healthcare Maintenance: She had annual bloodwork 2 weeks ago. Her cholesterol levels were elevated. Reports family history of high cholesterol. Patricia has noticed increased weight gain but her prediabetes has not changed with change in prednisone.    /83 (BP Location: Right arm, Patient Position: Sitting, Cuff Size: Adult Large)   Pulse 104   Wt 91.9 kg (202 lb 8 oz)   SpO2 99%   BMI 31.86 kg/m    Constitutional: no distress, comfortable, pleasant   Eyes: anicteric, normal extra-ocular movements   Cardiovascular: regular rate and rhythm for her 80-90bpm, normal S1 and S2, no murmurs, rubs or gallops, peripheral pulses full and symmetric   Respiratory: clear to auscultation, no wheezes or crackles, normal breath sounds   Skin: central facial erythema with scattered papules on right side of nose   Neurological:  normal gait, no tremor   Psychological: appropriate mood   Lymphatic: 1+ edema on ankles      A/P Reviewed current medications.  Continue as prescribed, no changes were made.  Discussed lifestyle modification to help with weight management.    RTC 3 months via video visit    Shavon Guzman  MD      Scribe Disclosure:   I, Ac Frias, am serving as a scribe; to document services personally performed by Dr. Shavon Guzman- -based on data collection and the provider's statements to me.     Provider Disclosure:  I agree with above History, Review of Systems, Physical exam and Plan.  I have reviewed the content of the documentation and have edited it as needed. I have personally performed the services documented here and the documentation accurately represents those services and the decisions I have made.      Electronically signed by:  Dr. Shavon Guzman

## 2023-04-19 NOTE — NURSING NOTE
Patricia Hall is a 47 year old female that presents in clinic today for the following:     Chief Complaint   Patient presents with     Follow Up     Pt here for follow up and wants to discuss blood tests        The patient's allergies and medications were reviewed. The patient's vitals were obtained without incident. The patient does not have any other questions for the provider.     Chance Myrick, EMT at 2:34 PM on 4/19/2023.  Primary Care Clinic: [phone]

## 2023-05-03 ENCOUNTER — TELEPHONE (OUTPATIENT)
Dept: NEUROLOGY | Facility: CLINIC | Age: 48
End: 2023-05-03

## 2023-05-03 NOTE — TELEPHONE ENCOUNTER
Pt has upcoming botox appt.  Has botox been authorized?  Please update. Thank you.     Sent to [employer] team

## 2023-05-03 NOTE — TELEPHONE ENCOUNTER
Alden Bateman,     This patient is good to go:     05.18.23 / [facility]O / BOTOX () - PA approved for Debora Chin for G43.719 for 4 visits - 155 units every 12 weeks from 05.03.23. - 05.03.24. with approval # [id number] - Called pt on 02.21.23 & LM - need to inform her that she can try to enroll for copay assistance     Thank you,     Jaylene

## 2023-05-08 ENCOUNTER — MYC MEDICAL ADVICE (OUTPATIENT)
Dept: INTERNAL MEDICINE | Facility: CLINIC | Age: 48
End: 2023-05-08
Payer: COMMERCIAL

## 2023-05-08 NOTE — TELEPHONE ENCOUNTER
RN called [employer] and was on hold for over 30 minutes X 2.  RN left voice message asking Danae to call RN back a direct number.    Dalila Marcano RN on 5/8/2023 at 2:52 PM

## 2023-05-09 ENCOUNTER — TELEPHONE (OUTPATIENT)
Dept: RHEUMATOLOGY | Facility: CLINIC | Age: 48
End: 2023-05-09
Payer: COMMERCIAL

## 2023-05-09 ENCOUNTER — TELEPHONE (OUTPATIENT)
Dept: NEUROLOGY | Facility: CLINIC | Age: 48
End: 2023-05-09
Payer: COMMERCIAL

## 2023-05-09 DIAGNOSIS — L97.509 FOOT ULCER (H): Primary | ICD-10-CM

## 2023-05-09 DIAGNOSIS — R21 RASH: ICD-10-CM

## 2023-05-09 DIAGNOSIS — M32.9 SYSTEMIC LUPUS ERYTHEMATOSUS, UNSPECIFIED SLE TYPE, UNSPECIFIED ORGAN INVOLVEMENT STATUS (H): ICD-10-CM

## 2023-05-09 DIAGNOSIS — L97.509 ULCER OF TOE, UNSPECIFIED LATERALITY, UNSPECIFIED ULCER STAGE (H): ICD-10-CM

## 2023-05-09 NOTE — TELEPHONE ENCOUNTER
----- Message from Josh Roy MD sent at 5/9/2023 12:55 PM CDT -----  Regarding: derm  Agree with priority derm referral for the ulcer.

## 2023-05-09 NOTE — TELEPHONE ENCOUNTER
Prior Authorization Retail Medication Request    Medication/Dose: Ubrelvy  mg  ICD code (if different than what is on RX):    Previously Tried and Failed:  :rizatriptan or sumatriptan and somedays both the same day  Sumatriptan works when catches quick enough   NSAIDs -a couple of tablets of ibuprofen in the week and does not help with the pain and does not tylenol in the last 6 month   Ondansetron helps and   Has had PT      Rationale: migraine     Insurance Name: [facility]  Insurance ID: [id number]      Pharmacy Information (if different than what is on RX)  Name:    Phone:

## 2023-05-15 DIAGNOSIS — G43.009 MIGRAINE WITHOUT AURA AND WITHOUT STATUS MIGRAINOSUS, NOT INTRACTABLE: ICD-10-CM

## 2023-05-15 RX ORDER — CEFUROXIME AXETIL 250 MG/1
6 TABLET ORAL
Qty: 3 KIT | Refills: 11 | Status: CANCELLED | OUTPATIENT
Start: 2023-05-15

## 2023-05-15 NOTE — TELEPHONE ENCOUNTER
PA Initiation    Medication: ubrogepant (UBRELVY) 50 MG tablet   Insurance Company: [employer] Clinical Review - Phone [phone] Fax [phone]  Pharmacy Filling the Rx: [id number] - [place], [place] - [facility] [address] [facility] [address]  Filling Pharmacy Phone: [phone]  Filling Pharmacy Fax: [phone]  Start Date: 5/15/2023

## 2023-05-16 ENCOUNTER — TELEPHONE (OUTPATIENT)
Dept: NEUROLOGY | Facility: CLINIC | Age: 48
End: 2023-05-16
Payer: COMMERCIAL

## 2023-05-16 DIAGNOSIS — M32.9 SYSTEMIC LUPUS ERYTHEMATOSUS, UNSPECIFIED SLE TYPE, UNSPECIFIED ORGAN INVOLVEMENT STATUS (H): ICD-10-CM

## 2023-05-16 DIAGNOSIS — G43.009 MIGRAINE WITHOUT AURA AND WITHOUT STATUS MIGRAINOSUS, NOT INTRACTABLE: ICD-10-CM

## 2023-05-16 RX ORDER — CEFUROXIME AXETIL 250 MG/1
6 TABLET ORAL
Qty: 3 KIT | Refills: 11 | Status: SHIPPED | OUTPATIENT
Start: 2023-05-16 | End: 2024-06-04

## 2023-05-16 NOTE — TELEPHONE ENCOUNTER
Ryan Cazares, Called pt to offer her a 9am appt today; however, unable to reach and unable to leave a message as mailbox is full.

## 2023-05-17 NOTE — TELEPHONE ENCOUNTER
Prior Authorization Approval    Medication: ubrogepant (UBRELVY) 50 MG tablet   Authorization Effective Date: 5/17/2023  Authorization Expiration Date: 5/17/2024  Approved Dose/Quantity:   Reference #:     Insurance Company: [employer] Clinical Review - Phone [phone] Fax [phone]  Expected CoPay:       CoPay Card Available:      Financial Assistance Needed:   Which Pharmacy is filling the prescription: [id number] - [place], [place] - [address] AT [facility] [address]  Pharmacy Notified: Yes  Patient Notified: Yes **Instructed pharmacy to notify patient when script is ready to /ship.**

## 2023-05-18 ENCOUNTER — ALLIED HEALTH/NURSE VISIT (OUTPATIENT)
Dept: NEUROLOGY | Facility: CLINIC | Age: 48
End: 2023-05-18

## 2023-05-18 ENCOUNTER — OFFICE VISIT (OUTPATIENT)
Dept: NEUROLOGY | Facility: CLINIC | Age: 48
End: 2023-05-18
Payer: COMMERCIAL

## 2023-05-18 ENCOUNTER — TELEPHONE (OUTPATIENT)
Dept: NEUROLOGY | Facility: CLINIC | Age: 48
End: 2023-05-18

## 2023-05-18 DIAGNOSIS — G43.719 INTRACTABLE CHRONIC MIGRAINE WITHOUT AURA AND WITHOUT STATUS MIGRAINOSUS: Primary | ICD-10-CM

## 2023-05-18 PROCEDURE — 64615 CHEMODENERV MUSC MIGRAINE: CPT | Performed by: NURSE PRACTITIONER

## 2023-05-18 PROCEDURE — 96372 THER/PROPH/DIAG INJ SC/IM: CPT | Mod: 59

## 2023-05-18 RX ORDER — KETOROLAC TROMETHAMINE 30 MG/ML
15 INJECTION, SOLUTION INTRAMUSCULAR; INTRAVENOUS ONCE
Status: COMPLETED | OUTPATIENT
Start: 2023-05-18 | End: 2023-05-18

## 2023-05-18 RX ORDER — ONDANSETRON 4 MG/1
4 TABLET, ORALLY DISINTEGRATING ORAL ONCE
Status: COMPLETED | OUTPATIENT
Start: 2023-05-18 | End: 2023-05-18

## 2023-05-18 RX ORDER — TOPIRAMATE 25 MG/1
TABLET, FILM COATED ORAL
Qty: 60 TABLET | Refills: 3 | Status: SHIPPED | OUTPATIENT
Start: 2023-05-18 | End: 2023-08-10 | Stop reason: SINTOL

## 2023-05-18 RX ADMIN — ONDANSETRON 4 MG: 4 TABLET, ORALLY DISINTEGRATING ORAL at 14:33

## 2023-05-18 RX ADMIN — KETOROLAC TROMETHAMINE 15 MG: 30 INJECTION, SOLUTION INTRAMUSCULAR; INTRAVENOUS at 14:31

## 2023-05-18 NOTE — PROGRESS NOTES
BOTULINUM NEUROTOXIN INJECTION PROCEDURE:  DATA:5/18/2023  INDICATIONS FOR PROCEDURES:   chronic migraine headaches. baseline symptoms have been recalcitrant to oral medications and conservative therapy. Patient is here today for a repeat  injection with Botox. Last Botox 2/2/7/2023 and no there concerns or side effects  Finds botox 50% or more reduction in headaches and headache free days. Headaches worsen when Botox wears off     Pre-procedure pain 7/10     GOAL OF PROCEDURE:  The goal of this procedure is to decrease pain and enhance functional independence.     TOTAL DOSE ADMINISTERED:  Dose Administered:  155 units  Botox (Botulinum Toxin Type A)       2:1 Dilution    Wasted 45 units  Medication guide was offered to patient   CONSENT:  The risks, benefits, and treatment options were discussed with the patient who agreed to proceed.     Written consent was obtained      EQUIPMENT USED:  Needles-30 gauge, 0.5 inches for injections  Four 1-ml tuberculin syringes for injections  One sodium chloride 10 ml vial preservative free  Alcohol swabs     SKIN PREPARATION:  Skin preparation was performed using an alcohol wipe.        AREA/MUSCLE INJECTED:  155 units of Botox     Right upper Trapezius (upper cervical) - 5 units of Botox at 3 site/s.   Left upper Trapezius (upper cervical) - 5 units of Botox at 3 site/s.      Right cervical paraspinals - 5 units of Botox at 2 site/s.   Left cervical paraspinals - 5 units of Botox at 2 site/s.      Left occipitalis - 5 units of Botox at 3 site/s.  Right occipitalis -5 units of Botox at 3 site/s     Right Frontalis - 5 units of Botox at 2 site/s.  Left Frontalis - 5 units of Botox at 2 site/s.     Right Temporalis - 5 units of Botox at 4 site/s.  Left Temporalis - 5 units of Botox at 4 site/s.     Right  - 5 units of Botox at 1 site/s.              Left  - 5 units of Botox at 1 site/s.     Procerus - 5 units of Botox at 1 site/s.     RESPONSE TO  PROCEDURE:  tolerated the procedure well and there were no immediate complications.  Patient was allowed to recover for an appropriate period of time and was discharged home in stable condition.     FOLLOW UP:  Try Ubrelvy at home and do not take sumatriptan for now  Adding topiramate to migraine prevention   Toradol 15 mg IM and zofran 4 mg oral -for acute headache today   Repeat Botox injections in 12 weeks        This procedure was performed under a hospital privileging agreement with Dr. Romina Chin, APRN, CNP  [facility]

## 2023-05-18 NOTE — NURSING NOTE
"Patricia Hall is a 47 year old female who presents today for a toradol injection. She has had severe headache since last Thursday, along with nausea and light sensitivity.  She called it a \"walking migraine\" as she is still able to function.  She stated she has taken 4 imitrex injections over the last 4 days with no relief.  She rates headache pain at 7/10.  Debora ordered 4mg ondansetron ODT for her nausea. I gave the 4mg ondansetron pill to her and she placed it under her tongue and it dissolved.  After a few minutes, I injected 15mg toradol into her right arm without complications. I told her not to take an NSAIDS for the next 6 hours and she verbally understood.    After 15 minutes, I checked in on her and she stated the nausea was much better and now rates her headache pain at 5/10.  She left our clinic without any questions/concerns.  Informed her to call us if needed.  This service was provided today was under the supervising provider, Debora Chin who was available if needed.      The Lot # and expiration date of medications administered have been scanned into the MAR by Linus Chaney.      Bhavana Marina RN       "

## 2023-05-18 NOTE — LETTER
5/18/2023       RE: Patricia Hall  [address]  [place] [address]     Dear Colleague,    Thank you for referring your patient, Patricia Hall, to the [facility] [place] at [facility]. Please see a copy of my visit note below.    BOTULINUM NEUROTOXIN INJECTION PROCEDURE:  DATA:5/18/2023  INDICATIONS FOR PROCEDURES:   chronic migraine headaches. baseline symptoms have been recalcitrant to oral medications and conservative therapy. Patient is here today for a repeat  injection with Botox. Last Botox 2/2/7/2023 and no there concerns or side effects  Finds botox 50% or more reduction in headaches and headache free days. Headaches worsen when Botox wears off     Pre-procedure pain 7/10     GOAL OF PROCEDURE:  The goal of this procedure is to decrease pain and enhance functional independence.     TOTAL DOSE ADMINISTERED:  Dose Administered:  155 units  Botox (Botulinum Toxin Type A)       2:1 Dilution    Wasted 45 units  Medication guide was offered to patient   CONSENT:  The risks, benefits, and treatment options were discussed with the patient who agreed to proceed.     Written consent was obtained      EQUIPMENT USED:  Needles-30 gauge, 0.5 inches for injections  Four 1-ml tuberculin syringes for injections  One sodium chloride 10 ml vial preservative free  Alcohol swabs     SKIN PREPARATION:  Skin preparation was performed using an alcohol wipe.        AREA/MUSCLE INJECTED:  155 units of Botox     Right upper Trapezius (upper cervical) - 5 units of Botox at 3 site/s.   Left upper Trapezius (upper cervical) - 5 units of Botox at 3 site/s.      Right cervical paraspinals - 5 units of Botox at 2 site/s.   Left cervical paraspinals - 5 units of Botox at 2 site/s.      Left occipitalis - 5 units of Botox at 3 site/s.  Right occipitalis -5 units of Botox at 3 site/s     Right Frontalis - 5 units of Botox at 2 site/s.  Left  Frontalis - 5 units of Botox at 2 site/s.     Right Temporalis - 5 units of Botox at 4 site/s.  Left Temporalis - 5 units of Botox at 4 site/s.     Right  - 5 units of Botox at 1 site/s.              Left  - 5 units of Botox at 1 site/s.     Procerus - 5 units of Botox at 1 site/s.     RESPONSE TO PROCEDURE:  tolerated the procedure well and there were no immediate complications.  Patient was allowed to recover for an appropriate period of time and was discharged home in stable condition.     FOLLOW UP:  Try Ubrelvy at home and do not take sumatriptan for now  Adding topiramate to migraine prevention   Toradol 15 mg IM and zofran 4 mg oral -for acute headache today   Repeat Botox injections in 12 weeks        This procedure was performed under a hospital privileging agreement with Dr. Vanegas         Again, thank you for allowing me to participate in the care of your patient.      Sincerely,    CARISSA Tang CNP

## 2023-05-19 NOTE — TELEPHONE ENCOUNTER
Plaquenil      Last Written Prescription Date:  2/9/23  Last Fill Quantity: 180,   # refills: 0  Last Office Visit: 2/9/23  Future Office visit: none    Last Eye Exam:    Refill Team has reviewed Health Maint., [facility] and Media.    Found - date: 10/18/19  Refill for 90+ 3 (from date of eye exam)     Creatinine   Date Value Ref Range Status   10/26/2022 0.77 0.51 - 0.95 mg/dL Final   06/19/2021 0.76 0.52 - 1.04 mg/dL Final         Routing refill request to provider/clinic team for review/approval because:    Overdue for eye exam

## 2023-05-19 NOTE — TELEPHONE ENCOUNTER
Duplicate See 5/9/23 Encounter     Prior Authorization Retail Medication Request    Medication/Dose: ubrogepant (UBRELVY) 50 MG tablet 16    ICD code (if different than what is on RX):

## 2023-05-20 RX ORDER — HYDROXYCHLOROQUINE SULFATE 200 MG/1
200 TABLET, FILM COATED ORAL 2 TIMES DAILY
Qty: 180 TABLET | Refills: 0 | Status: SHIPPED | OUTPATIENT
Start: 2023-05-20 | End: 2023-08-17

## 2023-06-12 ENCOUNTER — TRANSFERRED RECORDS (OUTPATIENT)
Dept: RHEUMATOLOGY | Facility: CLINIC | Age: 48
End: 2023-06-12
Payer: COMMERCIAL

## 2023-06-12 LAB — RETINOPATHY: NORMAL

## 2023-06-14 ENCOUNTER — TELEPHONE (OUTPATIENT)
Dept: RHEUMATOLOGY | Facility: CLINIC | Age: 48
End: 2023-06-14
Payer: COMMERCIAL

## 2023-06-14 NOTE — TELEPHONE ENCOUNTER
Plaquenil Eye Exam    Date of last eye exam specifically commenting on HCQ toxicity: 6/12/2023  Clinic: [facility]   Ophthalmologist: Melchor German  Toxicity: NO  Records scanned to chart: Yes  Follow up: 1 year      Maryellen Olvera CMA   6/14/2023 10:48 AM

## 2023-06-16 ENCOUNTER — MYC MEDICAL ADVICE (OUTPATIENT)
Dept: INTERNAL MEDICINE | Facility: CLINIC | Age: 48
End: 2023-06-16
Payer: COMMERCIAL

## 2023-06-16 DIAGNOSIS — G43.909 MIGRAINE WITHOUT STATUS MIGRAINOSUS, NOT INTRACTABLE, UNSPECIFIED MIGRAINE TYPE: ICD-10-CM

## 2023-06-16 DIAGNOSIS — G89.29 OTHER CHRONIC PAIN: ICD-10-CM

## 2023-06-19 RX ORDER — TRAMADOL HYDROCHLORIDE 300 MG/1
300 TABLET, EXTENDED RELEASE ORAL AT BEDTIME
Qty: 30 TABLET | Refills: 5 | Status: SHIPPED | OUTPATIENT
Start: 2023-06-19 | End: 2023-11-06

## 2023-07-11 ENCOUNTER — OFFICE VISIT (OUTPATIENT)
Dept: DERMATOLOGY | Facility: CLINIC | Age: 48
End: 2023-07-11
Payer: COMMERCIAL

## 2023-07-11 DIAGNOSIS — L71.9 ROSACEA: ICD-10-CM

## 2023-07-11 DIAGNOSIS — R21 RASH: ICD-10-CM

## 2023-07-11 DIAGNOSIS — L97.509 ULCER OF TOE, UNSPECIFIED LATERALITY, UNSPECIFIED ULCER STAGE (H): ICD-10-CM

## 2023-07-11 DIAGNOSIS — B07.9 VERRUCA VULGARIS: Primary | ICD-10-CM

## 2023-07-11 DIAGNOSIS — M32.9 SYSTEMIC LUPUS ERYTHEMATOSUS, UNSPECIFIED SLE TYPE, UNSPECIFIED ORGAN INVOLVEMENT STATUS (H): ICD-10-CM

## 2023-07-11 PROCEDURE — 99213 OFFICE O/P EST LOW 20 MIN: CPT | Mod: 25 | Performed by: STUDENT IN AN ORGANIZED HEALTH CARE EDUCATION/TRAINING PROGRAM

## 2023-07-11 PROCEDURE — 17110 DESTRUCTION B9 LES UP TO 14: CPT | Performed by: STUDENT IN AN ORGANIZED HEALTH CARE EDUCATION/TRAINING PROGRAM

## 2023-07-11 ASSESSMENT — PAIN SCALES - GENERAL: PAINLEVEL: NO PAIN (0)

## 2023-07-11 NOTE — PROGRESS NOTES
I have personally examined this patient and agree with the medical student's documentation and plan of care. I have reviewed and amended the medical student's note as necessary. I personally performed all procedure(s).The documentation accurately reflects my clinical observations, diagnoses, treatment and follow-up plans.     John Bernstein M.D.   Dermatology Staff       [facility] Dermatology Note  Encounter Date: Jul 11, 2023  Office Visit     Dermatology Problem List:  1. Systemic lupus   Current: prednisone 15 mg a day (chronically), plaquenil 200 mg BID   - diagnosed in early 2000s  - photosensitivity, VINITA 1:160, fatigue, oral ulcers, arthralgias, recurrent miscarriages (also has MTHFR & EARNEST-1 mutations)    2. Wart, left medial first toe  Current: LN2 x1 (7/11/23)   Previous: urea    3. Onychomycosis  Previous: ciclopirox (did not use), dilute bleach soaks  ____________________________________________    Assessment & Plan:    # Verruca vulgaris, left medial first toe  Previously diagnosed with david noir and treated with urea in this area; has been chronically calloused and thickened.    - Cryotherapy today    # Nasal papules, right and central nose   With patient's history of persistent scaliness of the nose, could be reasonable to treat as an AK. However, there was no scale on exam today. Likely not consistent with rosacea another inflammatory dermatitis due to chronicity. Reassurance was provided and patient will opt to monitor with possible LN2 therapy in future.       #Excoriative papules of mid- and left-forehead near hairline  Unclear etiology due to exam limited by excoriation and scab present on exam today. Patient describes left-forehead spot as scaly, and thus offered LN2 therapy, which patient declined at this time. Will opt to monitor.     Procedures Performed:   - Cryotherapy procedure note, location(s): left medial first toe. After verbal consent and discussion of risks and  "benefits including, but not limited to, dyspigmentation/scar, blister, and pain, 1 lesion(s) was(were) treated with 1-2 mm freeze border for 2 cycles with liquid nitrogen. Post cryotherapy instructions were provided.  - Procedure(s) performed by faculty.     Follow-up: 3 months    Staff and Medical Student:     Aida Chand, MS4      ____________________________________________    CC: Derm Problem (Lupus.  Ulcer on the left great toe.  Doesn't heal, has been like that for years.  Had a biopsy by Ken.  Follicle on head and nose that keeps getting inflamed.)    HPI:  Ms. Patricia Hall is a(n) 48 year old female who presents today as a return patient for the following concerns:     1) Spot on left great toe that has been there for \"years\". She states that it has gotten very \"scaly\" and thick. She has used urea cream on it before which has helped the \"scaliness\" but did not use this long due to back pain. Dr. Rivas saw her for this spot virtually in 11/1/21 and diagnosed it as david noir. She also has had it biopsied at [facility] dermatology but we do not have this report. She picks at it frequently.     2) Red spots on nose that have been there for years. Previously diagnosed as rosacea. She states they \"fill up and then deflate\" off and on. She describes the spot as scaly when they are thickened.     3) Two spots on forehead: One on central forehead that \"fills\" and gets \"cyst-like\" occasionally. She states it has been diagnosed as a missing follicle. She also notes a spot on her left forehead near her hairline that has been there for about a month. She said it feels scaly and that she picks at it.     Labs:  None reviewed.    Physical Exam:  Vitals: There were no vitals taken for this visit.  SKIN: Focused examination of face and feet were performed.  - Three non-scaly erythematous papules on nose  - Hyperkeratotic medial left first toe with hemorrhagic dots under dermoscopy   - Papules with " excoriation and scabbing, mid forehead and left forehead near hairline  - No other lesions of concern on areas examined.             Medications:  Current Outpatient Medications   Medication     AMBIEN 10 MG OR TABS     Belimumab (BENLYSTA) 200 MG/ML SOSY     calcium carbonate (OS-VAHID 500 MG [place]. [place]) 1250 MG tablet     EPINEPHrine (ANY BX GENERIC EQUIV) 0.3 MG/0.3ML injection 2-pack     hydroxychloroquine (PLAQUENIL) 200 MG tablet     MULTIVITAMIN TABS   OR     ondansetron (ZOFRAN) 8 MG tablet     predniSONE (DELTASONE) 1 MG tablet     predniSONE (DELTASONE) 5 MG tablet     SPIRULINA PO     SUMAtriptan (IMITREX STATDOSE) 6 MG/0.5ML pen injector kit     topiramate (TOPAMAX) 25 MG tablet     traMADol (ULTRAM) 50 MG tablet     traMADol (ULTRAM-ER) 300 MG 24 hr tablet     triamcinolone (NASACORT) 55 MCG/ACT nasal aerosol     ubrogepant (UBRELVY) 50 MG tablet     RACHNA SYRUP PO     albuterol (PROAIR HFA/PROVENTIL HFA/VENTOLIN HFA) 108 (90 Base) MCG/ACT inhaler     ciclopirox (PENLAC) 8 % external solution     cyclobenzaprine (FLEXERIL) 10 MG tablet     DULoxetine (CYMBALTA) 20 MG capsule     L-Methylfolate-Algae-B12-B6 (METANX PO)     mycophenolate (GENERIC EQUIVALENT) 500 MG tablet     Omega-3 Fatty Acids (OMEGA 3 PO)     omeprazole (PRILOSEC) 40 MG DR capsule     semaglutide (OZEMPIC) 2 MG/1.5ML SOPN pen     triamcinolone (KENALOG) 0.1 % paste     VITAMIN D, CHOLECALCIFEROL, PO     Current Facility-Administered Medications   Medication     Botulinum Toxin Type A (BOTOX) 200 units injection 155 Units      Past Medical History:   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)     Adjustment disorder with anxiety     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     Factor V Leiden?     Chronic headaches     Diminished ovarian reserve     Disorder of connective tissue (H)     Disease of  immune system (H)     Endometriosis of uterus     Female infertility     History of environmental allergies     Hyperlipidemia     Irritable bowel syndrome     Major depressive disorder, recurrent episode, in full remission (H)     Major depressive disorder, recurrent episode, mild (H)     Myalgia     Raynaud's disease     Recurrent pregnancy loss without current pregnancy     Seasonal allergies     Primary fibromyalgia syndrome     Uterine leiomyoma     Sciatica of right side     Numbness and tingling of both lower extremities     Sleep apnea     TMJ (temporomandibular joint syndrome)     Cervical cancer screening     Past Medical History:   Diagnosis Date     Allergy, unspecified not elsewhere classified      Endometriosis      Fibromyalgia      Migraine without aura, with intractable migraine, so stated, without mention of status migrainosus      Myalgia and myositis, unspecified      Systemic lupus erythematosus (H)         CC Josh Roy MD  [address] [place] [phone] on close of this encounter.

## 2023-07-11 NOTE — LETTER
7/11/2023       RE: Patricia Hall  [address]  [place] [address]     Dear Colleague,    Thank you for referring your patient, Patricia Hall, to the [facility] [place] at [facility]. Please see a copy of my visit note below.    I have personally examined this patient and agree with the medical student's documentation and plan of care. I have reviewed and amended the medical student's note as necessary. I personally performed all procedure(s).The documentation accurately reflects my clinical observations, diagnoses, treatment and follow-up plans.     John Bernstein M.D.   Dermatology Staff       [facility] Dermatology Note  Encounter Date: Jul 11, 2023  Office Visit     Dermatology Problem List:  1. Systemic lupus   Current: prednisone 15 mg a day (chronically), plaquenil 200 mg BID   - diagnosed in early 2000s  - photosensitivity, VINITA 1:160, fatigue, oral ulcers, arthralgias, recurrent miscarriages (also has MTHFR & EARNEST-1 mutations)    2. Wart, left medial first toe  Current: LN2 x1 (7/11/23)   Previous: urea    3. Onychomycosis  Previous: ciclopirox (did not use), dilute bleach soaks  ____________________________________________    Assessment & Plan:    # Verruca vulgaris, left medial first toe  Previously diagnosed with david noir and treated with urea in this area; has been chronically calloused and thickened.    - Cryotherapy today    # Nasal papules, right and central nose   With patient's history of persistent scaliness of the nose, could be reasonable to treat as an AK. However, there was no scale on exam today. Likely not consistent with rosacea another inflammatory dermatitis due to chronicity. Reassurance was provided and patient will opt to monitor with possible LN2 therapy in future.       #Excoriative papules of mid- and left-forehead near hairline  Unclear etiology due to exam limited  "by excoriation and scab present on exam today. Patient describes left-forehead spot as scaly, and thus offered LN2 therapy, which patient declined at this time. Will opt to monitor.     Procedures Performed:   - Cryotherapy procedure note, location(s): left medial first toe. After verbal consent and discussion of risks and benefits including, but not limited to, dyspigmentation/scar, blister, and pain, 1 lesion(s) was(were) treated with 1-2 mm freeze border for 2 cycles with liquid nitrogen. Post cryotherapy instructions were provided.  - Procedure(s) performed by faculty.     Follow-up: 3 months    Staff and Medical Student:     Aida Chand, MS4      ____________________________________________    CC: Derm Problem (Lupus.  Ulcer on the left great toe.  Doesn't heal, has been like that for years.  Had a biopsy by Ken.  Follicle on head and nose that keeps getting inflamed.)    HPI:  Ms. Patricia Hall is a(n) 48 year old female who presents today as a return patient for the following concerns:     1) Spot on left great toe that has been there for \"years\". She states that it has gotten very \"scaly\" and thick. She has used urea cream on it before which has helped the \"scaliness\" but did not use this long due to back pain. Dr. Rivas saw her for this spot virtually in 11/1/21 and diagnosed it as david noir. She also has had it biopsied at [facility] dermatology but we do not have this report. She picks at it frequently.     2) Red spots on nose that have been there for years. Previously diagnosed as rosacea. She states they \"fill up and then deflate\" off and on. She describes the spot as scaly when they are thickened.     3) Two spots on forehead: One on central forehead that \"fills\" and gets \"cyst-like\" occasionally. She states it has been diagnosed as a missing follicle. She also notes a spot on her left forehead near her hairline that has been there for about a month. She said it feels scaly and that " she picks at it.     Labs:  None reviewed.    Physical Exam:  Vitals: There were no vitals taken for this visit.  SKIN: Focused examination of face and feet were performed.  - Three non-scaly erythematous papules on nose  - Hyperkeratotic medial left first toe with hemorrhagic dots under dermoscopy   - Papules with excoriation and scabbing, mid forehead and left forehead near hairline  - No other lesions of concern on areas examined.             Medications:  Current Outpatient Medications   Medication    AMBIEN 10 MG OR TABS    Belimumab (BENLYSTA) 200 MG/ML SOSY    calcium carbonate (OS-VAHID 500 MG [place]. CA) 1250 MG tablet    EPINEPHrine (ANY BX GENERIC EQUIV) 0.3 MG/0.3ML injection 2-pack    hydroxychloroquine (PLAQUENIL) 200 MG tablet    MULTIVITAMIN TABS   OR    ondansetron (ZOFRAN) 8 MG tablet    predniSONE (DELTASONE) 1 MG tablet    predniSONE (DELTASONE) 5 MG tablet    SPIRULINA PO    SUMAtriptan (IMITREX STATDOSE) 6 MG/0.5ML pen injector kit    topiramate (TOPAMAX) 25 MG tablet    traMADol (ULTRAM) 50 MG tablet    traMADol (ULTRAM-ER) 300 MG 24 hr tablet    triamcinolone (NASACORT) 55 MCG/ACT nasal aerosol    ubrogepant (UBRELVY) 50 MG tablet    RACHNA SYRUP PO    albuterol (PROAIR HFA/PROVENTIL HFA/VENTOLIN HFA) 108 (90 Base) MCG/ACT inhaler    ciclopirox (PENLAC) 8 % external solution    cyclobenzaprine (FLEXERIL) 10 MG tablet    DULoxetine (CYMBALTA) 20 MG capsule    L-Methylfolate-Algae-B12-B6 (METANX PO)    mycophenolate (GENERIC EQUIVALENT) 500 MG tablet    Omega-3 Fatty Acids (OMEGA 3 PO)    omeprazole (PRILOSEC) 40 MG DR capsule    semaglutide (OZEMPIC) 2 MG/1.5ML SOPN pen    triamcinolone (KENALOG) 0.1 % paste    VITAMIN D, CHOLECALCIFEROL, PO     Current Facility-Administered Medications   Medication    Botulinum Toxin Type A (BOTOX) 200 units injection 155 Units      Past Medical History:   Patient Active Problem List   Diagnosis    Insomnia    Systemic lupus erythematosus (H)    Refractory  migraine without aura    5,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    Chronic headaches    Diminished ovarian reserve    Disorder of connective tissue (H)    Disease of immune system (H)    Endometriosis of uterus    Female infertility    History of environmental allergies    Hyperlipidemia    Irritable bowel syndrome    Major depressive disorder, recurrent episode, in full remission (H)    Major depressive disorder, recurrent episode, mild (H)    Myalgia    Raynaud's disease    Recurrent pregnancy loss without current pregnancy    Seasonal allergies    Primary fibromyalgia syndrome    Uterine leiomyoma    Sciatica of right side    Numbness and tingling of both lower extremities    Sleep apnea    TMJ (temporomandibular joint syndrome)    Cervical cancer screening     Past Medical History:   Diagnosis Date    Allergy, unspecified not elsewhere classified     Endometriosis     Fibromyalgia     Migraine without aura, with intractable migraine, so stated, without mention of status migrainosus     Myalgia and myositis, unspecified     Systemic lupus erythematosus (H)         CC Josh Roy MD  [address] [place] [phone] on close of this encounter.

## 2023-07-11 NOTE — PROGRESS NOTES
Drug Administration Record    Prior to injection, verified patient identity using patient's name and date of birth.  Due to injection administration, patient instructed to remain in clinic for 15 minutes  afterwards, and to report any adverse reaction to me immediately.    Drug Name: triamcinolone acetonide(kenalog)  Dose: 1 mL of triamcinolone 10mg/mL, 10 mg dose  Route administered: ID  NDC #: Kenalog-10 (6657-8625-17)  Amount of waste(mL):4 mL  Reason for waste: Multi dose vial    LOT #: [id number]  SITE: see notes  : [employer]  EXPIRATION DATE: SEP 2025

## 2023-07-11 NOTE — NURSING NOTE
Dermatology Rooming Note    Patricia Hall's goals for this visit include:   Chief Complaint   Patient presents with     Derm Problem     Lupus.  Ulcer on the left great toe.  Doesn't heal, has been like that for years.  Had a biopsy by Ken.  Follicle on head and nose that keeps getting inflamed.     Zoë Diaz, CMA

## 2023-08-10 ENCOUNTER — OFFICE VISIT (OUTPATIENT)
Dept: NEUROLOGY | Facility: CLINIC | Age: 48
End: 2023-08-10
Payer: COMMERCIAL

## 2023-08-10 DIAGNOSIS — G43.009 MIGRAINE WITHOUT AURA AND WITHOUT STATUS MIGRAINOSUS, NOT INTRACTABLE: Primary | ICD-10-CM

## 2023-08-10 PROCEDURE — 64615 CHEMODENERV MUSC MIGRAINE: CPT | Performed by: NURSE PRACTITIONER

## 2023-08-10 NOTE — PROGRESS NOTES
BOTULINUM NEUROTOXIN INJECTION PROCEDURE:  DATA:8/10/2023  INDICATIONS FOR PROCEDURES:   chronic migraine headaches. baseline symptoms have been recalcitrant to oral medications and conservative therapy. Patient is here today for a repeat  injection with Botox. Last Botox 5/18/2023 and no there concerns or side effects  Finds botox 50% at least or more reduction in headaches and headache free days. Headaches worsen when Botox wears off   Pre-procedure pain 7/10  Post 4-5/10       GOAL OF PROCEDURE:  The goal of this procedure is to decrease pain and enhance functional independence.     TOTAL DOSE ADMINISTERED:  Dose Administered:  155 units  Botox (Botulinum Toxin Type A)       2:1 Dilution    Wasted 45 units    CONSENT:  The risks, benefits, and treatment options were discussed with the patient who agreed to proceed.     Written consent was obtained      EQUIPMENT USED:  Needles-30 gauge, 0.5 inches for injections  Four 1-ml tuberculin syringes for injections  One sodium chloride 10 ml vial preservative free  Alcohol swabs     SKIN PREPARATION:  Skin preparation was performed using an alcohol wipe.        AREA/MUSCLE INJECTED:  155 units of Botox     Right upper Trapezius (upper cervical) - 10, 10, 5 units of Botox at 3 site/s.   Left upper Trapezius (upper cervical) - 10, 10, 5 units of Botox at 3 site/s.      Right cervical paraspinals - 5 units of Botox at 2 site/s.   Left cervical paraspinals - 5 units of Botox at 2 site/s.      Left occipitalis - 5 units of Botox at 2 site/s.  Right occipitalis -5 units of Botox at 2 site/s     Right Frontalis - 5 units of Botox at 2 site/s.  Left Frontalis - 5 units of Botox at 2 site/s.     Right Temporalis - 5 units of Botox at 3 site/s.  Left Temporalis - 5 units of Botox at 3 site/s.     Right  - 5 units of Botox at 1 site/s.              Left  - 5 units of Botox at 1 site/s.  Masseter right and left -5 units each      Procerus - 5 units of Botox at 1  site/s.     RESPONSE TO PROCEDURE:  tolerated the procedure well and there were no immediate complications.  Patient was allowed to recover for an appropriate period of time and was discharged home in stable condition.     FOLLOW UP:  Repeat Botox injections in 12 weeks        This procedure was performed under a hospital privileging agreement with CARISSA Li, [facility]

## 2023-08-10 NOTE — LETTER
8/10/2023       RE: Patricia Hall  [address]  [place] [address]       Dear Colleague,    Thank you for referring your patient, Patricia Hall, to the [facility] [place] [facility]. Please see a copy of my visit note below.    BOTULINUM NEUROTOXIN INJECTION PROCEDURE:  DATA:8/10/2023  INDICATIONS FOR PROCEDURES:   chronic migraine headaches. baseline symptoms have been recalcitrant to oral medications and conservative therapy. Patient is here today for a repeat  injection with Botox. Last Botox 5/18/2023 and no there concerns or side effects  Finds botox 50% at least or more reduction in headaches and headache free days. Headaches worsen when Botox wears off   Pre-procedure pain 7/10  Post 4-5/10       GOAL OF PROCEDURE:  The goal of this procedure is to decrease pain and enhance functional independence.     TOTAL DOSE ADMINISTERED:  Dose Administered:  155 units  Botox (Botulinum Toxin Type A)       2:1 Dilution    Wasted 45 units    CONSENT:  The risks, benefits, and treatment options were discussed with the patient who agreed to proceed.     Written consent was obtained      EQUIPMENT USED:  Needles-30 gauge, 0.5 inches for injections  Four 1-ml tuberculin syringes for injections  One sodium chloride 10 ml vial preservative free  Alcohol swabs     SKIN PREPARATION:  Skin preparation was performed using an alcohol wipe.        AREA/MUSCLE INJECTED:  155 units of Botox     Right upper Trapezius (upper cervical) - 10, 10, 5 units of Botox at 3 site/s.   Left upper Trapezius (upper cervical) - 10, 10, 5 units of Botox at 3 site/s.      Right cervical paraspinals - 5 units of Botox at 2 site/s.   Left cervical paraspinals - 5 units of Botox at 2 site/s.      Left occipitalis - 5 units of Botox at 2 site/s.  Right occipitalis -5 units of Botox at 2 site/s     Right Frontalis - 5 units of Botox at 2 site/s.  Left  Frontalis - 5 units of Botox at 2 site/s.     Right Temporalis - 5 units of Botox at 3 site/s.  Left Temporalis - 5 units of Botox at 3 site/s.     Right  - 5 units of Botox at 1 site/s.              Left  - 5 units of Botox at 1 site/s.  Masseter right and left -5 units each      Procerus - 5 units of Botox at 1 site/s.     RESPONSE TO PROCEDURE:  tolerated the procedure well and there were no immediate complications.  Patient was allowed to recover for an appropriate period of time and was discharged home in stable condition.     FOLLOW UP:  Repeat Botox injections in 12 weeks        This procedure was performed under a hospital privileging agreement with Dr. Vanegas        Again, thank you for allowing me to participate in the care of your patient.      Sincerely,    CARISSA Tang CNP

## 2023-08-11 ENCOUNTER — TELEPHONE (OUTPATIENT)
Dept: NEUROLOGY | Facility: CLINIC | Age: 48
End: 2023-08-11
Payer: COMMERCIAL

## 2023-08-11 NOTE — TELEPHONE ENCOUNTER
Prior Authorization Retail Medication Request    Medication/Dose: ubrogepant (UBRELVY) 100 MG tablet 16 tablet  ICD code (if different than what is on RX):  :rizatriptan or sumatriptan and somedays both the same day  Sumatriptan works when catches quick enough   NSAIDs -a couple of tablets of ibuprofen in the week and does not help with the pain and does not tylenol in the last 6 month   Ondansetron helps and   Has had PT     Has tried Ubrelvy 50mg needs 100mg  Rationale: migraine     Insurance Name: [facility]  Insurance ID: [id number]       Pharmacy Information (if different than what is on RX)  Name:    Phone:

## 2023-08-17 ENCOUNTER — TELEPHONE (OUTPATIENT)
Dept: RHEUMATOLOGY | Facility: CLINIC | Age: 48
End: 2023-08-17
Payer: COMMERCIAL

## 2023-08-17 DIAGNOSIS — M32.9 SYSTEMIC LUPUS ERYTHEMATOSUS, UNSPECIFIED SLE TYPE, UNSPECIFIED ORGAN INVOLVEMENT STATUS (H): ICD-10-CM

## 2023-08-17 RX ORDER — HYDROXYCHLOROQUINE SULFATE 200 MG/1
200 TABLET, FILM COATED ORAL 2 TIMES DAILY
Qty: 180 TABLET | Refills: 0 | Status: SHIPPED | OUTPATIENT
Start: 2023-08-17 | End: 2023-11-22

## 2023-08-17 NOTE — TELEPHONE ENCOUNTER
Medication: hydroxychloroquine (PLAQUENIL) 200 MG tablet    Last Written Prescription Date:  5/20/23  Last Fill Quantity: 180,   # refills: 0  Last Office Visit: 2/09/23  Future Office visit:  12/08/23           Plaquenil Eye Exam    Date of last eye exam specifically commenting on HCQ toxicity: 6/12/2023  Clinic: [facility]   Ophthalmologist: Melchor German       Routing refill request to provider for review/approval because:  Meets clinic protocol for med refill    Yasmine Hoskins RN  Rheumatology Clinic

## 2023-08-17 NOTE — TELEPHONE ENCOUNTER
Central Prior Authorization Team   Phone: [phone]    PA Initiation    Medication: UBRELVY 100 MG PO TABS  Insurance Company: [employer] - Phone [phone] Fax [phone]  Pharmacy Filling the Rx: [id number] - [place], [place] - [address] AT [facility] [address]  Filling Pharmacy Phone: [phone]  Filling Pharmacy Fax:    Start Date: 8/17/2023

## 2023-08-19 ENCOUNTER — HEALTH MAINTENANCE LETTER (OUTPATIENT)
Age: 48
End: 2023-08-19

## 2023-08-19 NOTE — TELEPHONE ENCOUNTER
Prior Authorization Approval    Medication: UBRELVY 100 MG PO TABS  Authorization Effective Date: 8/17/2023  Authorization Expiration Date: 5/17/2024  Approved Dose/Quantity:   Reference #:     Insurance Company: [employer] - Phone [phone] Fax [phone]  Expected CoPay:       CoPay Card Available:      Financial Assistance Needed:   Which Pharmacy is filling the prescription: [id number] - [place], [place] - [address] AT [facility] [address]  Pharmacy Notified: Yes  Patient Notified: No

## 2023-09-12 NOTE — TELEPHONE ENCOUNTER
DIAGNOSIS: Hand arthritis/ Josh Roy @ [facility]/ [employer]/ No Imaging    APPOINTMENT DATE: 09/27/23   NOTES STATUS DETAILS   OFFICE NOTE from referring provider Internal 09/08/23 - Josh Ryo MD   MEDICATION LIST Internal

## 2023-09-13 ENCOUNTER — TELEPHONE (OUTPATIENT)
Dept: RHEUMATOLOGY | Facility: CLINIC | Age: 48
End: 2023-09-13
Payer: COMMERCIAL

## 2023-09-13 NOTE — TELEPHONE ENCOUNTER
Call to patient to offer and confirm appt with Dr. Roy tomorrow 9/14 at 1600 in person.    All questions answered.    Tali Baldwin, NOBLEN, RN  RN Care Coordinator Rheumatology

## 2023-09-14 ENCOUNTER — LAB (OUTPATIENT)
Dept: LAB | Facility: CLINIC | Age: 48
End: 2023-09-14
Payer: COMMERCIAL

## 2023-09-14 ENCOUNTER — OFFICE VISIT (OUTPATIENT)
Dept: RHEUMATOLOGY | Facility: CLINIC | Age: 48
End: 2023-09-14
Attending: INTERNAL MEDICINE
Payer: COMMERCIAL

## 2023-09-14 VITALS
HEART RATE: 95 BPM | BODY MASS INDEX: 31.86 KG/M2 | TEMPERATURE: 97.7 F | OXYGEN SATURATION: 98 % | SYSTOLIC BLOOD PRESSURE: 124 MMHG | RESPIRATION RATE: 18 BRPM | DIASTOLIC BLOOD PRESSURE: 86 MMHG | HEIGHT: 67 IN

## 2023-09-14 DIAGNOSIS — M32.9 SYSTEMIC LUPUS ERYTHEMATOSUS, UNSPECIFIED SLE TYPE, UNSPECIFIED ORGAN INVOLVEMENT STATUS (H): ICD-10-CM

## 2023-09-14 DIAGNOSIS — Z51.81 ENCOUNTER FOR MEDICATION MONITORING: ICD-10-CM

## 2023-09-14 DIAGNOSIS — M19.049 HAND ARTHRITIS: ICD-10-CM

## 2023-09-14 DIAGNOSIS — Z79.52 CURRENT CHRONIC USE OF SYSTEMIC STEROIDS: ICD-10-CM

## 2023-09-14 DIAGNOSIS — M32.9 SYSTEMIC LUPUS ERYTHEMATOSUS, UNSPECIFIED SLE TYPE, UNSPECIFIED ORGAN INVOLVEMENT STATUS (H): Primary | ICD-10-CM

## 2023-09-14 LAB
ALBUMIN MFR UR ELPH: <6 MG/DL
ALBUMIN SERPL BCG-MCNC: 4.6 G/DL (ref 3.5–5.2)
ALBUMIN UR-MCNC: NEGATIVE MG/DL
ALT SERPL W P-5'-P-CCNC: 25 U/L (ref 0–50)
APPEARANCE UR: CLEAR
AST SERPL W P-5'-P-CCNC: 21 U/L (ref 0–45)
BASOPHILS # BLD AUTO: 0.1 10E3/UL (ref 0–0.2)
BASOPHILS NFR BLD AUTO: 1 %
BILIRUB UR QL STRIP: NEGATIVE
COLOR UR AUTO: ABNORMAL
CREAT SERPL-MCNC: 0.76 MG/DL (ref 0.51–0.95)
CREAT UR-MCNC: 24.3 MG/DL
CRP SERPL-MCNC: <3 MG/L
EGFRCR SERPLBLD CKD-EPI 2021: >90 ML/MIN/1.73M2
EOSINOPHIL # BLD AUTO: 0.1 10E3/UL (ref 0–0.7)
EOSINOPHIL NFR BLD AUTO: 1 %
ERYTHROCYTE [DISTWIDTH] IN BLOOD BY AUTOMATED COUNT: 13.1 % (ref 10–15)
ERYTHROCYTE [SEDIMENTATION RATE] IN BLOOD BY WESTERGREN METHOD: 4 MM/HR (ref 0–20)
GLUCOSE UR STRIP-MCNC: NEGATIVE MG/DL
HCT VFR BLD AUTO: 44.3 % (ref 35–47)
HGB BLD-MCNC: 14.7 G/DL (ref 11.7–15.7)
HGB UR QL STRIP: ABNORMAL
IMM GRANULOCYTES # BLD: 0.1 10E3/UL
IMM GRANULOCYTES NFR BLD: 1 %
KETONES UR STRIP-MCNC: NEGATIVE MG/DL
LEUKOCYTE ESTERASE UR QL STRIP: NEGATIVE
LYMPHOCYTES # BLD AUTO: 1.9 10E3/UL (ref 0.8–5.3)
LYMPHOCYTES NFR BLD AUTO: 18 %
MCH RBC QN AUTO: 29.6 PG (ref 26.5–33)
MCHC RBC AUTO-ENTMCNC: 33.2 G/DL (ref 31.5–36.5)
MCV RBC AUTO: 89 FL (ref 78–100)
MONOCYTES # BLD AUTO: 0.6 10E3/UL (ref 0–1.3)
MONOCYTES NFR BLD AUTO: 6 %
NEUTROPHILS # BLD AUTO: 8.2 10E3/UL (ref 1.6–8.3)
NEUTROPHILS NFR BLD AUTO: 73 %
NITRATE UR QL: NEGATIVE
NRBC # BLD AUTO: 0 10E3/UL
NRBC BLD AUTO-RTO: 0 /100
PH UR STRIP: 5 [PH] (ref 5–7)
PLATELET # BLD AUTO: 283 10E3/UL (ref 150–450)
PROT/CREAT 24H UR: NORMAL MG/G{CREAT}
RBC # BLD AUTO: 4.97 10E6/UL (ref 3.8–5.2)
RBC URINE: <1 /HPF
SP GR UR STRIP: 1.01 (ref 1–1.03)
SQUAMOUS EPITHELIAL: <1 /HPF
UROBILINOGEN UR STRIP-MCNC: NORMAL MG/DL
WBC # BLD AUTO: 11 10E3/UL (ref 4–11)
WBC URINE: <1 /HPF

## 2023-09-14 PROCEDURE — 81001 URINALYSIS AUTO W/SCOPE: CPT | Performed by: PATHOLOGY

## 2023-09-14 PROCEDURE — 86160 COMPLEMENT ANTIGEN: CPT | Performed by: INTERNAL MEDICINE

## 2023-09-14 PROCEDURE — 99000 SPECIMEN HANDLING OFFICE-LAB: CPT | Performed by: PATHOLOGY

## 2023-09-14 PROCEDURE — 85652 RBC SED RATE AUTOMATED: CPT | Performed by: PATHOLOGY

## 2023-09-14 PROCEDURE — 84156 ASSAY OF PROTEIN URINE: CPT | Performed by: PATHOLOGY

## 2023-09-14 PROCEDURE — 84450 TRANSFERASE (AST) (SGOT): CPT | Performed by: PATHOLOGY

## 2023-09-14 PROCEDURE — 99213 OFFICE O/P EST LOW 20 MIN: CPT | Performed by: INTERNAL MEDICINE

## 2023-09-14 PROCEDURE — 82040 ASSAY OF SERUM ALBUMIN: CPT | Performed by: PATHOLOGY

## 2023-09-14 PROCEDURE — 84460 ALANINE AMINO (ALT) (SGPT): CPT | Performed by: PATHOLOGY

## 2023-09-14 PROCEDURE — 82565 ASSAY OF CREATININE: CPT | Performed by: PATHOLOGY

## 2023-09-14 PROCEDURE — 86225 DNA ANTIBODY NATIVE: CPT | Performed by: INTERNAL MEDICINE

## 2023-09-14 PROCEDURE — 36415 COLL VENOUS BLD VENIPUNCTURE: CPT | Performed by: PATHOLOGY

## 2023-09-14 PROCEDURE — 86140 C-REACTIVE PROTEIN: CPT | Performed by: PATHOLOGY

## 2023-09-14 PROCEDURE — 99214 OFFICE O/P EST MOD 30 MIN: CPT | Performed by: INTERNAL MEDICINE

## 2023-09-14 PROCEDURE — 85025 COMPLETE CBC W/AUTO DIFF WBC: CPT | Performed by: PATHOLOGY

## 2023-09-14 ASSESSMENT — PAIN SCALES - GENERAL: PAINLEVEL: MODERATE PAIN (5)

## 2023-09-14 NOTE — LETTER
9/14/2023       RE: Patricia Hall  [address]  [place] [address]     Dear Colleague,    Thank you for referring your patient, Patricia Hall, to the [facility] RHEUMATOLOGY [facility] [place] at [facility]. Please see a copy of my visit note below.    Rheumatology In Person Urgent Visit Note    Reason for visit: Lupus    First Consult: 10/12/2017    Last visit: 2/9/2023  DOS: 9/14/2023    Original HPI (10/12/2017):  Patricia Hall is a 42 year old female who was referred to our clinic for evaluation and management of her SLE. The patient has been following with Dr. Mao for her SLE    History obtain from chart review and patient interview    Her lupus symptoms first started in during high school including fatigue and rash. She was diagnosed with lupus in early 2000s and she was in graduate school and presented with a few years of arthralgias involving knees and ankles and hands.  She had developed new onset Raynaud's phenomenon in which her fingers turned white in the cold but no digital sores.  VINITA was 1:160.  All specific autoantibodies and rheumatoid serologies negative.  She had a rash on her face, including cheeks and bridge of her nose.  She followed with Dr. Tejal Mayen in Dermatology.  A biopsy of a lesion from the nose was non-diagnostic.  Diagnosis was earlysigns of cutaneous lupus versus acne rosacea.  She was treated with Elidel cream.  She reported intermittent oral ulcers and significant fatigue as well as intermittent episodes of hair loss.  She was started on prednisone in 2003 along with Plaquenil.  She has been maintained on prednisone 5 mg q day with intermittent bursts up as high as 30 mg for a short time.  She has found it very difficult to taper off of prednisone because she gets flare-ups of skin rash, arthralgias, and fatigue. Patient reports significant symptoms during the summers and reports  significant flares this summer with photosensitivity with face rash,  Fatigue and join pain (knees and toes and hips).  She had fevers, mouth sores  And also reports increased hair loss.  Increased pain with walking and morning stiffness with Fibromyalgia burning in the morning. Currently taking prednisone 10 mg qd and plaquenil 200 mg BID.    Patient reports complicated fertility/OBGYN history with stage four endometriosis, fibroids and one pregnancy resulting in a miscarriage. Three rounds of IVF with one banked viable egg. She is currently undergoing fertility evaluation.  She has been working with infertility specialist for years. She currently has one viable egg and would like to undergo one more episode of IVF. She was seen by an autoimmune OB/GYN specialist in [place] (Daya Frost 02/17) for extensive testing. She was found to be heterozygous for MTHFR mutation. She is now taking Matanx (L-methyl folate). She has noticed less bruising since she started this. Metformin was recommended, but it upsets her stomach. DHEA was recommended, but she is not taking it. Her thyroid studies were normal, but she was started on Synthroid 25  g daily for the TSH to be less than 2.0. It was recommended that she take Lovenox prior to IFV and throughout the pregnancy to avoid risk of miscarriage. It was also recommended that she be treated with IVIG prior to IVF. 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  She plans to proceeded further with in vitro plans, but wants to get better control of her flaring lupus and concerns for IVF treatments/hormones.     Interval HPI (7/20/2018):  Repeatedly ill during the winter with both URIs and flares of disease (joint pain, malaise, fatigue). Still undergoing IVF care via MFM and reproductive immunology. Did not initiate azathioprine following previous visit with Dr. Roy due to worry over negative effects on  "pregnancy. Currently undergoing medication treatment for IVF, is picking up medications this weekend for stimulation.    Symptomatically, her recent flares include fatigue, flu-like symptoms (fever, chills, sweats), polyarticular joint pain (fingers, toes, feet, ankles, wrists, elbows).    Recently, she has experienced further hair loss (clumps in shower), ulcers on buccal mucosa (now resolved), fatigue, malaise, significant B/L hip pain (worse from previous, approx 1 year). Specifically, it is constant deep joint pain in hips, would say 8/10 in severity when it occurs during movement. Has been seriously disrupting her daily activities. Has been using tylenol to control pain without complete relief.     Has seen Reproductive Immunology in the interim, has undergone two rounds of IVIG (believed to help with NK cell and cytokine activity in embryo implantation), first IVIG May 14th, then second was July 16th. Had flu-like symptoms with first infusion (HA, myalgias, malaise, felt \"gross\", lower back pain, neck pain). During the 2nd infusion, they slowed infusion, took benadryl/tylenol/upped prednisone to 20 mg she did not experience SEs with this. Overall, felt that IVIG improved her lupus symptoms globally. Short-lived relief. Plan is to use IVIG monthly with monitoring of cytokine levels and NK cell counts.     Currently on 15 mg of prednisone chronically, > 1 year. Today, she would say her disease is mild-moderately active in spite of the 15 mg prednisone. Currently on 400-500 U of vitamin D. Taking 1000 mg calcium.     ROS:  (-) pleurisy, sob, cough, hematuria, dysuria, eye redness, nose bleeds, easy bruising, malar rash  (+) alternating diarrhea/constipation, dry eye, dry mouth, palatal ulcers/petechiae    Is interested in discussing SLE management (Imuran) while pursuing IVF. Will be undergoing planning and another round of IVF stimulation in August.       8/21/2019: She did another round of IVF in 8/2019, no " good embryo. IVF caused her flare ups. For flare ups, gets pain over knees, knuckles and toes with extreme fatigue and photosensitivity.    Had laparoscopic surgery for endometriosis in 4/2019.    Since 4/2019, has been on OC and has not been able to taper prednisone own.    Today, she is on prednisone 15 mg every day x 2-3 weeks. Before that, most of the time, she was on 20 mg every day.    No rash today.    Has pain over knuckles, toes, knees. Has morning flu like sx in AM x 2 hours.    Has extreme fatigue.    She was getting IVIG qmonthly, till 1/2019.    She is going to resume monthly IVIG for successful pregnancy.    11/27/2019: Started IVIG on 9/8/2019, her eyes swelled up, 2 days later had severe LBP/SI joint pain. Was doing keto diet at that time, had headaches.now has sciatica pain on the R side. Did not have this reaction with IVIG before.    Late Oct/early Nov, had malar rash, hair loss.    Had LE edema, went up on her prednisone to 30-40 mg a day x few days. Back to her baseline hair loss and baseline prednisone 20 mg every day.      R SI joint pain is better, but still has it, uses topical pain cream. It is the worst in AM.    Still has swollen ankles.    This edema is new for her.    Off birth control pills. Not on IVF tx either.    Her lupus is back to her baseline.    Has not started AZA yet, but not thinks she is ready to try it.      10/8/2021:    Gracie chose not to take AZA with concern for SEs buts he would like to try benlysta. Continues to have active lupus and can not taper prednisone off.    Dealing with back pain, sciatica, gallstones. Gets vol retention, swelling, LE edema. Ankles and eyes swell up. Has gained weight. Feels stiff. Had diarrhea, stomach pain but now it is improved.      Sciatica is now better. No skin rash or major hair loss today. Takes prednisone 20 mg every day. Has sun sensitivity. Sometimes increases prednisone to 40 mg every day.    Reports small joint pain 5-6/10, has  night and 1 hr am stiffness with swollen feet. Gets jaw pain, migraines, pleurisy. Considers surrogate.      7/1/2022:     Gracie presents for follow up.    Had pelvis, L spine MRI because of having LBP x years. every time she walked over soft grass, injured herself. Was found to have degenerative changes, full MRI report is below. Going to do follow up with her spine doctor. Doing physical therapy. The top of her spine is better. Has A1C elevated.    She is on prednisone 17 mg every day, tries to work through it . Today is a good day, yesterday was a rough day. Has hard time tapering prednisone down.    Dr. Mendez put her on gabapentin.    She is now on benlysta since 11/2021, thinks it is helping her. Thinks benlysta affected her menses, had it twice. Reports edema, weight gain on it. She did not have this extra weight, LEs edema before benlysta even on prednisone 20 mg every day. It helps with energy, when she takes it, feels better for couple of days.    Urine frequency, urgency is less on benlysta, has fluid retention.    Smelling chemical feeling is better on benlsyta.    Took AZA 50 mg every day x 2 months, no SEs and she would estrella to re-try it. When she started gabapentin, stopped AZA abut wants to re-try it.    Has pain, fatigue in her legs, it is better now. She thinks that it might be caused by benlysta. Had the most intense muscle pain, took muscle relaxant and it helped.    No skin rash or oral ulcers.    Takes prednisone for pain in small joints, fingers, toes and elbows. It is different from back pain.    Feels like her whole body is in a flare by going down on prednisone, feels getting a fever and diffuse pain along with fatigue.    Has bump over R 3rd finger which hurts, swells up.    Has unhealed ulcer over big toe, does follow up with a provider.    Toenails are the same.    Looking for surrogate.    Had eye exam.      2/9/2023: Gracie is here for follow up, she likes to try cellcept given the fact  that she has found a surrogate and will not get pregnant herself. Also would like to taper prednisone down.    Stopped AZA and benlysta around cayetano.    Resumed benlysta after 2 wk, not AZA. Feels AZA is not helping, would like to try cellcept.    Dropped prednisone down to 15 mg every day, about 3 mo ago.    Off HCQ x 1 year.    2-3 months of ankle swelling has improved.    Feels swollen in her face, chest, arms, but better by going down on prednisone.    Tolerates benlysta ok.    Still Sun sensitive.    Has fatigue, joint pain affecting fingers and ankles.    Has endometriosis pain, migraines, spine pain.    Would like to try ozempic.        Today 9/14/2023:    -urgent visit for evaluation of B/L hand weakness, drops objects, simple tasks like buttoning shirts are hard to do    -R cheek feels swollen    -has R jaw pain    -R 2 fingers feel weak    -on prednisone 13 mg every day, has hard time tapering down further, lupus sx of rash/joint pain recurs, has not started cellcept yet, but now is ready to do it                  ROS:  A comprehensive ROS was done, positives are per HPI.  Past Medical History:   Diagnosis Date    Allergy, unspecified not elsewhere classified     Endometriosis     Fibromyalgia     Migraine without aura, with intractable migraine, so stated, without mention of status migrainosus     Myalgia and myositis, unspecified     Systemic lupus erythematosus (H)      Past Surgical History:   Procedure Laterality Date    SURGICAL HISTORY OF -   01/01/1986    left elbow fracture repair     Family History   Problem Relation Age of Onset    Lipids Mother     Thyroid Cancer Mother 50 - 60    Dementia Mother     Prostate Cancer Father 80    Diabetes Maternal Grandfather     Skin Cancer Paternal Grandmother     Melanoma No family hx of      Social History     Socioeconomic History    Marital status:      Spouse name: Not on file    Number of children: Not on file    Years of education: Not on  file    Highest education level: Not on file   Occupational History    Not on file   Tobacco Use    Smoking status: Never    Smokeless tobacco: Never   Substance and Sexual Activity    Alcohol use: Not Currently    Drug use: No    Sexual activity: Yes     Partners: Male     Birth control/protection: Condom   Other Topics Concern    Parent/sibling w/ CABG, MI or angioplasty before 65F 55M? 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    Chronic headaches    Diminished ovarian reserve    Disorder of connective tissue (H)    Disease of immune system (H)    Endometriosis of uterus    Female infertility    History of environmental allergies    Hyperlipidemia    Irritable bowel syndrome    Major depressive disorder, recurrent episode, in full remission (H)    Major depressive disorder, recurrent episode, mild (H)    Myalgia    Raynaud's disease    Recurrent pregnancy loss without current pregnancy    Seasonal allergies    Primary fibromyalgia syndrome    Uterine leiomyoma    Sciatica of right side    Numbness and tingling of both lower extremities    Sleep apnea    TMJ (temporomandibular joint syndrome)    Cervical cancer screening     Allergies   Allergen Reactions    Celery Oil Anaphylaxis, Difficulty breathing and Shortness Of Breath     swelling      Celis Anaphylaxis    Dairy Aid  [Tilactase]      Other reaction(s): Arthralgia  (Joint Pain)    Gluten Meal      Other reaction(s): Abdominal Pain    No Clinical Screening - See Comments Anaphylaxis and Itching    Pistachio Nut Extract Skin Test Anaphylaxis    Brassica Oleracea      Other reaction(s): Abdominal Pain  Other reaction(s): Abdominal Pain    Soybean Oil GI Disturbance    Penicillins Hives and Rash    Sulfa Antibiotics Hives and Rash       Outpatient Encounter Medications as of 9/14/2023   Medication Sig Dispense Refill    RACHNA SYRUP PO  (Patient not taking: Reported on 1/23/2023)      albuterol (PROAIR HFA/PROVENTIL HFA/VENTOLIN HFA) 108 (90 Base) MCG/ACT inhaler  (Patient not taking: Reported on 1/23/2023)      AMBIEN 10 MG OR TABS 1 po HS, prn 20 0    Belimumab (BENLYSTA) 200 MG/ML SOSY Inject 200 mg Subcutaneous every 7 days 12 mL 3    calcium carbonate (OS-VAHID 500 MG Teller. CA) 1250 MG tablet Take 1 tablet by mouth daily      ciclopirox (PENLAC) 8 % external solution Apply to adjacent skin and affected nails daily.  Remove with alcohol every 7 days, then repeat. (Patient not taking: Reported on 1/23/2023) 6.6 mL 11    cyclobenzaprine (FLEXERIL) 10 MG tablet Take 1 tablet (10 mg) by mouth 3 times daily as needed for muscle spasms (Patient not taking: Reported on 4/4/2023) 30 tablet 11    EPINEPHrine (ANY BX GENERIC EQUIV) 0.3 MG/0.3ML injection 2-pack Inject 0.3 mLs (0.3 mg) into the muscle as needed for anaphlaxis 2 each 0    hydroxychloroquine (PLAQUENIL) 200 MG tablet Take 1 tablet (200 mg) by mouth 2 times daily 180 tablet 0    L-Methylfolate-Algae-B12-B6 (METANX PO) Take 1,000 mg by mouth daily (Patient not taking: Reported on 4/4/2023)      MULTIVITAMIN TABS   OR   0    mycophenolate (GENERIC EQUIVALENT) 500 MG tablet Take 1 tablet (500 mg) by mouth 2 times daily (Patient not taking: Reported on 4/4/2023) 60 tablet 1    Omega-3 Fatty Acids (OMEGA 3 PO) Take 1,250 mg by mouth daily (Patient not taking: Reported on 4/4/2023)      omeprazole (PRILOSEC) 40 MG DR capsule Take 1  capsule (40 mg) by mouth daily (Patient not taking: Reported on 4/4/2023) 60 capsule 1    ondansetron (ZOFRAN) 8 MG tablet Take one tablet up to three times daily for nausea. 90 tablet 1    predniSONE (DELTASONE) 1 MG tablet 12.5-10 mg a day, each course x 1 week then 9-8-7-6 mg a day, each course x 1 month then 5 mg a day, use with 5 mg size tab 360 tablet 1    predniSONE (DELTASONE) 5 MG tablet 12.5-10 mg a day, each course x 1 week then 9-8-7-6 mg a day, each course x 1 month then 5 mg a day, use with 1 mg size tab 150 tablet 5    semaglutide (OZEMPIC) 2 MG/1.5ML SOPN pen Inject 0.25 mg Subcutaneous every 7 days Increase to 0.5 mg after 2-3 weeks if tolerating. 1.5 mL 11    SPIRULINA PO Take by mouth daily      SUMAtriptan (IMITREX STATDOSE) 6 MG/0.5ML pen injector kit Inject 0.5 mLs (6 mg) Subcutaneous at onset of headache for migraine May repeat in 1 hour. Max 12 mg/24 hours. 3 kit 11    traMADol (ULTRAM) 50 MG tablet Take 1 tablet (50 mg) by mouth every 6 hours as needed for severe pain Take 1-2 tablets up to three times a day as needed - Oral 180 tablet 5    traMADol (ULTRAM-ER) 300 MG 24 hr tablet Take 1 tablet (300 mg) by mouth At Bedtime maximum 1 tablet(s) per day 30 tablet 5    triamcinolone (KENALOG) 0.1 % paste Take by mouth 2 times daily (Patient not taking: Reported on 1/23/2023) 5 g 3    triamcinolone (NASACORT) 55 MCG/ACT nasal aerosol Spray 2 sprays into both nostrils daily 16.5 mL 11    ubrogepant (UBRELVY) 100 MG tablet Take 1 tablet (100 mg) by mouth at onset of headache (May repeat in 2 hours as needed. Max 2 tabs in 24 hours) 16 tablet 11    VITAMIN D, CHOLECALCIFEROL, PO Take 5,000 Units by mouth daily (Patient not taking: Reported on 4/4/2023)       Facility-Administered Encounter Medications as of 9/14/2023   Medication Dose Route Frequency Provider Last Rate Last Admin    Botulinum Toxin Type A (BOTOX) 200 units injection 155 Units  155 Units Intramuscular See Admin Instructions Giuseppe  Debora Dent, APRN CNP   155 Units at 08/10/23 0711         Her records were reviewed.    Component      Latest Ref Rng & Units 2/10/2022 5/17/2022   WBC      4.0 - 11.0 10e3/uL 11.6 (H) 10.7   RBC Count      3.80 - 5.20 10e6/uL 4.86 5.03   Hemoglobin      11.7 - 15.7 g/dL 14.8 14.9   Hematocrit      35.0 - 47.0 % 44.5 45.9   MCV      78 - 100 fL 92 91   MCH      26.5 - 33.0 pg 30.5 29.6   MCHC      31.5 - 36.5 g/dL 33.3 32.5   RDW      10.0 - 15.0 % 12.8 12.5   Platelet Count      150 - 450 10e3/uL 242 267   % Neutrophils      % 76    % Lymphocytes      % 16    % Monocytes      % 5    % Eosinophils      % 0    % Basophils      % 1    % Immature Granulocytes      % 2    NRBCs per 100 WBC      <1 /100 0    Absolute Neutrophils      1.6 - 8.3 10e3/uL 8.9 (H)    Absolute Lymphocytes      0.8 - 5.3 10e3/uL 1.8    Absolute Monocytes      0.0 - 1.3 10e3/uL 0.6    Absolute Eosinophils      0.0 - 0.7 10e3/uL 0.0    Absolute Basophils      0.0 - 0.2 10e3/uL 0.1    Absolute Immature Granulocytes      <=0.4 10e3/uL 0.2    Absolute NRBCs      10e3/uL 0.0    Sodium      133 - 144 mmol/L  141   Potassium      3.4 - 5.3 mmol/L  4.3   Chloride      94 - 109 mmol/L  102   Carbon Dioxide      20 - 32 mmol/L  32   Anion Gap      3 - 14 mmol/L  7   Urea Nitrogen      7 - 30 mg/dL  19   Creatinine      0.52 - 1.04 mg/dL 0.72 0.80   Calcium      8.5 - 10.1 mg/dL  9.6   Glucose      70 - 99 mg/dL  128 (H)   Alkaline Phosphatase      40 - 150 U/L  73   AST      0 - 45 U/L 25 23   ALT      0 - 50 U/L 47 44   Protein Total      6.8 - 8.8 g/dL  7.4   Albumin      3.4 - 5.0 g/dL 3.8 3.9   Bilirubin Total      0.2 - 1.3 mg/dL  0.3   GFR Estimate      >60 mL/min/1.73m2 >90 >90   SARS-CoV-2 Rigo Ab, Interp, S       Positive    SARS-CoV-2 Rigo Ab, Quant, S      U/mL >250    Patient's Race       Unknown    Patient's Ethnicity       Unknown    Protein Random Urine      g/L <0.05    Protein Total Urine g/gr Creatinine           Creatinine  "Urine      mg/dL 13    % Retic      0.5 - 2.0 % 1.6    Absolute Retic      0.025 - 0.095 10e6/uL 0.080    DNA-ds      <10.0 IU/mL <0.6    Complement C4      13 - 39 mg/dL 34    Complement C3      81 - 157 mg/dL 135    Sed Rate      0 - 20 mm/hr 7    CRP Inflammation      0.0 - 8.0 mg/L 3.0    Hemoglobin A1C      0.0 - 5.6 %  5.8 (H)     Pregnancy: She is . H/o OCP and HRT use, see HPI    Ph.E:    /86 (BP Location: Right arm, Patient Position: Sitting, Cuff Size: Adult Regular)   Pulse 95   Temp 97.7  F (36.5  C) (Oral)   Resp 18   Ht 1.698 m (5' 6.85\")   SpO2 98%   BMI 31.86 kg/m        Constitutional: WD/WN. Pleasant, NAD.    Eyes: EOM intact, sclera anicteric, conj not injected   HEENT: no oral ulcers  Chest: CTAB  CV: no M/R/G, RRR  Abdomen: soft, NT  MS: No active synovitis over hands, could make full fist. + [place]ber[place] nodes. Poor .  Skin: no rash.  Neuro: A&O x 3. Grossly non focal  Psych: NL affect    Assessment/Plan     1-SLE FLARE/active  2-HCQ monitoring  3-AZA monitoring  4-Benlysta monitoring    SLE, dx in  (VINITA: 1:80, dsDNA +, Smith negative, normal complements, joint pains, malar rash, oral ulcers), usually flares approximately monthly with joint pains, pleurisy and malar rash. Currently, feels that disease is active. Has been on chronic 20 mg every day prednisone, gained weight, gets LE edema, looks cushingoid. Still planning for PGS normal embryo implantation, might consider surrogate. Had a reaction to IVIG which was given for infertility tx and is not going to get it again. Her back pain seems to be sciatica and not related to SLE.    Previous visits, was advised to try AZA as steroid sparing agent (I am concerned about long term steroid SE) but did not start with concern for potential SE but willing to try benlysta, especially with planning for surrogate pregnancy. Labs are stable.    2022: Gracie is on benlysta since last visit with some benefit, but can not taper prednisone " below 17 mg every day due to increased joint pain by taper. Briefly tried AZA 1 tab a day, no SEs, willing to re-try it, will resume it at higher dose.        2/9/2023: Failed AZA, which was chosen over cellcept due to pregnancy planning via IVF. Will try cellcept as steroid sparing agent to allow tapering prednisone down; risks were discussed. She going to proceed with pregnancy via surrogate; therefore she will not get pregnant herself. She misunderstood and stopped HCQ, thought we were switching tx, will resume. Discussed its benefits.     Today 9/14/2023: B/L hand poor /loss of strength seems to be OA related, discussed mx. She is willing to try cellcept to allow tapering prednisone off; risks were discussed.    Plan:    Continue plaquenil 200 mg twice a day with yearly eye exam    Continue benlysta sub q inj qwk    Continue prednisone 13 mg every day, plan is to taper after start of cellcept, 1 mg down q2-4 weeks.    Refer to OT    DEXA bone density    Labs today and small blood work for cellcept 1 month after start of cellcept    Start cellcept 1 tab twice a day    Keep ortho appointment    Keep 12/2023 appointment    Josh Roy MD

## 2023-09-14 NOTE — PROGRESS NOTES
Rheumatology In Person Urgent Visit Note    Reason for visit: Lupus    First Consult: 10/12/2017    Last visit: 2/9/2023  DOS: 9/14/2023    Original HPI (10/12/2017):  Patricia Hall is a 42 year old female who was referred to our clinic for evaluation and management of her SLE. The patient has been following with Dr. Mao for her SLE    History obtain from chart review and patient interview    Her lupus symptoms first started in during high school including fatigue and rash. She was diagnosed with lupus in early 2000s and she was in graduate school and presented with a few years of arthralgias involving knees and ankles and hands.  She had developed new onset Raynaud's phenomenon in which her fingers turned white in the cold but no digital sores.  VINITA was 1:160.  All specific autoantibodies and rheumatoid serologies negative.  She had a rash on her face, including cheeks and bridge of her nose.  She followed with Dr. Tejal Mayen in Dermatology.  A biopsy of a lesion from the nose was non-diagnostic.  Diagnosis was earlysigns of cutaneous lupus versus acne rosacea.  She was treated with Elidel cream.  She reported intermittent oral ulcers and significant fatigue as well as intermittent episodes of hair loss.  She was started on prednisone in 2003 along with Plaquenil.  She has been maintained on prednisone 5 mg q day with intermittent bursts up as high as 30 mg for a short time.  She has found it very difficult to taper off of prednisone because she gets flare-ups of skin rash, arthralgias, and fatigue. Patient reports significant symptoms during the summers and reports significant flares this summer with photosensitivity with face rash,  Fatigue and join pain (knees and toes and hips).  She had fevers, mouth sores  And also reports increased hair loss.  Increased pain with walking and morning stiffness with Fibromyalgia burning in the morning. Currently taking prednisone 10 mg qd and plaquenil 200  mg BID.    Patient reports complicated fertility/OBGYN history with stage four endometriosis, fibroids and one pregnancy resulting in a miscarriage. Three rounds of IVF with one banked viable egg. She is currently undergoing fertility evaluation.  She has been working with infertility specialist for years. She currently has one viable egg and would like to undergo one more episode of IVF. She was seen by an autoimmune OB/GYN specialist in [place] (Daya Frost 02/17) for extensive testing. She was found to be heterozygous for MTHFR mutation. She is now taking Matanx (L-methyl folate). She has noticed less bruising since she started this. Metformin was recommended, but it upsets her stomach. DHEA was recommended, but she is not taking it. Her thyroid studies were normal, but she was started on Synthroid 25  g daily for the TSH to be less than 2.0. It was recommended that she take Lovenox prior to IFV and throughout the pregnancy to avoid risk of miscarriage. It was also recommended that she be treated with IVIG prior to IVF. 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  She plans to proceeded further with in vitro plans, but wants to get better control of her flaring lupus and concerns for IVF treatments/hormones.     Interval HPI (7/20/2018):  Repeatedly ill during the winter with both URIs and flares of disease (joint pain, malaise, fatigue). Still undergoing IVF care via MFM and reproductive immunology. Did not initiate azathioprine following previous visit with Dr. Roy due to worry over negative effects on pregnancy. Currently undergoing medication treatment for IVF, is picking up medications this weekend for stimulation.    Symptomatically, her recent flares include fatigue, flu-like symptoms (fever, chills, sweats), polyarticular joint pain (fingers, toes, feet, ankles, wrists, elbows).    Recently, she has experienced further hair  "loss (clumps in shower), ulcers on buccal mucosa (now resolved), fatigue, malaise, significant B/L hip pain (worse from previous, approx 1 year). Specifically, it is constant deep joint pain in hips, would say 8/10 in severity when it occurs during movement. Has been seriously disrupting her daily activities. Has been using tylenol to control pain without complete relief.     Has seen Reproductive Immunology in the interim, has undergone two rounds of IVIG (believed to help with NK cell and cytokine activity in embryo implantation), first IVIG May 14th, then second was July 16th. Had flu-like symptoms with first infusion (HA, myalgias, malaise, felt \"gross\", lower back pain, neck pain). During the 2nd infusion, they slowed infusion, took benadryl/tylenol/upped prednisone to 20 mg she did not experience SEs with this. Overall, felt that IVIG improved her lupus symptoms globally. Short-lived relief. Plan is to use IVIG monthly with monitoring of cytokine levels and NK cell counts.     Currently on 15 mg of prednisone chronically, > 1 year. Today, she would say her disease is mild-moderately active in spite of the 15 mg prednisone. Currently on 400-500 U of vitamin D. Taking 1000 mg calcium.     ROS:  (-) pleurisy, sob, cough, hematuria, dysuria, eye redness, nose bleeds, easy bruising, malar rash  (+) alternating diarrhea/constipation, dry eye, dry mouth, palatal ulcers/petechiae    Is interested in discussing SLE management (Imuran) while pursuing IVF. Will be undergoing planning and another round of IVF stimulation in August.       8/21/2019: She did another round of IVF in 8/2019, no good embryo. IVF caused her flare ups. For flare ups, gets pain over knees, knuckles and toes with extreme fatigue and photosensitivity.    Had laparoscopic surgery for endometriosis in 4/2019.    Since 4/2019, has been on OC and has not been able to taper prednisone own.    Today, she is on prednisone 15 mg every day x 2-3 weeks. " Before that, most of the time, she was on 20 mg every day.    No rash today.    Has pain over knuckles, toes, knees. Has morning flu like sx in AM x 2 hours.    Has extreme fatigue.    She was getting IVIG qmonthly, till 1/2019.    She is going to resume monthly IVIG for successful pregnancy.    11/27/2019: Started IVIG on 9/8/2019, her eyes swelled up, 2 days later had severe LBP/SI joint pain. Was doing keto diet at that time, had headaches.now has sciatica pain on the R side. Did not have this reaction with IVIG before.    Late Oct/early Nov, had malar rash, hair loss.    Had LE edema, went up on her prednisone to 30-40 mg a day x few days. Back to her baseline hair loss and baseline prednisone 20 mg every day.      R SI joint pain is better, but still has it, uses topical pain cream. It is the worst in AM.    Still has swollen ankles.    This edema is new for her.    Off birth control pills. Not on IVF tx either.    Her lupus is back to her baseline.    Has not started AZA yet, but not thinks she is ready to try it.      10/8/2021:    Gracie chose not to take AZA with concern for SEs buts he would like to try benlysta. Continues to have active lupus and can not taper prednisone off.    Dealing with back pain, sciatica, gallstones. Gets vol retention, swelling, LE edema. Ankles and eyes swell up. Has gained weight. Feels stiff. Had diarrhea, stomach pain but now it is improved.      Sciatica is now better. No skin rash or major hair loss today. Takes prednisone 20 mg every day. Has sun sensitivity. Sometimes increases prednisone to 40 mg every day.    Reports small joint pain 5-6/10, has night and 1 hr am stiffness with swollen feet. Gets jaw pain, migraines, pleurisy. Considers surrogate.      7/1/2022:     Gracie presents for follow up.    Had pelvis, L spine MRI because of having LBP x years. every time she walked over soft grass, injured herself. Was found to have degenerative changes, full MRI report is below.  Going to do follow up with her spine doctor. Doing physical therapy. The top of her spine is better. Has A1C elevated.    She is on prednisone 17 mg every day, tries to work through it . Today is a good day, yesterday was a rough day. Has hard time tapering prednisone down.    Dr. Mendez put her on gabapentin.    She is now on benlysta since 11/2021, thinks it is helping her. Thinks benlysta affected her menses, had it twice. Reports edema, weight gain on it. She did not have this extra weight, LEs edema before benlysta even on prednisone 20 mg every day. It helps with energy, when she takes it, feels better for couple of days.    Urine frequency, urgency is less on benlysta, has fluid retention.    Smelling chemical feeling is better on benlsyta.    Took AZA 50 mg every day x 2 months, no SEs and she would estrella to re-try it. When she started gabapentin, stopped AZA abut wants to re-try it.    Has pain, fatigue in her legs, it is better now. She thinks that it might be caused by benlysta. Had the most intense muscle pain, took muscle relaxant and it helped.    No skin rash or oral ulcers.    Takes prednisone for pain in small joints, fingers, toes and elbows. It is different from back pain.    Feels like her whole body is in a flare by going down on prednisone, feels getting a fever and diffuse pain along with fatigue.    Has bump over R 3rd finger which hurts, swells up.    Has unhealed ulcer over big toe, does follow up with a provider.    Toenails are the same.    Looking for surrogate.    Had eye exam.      2/9/2023: Gracie is here for follow up, she likes to try cellcept given the fact that she has found a surrogate and will not get pregnant herself. Also would like to taper prednisone down.    Stopped AZA and benlysta around cayetano.    Resumed benlysta after 2 wk, not AZA. Feels AZA is not helping, would like to try cellcept.    Dropped prednisone down to 15 mg every day, about 3 mo ago.    Off HCQ x 1  year.    2-3 months of ankle swelling has improved.    Feels swollen in her face, chest, arms, but better by going down on prednisone.    Tolerates benlysta ok.    Still Sun sensitive.    Has fatigue, joint pain affecting fingers and ankles.    Has endometriosis pain, migraines, spine pain.    Would like to try ozempic.        Today 9/14/2023:    -urgent visit for evaluation of B/L hand weakness, drops objects, simple tasks like buttoning shirts are hard to do    -R cheek feels swollen    -has R jaw pain    -R 2 fingers feel weak    -on prednisone 13 mg every day, has hard time tapering down further, lupus sx of rash/joint pain recurs, has not started cellcept yet, but now is ready to do it                  ROS:  A comprehensive ROS was done, positives are per HPI.  Past Medical History:   Diagnosis Date     Allergy, unspecified not elsewhere classified      Endometriosis      Fibromyalgia      Migraine without aura, with intractable migraine, so stated, without mention of status migrainosus      Myalgia and myositis, unspecified      Systemic lupus erythematosus (H)      Past Surgical History:   Procedure Laterality Date     SURGICAL HISTORY OF -   01/01/1986    left elbow fracture repair     Family History   Problem Relation Age of Onset     Lipids Mother      Thyroid Cancer Mother 50 - 60     Dementia Mother      Prostate Cancer Father 80     Diabetes Maternal Grandfather      Skin Cancer Paternal Grandmother      Melanoma No family hx of      Social History     Socioeconomic History     Marital status:      Spouse name: Not on file     Number of children: Not on file     Years of education: Not on file     Highest education level: Not on file   Occupational History     Not on file   Tobacco Use     Smoking status: Never     Smokeless tobacco: Never   Substance and Sexual Activity     Alcohol use: Not Currently     Drug use: No     Sexual activity: Yes     Partners: Male     Birth control/protection:  Condom   Other Topics Concern     Parent/sibling w/ CABG, MI or angioplasty before 65F 55M? 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     Chronic headaches     Diminished ovarian reserve     Disorder of connective tissue (H)     Disease of immune system (H)     Endometriosis of uterus     Female infertility     History of environmental allergies     Hyperlipidemia     Irritable bowel syndrome     Major depressive disorder, recurrent episode, in full remission (H)     Major depressive disorder, recurrent episode, mild (H)     Myalgia     Raynaud's disease     Recurrent pregnancy loss without current pregnancy     Seasonal allergies     Primary fibromyalgia syndrome     Uterine leiomyoma     Sciatica of right side     Numbness and tingling of both lower extremities     Sleep apnea     TMJ (temporomandibular joint syndrome)     Cervical cancer screening     Allergies   Allergen Reactions     Celery Oil Anaphylaxis, Difficulty breathing and Shortness Of Breath     swelling       Celis Anaphylaxis     Dairy Aid  [Tilactase]      Other reaction(s): Arthralgia (Joint Pain)     Gluten Meal      Other reaction(s): Abdominal Pain     No Clinical Screening - See Comments Anaphylaxis and Itching     Pistachio Nut Extract Skin Test Anaphylaxis     Brassica Oleracea      Other reaction(s): Abdominal Pain  Other reaction(s):  Abdominal Pain     Soybean Oil GI Disturbance     Penicillins Hives and Rash     Sulfa Antibiotics Hives and Rash       Outpatient Encounter Medications as of 9/14/2023   Medication Sig Dispense Refill     RACHNA SYRUP PO  (Patient not taking: Reported on 1/23/2023)       albuterol (PROAIR HFA/PROVENTIL HFA/VENTOLIN HFA) 108 (90 Base) MCG/ACT inhaler  (Patient not taking: Reported on 1/23/2023)       AMBIEN 10 MG OR TABS 1 po HS, prn 20 0     Belimumab (BENLYSTA) 200 MG/ML SOSY Inject 200 mg Subcutaneous every 7 days 12 mL 3     calcium carbonate (OS-VAHID 500 MG [place]. CA) 1250 MG tablet Take 1 tablet by mouth daily       ciclopirox (PENLAC) 8 % external solution Apply to adjacent skin and affected nails daily.  Remove with alcohol every 7 days, then repeat. (Patient not taking: Reported on 1/23/2023) 6.6 mL 11     cyclobenzaprine (FLEXERIL) 10 MG tablet Take 1 tablet (10 mg) by mouth 3 times daily as needed for muscle spasms (Patient not taking: Reported on 4/4/2023) 30 tablet 11     EPINEPHrine (ANY BX GENERIC EQUIV) 0.3 MG/0.3ML injection 2-pack Inject 0.3 mLs (0.3 mg) into the muscle as needed for anaphlaxis 2 each 0     hydroxychloroquine (PLAQUENIL) 200 MG tablet Take 1 tablet (200 mg) by mouth 2 times daily 180 tablet 0     L-Methylfolate-Algae-B12-B6 (METANX PO) Take 1,000 mg by mouth daily (Patient not taking: Reported on 4/4/2023)       MULTIVITAMIN TABS   OR   0     mycophenolate (GENERIC EQUIVALENT) 500 MG tablet Take 1 tablet (500 mg) by mouth 2 times daily (Patient not taking: Reported on 4/4/2023) 60 tablet 1     Omega-3 Fatty Acids (OMEGA 3 PO) Take 1,250 mg by mouth daily (Patient not taking: Reported on 4/4/2023)       omeprazole (PRILOSEC) 40 MG DR capsule Take 1 capsule (40 mg) by mouth daily (Patient not taking: Reported on 4/4/2023) 60 capsule 1     ondansetron (ZOFRAN) 8 MG tablet Take one tablet up to three times daily for nausea. 90 tablet 1     predniSONE (DELTASONE) 1 MG tablet 12.5-10 mg a  day, each course x 1 week then 9-8-7-6 mg a day, each course x 1 month then 5 mg a day, use with 5 mg size tab 360 tablet 1     predniSONE (DELTASONE) 5 MG tablet 12.5-10 mg a day, each course x 1 week then 9-8-7-6 mg a day, each course x 1 month then 5 mg a day, use with 1 mg size tab 150 tablet 5     semaglutide (OZEMPIC) 2 MG/1.5ML SOPN pen Inject 0.25 mg Subcutaneous every 7 days Increase to 0.5 mg after 2-3 weeks if tolerating. 1.5 mL 11     SPIRULINA PO Take by mouth daily       SUMAtriptan (IMITREX STATDOSE) 6 MG/0.5ML pen injector kit Inject 0.5 mLs (6 mg) Subcutaneous at onset of headache for migraine May repeat in 1 hour. Max 12 mg/24 hours. 3 kit 11     traMADol (ULTRAM) 50 MG tablet Take 1 tablet (50 mg) by mouth every 6 hours as needed for severe pain Take 1-2 tablets up to three times a day as needed - Oral 180 tablet 5     traMADol (ULTRAM-ER) 300 MG 24 hr tablet Take 1 tablet (300 mg) by mouth At Bedtime maximum 1 tablet(s) per day 30 tablet 5     triamcinolone (KENALOG) 0.1 % paste Take by mouth 2 times daily (Patient not taking: Reported on 1/23/2023) 5 g 3     triamcinolone (NASACORT) 55 MCG/ACT nasal aerosol Spray 2 sprays into both nostrils daily 16.5 mL 11     ubrogepant (UBRELVY) 100 MG tablet Take 1 tablet (100 mg) by mouth at onset of headache (May repeat in 2 hours as needed. Max 2 tabs in 24 hours) 16 tablet 11     VITAMIN D, CHOLECALCIFEROL, PO Take 5,000 Units by mouth daily (Patient not taking: Reported on 4/4/2023)       Facility-Administered Encounter Medications as of 9/14/2023   Medication Dose Route Frequency Provider Last Rate Last Admin     Botulinum Toxin Type A (BOTOX) 200 units injection 155 Units  155 Units Intramuscular See Admin Instructions Debora Chin, CARISSA CNP   155 Units at 08/10/23 0711         Her records were reviewed.    Component      Latest Ref Rng & Units 2/10/2022 5/17/2022   WBC      4.0 - 11.0 10e3/uL 11.6 (H) 10.7   RBC Count      3.80 -  5.20 10e6/uL 4.86 5.03   Hemoglobin      11.7 - 15.7 g/dL 14.8 14.9   Hematocrit      35.0 - 47.0 % 44.5 45.9   MCV      78 - 100 fL 92 91   MCH      26.5 - 33.0 pg 30.5 29.6   MCHC      31.5 - 36.5 g/dL 33.3 32.5   RDW      10.0 - 15.0 % 12.8 12.5   Platelet Count      150 - 450 10e3/uL 242 267   % Neutrophils      % 76    % Lymphocytes      % 16    % Monocytes      % 5    % Eosinophils      % 0    % Basophils      % 1    % Immature Granulocytes      % 2    NRBCs per 100 WBC      <1 /100 0    Absolute Neutrophils      1.6 - 8.3 10e3/uL 8.9 (H)    Absolute Lymphocytes      0.8 - 5.3 10e3/uL 1.8    Absolute Monocytes      0.0 - 1.3 10e3/uL 0.6    Absolute Eosinophils      0.0 - 0.7 10e3/uL 0.0    Absolute Basophils      0.0 - 0.2 10e3/uL 0.1    Absolute Immature Granulocytes      <=0.4 10e3/uL 0.2    Absolute NRBCs      10e3/uL 0.0    Sodium      133 - 144 mmol/L  141   Potassium      3.4 - 5.3 mmol/L  4.3   Chloride      94 - 109 mmol/L  102   Carbon Dioxide      20 - 32 mmol/L  32   Anion Gap      3 - 14 mmol/L  7   Urea Nitrogen      7 - 30 mg/dL  19   Creatinine      0.52 - 1.04 mg/dL 0.72 0.80   Calcium      8.5 - 10.1 mg/dL  9.6   Glucose      70 - 99 mg/dL  128 (H)   Alkaline Phosphatase      40 - 150 U/L  73   AST      0 - 45 U/L 25 23   ALT      0 - 50 U/L 47 44   Protein Total      6.8 - 8.8 g/dL  7.4   Albumin      3.4 - 5.0 g/dL 3.8 3.9   Bilirubin Total      0.2 - 1.3 mg/dL  0.3   GFR Estimate      >60 mL/min/1.73m2 >90 >90   SARS-CoV-2 Rigo Ab, Interp, S       Positive    SARS-CoV-2 Rigo Ab, Quant, S      U/mL >250    Patient's Race       Unknown    Patient's Ethnicity       Unknown    Protein Random Urine      g/L <0.05    Protein Total Urine g/gr Creatinine           Creatinine Urine      mg/dL 13    % Retic      0.5 - 2.0 % 1.6    Absolute Retic      0.025 - 0.095 10e6/uL 0.080    DNA-ds      <10.0 IU/mL <0.6    Complement C4      13 - 39 mg/dL 34    Complement C3      81 - 157 mg/dL 135    Sed  "Rate      0 - 20 mm/hr 7    CRP Inflammation      0.0 - 8.0 mg/L 3.0    Hemoglobin A1C      0.0 - 5.6 %  5.8 (H)     Pregnancy: She is . H/o OCP and HRT use, see HPI    Ph.E:    /86 (BP Location: Right arm, Patient Position: Sitting, Cuff Size: Adult Regular)   Pulse 95   Temp 97.7  F (36.5  C) (Oral)   Resp 18   Ht 1.698 m (5' 6.85\")   SpO2 98%   BMI 31.86 kg/m        Constitutional: WD/WN. Pleasant, NAD.    Eyes: EOM intact, sclera anicteric, conj not injected   HEENT: no oral ulcers  Chest: CTAB  CV: no M/R/G, RRR  Abdomen: soft, NT  MS: No active synovitis over hands, could make full fist. + [place] nodes. Poor .  Skin: no rash.  Neuro: A&O x 3. Grossly non focal  Psych: NL affect    Assessment/Plan     1-SLE FLARE/active  2-HCQ monitoring  3-AZA monitoring  4-Benlysta monitoring    SLE, dx in  (VINITA: 1:80, dsDNA +, Smith negative, normal complements, joint pains, malar rash, oral ulcers), usually flares approximately monthly with joint pains, pleurisy and malar rash. Currently, feels that disease is active. Has been on chronic 20 mg every day prednisone, gained weight, gets LE edema, looks cushingoid. Still planning for PGS normal embryo implantation, might consider surrogate. Had a reaction to IVIG which was given for infertility tx and is not going to get it again. Her back pain seems to be sciatica and not related to SLE.    Previous visits, was advised to try AZA as steroid sparing agent (I am concerned about long term steroid SE) but did not start with concern for potential SE but willing to try benlysta, especially with planning for surrogate pregnancy. Labs are stable.    2022: Gracie is on benlysta since last visit with some benefit, but can not taper prednisone below 17 mg every day due to increased joint pain by taper. Briefly tried AZA 1 tab a day, no SEs, willing to re-try it, will resume it at higher dose.        2023: Failed AZA, which was chosen over cellcept due to " pregnancy planning via IVF. Will try cellcept as steroid sparing agent to allow tapering prednisone down; risks were discussed. She going to proceed with pregnancy via surrogate; therefore she will not get pregnant herself. She misunderstood and stopped HCQ, thought we were switching tx, will resume. Discussed its benefits.     Today 9/14/2023: B/L hand poor /loss of strength seems to be OA related, discussed mx. She is willing to try cellcept to allow tapering prednisone off; risks were discussed.    Plan:    Continue plaquenil 200 mg twice a day with yearly eye exam    Continue benlysta sub q inj qwk    Continue prednisone 13 mg every day, plan is to taper after start of cellcept, 1 mg down q2-4 weeks.    Refer to OT    DEXA bone density    Labs today and small blood work for cellcept 1 month after start of cellcept    Start cellcept 1 tab twice a day    Keep ortho appointment    Keep 12/2023 appointment    Josh Roy MD

## 2023-09-14 NOTE — PATIENT INSTRUCTIONS
Refer to OT    DEXA bone density    Labs today and small blood work for cellcept 1 month after start of cellcept    Start cellcept 1 tab twice a day    Keep ortho appointment    Keep 12/2023 appointment

## 2023-09-15 LAB
C3 SERPL-MCNC: 134 MG/DL (ref 81–157)
C4 SERPL-MCNC: 29 MG/DL (ref 13–39)

## 2023-09-16 LAB — DSDNA AB SER-ACNC: <0.6 IU/ML

## 2023-09-20 DIAGNOSIS — M25.541 ARTHRALGIA OF BOTH HANDS: Primary | ICD-10-CM

## 2023-09-20 DIAGNOSIS — M25.542 ARTHRALGIA OF BOTH HANDS: Primary | ICD-10-CM

## 2023-09-25 ENCOUNTER — TRANSFERRED RECORDS (OUTPATIENT)
Dept: HEALTH INFORMATION MANAGEMENT | Facility: CLINIC | Age: 48
End: 2023-09-25
Payer: COMMERCIAL

## 2023-09-27 ENCOUNTER — PRE VISIT (OUTPATIENT)
Dept: ORTHOPEDICS | Facility: CLINIC | Age: 48
End: 2023-09-27

## 2023-10-17 DIAGNOSIS — G43.909 MIGRAINE WITHOUT STATUS MIGRAINOSUS, NOT INTRACTABLE, UNSPECIFIED MIGRAINE TYPE: ICD-10-CM

## 2023-10-17 RX ORDER — TRAMADOL HYDROCHLORIDE 50 MG/1
TABLET ORAL
Qty: 180 TABLET | Refills: 5 | Status: SHIPPED | OUTPATIENT
Start: 2023-11-05 | End: 2023-10-23

## 2023-10-17 NOTE — TELEPHONE ENCOUNTER
traMADol (ULTRAM) 50 MG tablet 180 tablet 5 5/5/2023     Last Office Visit : 4/19/2023   Future Office visit:  none    Routing refill request to provider for review/approval because:  Drug not on the [facility], [facility] or [facility] refill protocol or controlled substance

## 2023-10-18 DIAGNOSIS — G43.719 INTRACTABLE CHRONIC MIGRAINE WITHOUT AURA AND WITHOUT STATUS MIGRAINOSUS: Primary | ICD-10-CM

## 2023-10-19 ENCOUNTER — TELEPHONE (OUTPATIENT)
Dept: INTERNAL MEDICINE | Facility: CLINIC | Age: 48
End: 2023-10-19
Payer: COMMERCIAL

## 2023-10-23 NOTE — TELEPHONE ENCOUNTER
[facility] Call Center    Phone Message    May a detailed message be left on voicemail: yes     Reason for Call: Medication Question or concern regarding medication   Prescription Clarification  Name of Medication: traMADol (ULTRAM) 50 MG tablet, ondansetron (ZOFRAN) 8 MG tablet  Prescribing Provider: Dr. Andrea Guzman   Pharmacy:   [id number] - [place], [place] - [address] AT [facility] [address]      What on the order needs clarification? Pt called and stated that the above listed mediations have not been sent to the pharmacy yet and were requested on 10/16, requesting call back

## 2023-10-24 RX ORDER — TRAMADOL HYDROCHLORIDE 50 MG/1
TABLET ORAL
Qty: 180 TABLET | Refills: 5 | Status: SHIPPED | OUTPATIENT
Start: 2023-11-05 | End: 2024-04-08

## 2023-10-27 ENCOUNTER — TELEPHONE (OUTPATIENT)
Dept: RHEUMATOLOGY | Facility: CLINIC | Age: 48
End: 2023-10-27

## 2023-10-27 NOTE — TELEPHONE ENCOUNTER
PA Needed    Medication: Benlysta  QTY/DS: 4 per 28ds  NEW INS: No  Insurance Company: [employer]  Pharmacy Filling the Rx: [facility]  PA : 10/16/23  Date of last fill: 23

## 2023-11-02 ENCOUNTER — OFFICE VISIT (OUTPATIENT)
Dept: NEUROLOGY | Facility: CLINIC | Age: 48
End: 2023-11-02
Payer: COMMERCIAL

## 2023-11-02 DIAGNOSIS — M25.542 ARTHRALGIA OF BOTH HANDS: Primary | ICD-10-CM

## 2023-11-02 DIAGNOSIS — G43.719 INTRACTABLE CHRONIC MIGRAINE WITHOUT AURA AND WITHOUT STATUS MIGRAINOSUS: Primary | ICD-10-CM

## 2023-11-02 DIAGNOSIS — G24.4 OROMANDIBULAR DYSTONIA: ICD-10-CM

## 2023-11-02 DIAGNOSIS — M25.541 ARTHRALGIA OF BOTH HANDS: Primary | ICD-10-CM

## 2023-11-02 PROCEDURE — 99207 PR SATISFY VISIT NUMBER: CPT | Performed by: NURSE PRACTITIONER

## 2023-11-02 PROCEDURE — 64615 CHEMODENERV MUSC MIGRAINE: CPT | Performed by: NURSE PRACTITIONER

## 2023-11-02 NOTE — TELEPHONE ENCOUNTER
PA Initiation    Medication: BENLYSTA 200 MG/ML SC [facility]  Insurance Company: [employer] [place] - Phone [phone] Fax [phone]  Pharmacy Filling the Rx: [facility] - [place], [place] - [address]  Filling Pharmacy Phone:    Filling Pharmacy Fax:    Start Date: 11/2/2023    Sent via prime portal

## 2023-11-02 NOTE — PROGRESS NOTES
BOTULINUM NEUROTOXIN INJECTION PROCEDURE:  DATA:11/2/2023  INDICATIONS FOR PROCEDURES:   chronic migraine headaches. baseline symptoms have been recalcitrant to oral medications and conservative therapy. Patient is here today for a repeat  injection with Botox. Last Botox 5/18/2023 and no there concerns or side effects   migraines 18-20 since August 10th and still less than used to be prior starting Botox   Wearing off effects 2 weeks  Pain in the shoulders and TMD and tighness contributes to migraines     Finds botox 50% at least or more reduction in headaches and headache free days. Headaches worsen when Botox wears off   Pre-procedure pain 7/10  Post 4-5/10       GOAL OF PROCEDURE:  The goal of this procedure is to decrease pain and enhance functional independence.     TOTAL DOSE ADMINISTERED:  Dose Administered:  155 units  Botox (Botulinum Toxin Type A)       2:1 Dilution    Wasted 45 units     CONSENT:  The risks, benefits, and treatment options were discussed with the patient who agreed to proceed.     Written consent was obtained      EQUIPMENT USED:  Needles-30 gauge, 0.5 inches for injections  Four 1-ml tuberculin syringes for injections  One sodium chloride 10 ml vial preservative free  Alcohol swabs     SKIN PREPARATION:  Skin preparation was performed using an alcohol wipe.        AREA/MUSCLE INJECTED:  155 units of Botox     Right upper Trapezius (upper cervical) - 10, 10, 5 units of Botox at 3 site/s.   Left upper Trapezius (upper cervical) - 10, 10, 5 units of Botox at 3 site/s.      Right cervical paraspinals - 5 units of Botox at 2 site/s.   Left cervical paraspinals - 5 units of Botox at 2 site/s.      Left occipitalis - 5 units of Botox at 2 site/s.  Right occipitalis -5 units of Botox at 2 site/s     Right Frontalis - 5 units of Botox at 2 site/s.  Left Frontalis - 5 units of Botox at 2 site/s.     Right Temporalis - 5 units of Botox at 3 site/s.  Left Temporalis - 5 units of Botox at 3  site/s.     Right  - 5 units of Botox at 1 site/s.              Left  - 5 units of Botox at 1 site/s.  Masseter right and left -5 units each      Procerus - 5 units of Botox at 1 site/s.     RESPONSE TO PROCEDURE:  tolerated the procedure well and there were no immediate complications.  Patient was allowed to recover for an appropriate period of time and was discharged home in stable condition.     FOLLOW UP:  Repeat Botox injections in 12 weeks        This procedure was performed under a hospital privileging agreement with Dr. Romina Chin, CARISSA, [facility]

## 2023-11-02 NOTE — LETTER
11/2/2023       RE: Patricia Hall  [address]  [place] [address]     Dear Colleague,    Thank you for referring your patient, Patricia Hall, to the [facility] [place] at [facility]. Please see a copy of my visit note below.    BOTULINUM NEUROTOXIN INJECTION PROCEDURE:  DATA:11/2/2023  INDICATIONS FOR PROCEDURES:   chronic migraine headaches. baseline symptoms have been recalcitrant to oral medications and conservative therapy. Patient is here today for a repeat  injection with Botox. Last Botox 5/18/2023 and no there concerns or side effects   migraines 18-20 since August 10th and still less than used to be prior starting Botox   Wearing off effects 2 weeks  Pain in the shoulders and TMD and tighness contributes to migraines     Finds botox 50% at least or more reduction in headaches and headache free days. Headaches worsen when Botox wears off   Pre-procedure pain 7/10  Post 4-5/10       GOAL OF PROCEDURE:  The goal of this procedure is to decrease pain and enhance functional independence.     TOTAL DOSE ADMINISTERED:  Dose Administered:  155 units  Botox (Botulinum Toxin Type A)       2:1 Dilution    Wasted 45 units     CONSENT:  The risks, benefits, and treatment options were discussed with the patient who agreed to proceed.     Written consent was obtained      EQUIPMENT USED:  Needles-30 gauge, 0.5 inches for injections  Four 1-ml tuberculin syringes for injections  One sodium chloride 10 ml vial preservative free  Alcohol swabs     SKIN PREPARATION:  Skin preparation was performed using an alcohol wipe.        AREA/MUSCLE INJECTED:  155 units of Botox     Right upper Trapezius (upper cervical) - 10, 10, 5 units of Botox at 3 site/s.   Left upper Trapezius (upper cervical) - 10, 10, 5 units of Botox at 3 site/s.      Right cervical paraspinals - 5 units of Botox at 2 site/s.   Left cervical paraspinals - 5 units  of Botox at 2 site/s.      Left occipitalis - 5 units of Botox at 2 site/s.  Right occipitalis -5 units of Botox at 2 site/s     Right Frontalis - 5 units of Botox at 2 site/s.  Left Frontalis - 5 units of Botox at 2 site/s.     Right Temporalis - 5 units of Botox at 3 site/s.  Left Temporalis - 5 units of Botox at 3 site/s.     Right  - 5 units of Botox at 1 site/s.              Left  - 5 units of Botox at 1 site/s.  Masseter right and left -5 units each      Procerus - 5 units of Botox at 1 site/s.     RESPONSE TO PROCEDURE:  tolerated the procedure well and there were no immediate complications.  Patient was allowed to recover for an appropriate period of time and was discharged home in stable condition.     FOLLOW UP:  Repeat Botox injections in 12 weeks        This procedure was performed under a hospital privileging agreement with Dr. Vanegas               Again, thank you for allowing me to participate in the care of your patient.      Sincerely,    CARISSA Tang CNP

## 2023-11-06 ENCOUNTER — VIRTUAL VISIT (OUTPATIENT)
Dept: INTERNAL MEDICINE | Facility: CLINIC | Age: 48
End: 2023-11-06
Payer: COMMERCIAL

## 2023-11-06 DIAGNOSIS — K64.4 EXTERNAL HEMORRHOIDS: Primary | ICD-10-CM

## 2023-11-06 DIAGNOSIS — G89.29 OTHER CHRONIC PAIN: ICD-10-CM

## 2023-11-06 DIAGNOSIS — M26.609 TMJ (TEMPOROMANDIBULAR JOINT SYNDROME): ICD-10-CM

## 2023-11-06 PROCEDURE — 99215 OFFICE O/P EST HI 40 MIN: CPT | Mod: VID | Performed by: INTERNAL MEDICINE

## 2023-11-06 RX ORDER — HYDROCORTISONE ACETATE 25 MG/1
25 SUPPOSITORY RECTAL 2 TIMES DAILY
Qty: 20 SUPPOSITORY | Refills: 0 | Status: SHIPPED | OUTPATIENT
Start: 2023-11-06 | End: 2023-11-16

## 2023-11-06 RX ORDER — TRAMADOL HYDROCHLORIDE 300 MG/1
300 TABLET, EXTENDED RELEASE ORAL AT BEDTIME
Qty: 30 TABLET | Refills: 5 | Status: SHIPPED | OUTPATIENT
Start: 2023-11-06 | End: 2024-02-26

## 2023-11-06 NOTE — NURSING NOTE
Is the patient currently in the state of [place]? YES    Visit mode:VIDEO    If the visit is dropped, the patient can be reconnected by: VIDEO VISIT: Text to cell phone:   Telephone Information:   Mobile [phone]       Will anyone else be joining the visit? NO  (If patient encounters technical issues they should call [phone])    How would you like to obtain your AVS? MyChart    Are changes needed to the allergy or medication list? Yes Remove Cristina, Albuterol, Ciclopirox, Cyclobenzaprine, L-Methylfolate,  Mycophenolate, 1mg Prednisone, both Triamcinolone. Pt currently on 15-14mg daily of Prednisone. Pt taking Ibuprofen as needed.    Reason for visit: RECHECK, Refill Request, and Rectal Problem    Did not complete vitals, qnrs per time constraints    SHELLIE CUNNINGHAM

## 2023-11-06 NOTE — PATIENT INSTRUCTIONS
Thank you for visiting the Primary Care Center today at the [facility]! The following is some information about our clinic:     Primary Care Center Frequently-Asked Questions    (1) How do I schedule appointments at the [facility]?     Primary Care--to schedule or make changes to an existing appointment, please call our primary care line at [phone].    Labs--to schedule a lab appointment at the [facility] you can use [employer] or call [phone]. If you have a [place] location that is closer to home, you can reach out to that location for scheduling options.     Imaging--if you need to schedule a CT, X-ray, MRI, ultrasound, or other imaging study you can call [phone] to schedule at the [facility] or any other [facility] imaging location.     Referrals--if a referral to another specialty was ordered you can expect a phone call from their scheduling team. If you have not heard from them in a week, please call us or send us a [employer] message to check the status or get a scheduling number. Please note that this only applies to internal [facility] referrals. If the referral is external you would need to contact their office for scheduling.     (2) I have a question about my visit, who do I contact?     You can call us at the primary care line at [phone] to ask questions about your visit. You can also send a secure message through [employer], which is reviewed by clinic staff. Please note that [employer] messages have a twenty-four to forty-eight business hour turnaround time and should not be used for urgent concerns.    (3) How will I get the results of my tests?    If you are signed up for [employer] all tests will be released to you within twenty-four hours of resulting. Please allow three to five days for your doctor to review your results and place a note interpreting the results. If you do not have [employer] you will receive your  results through mail seven to ten business days following the return of the tests. Please note that if there should be any urgent or concerning results that your doctor or their registered nurse will reach out to you the same day as the tests come back. If you have follow up questions about your results or would like to discuss the results in detail please schedule a follow up with your provider either in person or virtually.     (4) How do I get refills of my prescriptions?     You should always first contact your pharmacy for refills of your medications. If submitting a refill request on DialedIN, please be sure to submit the request only once--repeat requests can cause delays in refill. If you are requesting a NEW medication or a medication related to new symptoms you will need to schedule an appointment with a provider prior to approval. Please note: Routine medication refills have up to one to three business day turnaround whereas controlled substances refills have up to five to seven business day turnaround.    (5) I have new symptoms, what do I do?     If you are having an immediate medical emergency, you should dial 911 for assistance.   For anything urgent that needs to be seen within a few hours to one day you should visit a local urgent care for assistance.  For non-urgent symptoms that need to be seen within a few days to a week you can schedule with an available provider in primary care by going to [employer] or calling [phone].   If you are not sure how serious your symptoms are or you would like to receive medical advice you can always call [phone] to speak with a triage nurse.

## 2023-11-06 NOTE — PROGRESS NOTES
Gracie is here for follow up of several chronic health concerns, and med refills.    Bad hemorrhoids recently; using tucks cream daily and still having some pain, which triggers nausea for her. No bleeding noted. It does make her reluctant to have a bowel movement when they are so swollen and enlarged.  This cream does not have any steroid in it so I'd like to recommend hydrocortisone suppositories bid for 10 days.  We also spoke about in office procedures such as laser or banding.    Currently on ozempic, 0.75 mg daily.  Did start at 0.25 then 0.5 mg. Her A1c is in the prediabetic range.  Nausea has been a rate limiting factor so she went back down to the 0.25 mg dose. However she also has nausea with her migraines, and when hemorrhoids flare she also experiences more nausea. She's willing to try increasing the dose again if these other symptoms are better controlled.    Migraines have been challenging, she is utilizing botox injections to help manage this, for about a year now.  Jaw and neck tightness have also been contributing.  Has attended the TMJ/head and neck pain clinic, they recommended an appliance for me to review, a mandibular intraoral appliance to keep the jaw more open at night and improve symptoms. I can send a miscellanous order for DME to that end.    Tomorrow will obtain more xrays per Rheumatology/Ortho assessment, bilateral hands.  She's having more joint pain and other symptoms so they are taking a look.  She's requesting a refill of long acting tramadol which has been very effective for pain management with no side effects noted.    On exam, she is alert, NAD.  No cough or wheezing is noted.  Visible skin is without rash or erythema.  Mood/affect seem upbeat.    Gracie was seen today for recheck, refill request and rectal problem.    Diagnoses and all orders for this visit:    External hemorrhoids  -     hydrocortisone (ANUSOL-HC) 25 MG suppository; Place 1 suppository (25 mg) rectally 2 times  daily for 10 days    TMJ (temporomandibular joint syndrome)  -     Miscellaneous Order for DME - ONLY FOR DME    Other chronic pain  -     traMADol (ULTRAM-ER) 300 MG 24 hr tablet; Take 1 tablet (300 mg) by mouth at bedtime maximum 1 tablet(s) per day     Shavon Guzman MD      Virtual Visit Details    Type of service:  Video Visit started 11:58 ended 12:23 with another 15 minutes chart review and documentation same day    Originating Location (pt. Location): Home    Distant Location (provider location):  On-site  Platform used for Video Visit: Padmini

## 2023-11-06 NOTE — TELEPHONE ENCOUNTER
Prior Authorization Approval    Medication: BENLYSTA 200 MG/ML SC S[facility]  Authorization Effective Date: 11/6/2023  Authorization Expiration Date: 11/4/2024  Approved Dose/Quantity: weekly  Reference #:     Insurance Company: [employer] [place] - Phone [phone] Fax [phone]  Expected CoPay: $ 0  CoPay Card Available: Yes    Financial Assistance Needed: no  Which Pharmacy is filling the prescription: [facility] - [place], [place] - [address]  Pharmacy Notified: yes  Patient Notified: yes

## 2023-11-07 ENCOUNTER — OFFICE VISIT (OUTPATIENT)
Dept: ORTHOPEDICS | Facility: CLINIC | Age: 48
End: 2023-11-07
Payer: COMMERCIAL

## 2023-11-07 ENCOUNTER — ANCILLARY PROCEDURE (OUTPATIENT)
Dept: GENERAL RADIOLOGY | Facility: CLINIC | Age: 48
End: 2023-11-07
Attending: STUDENT IN AN ORGANIZED HEALTH CARE EDUCATION/TRAINING PROGRAM
Payer: COMMERCIAL

## 2023-11-07 DIAGNOSIS — R29.898 WEAKNESS OF BOTH HANDS: Primary | ICD-10-CM

## 2023-11-07 DIAGNOSIS — M25.542 ARTHRALGIA OF BOTH HANDS: ICD-10-CM

## 2023-11-07 DIAGNOSIS — M25.541 ARTHRALGIA OF BOTH HANDS: ICD-10-CM

## 2023-11-07 PROCEDURE — 73130 X-RAY EXAM OF HAND: CPT | Mod: LT | Performed by: RADIOLOGY

## 2023-11-07 PROCEDURE — 99204 OFFICE O/P NEW MOD 45 MIN: CPT | Performed by: STUDENT IN AN ORGANIZED HEALTH CARE EDUCATION/TRAINING PROGRAM

## 2023-11-07 NOTE — PROGRESS NOTES
Sports Medicine Clinic           ASSESSMENT and PLAN:     Gracie was seen today for hand pain.    Diagnoses and all orders for this visit:    Weakness of both hands  History of lupus with bilateral hand weakness and problems with dexterity primarily of the first three digits, R>L, Presently her joints are not imflammed and she does not have widespread arthritis throughout her hands. 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 She is negative for provocation of her carpal tunnel but given the distribution of her weakness will further evaluate with an EMG if not improving with OT.   - follow up with OT, previously referred, number given today  - EMG; Future if not improving with OT  - follow up after EMG    Return sooner if develops new or worsening symptoms.    Options for treatment and/or follow-up care were reviewed with the patient was actively involved in the decision making process. Patient verbalized understanding and was in agreement with the plan.    45 minutes spent on the date of the encounter doing chart reivew, history and exam, documentation and further activities as noted above with the exception of procedures.    Birgit Huddleston MD, [facility]  Primary Care Sports Medicine           SUBJECTIVE       Patricia Hall is a 48 year old female presenting to clinic today with a chief complaint of bilateral hand pain and weakness, referred by Dr. Moreno.    Onset: 6 month(s) ago. Reports insidious onset without acute precipitating event.  Location of Pain: bilateral 1st -  3rd fingers  Rating of Pain at worst: 10/10  Rating of Pain Currently: 2/10  Worsened by: Gripping, fine motor movements  Better with: Voltaren gel   Treatments tried: Voltaren gel, arnica gel  Associated symptoms: weakness of bilateral 1st - 3rd fingers  Orthopedic history: NO  Relevant surgical history: NO  Social history: social history:     Never  had a similar problem prior to 6 months ago. She isn't sure if she was having a lupus flare when she first noticed it or not. It originally was more painful and had a lot of trouble closing her hands. She remembers when she last saw Dr. Roy that she had a really hard time with her  strength. She has been using voltaren gel which helps and now she doesn't feel like it is really painful but is more just hard to perform fine dexterity. She feels like it is slightly worse on her right hand, but might be because she is RHD and uses her right hand more. Dr. Roy felt it might be more OA related compared to lupus. She was supposed to start cellcept but has not yet done that.  Was referred to hand therapy but has not started that as well.     She feels like she lacks the strength to perform tasks with her hands. It is in her first three digits bilaterally. She denies any numbness or tingling. She does have tightness in her necks and traps but denies symptoms throughout her upper arm. She doesn't think it is worse at night.     PMH, Medications and Allergies were reviewed and updated as needed.    ROS:  As noted above otherwise negative.    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    Chronic headaches    Diminished ovarian reserve    Disorder of connective tissue (H24)    Disease of immune system (H24)    Endometriosis of uterus    Female infertility    History of environmental allergies    Hyperlipidemia    Irritable bowel syndrome    Major depressive disorder, recurrent episode, in full remission (H24)    Major depressive disorder, recurrent episode, mild (H24)    Myalgia    Raynaud's disease    Recurrent pregnancy loss without current pregnancy    Seasonal allergies     Primary fibromyalgia syndrome    Uterine leiomyoma    Sciatica of right side    Numbness and tingling of both lower extremities    Sleep apnea    TMJ (temporomandibular joint syndrome)    Cervical cancer screening       Current Outpatient Medications   Medication Sig Dispense Refill    RACHNA SYRUP PO  (Patient not taking: Reported on 1/23/2023)      albuterol (PROAIR HFA/PROVENTIL HFA/VENTOLIN HFA) 108 (90 Base) MCG/ACT inhaler  (Patient not taking: Reported on 1/23/2023)      AMBIEN 10 MG OR TABS 1 po HS, prn 20 0    Belimumab (BENLYSTA) 200 MG/ML SOSY Inject 200 mg Subcutaneous every 7 days 12 mL 3    calcium carbonate (OS-VAHID 500 MG [place]) 1250 MG tablet Take 1 tablet by mouth daily      ciclopirox (PENLAC) 8 % external solution Apply to adjacent skin and affected nails daily.  Remove with alcohol every 7 days, then repeat. (Patient not taking: Reported on 1/23/2023) 6.6 mL 11    cyclobenzaprine (FLEXERIL) 10 MG tablet Take 1 tablet (10 mg) by mouth 3 times daily as needed for muscle spasms (Patient not taking: Reported on 11/6/2023) 30 tablet 11    EPINEPHrine (ANY BX GENERIC EQUIV) 0.3 MG/0.3ML injection 2-pack Inject 0.3 mLs (0.3 mg) into the muscle as needed for anaphlaxis 2 each 0    hydrocortisone (ANUSOL-HC) 25 MG suppository Place 1 suppository (25 mg) rectally 2 times daily for 10 days 20 suppository 0    hydroxychloroquine (PLAQUENIL) 200 MG tablet Take 1 tablet (200 mg) by mouth 2 times daily 180 tablet 0    L-Methylfolate-Algae-B12-B6 (METANX PO) Take 1,000 mg by mouth daily (Patient not taking: Reported on 4/4/2023)      MULTIVITAMIN TABS   OR   0    mycophenolate (GENERIC EQUIVALENT) 500 MG tablet Take 1 tablet (500 mg) by mouth 2 times daily (Patient not taking: Reported on 11/6/2023) 60 tablet 1    Omega-3 Fatty Acids (OMEGA 3 PO) Take 1,250 mg by mouth daily      omeprazole (PRILOSEC) 40 MG DR capsule Take 1 capsule (40 mg) by mouth daily 60 capsule 1    ondansetron (ZOFRAN) 8 MG tablet Take  one tablet up to three times daily for nausea. 90 tablet 1    predniSONE (DELTASONE) 1 MG tablet 12.5-10 mg a day, each course x 1 week then 9-8-7-6 mg a day, each course x 1 month then 5 mg a day, use with 5 mg size tab (Patient not taking: Reported on 11/6/2023) 360 tablet 1    predniSONE (DELTASONE) 5 MG tablet 12.5-10 mg a day, each course x 1 week then 9-8-7-6 mg a day, each course x 1 month then 5 mg a day, use with 1 mg size tab 150 tablet 5    semaglutide (OZEMPIC) 2 MG/1.5ML SOPN pen Inject 0.25 mg Subcutaneous every 7 days Increase to 0.5 mg after 2-3 weeks if tolerating. 1.5 mL 11    SPIRULINA PO Take by mouth daily      SUMAtriptan (IMITREX STATDOSE) 6 MG/0.5ML pen injector kit Inject 0.5 mLs (6 mg) Subcutaneous at onset of headache for migraine May repeat in 1 hour. Max 12 mg/24 hours. 3 kit 11    traMADol (ULTRAM) 50 MG tablet Take 1 tablet (50 mg) by mouth every 6 hours as needed for severe pain Take 1-2 tablets up to three times a day as needed - Oral 180 tablet 5    traMADol (ULTRAM-ER) 300 MG 24 hr tablet Take 1 tablet (300 mg) by mouth at bedtime maximum 1 tablet(s) per day 30 tablet 5    triamcinolone (KENALOG) 0.1 % paste Take by mouth 2 times daily (Patient not taking: Reported on 1/23/2023) 5 g 3    triamcinolone (NASACORT) 55 MCG/ACT nasal aerosol Spray 2 sprays into both nostrils daily (Patient not taking: Reported on 11/6/2023) 16.5 mL 11    ubrogepant (UBRELVY) 100 MG tablet Take 1 tablet (100 mg) by mouth at onset of headache (May repeat in 2 hours as needed. Max 2 tabs in 24 hours) 16 tablet 11    VITAMIN D, CHOLECALCIFEROL, PO Take 5,000 Units by mouth daily              OBJECTIVE:       Vitals: There were no vitals filed for this visit.  BMI: There is no height or weight on file to calculate BMI.    Gen:  Well nourished and in no acute distress  HEENT: Extraocular movement intact  Pulm:  Breathing Comfortably. No increased respiratory effort.  Psych: Euthymic. Appropriately answers  questions    MSK:   BILATERAL HAND  Inspection:    No swelling, bruising, discoloration. [place] nodes on the DIP of the 2nd and 3rd digits bilaterally.   Palpation:   Thumb: CMC tender  Range of Motion:    Full active flexion and extension at MCP, PIP, and DIP joints; normal finger cascade without malrotation.  Wrist pronation, supination, and ulnar/radial deviation normal.  Strength:    , thumb flexion, extension and abduction limited by weakness. Finger flexion also limited by strength.   Special Tests:    Positive: CMC grind    Negative: Tinel's, Phalen's, Finkelstein's     Imaging was personally reviewed and interpreted by me.   3 views bilateral hand radiographs 11/7/2023 2:00 PM     History: Arthralgia of both hands; Arthralgia of both hands      Comparison: None     Findings:     PA, oblique and lateral view(s) of the hands were obtained.      Left:     No acute osseous abnormality.  No erosion.     Mild degenerative changes of the first carpometacarpal and triscaphe  joints.  Additional scattered degenerative change in the  interphalangeal joints, particularly the second distal interphalangeal  joint. Ulnar negative variance.     Soft tissue calcification ulnar to the third distal interphalangeal  joint.     Right:     No acute osseous abnormality.  No erosion.     Mild degenerative changes of the first carpometacarpal and triscaphe  joints.  Additional scattered degenerative change in the  interphalangeal joints, particularly the third distal interphalangeal  joint. Ulnar negative variance with distal radioulnar joint  degenerative change.     Soft tissue is unremarkable.                                                         Impression:  1. No acute osseous abnormality.  2. Mild polyarticular degenerative change.  3. Bilateral ulnar negative variance. Right distal radioulnar joint  degenerative change.

## 2023-11-07 NOTE — LETTER
11/7/2023      RE: Patricia Hall  [address]  [place] [address]     Dear Colleague,    Thank you for referring your patient, Patricia Hall, to the [facility] [place]. Please see a copy of my visit note below.    Sports Medicine Clinic           ASSESSMENT and PLAN:     Gracie was seen today for hand pain.    Diagnoses and all orders for this visit:    Weakness of both hands  History of lupus with bilateral hand weakness and problems with dexterity primarily of the first three digits, R>L, Presently her joints are not imflammed and she does not have widespread arthritis throughout her hands. 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 She is negative for provocation of her carpal tunnel but given the distribution of her weakness will further evaluate with an EMG if not improving with OT.   - follow up with OT, previously referred, number given today  - EMG; Future if not improving with OT  - follow up after EMG    Return sooner if develops new or worsening symptoms.    Options for treatment and/or follow-up care were reviewed with the patient was actively involved in the decision making process. Patient verbalized understanding and was in agreement with the plan.    45 minutes spent on the date of the encounter doing chart reivew, history and exam, documentation and further activities as noted above with the exception of procedures.    Birgit Huddleston MD, [facility]  Primary Care Sports Medicine           SUBJECTIVE       Patricia Hall is a 48 year old female presenting to clinic today with a chief complaint of bilateral hand pain and weakness, referred by Dr. Moreno.    Onset: 6 month(s) ago. Reports insidious onset without acute precipitating event.  Location of Pain: bilateral 1st -  3rd fingers  Rating of Pain at worst: 10/10  Rating of Pain Currently: 2/10  Worsened by:  Gripping, fine motor movements  Better with: Voltaren gel   Treatments tried: Voltaren gel, arnica gel  Associated symptoms: weakness of bilateral 1st - 3rd fingers  Orthopedic history: NO  Relevant surgical history: NO  Social history: social history:     Never had a similar problem prior to 6 months ago. She isn't sure if she was having a lupus flare when she first noticed it or not. It originally was more painful and had a lot of trouble closing her hands. She remembers when she last saw Dr. Roy that she had a really hard time with her  strength. She has been using voltaren gel which helps and now she doesn't feel like it is really painful but is more just hard to perform fine dexterity. She feels like it is slightly worse on her right hand, but might be because she is RHD and uses her right hand more. Dr. Roy felt it might be more OA related compared to lupus. She was supposed to start cellcept but has not yet done that.  Was referred to hand therapy but has not started that as well.     She feels like she lacks the strength to perform tasks with her hands. It is in her first three digits bilaterally. She denies any numbness or tingling. She does have tightness in her necks and traps but denies symptoms throughout her upper arm. She doesn't think it is worse at night.     PMH, Medications and Allergies were reviewed and updated as needed.    ROS:  As noted above otherwise negative.    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    Chronic headaches    Diminished ovarian reserve    Disorder of connective tissue (H24)    Disease of immune system (H24)    Endometriosis of uterus    Female infertility    History of environmental allergies     Hyperlipidemia    Irritable bowel syndrome    Major depressive disorder, recurrent episode, in full remission (H24)    Major depressive disorder, recurrent episode, mild (H24)    Myalgia    Raynaud's disease    Recurrent pregnancy loss without current pregnancy    Seasonal allergies    Primary fibromyalgia syndrome    Uterine leiomyoma    Sciatica of right side    Numbness and tingling of both lower extremities    Sleep apnea    TMJ (temporomandibular joint syndrome)    Cervical cancer screening       Current Outpatient Medications   Medication Sig Dispense Refill    RACHNA SYRUP PO  (Patient not taking: Reported on 1/23/2023)      albuterol (PROAIR HFA/PROVENTIL HFA/VENTOLIN HFA) 108 (90 Base) MCG/ACT inhaler  (Patient not taking: Reported on 1/23/2023)      AMBIEN 10 MG OR TABS 1 po HS, prn 20 0    Belimumab (BENLYSTA) 200 MG/ML SOSY Inject 200 mg Subcutaneous every 7 days 12 mL 3    calcium carbonate (OS-VAHID 500 MG [place]. [place]) 1250 MG tablet Take 1 tablet by mouth daily      ciclopirox (PENLAC) 8 % external solution Apply to adjacent skin and affected nails daily.  Remove with alcohol every 7 days, then repeat. (Patient not taking: Reported on 1/23/2023) 6.6 mL 11    cyclobenzaprine (FLEXERIL) 10 MG tablet Take 1 tablet (10 mg) by mouth 3 times daily as needed for muscle spasms (Patient not taking: Reported on 11/6/2023) 30 tablet 11    EPINEPHrine (ANY BX GENERIC EQUIV) 0.3 MG/0.3ML injection 2-pack Inject 0.3 mLs (0.3 mg) into the muscle as needed for anaphlaxis 2 each 0    hydrocortisone (ANUSOL-HC) 25 MG suppository Place 1 suppository (25 mg) rectally 2 times daily for 10 days 20 suppository 0    hydroxychloroquine (PLAQUENIL) 200 MG tablet Take 1 tablet (200 mg) by mouth 2 times daily 180 tablet 0    L-Methylfolate-Algae-B12-B6 (METANX PO) Take 1,000 mg by mouth daily (Patient not taking: Reported on 4/4/2023)      MULTIVITAMIN TABS   OR   0    mycophenolate (GENERIC EQUIVALENT) 500 MG tablet Take 1 tablet  (500 mg) by mouth 2 times daily (Patient not taking: Reported on 11/6/2023) 60 tablet 1    Omega-3 Fatty Acids (OMEGA 3 PO) Take 1,250 mg by mouth daily      omeprazole (PRILOSEC) 40 MG DR capsule Take 1 capsule (40 mg) by mouth daily 60 capsule 1    ondansetron (ZOFRAN) 8 MG tablet Take one tablet up to three times daily for nausea. 90 tablet 1    predniSONE (DELTASONE) 1 MG tablet 12.5-10 mg a day, each course x 1 week then 9-8-7-6 mg a day, each course x 1 month then 5 mg a day, use with 5 mg size tab (Patient not taking: Reported on 11/6/2023) 360 tablet 1    predniSONE (DELTASONE) 5 MG tablet 12.5-10 mg a day, each course x 1 week then 9-8-7-6 mg a day, each course x 1 month then 5 mg a day, use with 1 mg size tab 150 tablet 5    semaglutide (OZEMPIC) 2 MG/1.5ML SOPN pen Inject 0.25 mg Subcutaneous every 7 days Increase to 0.5 mg after 2-3 weeks if tolerating. 1.5 mL 11    SPIRULINA PO Take by mouth daily      SUMAtriptan (IMITREX STATDOSE) 6 MG/0.5ML pen injector kit Inject 0.5 mLs (6 mg) Subcutaneous at onset of headache for migraine May repeat in 1 hour. Max 12 mg/24 hours. 3 kit 11    traMADol (ULTRAM) 50 MG tablet Take 1 tablet (50 mg) by mouth every 6 hours as needed for severe pain Take 1-2 tablets up to three times a day as needed - Oral 180 tablet 5    traMADol (ULTRAM-ER) 300 MG 24 hr tablet Take 1 tablet (300 mg) by mouth at bedtime maximum 1 tablet(s) per day 30 tablet 5    triamcinolone (KENALOG) 0.1 % paste Take by mouth 2 times daily (Patient not taking: Reported on 1/23/2023) 5 g 3    triamcinolone (NASACORT) 55 MCG/ACT nasal aerosol Spray 2 sprays into both nostrils daily (Patient not taking: Reported on 11/6/2023) 16.5 mL 11    ubrogepant (UBRELVY) 100 MG tablet Take 1 tablet (100 mg) by mouth at onset of headache (May repeat in 2 hours as needed. Max 2 tabs in 24 hours) 16 tablet 11    VITAMIN D, CHOLECALCIFEROL, PO Take 5,000 Units by mouth daily              OBJECTIVE:       Vitals: There  were no vitals filed for this visit.  BMI: There is no height or weight on file to calculate BMI.    Gen:  Well nourished and in no acute distress  HEENT: Extraocular movement intact  Pulm:  Breathing Comfortably. No increased respiratory effort.  Psych: Euthymic. Appropriately answers questions    MSK:   BILATERAL HAND  Inspection:    No swelling, bruising, discoloration. Heberden nodes on the DIP of the 2nd and 3rd digits bilaterally.   Palpation:   Thumb: CMC tender  Range of Motion:    Full active flexion and extension at MCP, PIP, and DIP joints; normal finger cascade without malrotation.  Wrist pronation, supination, and ulnar/radial deviation normal.  Strength:    , thumb flexion, extension and abduction limited by weakness. Finger flexion also limited by strength.   Special Tests:    Positive: CMC grind    Negative: Tinel's, Phalen's, Finkelstein's     Imaging was personally reviewed and interpreted by me.   3 views bilateral hand radiographs 11/7/2023 2:00 PM     History: Arthralgia of both hands; Arthralgia of both hands      Comparison: None     Findings:     PA, oblique and lateral view(s) of the hands were obtained.      Left:     No acute osseous abnormality.  No erosion.     Mild degenerative changes of the first carpometacarpal and triscaphe  joints.  Additional scattered degenerative change in the  interphalangeal joints, particularly the second distal interphalangeal  joint. Ulnar negative variance.     Soft tissue calcification ulnar to the third distal interphalangeal  joint.     Right:     No acute osseous abnormality.  No erosion.     Mild degenerative changes of the first carpometacarpal and triscaphe  joints.  Additional scattered degenerative change in the  interphalangeal joints, particularly the third distal interphalangeal  joint. Ulnar negative variance with distal radioulnar joint  degenerative change.     Soft tissue is unremarkable.                                                          Impression:  1. No acute osseous abnormality.  2. Mild polyarticular degenerative change.  3. Bilateral ulnar negative variance. Right distal radioulnar joint  degenerative change.      Again, thank you for allowing me to participate in the care of your patient.      Sincerely,    Birgit Huddleston MD

## 2023-11-10 ENCOUNTER — TELEPHONE (OUTPATIENT)
Dept: NEUROLOGY | Facility: CLINIC | Age: 48
End: 2023-11-10
Payer: COMMERCIAL

## 2023-11-10 NOTE — TELEPHONE ENCOUNTER
Left Voicemail (1st Attempt) and Sent Mychart (1st Attempt) for the patient to call back and schedule the following:    Appointment type: EMG  Provider: Any provider  Return date: First available  Specialty phone number: [phone]  Additional appointment(s) needed: N/A  Additional Notes: N/A    Shant Phoenix on 11/10/2023 at 9:59 AM

## 2023-11-19 DIAGNOSIS — M32.9 SYSTEMIC LUPUS ERYTHEMATOSUS, UNSPECIFIED SLE TYPE, UNSPECIFIED ORGAN INVOLVEMENT STATUS (H): ICD-10-CM

## 2023-11-22 RX ORDER — HYDROXYCHLOROQUINE SULFATE 200 MG/1
200 TABLET, FILM COATED ORAL 2 TIMES DAILY
Qty: 180 TABLET | Refills: 1 | Status: SHIPPED | OUTPATIENT
Start: 2023-11-22 | End: 2024-07-10

## 2023-11-22 NOTE — TELEPHONE ENCOUNTER
hydroxychloroquine (PLAQUENIL) 200 MG tablet 180 tablet 0 8/17/2023       Last Office Visit: 9/14/23  Future Office visit:  12/8/23  Eye exam: 6/12/23    Creatinine   Date Value Ref Range Status   09/14/2023 0.76 0.51 - 0.95 mg/dL Final   06/19/2021 0.76 0.52 - 1.04 mg/dL Final

## 2023-12-04 DIAGNOSIS — M32.9 SYSTEMIC LUPUS ERYTHEMATOSUS, UNSPECIFIED SLE TYPE, UNSPECIFIED ORGAN INVOLVEMENT STATUS (H): Primary | ICD-10-CM

## 2023-12-07 ENCOUNTER — TELEPHONE (OUTPATIENT)
Dept: DERMATOLOGY | Facility: CLINIC | Age: 48
End: 2023-12-07
Payer: COMMERCIAL

## 2023-12-08 ENCOUNTER — TELEPHONE (OUTPATIENT)
Dept: DERMATOLOGY | Facility: CLINIC | Age: 48
End: 2023-12-08
Payer: COMMERCIAL

## 2023-12-08 DIAGNOSIS — M32.9 SYSTEMIC LUPUS ERYTHEMATOSUS, UNSPECIFIED SLE TYPE, UNSPECIFIED ORGAN INVOLVEMENT STATUS (H): ICD-10-CM

## 2023-12-08 RX ORDER — BELIMUMAB 200 MG/ML
200 SOLUTION SUBCUTANEOUS
Qty: 12 ML | Refills: 3 | Status: SHIPPED | OUTPATIENT
Start: 2023-12-08 | End: 2023-12-08

## 2023-12-08 NOTE — TELEPHONE ENCOUNTER
Lvm sent my chart msg for patient to rescheduled  the following:    Appointment type: RDA  Provider:   Return date: 01-12-24  Specialty phone number: [phone]

## 2023-12-10 RX ORDER — BELIMUMAB 200 MG/ML
200 SOLUTION SUBCUTANEOUS
Qty: 12 ML | Refills: 3 | Status: SHIPPED | OUTPATIENT
Start: 2023-12-10 | End: 2024-07-10 | Stop reason: ALTCHOICE

## 2023-12-15 NOTE — TELEPHONE ENCOUNTER
FUTURE VISIT INFORMATION      FUTURE VISIT INFORMATION:  Date: 2.27.24  Time: 3:00  Location: [facility]  REFERRAL INFORMATION:  Referring provider:  Day  Referring providers clinic:  Derm  Reason for visit/diagnosis  Allergy consultation, food allergies, seasonal allergies, recs in Epic, Per Pt      RECORDS REQUESTED FROM:       Clinic name Comments Records Status Imaging Status   Derm 7.11.23  Day Epic    FP 4.4.23  [facility] Epic

## 2023-12-18 ENCOUNTER — VIRTUAL VISIT (OUTPATIENT)
Dept: URGENT CARE | Facility: CLINIC | Age: 48
End: 2023-12-18
Payer: COMMERCIAL

## 2023-12-18 ENCOUNTER — TELEPHONE (OUTPATIENT)
Dept: INTERNAL MEDICINE | Facility: CLINIC | Age: 48
End: 2023-12-18

## 2023-12-18 DIAGNOSIS — U07.1 SARS-COV-2 POSITIVE: Primary | ICD-10-CM

## 2023-12-18 PROBLEM — N80.9 ENDOMETRIOSIS: Status: ACTIVE | Noted: 2021-05-17

## 2023-12-18 PROBLEM — M26.623 BILATERAL TEMPOROMANDIBULAR JOINT PAIN: Status: ACTIVE | Noted: 2023-04-04

## 2023-12-18 PROBLEM — L93.0 LUPUS ERYTHEMATOSUS: Status: ACTIVE | Noted: 2021-05-17

## 2023-12-18 PROBLEM — D89.9 DISORDER OF IMMUNE SYSTEM (H): Status: ACTIVE | Noted: 2017-12-05

## 2023-12-18 PROCEDURE — 99213 OFFICE O/P EST LOW 20 MIN: CPT | Mod: 93

## 2023-12-18 RX ORDER — LEVOTHYROXINE SODIUM 25 UG/1
1 TABLET ORAL DAILY
COMMUNITY

## 2023-12-18 RX ORDER — PROMETHAZINE HYDROCHLORIDE 25 MG/1
TABLET ORAL
COMMUNITY

## 2023-12-18 RX ORDER — PEG-3350, SODIUM SULFATE, SODIUM CHLORIDE, POTASSIUM CHLORIDE, SODIUM ASCORBATE AND ASCORBIC ACID 7.5-2.691G
KIT ORAL
COMMUNITY
End: 2024-07-10

## 2023-12-18 RX ORDER — LEVALBUTEROL TARTRATE 45 UG/1
AEROSOL, METERED ORAL
COMMUNITY

## 2023-12-18 RX ORDER — BENZONATATE 100 MG/1
100 CAPSULE ORAL 3 TIMES DAILY PRN
Qty: 30 CAPSULE | Refills: 0 | Status: SHIPPED | OUTPATIENT
Start: 2023-12-18

## 2023-12-18 RX ORDER — MONTELUKAST SODIUM 10 MG/1
TABLET ORAL
COMMUNITY

## 2023-12-18 RX ORDER — METFORMIN HCL 500 MG
TABLET, EXTENDED RELEASE 24 HR ORAL
COMMUNITY
Start: 2022-12-20 | End: 2024-07-15

## 2023-12-18 NOTE — PROGRESS NOTES
"Assessment:    SARS-CoV-2 positive    COVID-19 positive patient.  Encounter for consideration of medication intervention.    Patient does qualify for a prescription.   Full discussion with patient including medication options, risks and benefits.   Potential drug interactions reviewed with patient.    Plan:  Treatment planned   Paxlovid will be sent in to preferred pharmacy.     Temporary change to home medications:   HOLD Ambien      Subjective      Patricia  is a 48 year old  who has a confirmed new positive COVID-19 diagnosis.      She has been identified as high risk for complications of this infection, and is being evaluated via a billable     Phone visit.      Phone call duration: 15 minutes  Patient location: Home  Provider location: Clinic    Concern for COVID-19  When did symptoms begin? 12/14/23    Positive COVID test? Yes    Symptoms (Cough, trouble breathing, fever, chills, headache, sore throat, chest pain, diarrhea, body aches)?  Fatigue, fever, nausea with vomiting, chills, throat pain, head ache        Constitutional, HEENT, cardiovascular, pulmonary, gi and gu systems are negative, except as otherwise noted.    Objective    Vitals:  No vitals were obtained today due to virtual visit.  Estimated body mass index is 31.86 kg/m  as calculated from the following:    Height as of 9/14/23: 1.698 m (5' 6.85\").    Weight as of 4/19/23: 91.9 kg (202 lb 8 oz).   General: Alert, no apparent distress  PSYCH: Alert; coherent speech, normal rate and volume, able to articulate logical thoughts, appropriate insight, no tangential thoughts, no hallucination or delusions.  Affect is appropriate  RESP: No cough, no audible wheezing, able to talk in full sentences  Remainder of exam unable to be completed due to telephone visit  GFR Estimate   Date Value Ref Range Status   09/14/2023 >90 >60 mL/min/1.73m2 Final   06/19/2021 >90 >60 mL/min/[1.73_m2] Final     Comment:     Non  GFR Calc  Starting " "12/18/2018, serum creatinine based estimated GFR (eGFR) will be   calculated using the Chronic Kidney Disease Epidemiology Collaboration   (CKD-EPI) equation.              The patient has been notified of following:     \"This telephone visit will be conducted via a call between you and your physician/provider. We have found that certain health care needs can be provided without the need for a physical exam.  This service lets us provide the care you need with a short phone conversation.  If a prescription is necessary we can send it directly to your pharmacy.  If lab work is needed we can place an order for that and you can then stop by our lab to have the test done at a later time.    Telephone visits are billed at different rates depending on your insurance coverage. During this emergency period, for some insurers they may be billed the same as an in-person visit.  Please reach out to your insurance provider with any questions.    If during the course of the call the physician/provider feels a telephone visit is not appropriate, you will not be charged for this service.\"    Patient has given verbal consent for Telephone visit?  Yes          "

## 2023-12-31 ENCOUNTER — TELEPHONE (OUTPATIENT)
Dept: INTERNAL MEDICINE | Facility: CLINIC | Age: 48
End: 2023-12-31
Payer: COMMERCIAL

## 2023-12-31 DIAGNOSIS — R11.0 NAUSEA: ICD-10-CM

## 2024-01-03 RX ORDER — ONDANSETRON 8 MG/1
TABLET, FILM COATED ORAL
Qty: 90 TABLET | Refills: 1 | Status: SHIPPED | OUTPATIENT
Start: 2024-01-03 | End: 2024-07-15

## 2024-01-03 NOTE — TELEPHONE ENCOUNTER
[facility] Call Center    Phone Message    May a detailed message be left on voicemail: yes     Reason for Call: Medication Question or concern regarding medication   Prescription Clarification  Name of Medication:   ondansetron (ZOFRAN) 8 MG tablet       Prescribing Provider:   Shavon Raines MD        Pharmacy:   [id number] - [place], [place] - [address] AT [facility] [address]      What on the order needs clarification?   The patient said she'll be leaving out of town this afternoon around 3pm-4pm, its urgent please review and follow up thank you.      Action Taken: Message routed to:  [facility] ([facility]    Travel Screening: Not Applicable

## 2024-01-15 ENCOUNTER — TELEPHONE (OUTPATIENT)
Dept: INTERNAL MEDICINE | Facility: CLINIC | Age: 49
End: 2024-01-15
Payer: COMMERCIAL

## 2024-01-15 NOTE — TELEPHONE ENCOUNTER
Prior Authorization Retail Medication Request    Medication/Dose: ondansetron (ZOFRAN) 8 MG tablet - DISP 90 TABLET  Diagnosis and ICD code (if different than what is on RX):    New/renewal/insurance change PA/secondary ins. PA:  Previously Tried and Failed:    Rationale:      Insurance   Primary:   Insurance ID:      Secondary (if applicable):  Insurance ID:      Pharmacy Information (if different than what is on RX)  Name:    Phone:    Fax:

## 2024-01-29 ENCOUNTER — OFFICE VISIT (OUTPATIENT)
Dept: NEUROLOGY | Facility: CLINIC | Age: 49
End: 2024-01-29
Payer: COMMERCIAL

## 2024-01-29 DIAGNOSIS — G43.719 INTRACTABLE CHRONIC MIGRAINE WITHOUT AURA AND WITHOUT STATUS MIGRAINOSUS: Primary | ICD-10-CM

## 2024-01-29 PROCEDURE — 64615 CHEMODENERV MUSC MIGRAINE: CPT | Performed by: NURSE PRACTITIONER

## 2024-01-29 NOTE — PROGRESS NOTES
"BOTULINUM NEUROTOXIN INJECTION PROCEDURE:  DATA:1/29/2024  INDICATIONS FOR PROCEDURES:   chronic migraine headaches. baseline symptoms have been recalcitrant to oral medications and conservative therapy. Patient is here today for a repeat  injection with Botox.   Last Botox 11/2/2023 and no there concerns or side effects  Migraines \"fine\" for first 2 month than pain came back in the last month   Wearing off effects 4 weeks  Pain in the shoulders and TMD and tighness contributes to migraines   Headaches worsen when Botox wears off   Pre-procedure pain 2/10      GOAL OF PROCEDURE:  The goal of this procedure is to decrease pain and enhance functional independence.     TOTAL DOSE ADMINISTERED:  Dose Administered:  155 units  Botox (Botulinum Toxin Type A)       2:1 Dilution    Wasted 45 units     CONSENT:  The risks, benefits, and treatment options were discussed with the patient who agreed to proceed.     Written consent was obtained      EQUIPMENT USED:  Needles-30 gauge, 0.5 inches for injections  Four 1-ml tuberculin syringes for injections  One sodium chloride 10 ml vial preservative free  Alcohol swabs     SKIN PREPARATION:  Skin preparation was performed using an alcohol wipe.        AREA/MUSCLE INJECTED:  155 units of Botox     Right upper Trapezius (upper cervical) - 10, 10, 10 units of Botox at 3 site/s.   Left upper Trapezius (upper cervical) - 10, 10, 10 units of Botox at 3 site/s.      Right cervical paraspinals - 5 units of Botox at 2 site/s.   Left cervical paraspinals - 5 units of Botox at 2 site/s.      Left occipitalis - 5 units of Botox at 1 site/s.  Right occipitalis -5 units of Botox at 1 site/s     Right Frontalis - 5 units of Botox at 2 site/s.  Left Frontalis - 5 units of Botox at 2 site/s.     Right Temporalis - 5 units of Botox at 3 site/s.  Left Temporalis - 5 units of Botox at 3 site/s.     Right  - 5 units of Botox at 1 site/s.              Left  - 5 units of Botox at 1 " site/s.              Masseter right and left -5 units each      Procerus - 5 units of Botox at 1 site/s.     RESPONSE TO PROCEDURE:  tolerated the procedure well and there were no immediate complications.  Patient was allowed to recover for an appropriate period of time and was discharged home in stable condition.     FOLLOW UP:  Repeat Botox injections in 12 weeks        This procedure was performed under a hospital privileging agreement with Dr. Romina Chin, CARISSA, [facility]

## 2024-01-29 NOTE — LETTER
"1/29/2024       RE: Patricia Hall  [address]  [place] [address]       Dear Colleague,    Thank you for referring your patient, Patricia Hall, to the [facility] [place] [facility]. Please see a copy of my visit note below.    BOTULINUM NEUROTOXIN INJECTION PROCEDURE:  DATA:1/29/2024  INDICATIONS FOR PROCEDURES:   chronic migraine headaches. baseline symptoms have been recalcitrant to oral medications and conservative therapy. Patient is here today for a repeat  injection with Botox.   Last Botox 11/2/2023 and no there concerns or side effects  Migraines \"fine\" for first 2 month than pain came back in the last month   Wearing off effects 4 weeks  Pain in the shoulders and TMD and tighness contributes to migraines   Headaches worsen when Botox wears off   Pre-procedure pain 2/10      GOAL OF PROCEDURE:  The goal of this procedure is to decrease pain and enhance functional independence.     TOTAL DOSE ADMINISTERED:  Dose Administered:  155 units  Botox (Botulinum Toxin Type A)       2:1 Dilution    Wasted 45 units     CONSENT:  The risks, benefits, and treatment options were discussed with the patient who agreed to proceed.     Written consent was obtained      EQUIPMENT USED:  Needles-30 gauge, 0.5 inches for injections  Four 1-ml tuberculin syringes for injections  One sodium chloride 10 ml vial preservative free  Alcohol swabs     SKIN PREPARATION:  Skin preparation was performed using an alcohol wipe.        AREA/MUSCLE INJECTED:  155 units of Botox     Right upper Trapezius (upper cervical) - 10, 10, 10 units of Botox at 3 site/s.   Left upper Trapezius (upper cervical) - 10, 10, 10 units of Botox at 3 site/s.      Right cervical paraspinals - 5 units of Botox at 2 site/s.   Left cervical paraspinals - 5 units of Botox at 2 site/s.      Left occipitalis - 5 units of Botox at 1 site/s.  Right occipitalis -5 units " of Botox at 1 site/s     Right Frontalis - 5 units of Botox at 2 site/s.  Left Frontalis - 5 units of Botox at 2 site/s.     Right Temporalis - 5 units of Botox at 3 site/s.  Left Temporalis - 5 units of Botox at 3 site/s.     Right  - 5 units of Botox at 1 site/s.              Left  - 5 units of Botox at 1 site/s.              Masseter right and left -5 units each      Procerus - 5 units of Botox at 1 site/s.     RESPONSE TO PROCEDURE:  tolerated the procedure well and there were no immediate complications.  Patient was allowed to recover for an appropriate period of time and was discharged home in stable condition.     FOLLOW UP:  Repeat Botox injections in 12 weeks        This procedure was performed under a hospital privileging agreement with Dr. Vanegas          Again, thank you for allowing me to participate in the care of your patient.      Sincerely,    CARISSA Tang CNP

## 2024-01-31 ENCOUNTER — TELEPHONE (OUTPATIENT)
Dept: PHYSICAL MEDICINE AND REHAB | Facility: CLINIC | Age: 49
End: 2024-01-31
Payer: COMMERCIAL

## 2024-01-31 NOTE — TELEPHONE ENCOUNTER
Returned call to pt, unable to LVM, phone just rang multiple times. Pt can be scheduled first available and put on a wait list.

## 2024-01-31 NOTE — TELEPHONE ENCOUNTER
[facility] Call Center    Phone Message    May a detailed message be left on voicemail: yes     Reason for Call: Other: Patient is calling to schedule her referral appointment. Writer offered the patient soonest openings for [facility] - August 5th, or [facility] - July 30th. Patient was upset that we are booked out so far and is wondering if there is another provider besides Dr. Mondragon that she can be seen with sooner due to being in pain. Please review and call patient back.      Action Taken: Message routed to:  [facility] ([facility]): [facility]    Travel Screening: Not Applicable

## 2024-02-15 ENCOUNTER — VIRTUAL VISIT (OUTPATIENT)
Dept: NEUROLOGY | Facility: CLINIC | Age: 49
End: 2024-02-15
Payer: COMMERCIAL

## 2024-02-15 ENCOUNTER — TELEPHONE (OUTPATIENT)
Dept: NEUROLOGY | Facility: CLINIC | Age: 49
End: 2024-02-15

## 2024-02-15 DIAGNOSIS — G43.719 INTRACTABLE CHRONIC MIGRAINE WITHOUT AURA AND WITHOUT STATUS MIGRAINOSUS: Primary | ICD-10-CM

## 2024-02-15 DIAGNOSIS — M79.18 CHRONIC MYOFASCIAL PAIN: ICD-10-CM

## 2024-02-15 DIAGNOSIS — G89.29 CHRONIC MYOFASCIAL PAIN: ICD-10-CM

## 2024-02-15 PROCEDURE — 99214 OFFICE O/P EST MOD 30 MIN: CPT | Mod: 95 | Performed by: NURSE PRACTITIONER

## 2024-02-15 RX ORDER — PROMETHAZINE HYDROCHLORIDE 12.5 MG/1
25 TABLET ORAL EVERY 6 HOURS PRN
Qty: 20 TABLET | Refills: 3 | Status: SHIPPED | OUTPATIENT
Start: 2024-02-15

## 2024-02-15 RX ORDER — TOPIRAMATE 25 MG/1
25 TABLET, FILM COATED ORAL 2 TIMES DAILY
Qty: 120 TABLET | Refills: 6 | Status: SHIPPED | OUTPATIENT
Start: 2024-02-15 | End: 2024-06-13

## 2024-02-15 ASSESSMENT — MIGRAINE DISABILITY ASSESSMENT (MIDAS)
HOW MANY DAYS WAS YOUR PRODUCTIVITY CUT IN HALF BECAUSE OF HEADACHES: 25
HOW MANY DAYS DID YOU NOT DO HOUSEWORK BECAUSE OF HEADACHES: 25
ON A SCALE FROM 0-10 ON AVERAGE HOW PAINFUL WERE HEADACHES: 10
HOW MANY DAYS WAS HOUSEWORK PRODUCTIVITY CUT IN HALF DUE TO HEADACHES: 25
TOTAL SCORE: 125
HOW MANY DAYS DID YOU MISS WORK OR SCHOOL BECAUSE OF HEADACHES: 25
HOW OFTEN WERE SOCIAL ACTIVITIES MISSED DUE TO HEADACHES: 25
HOW MANY DAYS IN THE PAST 3 MONTHS HAVE YOU HAD A HEADACHE: 25

## 2024-02-15 ASSESSMENT — HEADACHE IMPACT TEST (HIT 6)
WHEN YOU HAVE A HEADACHE HOW OFTEN DO YOU WISH YOU COULD LIE DOWN: ALWAYS
HIT6 TOTAL SCORE: 78
HOW OFTEN DO HEADACHES LIMIT YOUR DAILY ACTIVITIES: ALWAYS
HOW OFTEN HAVE YOU FELT FED UP OR IRRITATED BECAUSE OF YOUR HEADACHES: ALWAYS
HOW OFTEN HAVE YOU FELT TOO TIRED TO WORK BECAUSE OF YOUR HEADACHES: ALWAYS
WHEN YOU HAVE HEADACHES HOW OFTEN IS THE PAIN SEVERE: ALWAYS
HOW OFTEN DID HEADACHS LIMIT CONCENTRATION ON WORK OR DAILY ACTIVITY: ALWAYS

## 2024-02-15 NOTE — PATIENT INSTRUCTIONS
Plan:  Has eyad with PMR to discuss msk and see if any other botox protocol can be used   Migraine prevention -Migraine headache prevention-a trial of topiramate 25 mg at bedtime for one week, then 50 mg at bedtime for one week, then take 75 mg (3 tabs) at bedtime. Side effects-stay hydrated and drink at least 10+glasses of water to decrease risk of kidney stones, may cause tingling in the hands and feet, taste changes, glaucoma, nausea, weight stable or loss, mood changes.    Alternatively a trial of Emgality for migraine prevention Recommended a trial of migraine preventive treatment with Emgality. Side effects-allergic reaction or pain in the injection side or redness in the injection side. Unknown side effects with a long term use.   Rescue treaatment -sumatriptan and/or Ubrelvy as needed   A trial of promethazine as needed for nausea/vomiting   Follow up in 3-4 months or sooner if needed

## 2024-02-15 NOTE — LETTER
2/15/2024       RE: Patricia Hall  [address]  [place] [address]       Dear Colleague,    Thank you for referring your patient, Patricia Hall, to the [facility] NEUROLOGY CLINIC [place] at [facility]. Please see a copy of my visit note below.    Gracie is a 48 year old who is being evaluated via a billable video visit.      Subjective  Gracie is a 48 year old, presenting for the following health issues:headache   Video Visit (Follow-up )      Since Botox on 1/29/2024 - only one bed migraine -sumatriptan inj+ondasetron and ubrelvy. Ondansetron helps and gets only a few pills never gets 90 tablets as prescribed.  Headaches now may be 10 days per month between Botox visits -Botox helps but than wearing off effect and than more headaches at the end of the cycle. Still feels need to use more sumatriptan especially at the end of Botox cycle.   Ubrelvy as needed -migraine does not go away and always in conjuction with sumatriptan.   Migraines still break thru possibly due to different reason.   Neck pain/TMJ and muscle spasm   Tried prochlorperazine and metoclopromide did not help  Promethazine (phenergen) works and opened to try it again     Past Tx-pregabalin, sertraline, duloxetine, topiramate   Amitriptyline -caused weight gain -tried many     Impression:  Chronic migraines  Possible MSK pain contributing to headaches -needs evaluation      Plan:  Has eyad with PMR to discuss msk and see if any other botox protocol can be used   Migraine prevention -Migraine headache prevention-a trial of topiramate 25 mg at bedtime for one week, then 50 mg at bedtime for one week, then take 75 mg (3 tabs) at bedtime. Side effects-stay hydrated and drink at least 10+glasses of water to decrease risk of kidney stones, may cause tingling in the hands and feet, taste changes, glaucoma, nausea, weight stable or loss, mood changes.    Alternatively a trial of  Emgality for migraine prevention Recommended a trial of migraine preventive treatment with Emgality. Side effects-allergic reaction or pain in the injection side or redness in the injection side. Unknown side effects with a long term use.   Rescue treaatment -sumatriptan and/or Ubrelvy as needed   A trial of promethazine as needed for nausea/vomiting   Follow up in 3 months or sooner if needed       Objective   Vitals - Patient Reported  Pain Score: Mild Pain (3)  Pain Loc: Head    Physical Exam   Patient is alert and no in apparent acute distress,  mentation appears normal, judgement and insight intact, normal speech, no weakness    I discussed all my recommendations with Patricia Hall who verbalizes understanding and comfortable with the plan.      35 minutes spent on the date of the encounter doing video access, chart  review, meds review, treatment plan, documentation and further activities as noted above        Again, thank you for allowing me to participate in the care of your patient.      Sincerely,    CARISSA Tang CNP

## 2024-02-15 NOTE — NURSING NOTE
Is the patient currently in the state of [place]? YES    Visit mode:VIDEO    If the visit is dropped, the patient can be reconnected by: TELEPHONE VISIT: Phone number:   Telephone Information:   Mobile [phone]       Will anyone else be joining the visit? NO  (If patient encounters technical issues they should call [phone]    How would you like to obtain your AVS? MyChart    Are changes needed to the allergy or medication list? Pt stated no med changes    Reason for visit: Video Visit (Follow-up )    Rowan CUNNINGHAM

## 2024-02-15 NOTE — TELEPHONE ENCOUNTER
Prior Authorization Retail Medication Request    Medication/Dose: galcanezumab-gnlm (EMGALITY) 120 MG/ML injection, Inject 240 mg subcutaneous first month and then inject 120 mg subcutaneous every 28 days   Diagnosis and ICD code (if different than what is on RX):    Intractable chronic migraine without aura and without status migrainosus [G43.719]        New/renewal/insurance change PA/secondary ins. PA:  Previously Tried and Failed:    pregabalin, sertraline, duloxetine, topiramate   Amitriptyline    Rationale:  migraine prevention    Insurance   Primary: [facility]  Insurance ID:  [id number]    Pharmacy Information (if different than what is on RX)  Name:    Phone:    Fax:

## 2024-02-15 NOTE — PROGRESS NOTES
Gracie is a 48 year old who is being evaluated via a billable video visit.          Subjective   Gracie is a 48 year old, presenting for the following health issues:headache   Video Visit (Follow-up )      Since Botox on 1/29/2024 - only one bed migraine -sumatriptan inj+ondasetron and ubrelvy. Ondansetron helps and gets only a few pills never gets 90 tablets as prescribed.  Headaches now may be 10 days per month between Botox visits -Botox helps but than wearing off effect and than more headaches at the end of the cycle. Still feels need to use more sumatriptan especially at the end of Botox cycle.   Ubrelvy as needed -migraine does not go away and always in conjuction with sumatriptan.   Migraines still break thru possibly due to different reason.   Neck pain/TMJ and muscle spasm   Tried prochlorperazine and metoclopromide did not help  Promethazine (phenergen) works and opened to try it again     Past Tx-pregabalin, sertraline, duloxetine, topiramate   Amitriptyline -caused weight gain -tried many     Impression:  Chronic migraines  Possible MSK pain contributing to headaches -needs evaluation      Plan:  Has eyad with PMR to discuss msk and see if any other botox protocol can be used   Migraine prevention -Migraine headache prevention-a trial of topiramate 25 mg at bedtime for one week, then 50 mg at bedtime for one week, then take 75 mg (3 tabs) at bedtime. Side effects-stay hydrated and drink at least 10+glasses of water to decrease risk of kidney stones, may cause tingling in the hands and feet, taste changes, glaucoma, nausea, weight stable or loss, mood changes.    Alternatively a trial of Emgality for migraine prevention Recommended a trial of migraine preventive treatment with Emgality. Side effects-allergic reaction or pain in the injection side or redness in the injection side. Unknown side effects with a long term use.   Rescue treaatment -sumatriptan and/or Ubrelvy as needed   A trial of promethazine as  needed for nausea/vomiting   Follow up in 3 months or sooner if needed       Objective    Vitals - Patient Reported  Pain Score: Mild Pain (3)  Pain Loc: Head    Physical Exam   Patient is alert and no in apparent acute distress,  mentation appears normal, judgement and insight intact, normal speech, no weakness    I discussed all my recommendations with Patricia Hall who verbalizes understanding and comfortable with the plan.      35 minutes spent on the date of the encounter doing video access, chart  review, meds review, treatment plan, documentation and further activities as noted above    CARISSA Johnson, CNP [facility]  Headache certified  [facility]    Video-Visit Details    Type of service:  Video Visit   Originating Location (pt. Location): Home  Distant Location (provider location):  Off-site  Platform used for Video Visit: Zesty

## 2024-02-21 ENCOUNTER — OFFICE VISIT (OUTPATIENT)
Dept: INTERNAL MEDICINE | Facility: CLINIC | Age: 49
End: 2024-02-21
Payer: COMMERCIAL

## 2024-02-21 VITALS — OXYGEN SATURATION: 94 % | HEART RATE: 119 BPM | SYSTOLIC BLOOD PRESSURE: 142 MMHG | DIASTOLIC BLOOD PRESSURE: 83 MMHG

## 2024-02-21 DIAGNOSIS — Z11.4 SCREENING FOR HIV (HUMAN IMMUNODEFICIENCY VIRUS): Primary | ICD-10-CM

## 2024-02-21 DIAGNOSIS — G89.29 OTHER CHRONIC PAIN: ICD-10-CM

## 2024-02-21 DIAGNOSIS — L81.9 PIGMENTED SKIN LESION SUSPICIOUS FOR MALIGNANT NEOPLASM: ICD-10-CM

## 2024-02-21 DIAGNOSIS — E78.01 FAMILIAL HYPERCHOLESTEROLEMIA: ICD-10-CM

## 2024-02-21 PROCEDURE — 99214 OFFICE O/P EST MOD 30 MIN: CPT | Performed by: INTERNAL MEDICINE

## 2024-02-21 RX ORDER — ROSUVASTATIN CALCIUM 5 MG/1
5 TABLET, COATED ORAL DAILY
Qty: 90 TABLET | Refills: 3 | Status: SHIPPED | OUTPATIENT
Start: 2024-02-21 | End: 2024-07-15

## 2024-02-21 ASSESSMENT — PATIENT HEALTH QUESTIONNAIRE - PHQ9
10. IF YOU CHECKED OFF ANY PROBLEMS, HOW DIFFICULT HAVE THESE PROBLEMS MADE IT FOR YOU TO DO YOUR WORK, TAKE CARE OF THINGS AT HOME, OR GET ALONG WITH OTHER PEOPLE: SOMEWHAT DIFFICULT
SUM OF ALL RESPONSES TO PHQ QUESTIONS 1-9: 11
SUM OF ALL RESPONSES TO PHQ QUESTIONS 1-9: 11

## 2024-02-21 NOTE — PROGRESS NOTES
Subjective   Gracie is a 48 year old, presenting for the following health issues:  Follow Up (Pt here for follow up and would like to discuss bump on leg), Recheck Medication (Pt would like to discuss refills and light medication review), and Neck Pain (Pt reports new onset neck pain that translates into migraines - currently discussing with migraine specialist)      2/21/2024     2:48 PM   Additional Questions   Roomed by MJL, EMT     HPI   Gracie is here for routine follow up of a few concerns.  We discussed her migraine headache management and I also reviewed notes from Neurology on this topic.  She's been using botox injections to help manage this for quite some time.  She also has increased neck muscle tension particularly on the left lateral neck and it was suggested that botox to this area may help.      Second is a new skin lesion or bump that appeared on the right leg sometime in the past few months.  It is not painful nor itchy, but seems slightly larger now.      We reviewed her cholesterol result from last panel of labs drawn by Dr. Yasmine Longo.  Given the high LDL of 233, a statin would be recommended and she is willing to try.    Finally she is due for a refill of tramadol and feels it has been working well with no significant side effects.        Objective    There were no vitals taken for this visit.  There is no height or weight on file to calculate BMI.  Physical Exam   BP (!) 142/83 (BP Location: Right arm, Patient Position: Sitting, Cuff Size: Adult Regular)   Pulse 119   SpO2 94%    Skin: poorly marginated, pigmented lesion, slightly nodular and about 3-4mm in diameter right upper thigh      A/P Gracie was seen today for follow up, recheck medication and neck pain.    Familial hypercholesterolemia  -    Start rosuvastatin (CRESTOR) 5 MG tablet; Take 1 tablet (5 mg) by mouth daily    Pigmented skin lesion suspicious for malignant neoplasm  -     Adult Dermatology  Referral;  Future    Other chronic pain  -     Refill traMADol (ULTRAM-ER) 300 MG 24 hr tablet; Take 1 tablet (300 mg) by mouth at bedtime maximum 1 tablet(s) per day     Shavon Guzman MD         Signed Electronically by: Shavon Guzman MD

## 2024-02-25 DIAGNOSIS — M32.9 SYSTEMIC LUPUS ERYTHEMATOSUS, UNSPECIFIED SLE TYPE, UNSPECIFIED ORGAN INVOLVEMENT STATUS (H): ICD-10-CM

## 2024-02-26 RX ORDER — TRAMADOL HYDROCHLORIDE 300 MG/1
300 TABLET, EXTENDED RELEASE ORAL AT BEDTIME
Qty: 30 TABLET | Refills: 5 | Status: SHIPPED | OUTPATIENT
Start: 2024-02-26 | End: 2024-07-15

## 2024-02-27 ENCOUNTER — OFFICE VISIT (OUTPATIENT)
Dept: DERMATOLOGY | Facility: CLINIC | Age: 49
End: 2024-02-27
Payer: COMMERCIAL

## 2024-02-27 ENCOUNTER — PRE VISIT (OUTPATIENT)
Dept: ALLERGY | Facility: CLINIC | Age: 49
End: 2024-02-27

## 2024-02-27 DIAGNOSIS — L72.0 EPIDERMAL INCLUSION CYST: Primary | ICD-10-CM

## 2024-02-27 PROCEDURE — 99213 OFFICE O/P EST LOW 20 MIN: CPT | Performed by: PHYSICIAN ASSISTANT

## 2024-02-27 ASSESSMENT — PAIN SCALES - GENERAL: PAINLEVEL: NO PAIN (0)

## 2024-02-27 NOTE — LETTER
2/27/2024       RE: Patricia Hall  [address]  [place] [address]     Dear Colleague,    Thank you for referring your patient, Patricia Hall, to the [facility] DERMATOLOGY CLINIC [place] at [facility]. Please see a copy of my visit note below.    [facility] Dermatology Note  Encounter Date: Feb 27, 2024  Office Visit     Reviewed patients past medical history and pertinent chart review prior to patients visit today.     Dermatology Problem List:  1. Systemic lupus   Current: prednisone 15 mg a day (chronically), plaquenil 200 mg BID   - diagnosed in early 2000s  - photosensitivity, VINITA 1:160, fatigue, oral ulcers, arthralgias, recurrent miscarriages (also has MTHFR & EARNEST-1 mutations)     2. Wart, left medial first toe  Current: LN2 x1 (7/11/23)   Previous: urea     3. Onychomycosis  Previous: ciclopirox (did not use), dilute bleach soaks    ____________________________________________    Assessment & Plan:     # Epidermal inclusion cyst versus dermatofibroma, right hip  - We discussed the benign appearence of the skin lesion. We reviewed options including continued observation versus elective excision versus punch biopsy today.  The patient elected to undergo punch biopsy, but upon receiving the lidocaine decided to forgo treatment for now. She plans to follow up for excision in the future.  I recommend continued observation with follow up should any concerning changes arise.     All risks, benefits and alternatives were discussed with patient.  Patient is in agreement and understands the assessment and plan.  All questions were answered.  Josefina Dawkins PA-C  [facility] Dermatology  _______________________________________    CC: Derm Problem (Spot of concern on R hip.Thought it was a bruise, is raised. She applied arnica to spot. Has gotten bigger over the last month. Endorse picking at spot. Feels like  "\"it's building pressure\")    HPI:  Ms. Patricia Hall is a(n) 48 year old female who presents today as a return patient for a lesion on the right hip. Initially, the patient thought the lesion was a bruise. The lesion has grown and changed colored the past month. The patient denies any pain, itching, bleeding, or discharge from the site. Patient is otherwise feeling well, without additional skin concerns.      Physical Exam:  SKIN: Focused examination of right hip was performed.  -The right lateral hip demonstrates a 1.0 x 1.0 cm well-demarcated, mobile subcutaneous nodule.    - No other lesions of concern on areas examined.     Medications:  Current Outpatient Medications   Medication    AMBIEN 10 MG OR TABS    Belimumab (BENLYSTA) 200 MG/ML SOSY    benzonatate (TESSALON) 100 MG capsule    calcium carbonate (OS-VAHID 500 MG [place]. [place]) 1250 MG tablet    EPINEPHrine (ANY BX GENERIC EQUIV) 0.3 MG/0.3ML injection 2-pack    galcanezumab-gnlm (EMGALITY) 120 MG/ML injection    hydroxychloroquine (PLAQUENIL) 200 MG tablet    levalbuterol (XOPENEX HFA) 45 MCG/ACT inhaler    levothyroxine (SYNTHROID/LEVOTHROID) 25 MCG tablet    metFORMIN (GLUCOPHAGE XR) 500 MG 24 hr tablet    montelukast (SINGULAIR) 10 MG tablet    MULTIVITAMIN TABS   OR    Omega-3 Fatty Acids (OMEGA 3 PO)    omeprazole (PRILOSEC) 40 MG DR capsule    ondansetron (ZOFRAN) 8 MG tablet    PEG-KCl-NaCl-NaSulf-Na Asc-C (MOVIPREP) 100 g SOLR    predniSONE (DELTASONE) 1 MG tablet    predniSONE (DELTASONE) 5 MG tablet    promethazine (PHENERGAN) 12.5 MG tablet    promethazine (PHENERGAN) 25 MG tablet    rosuvastatin (CRESTOR) 5 MG tablet    semaglutide (OZEMPIC) 2 MG/1.5ML SOPN pen    sertraline (ZOLOFT) 50 MG tablet    SPIRULINA PO    SUMAtriptan (IMITREX STATDOSE) 6 MG/0.5ML pen injector kit    topiramate (TOPAMAX) 25 MG tablet    traMADol (ULTRAM) 50 MG tablet    traMADol (ULTRAM-ER) 300 MG 24 hr tablet    ubrogepant (UBRELVY) 100 MG tablet    VITAMIN D, " CHOLECALCIFEROL, PO     Current Facility-Administered Medications   Medication    Botulinum Toxin Type A (BOTOX) 200 units injection 155 Units    Botulinum Toxin Type A (BOTOX) 200 units injection 155 Units      Past Medical History:   Patient Active Problem List   Diagnosis    Insomnia    Systemic lupus erythematosus (H)    Refractory migraine without aura    5,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    Tension-type headache    Diminished ovarian reserve    Disorder of connective tissue (H24)    Disorder of immune system (H24)    Endometriosis of uterus    Female infertility    History of environmental allergies    Hyperlipidemia    Irritable bowel syndrome    Major depressive disorder, recurrent episode, in full remission (H24)    Major depressive disorder, recurrent episode, mild (H24)    Migraine with aura    Myofascial pain    Raynaud's disease    Recurrent pregnancy loss without current pregnancy    Seasonal allergies    Primary fibromyalgia syndrome    Uterine leiomyoma    Sciatica of right side    Numbness and tingling of both lower extremities    Sleep apnea    Bilateral temporomandibular joint pain    Cervical cancer screening    Endometriosis    Lupus erythematosus     Past Medical History:   Diagnosis Date    Allergy, unspecified not elsewhere classified     Endometriosis     Fibromyalgia     Migraine without aura, with intractable migraine, so stated, without mention of status migrainosus     Myalgia and myositis, unspecified     Systemic lupus erythematosus (H)        CC Shavon Guzman MD  [address] [address] on close of this encounter.

## 2024-02-27 NOTE — NURSING NOTE
Lidocaine-epinephrine 1-1:337665 % injection   2mL once for one use, starting 2/27/2024 ending 2/27/2024,  2mL disp, R-0, injection  Injected by Kojo GUPTA CMA

## 2024-02-27 NOTE — PROGRESS NOTES
"[facility] Dermatology Note  Encounter Date: Feb 27, 2024  Office Visit     Reviewed patients past medical history and pertinent chart review prior to patients visit today.     Dermatology Problem List:  1. Systemic lupus   Current: prednisone 15 mg a day (chronically), plaquenil 200 mg BID   - diagnosed in early 2000s  - photosensitivity, VINITA 1:160, fatigue, oral ulcers, arthralgias, recurrent miscarriages (also has MTHFR & EARNEST-1 mutations)     2. Wart, left medial first toe  Current: LN2 x1 (7/11/23)   Previous: urea     3. Onychomycosis  Previous: ciclopirox (did not use), dilute bleach soaks    ____________________________________________    Assessment & Plan:     # Epidermal inclusion cyst versus dermatofibroma, right hip  - We discussed the benign appearence of the skin lesion. We reviewed options including continued observation versus elective excision versus punch biopsy today.  The patient elected to undergo punch biopsy, but upon receiving the lidocaine decided to forgo treatment for now. She plans to follow up for excision in the future.  I recommend continued observation with follow up should any concerning changes arise.     All risks, benefits and alternatives were discussed with patient.  Patient is in agreement and understands the assessment and plan.  All questions were answered.  Josefina Dawkins PA-C  [facility] Dermatology  _______________________________________    CC: Derm Problem (Spot of concern on R hip.Thought it was a bruise, is raised. She applied arnica to spot. Has gotten bigger over the last month. Endorse picking at spot. Feels like \"it's building pressure\")    HPI:  Ms. Patricia Hall is a(n) 48 year old female who presents today as a return patient for a lesion on the right hip. Initially, the patient thought the lesion was a bruise. The lesion has grown and changed colored the past month. The patient denies any pain, itching, bleeding, or discharge " from the site. Patient is otherwise feeling well, without additional skin concerns.      Physical Exam:  SKIN: Focused examination of right hip was performed.  -The right lateral hip demonstrates a 1.0 x 1.0 cm well-demarcated, mobile subcutaneous nodule.    - No other lesions of concern on areas examined.     Medications:  Current Outpatient Medications   Medication    AMBIEN 10 MG OR TABS    Belimumab (BENLYSTA) 200 MG/ML SOSY    benzonatate (TESSALON) 100 MG capsule    calcium carbonate (OS-VAHID 500 MG [place]. [place]) 1250 MG tablet    EPINEPHrine (ANY BX GENERIC EQUIV) 0.3 MG/0.3ML injection 2-pack    galcanezumab-gnlm (EMGALITY) 120 MG/ML injection    hydroxychloroquine (PLAQUENIL) 200 MG tablet    levalbuterol (XOPENEX HFA) 45 MCG/ACT inhaler    levothyroxine (SYNTHROID/LEVOTHROID) 25 MCG tablet    metFORMIN (GLUCOPHAGE XR) 500 MG 24 hr tablet    montelukast (SINGULAIR) 10 MG tablet    MULTIVITAMIN TABS   OR    Omega-3 Fatty Acids (OMEGA 3 PO)    omeprazole (PRILOSEC) 40 MG DR capsule    ondansetron (ZOFRAN) 8 MG tablet    PEG-KCl-NaCl-NaSulf-Na Asc-C (MOVIPREP) 100 g SOLR    predniSONE (DELTASONE) 1 MG tablet    predniSONE (DELTASONE) 5 MG tablet    promethazine (PHENERGAN) 12.5 MG tablet    promethazine (PHENERGAN) 25 MG tablet    rosuvastatin (CRESTOR) 5 MG tablet    semaglutide (OZEMPIC) 2 MG/1.5ML SOPN pen    sertraline (ZOLOFT) 50 MG tablet    SPIRULINA PO    SUMAtriptan (IMITREX STATDOSE) 6 MG/0.5ML pen injector kit    topiramate (TOPAMAX) 25 MG tablet    traMADol (ULTRAM) 50 MG tablet    traMADol (ULTRAM-ER) 300 MG 24 hr tablet    ubrogepant (UBRELVY) 100 MG tablet    VITAMIN D, CHOLECALCIFEROL, PO     Current Facility-Administered Medications   Medication    Botulinum Toxin Type A (BOTOX) 200 units injection 155 Units    Botulinum Toxin Type A (BOTOX) 200 units injection 155 Units      Past Medical History:   Patient Active Problem List   Diagnosis    Insomnia    Systemic lupus erythematosus (H)     Refractory migraine without aura    5,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    Tension-type headache    Diminished ovarian reserve    Disorder of connective tissue (H24)    Disorder of immune system (H24)    Endometriosis of uterus    Female infertility    History of environmental allergies    Hyperlipidemia    Irritable bowel syndrome    Major depressive disorder, recurrent episode, in full remission (H24)    Major depressive disorder, recurrent episode, mild (H24)    Migraine with aura    Myofascial pain    Raynaud's disease    Recurrent pregnancy loss without current pregnancy    Seasonal allergies    Primary fibromyalgia syndrome    Uterine leiomyoma    Sciatica of right side    Numbness and tingling of both lower extremities    Sleep apnea    Bilateral temporomandibular joint pain    Cervical cancer screening    Endometriosis    Lupus erythematosus     Past Medical History:   Diagnosis Date    Allergy, unspecified not elsewhere classified     Endometriosis     Fibromyalgia     Migraine without aura, with intractable migraine, so stated, without mention of status migrainosus     Myalgia and myositis, unspecified     Systemic lupus erythematosus (H)        CC Shavon Guzman MD  [address] [address] on close of this encounter.

## 2024-02-28 NOTE — TELEPHONE ENCOUNTER
PA Initiation    Medication: EMGALITY 120 MG/ML SC SOAJ  Insurance Company: [employer] [place] - Phone [phone] Fax [phone]  Pharmacy Filling the Rx: [facility] #[id number] - [place], [place] - [facility] AT [facility] [address]  Filling Pharmacy Phone: [phone]  Filling Pharmacy Fax:    Start Date: 2/28/2024  Retail Pharmacy Prior Authorization Team   Phone: [phone]

## 2024-02-29 RX ORDER — PREDNISONE 5 MG/1
TABLET ORAL
Qty: 120 TABLET | OUTPATIENT
Start: 2024-02-29

## 2024-02-29 NOTE — TELEPHONE ENCOUNTER
Prior Authorization Approval    Medication: EMGALITY 120 MG/ML SC SOAJ  Authorization Effective Date: 1/30/2024  Authorization Expiration Date: 8/29/2024  Approved Dose/Quantity:   Reference #:     Insurance Company: [employer] [place] - Phone [phone] Fax [phone]  Expected CoPay: $    CoPay Card Available:      Financial Assistance Needed: No  Which Pharmacy is filling the prescription: [id number] - [place], [place] - [address] AT [facility] [address]  Pharmacy Notified: Yes  Patient Notified: Pharmacy will notify patient.

## 2024-02-29 NOTE — TELEPHONE ENCOUNTER
predniSONE (DELTASONE) 5 MG tablet 150 tablet 5 2/27/2024     Should have refills on file. Pharmacy sent message. Rx refill denied    Rosario Larry RN  P Red Flag Triage/MRT

## 2024-03-07 ENCOUNTER — TELEPHONE (OUTPATIENT)
Dept: PHYSICAL MEDICINE AND REHAB | Facility: CLINIC | Age: 49
End: 2024-03-07
Payer: COMMERCIAL

## 2024-03-08 ENCOUNTER — TELEPHONE (OUTPATIENT)
Dept: PHYSICAL MEDICINE AND REHAB | Facility: CLINIC | Age: 49
End: 2024-03-08
Payer: COMMERCIAL

## 2024-03-08 NOTE — TELEPHONE ENCOUNTER
Writer called and left message for patient to inform her and remind her of her consult appointment scheduled for next week, March 14th with Dr. Reid. Writer clarified that appointment is a consult appointment and requested patient contact clinic with any questions regarding appointment. Kimberly Ji RN on 3/8/2024 at 10:45 AM

## 2024-03-14 ENCOUNTER — TELEPHONE (OUTPATIENT)
Dept: PHYSICAL MEDICINE AND REHAB | Facility: CLINIC | Age: 49
End: 2024-03-14

## 2024-03-14 ENCOUNTER — OFFICE VISIT (OUTPATIENT)
Dept: PHYSICAL MEDICINE AND REHAB | Facility: CLINIC | Age: 49
End: 2024-03-14
Attending: NURSE PRACTITIONER
Payer: COMMERCIAL

## 2024-03-14 VITALS — DIASTOLIC BLOOD PRESSURE: 86 MMHG | OXYGEN SATURATION: 100 % | HEART RATE: 94 BPM | SYSTOLIC BLOOD PRESSURE: 135 MMHG

## 2024-03-14 DIAGNOSIS — M53.3 DISORDER OF SACRUM: ICD-10-CM

## 2024-03-14 DIAGNOSIS — G89.29 OTHER CHRONIC PAIN: ICD-10-CM

## 2024-03-14 DIAGNOSIS — M47.817 LUMBOSACRAL SPONDYLOSIS WITHOUT MYELOPATHY: ICD-10-CM

## 2024-03-14 DIAGNOSIS — G43.719 INTRACTABLE CHRONIC MIGRAINE WITHOUT AURA AND WITHOUT STATUS MIGRAINOSUS: Primary | ICD-10-CM

## 2024-03-14 DIAGNOSIS — G24.4 OROMANDIBULAR DYSTONIA: ICD-10-CM

## 2024-03-14 DIAGNOSIS — M47.812 CERVICAL SPONDYLOSIS WITHOUT MYELOPATHY: ICD-10-CM

## 2024-03-14 PROCEDURE — 99215 OFFICE O/P EST HI 40 MIN: CPT | Performed by: PHYSICAL MEDICINE & REHABILITATION

## 2024-03-14 ASSESSMENT — PAIN SCALES - GENERAL: PAINLEVEL: MODERATE PAIN (5)

## 2024-03-14 NOTE — NURSING NOTE
Chief Complaint   Patient presents with    Consult     Back pain       Fina Chin CMA at 1:04 PM on 3/14/2024.

## 2024-03-14 NOTE — TELEPHONE ENCOUNTER
Phoned the patient to schedule injection procedure with dr. Hoyos. Couldn't leave VM due to Patient's mail box being full.

## 2024-03-14 NOTE — PROGRESS NOTES
CC: I am seeing this patient for evaluation of chronic pain headache neck pain and migraines.    Patient is a very pleasant 48-year-old woman with a complex medical history.  She has been dealing with severe migraines neck pain as well as TMJ dysfunction.  She has seen her dentist.  She has been getting Botox which have been helpful.  She still has several headaches and the headaches worsen when the Botox wears off.  She has also noted significant tightness in the muscles of the neck and shoulder.  Her treating provider is wondering whether she will benefit from a different protocol approach.  She is here for that purpose.    She also gives history of lupus.  Because of steroids, she has fungal infection in her toenails.  She has had issues with her lower back and has previously undergone radiofrequency ablation.  She currently hurts all over and notes that she has been given a diagnosis of fibroid some point in the past.    Currently she rates her pain in the 8-10 out of 10 at its worst 3-4 at best 3-4 currently.  She finds that it affects all of her activities sleep as well.  She also has issues with  strength in the hands.  She has done physical therapy for her back.  She has not had imaging studies for the neck.  She denies any bowel bladder disturbance.  She has constipation and hemorrhoids.    Interviewing the history she has had chronic pain affecting the neck and shoulders for years.  She has tried medications including ibuprofen and Tylenol for more than 3 months without relief.  She would like to get this under control.    EXAM: MR LUMBAR SPINE W/O CONTRAST  LOCATION: [facility]  DATE/TIME: 5/24/2022 7:04 PM     INDICATION: Myelopathy, acute or progressive.  COMPARISON: 12/9/2019  TECHNIQUE: Routine Lumbar Spine MRI without IV contrast.     FINDINGS:   Nomenclature is based on 5 lumbar type vertebral bodies. Satisfactory vertebral body height. There is 2 mm degenerative  anterior subluxation L5 upon S1 with no pars interarticularis defects. Mild interspace narrowing L2-L3 and L5-S1. No compression   fractures. No marrow or ligamentous edema. Satisfactory sacral alignment. Normal distal spinal cord and cauda equina with conus medullaris at L1. No cord expansive changes are noted. Nerve roots of the cauda equina are satisfactory without nodularity or   thickening. No morphologic evidence to suggest arachnoiditis. No retroperitoneal solid mass or adenopathy. Symmetric psoas appearance bilaterally. Nothing for sacral insufficiency or stress fracture. No pelvic mass or adenopathy is noted.     T12-L1: Normal disc height and signal. No herniation. Normal facets. No spinal canal or neural foraminal stenosis.      L1-L2: Normal disc height and signal. No herniation. Normal facets. No spinal canal or neural foraminal stenosis.     L2-L3: Mild loss of disc height with annular bulge. No disc herniation. No spinal stenosis. Suggested mild left foraminal compromise without right foraminal stenosis. Facet joints are satisfactory.      L3-L4: Normal disc height and signal. No herniation. Normal facets. No spinal canal or neural foraminal stenosis.     L4-L5: Minimal annular bulging. No disc herniation. No spinal stenosis. Mild foraminal narrowing inferiorly bilaterally and mild facet joint hypertrophic changes with increased joint space fluid.     L5-S1: Moderate loss of disc height. Uncovering of disc material superiorly with low-grade degenerative anterior subluxation and generalized disc bulge. No disc herniation. No spinal stenosis. Mild bilateral foraminal compromise. Facet joint hypertrophic   changes bilaterally with increased joint space fluid. Increased joint space fluid overall has been described in association with instability, consider flexion and extension views to assess L4-L5 and L5-S1 relationships on dynamic views as indicated.                                                                       IMPRESSION:  1.  Degenerative changes lower lumbar spine most marked L4-L5 and L5-S1. No severe spinal stenosis or severe foraminal compromise at any lumbar level.     2.  No spinal stenosis with mild L4-L5 and L5-S1 foraminal compromise inferiorly. Facet joint arthropathy and increased joint space fluid is noted at these levels.     3.  Consider flexion-extension views if there is concern for instability as increased joint space fluid in the facet joints has been described in association with instability.     4.  Since previous MR 12/9/2019, L5-S1 anterior subluxation has progressed, along with interspace narrowing. Previously identified synovial cyst arising from the medial aspect of the degenerated right L5-S1 facet joint is not present on today's study   with decreased mass effect upon the right lateral thecal sac and mass effect upon the traversing right-sided nerve roots S1-S2.      [facility]   MRI HEAD WITHOUT AND WITH CONTRAST   5/17/2013     INDICATION: Lupus, psychosis, auditory hallucinations.   TECHNIQUE: Routine brain MRI without and with gadolinium. CONTRAST:   Magnevist 12 mL IV.   COMPARISON: Head MRI 7/2/2010.     REPORT: Evaluation of the intracranial contents demonstrates there is no   evidence for acute or subacute infarct on the diffusion acquisition. No   evidence for intracranial hemorrhage, mass lesion, or obstructive type   hydrocephalus. No pathologic enhancement of the brain parenchyma or   meninges following IV gadolinium. Mild inferior position of the cerebellar   tonsils at the craniovertebral junction noted; this is unchanged and likely    incidental. The major intracranial vascular flow voids are maintained.   Mastoid air cells clear. Paranasal sinuses show a trace of mucosal   thickening in the right ethmoid air cells.     CONCLUSION:   1. No evidence for intracranial mass, hemorrhage, hydrocephalus, or   infarct.   2. Slight inferior position of the cerebellar  tonsils noted at the   craniovertebral junction which is unchanged from prior study. This is   likely an incidental finding.   3. Mastoid air cells clear. Trace of mucosal thickening in the right   ethmoid sinuses.   Past Medical History:   Diagnosis Date    Allergy, unspecified not elsewhere classified     Endometriosis     Fibromyalgia     Migraine without aura, with intractable migraine, so stated, without mention of status migrainosus     Myalgia and myositis, unspecified     Systemic lupus erythematosus (H)        Past Surgical History:   Procedure Laterality Date    SURGICAL HISTORY OF -   01/01/1986    left elbow fracture repair       Family History       Problem (# of Occurrences) Relation (Name,Age of Onset)    Dementia (1) Mother    Diabetes (1) Maternal Grandfather    Lipids (1) Mother    Prostate Cancer (1) Father (80)    Skin Cancer (1) Paternal Grandmother    Thyroid Cancer (1) Mother (50 - 60)           Negative family history of: Melanoma           Social History     Socioeconomic History    Marital status:      Spouse name: Not on file    Number of children: Not on file    Years of education: Not on file    Highest education level: Not on file   Occupational History    Not on file   Tobacco Use    Smoking status: Never    Smokeless tobacco: Never   Substance and Sexual Activity    Alcohol use: Not Currently    Drug use: No    Sexual activity: Yes     Partners: Male     Birth control/protection: Condom   Other Topics Concern    Parent/sibling w/ CABG, MI or angioplasty before 65F 55M? Not Asked   Social History Narrative    Not on file     Social Determinants of Health     Financial Resource Strain: Not on file   Food Insecurity: Not on file   Transportation Needs: Not on file   Physical Activity: Not on file   Stress: Not on file   Social Connections: Not on file   Interpersonal Safety: Low Risk  (2/21/2024)    Interpersonal Safety     Do you feel physically and emotionally safe where you  currently live?: Yes     Within the past 12 months, have you been hit, slapped, kicked or otherwise physically hurt by someone?: No     Within the past 12 months, have you been humiliated or emotionally abused in other ways by your partner or ex-partner?: No   Housing Stability: Not on file       Current Outpatient Medications   Medication Sig Dispense Refill    AMBIEN 10 MG OR TABS 1 po HS, prn 20 0    Belimumab (BENLYSTA) 200 MG/ML SOSY Inject 200 mg Subcutaneous every 7 days 12 mL 3    benzonatate (TESSALON) 100 MG capsule Take 1 capsule (100 mg) by mouth 3 times daily as needed for cough 30 capsule 0    calcium carbonate (OS-VAHID 500 MG [place]. [place]) 1250 MG tablet Take 1 tablet by mouth daily      EPINEPHrine (ANY BX GENERIC EQUIV) 0.3 MG/0.3ML injection 2-pack Inject 0.3 mLs (0.3 mg) into the muscle as needed for anaphlaxis 2 each 0    galcanezumab-gnlm (EMGALITY) 120 MG/ML injection Inject 240 mg subcutaneous first month and then inject 120 mg subcutaneous every 28 days 2 mL 11    hydroxychloroquine (PLAQUENIL) 200 MG tablet Take 1 tablet (200 mg) by mouth 2 times daily 180 tablet 1    levalbuterol (XOPENEX HFA) 45 MCG/ACT inhaler       levothyroxine (SYNTHROID/LEVOTHROID) 25 MCG tablet Take 1 tablet by mouth daily      metFORMIN (GLUCOPHAGE XR) 500 MG 24 hr tablet       montelukast (SINGULAIR) 10 MG tablet       MULTIVITAMIN TABS   OR   0    Omega-3 Fatty Acids (OMEGA 3 PO) Take 1,250 mg by mouth daily      omeprazole (PRILOSEC) 40 MG DR capsule Take 1 capsule (40 mg) by mouth daily 60 capsule 1    ondansetron (ZOFRAN) 8 MG tablet TAKE 1 TABLET BY MOUTH UP TO THREE TIMES DAILY FOR NAUSEA 90 tablet 1    PEG-KCl-NaCl-NaSulf-Na Asc-C (MOVIPREP) 100 g SOLR       predniSONE (DELTASONE) 1 MG tablet 12.5-10 mg a day, each course x 1 week then 9-8-7-6 mg a day, each course x 1 month then 5 mg a day, use with 5 mg size tab 90 tablet 5    predniSONE (DELTASONE) 5 MG tablet 12.5-10 mg a day, each course x 1 week then  9-8-7-6 mg a day, each course x 1 month then 5 mg a day, use with 1 mg size tab 150 tablet 5    promethazine (PHENERGAN) 12.5 MG tablet Take 2 tablets (25 mg) by mouth every 6 hours as needed for nausea 20 tablet 3    promethazine (PHENERGAN) 25 MG tablet       rosuvastatin (CRESTOR) 5 MG tablet Take 1 tablet (5 mg) by mouth daily 90 tablet 3    semaglutide (OZEMPIC) 2 MG/1.5ML SOPN pen Inject 0.25 mg Subcutaneous every 7 days Increase to 0.5 mg after 2-3 weeks if tolerating. 1.5 mL 11    sertraline (ZOLOFT) 50 MG tablet Take 1 tablet by mouth daily      SPIRULINA PO Take by mouth daily      SUMAtriptan (IMITREX STATDOSE) 6 MG/0.5ML pen injector kit Inject 0.5 mLs (6 mg) Subcutaneous at onset of headache for migraine May repeat in 1 hour. Max 12 mg/24 hours. 3 kit 11    topiramate (TOPAMAX) 25 MG tablet Take 1 tablet (25 mg) by mouth 2 times daily 120 tablet 6    traMADol (ULTRAM) 50 MG tablet Take 1 tablet (50 mg) by mouth every 6 hours as needed for severe pain Take 1-2 tablets up to three times a day as needed - Oral 180 tablet 5    traMADol (ULTRAM-ER) 300 MG 24 hr tablet Take 1 tablet (300 mg) by mouth at bedtime maximum 1 tablet(s) per day 30 tablet 5    ubrogepant (UBRELVY) 100 MG tablet Take 1 tablet (100 mg) by mouth at onset of headache (May repeat in 2 hours as needed. Max 2 tabs in 24 hours) 16 tablet 11    VITAMIN D, CHOLECALCIFEROL, PO Take 5,000 Units by mouth daily         /86   Pulse 94   SpO2 100%      On examination, patient is alert and cooperative.  Vitals are stable.  She is afebrile.  HEENT is negative.  Extraocular movements are intact.  Face is symmetric.  Tongue is midline neck is supple.  She has taut muscles in the neck and shoulder region.    She is able to move her upper extremities functionally.  She is able to carry out straight leg raising test.  Sánchez's positive bilaterally.  She is able to stand.  Romberg is negative.  Coordination tests are intact.  She is able to walk  on her heels and toes.    Musculoskeletal lamination reveals areas of tenderness in the cervical facets bilaterally.  She is tender over the cervical paraspinals overlying the levator scapulae, rhomboids minor, infraspinatus, and in the lower back she is tender over the right lumbar paraspinal, SI joints bilaterally, piriformis bilaterally.  She was also tender over the trochanteric bursal region.    Neurological examination revealed normal strength normal sensation.  Reflexes are equal and symmetrical and plantars are downgoing.  Coordination tests are intact.    Impression: At this point this 48-year-old woman with multiple issues going on.  She has chronic headaches with migraines.  She definitely has issues going with her neck as well which is making her headaches and neck pain and migraines worse.  In addition she has issues with the joints and muscles in the hip and the lower back region.  In terms of treatment, it may be reasonable to treat her with my migraine protocol as well as inject the masseter muscles for her TMJ dysfunction.  Anticipate this will help improve her migraine control.  In addition she has issues with the cervical facets.  I will get MRI of the cervical spine.  I am not expecting any foraminal stenosis or other issues.  I am suspecting facet issues involving C2-C5.  I will get medial branch blocks done left alternating with right side at weekly intervals.  If she gets 80% improvement, and is not improved by addition of physical therapy once a week for 4 weeks, she would be a candidate for radiofrequency ablations.  By coordinating these injections along with her migraine protocol, I am expecting significant improvement in her head neck and shoulder pain.  Subsequently will reassess her pain in the lower back hip region and consider treatment appropriately.    This was reviewed at length with the patient.  All her questions were answered to her satisfaction.  More than 60 minutes were spent  for this visit.  More than 50% of which was for counseling on above-mentioned issues.    Thank you much for allow me to assist in her care.    Chacorta Hoyos MD

## 2024-03-14 NOTE — LETTER
3/14/2024       RE: Patricia Hall  [address]  [place] [address]       Dear Colleague,    Thank you for referring your patient, Patricia Hall, to the [facility] PHYSICAL MEDICINE AND REHABILITATION CLINIC [place] at [facility]. Please see a copy of my visit note below.    CC: I am seeing this patient for evaluation of chronic pain headache neck pain and migraines.    Patient is a very pleasant 48-year-old woman with a complex medical history.  She has been dealing with severe migraines neck pain as well as TMJ dysfunction.  She has seen her dentist.  She has been getting Botox which have been helpful.  She still has several headaches and the headaches worsen when the Botox wears off.  She has also noted significant tightness in the muscles of the neck and shoulder.  Her treating provider is wondering whether she will benefit from a different protocol approach.  She is here for that purpose.    She also gives history of lupus.  Because of steroids, she has fungal infection in her toenails.  She has had issues with her lower back and has previously undergone radiofrequency ablation.  She currently hurts all over and notes that she has been given a diagnosis of fibroid some point in the past.    Currently she rates her pain in the 8-10 out of 10 at its worst 3-4 at best 3-4 currently.  She finds that it affects all of her activities sleep as well.  She also has issues with  strength in the hands.  She has done physical therapy for her back.  She has not had imaging studies for the neck.  She denies any bowel bladder disturbance.  She has constipation and hemorrhoids.    Interviewing the history she has had chronic pain affecting the neck and shoulders for years.  She has tried medications including ibuprofen and Tylenol for more than 3 months without relief.  She would like to get this under control.    EXAM: MR LUMBAR SPINE W/O  CONTRAST  LOCATION: [facility]  DATE/TIME: 5/24/2022 7:04 PM     INDICATION: Myelopathy, acute or progressive.  COMPARISON: 12/9/2019  TECHNIQUE: Routine Lumbar Spine MRI without IV contrast.     FINDINGS:   Nomenclature is based on 5 lumbar type vertebral bodies. Satisfactory vertebral body height. There is 2 mm degenerative anterior subluxation L5 upon S1 with no pars interarticularis defects. Mild interspace narrowing L2-L3 and L5-S1. No compression   fractures. No marrow or ligamentous edema. Satisfactory sacral alignment. Normal distal spinal cord and cauda equina with conus medullaris at L1. No cord expansive changes are noted. Nerve roots of the cauda equina are satisfactory without nodularity or   thickening. No morphologic evidence to suggest arachnoiditis. No retroperitoneal solid mass or adenopathy. Symmetric psoas appearance bilaterally. Nothing for sacral insufficiency or stress fracture. No pelvic mass or adenopathy is noted.     T12-L1: Normal disc height and signal. No herniation. Normal facets. No spinal canal or neural foraminal stenosis.      L1-L2: Normal disc height and signal. No herniation. Normal facets. No spinal canal or neural foraminal stenosis.     L2-L3: Mild loss of disc height with annular bulge. No disc herniation. No spinal stenosis. Suggested mild left foraminal compromise without right foraminal stenosis. Facet joints are satisfactory.      L3-L4: Normal disc height and signal. No herniation. Normal facets. No spinal canal or neural foraminal stenosis.     L4-L5: Minimal annular bulging. No disc herniation. No spinal stenosis. Mild foraminal narrowing inferiorly bilaterally and mild facet joint hypertrophic changes with increased joint space fluid.     L5-S1: Moderate loss of disc height. Uncovering of disc material superiorly with low-grade degenerative anterior subluxation and generalized disc bulge. No disc herniation. No spinal stenosis. Mild bilateral  foraminal compromise. Facet joint hypertrophic   changes bilaterally with increased joint space fluid. Increased joint space fluid overall has been described in association with instability, consider flexion and extension views to assess L4-L5 and L5-S1 relationships on dynamic views as indicated.                                                                      IMPRESSION:  1.  Degenerative changes lower lumbar spine most marked L4-L5 and L5-S1. No severe spinal stenosis or severe foraminal compromise at any lumbar level.     2.  No spinal stenosis with mild L4-L5 and L5-S1 foraminal compromise inferiorly. Facet joint arthropathy and increased joint space fluid is noted at these levels.     3.  Consider flexion-extension views if there is concern for instability as increased joint space fluid in the facet joints has been described in association with instability.     4.  Since previous MR 12/9/2019, L5-S1 anterior subluxation has progressed, along with interspace narrowing. Previously identified synovial cyst arising from the medial aspect of the degenerated right L5-S1 facet joint is not present on today's study   with decreased mass effect upon the right lateral thecal sac and mass effect upon the traversing right-sided nerve roots S1-S2.      [facility]   MRI HEAD WITHOUT AND WITH CONTRAST   5/17/2013     INDICATION: Lupus, psychosis, auditory hallucinations.   TECHNIQUE: Routine brain MRI without and with gadolinium. CONTRAST:   Magnevist 12 mL IV.   COMPARISON: Head MRI 7/2/2010.     REPORT: Evaluation of the intracranial contents demonstrates there is no   evidence for acute or subacute infarct on the diffusion acquisition. No   evidence for intracranial hemorrhage, mass lesion, or obstructive type   hydrocephalus. No pathologic enhancement of the brain parenchyma or   meninges following IV gadolinium. Mild inferior position of the cerebellar   tonsils at the craniovertebral junction noted; this is  unchanged and likely    incidental. The major intracranial vascular flow voids are maintained.   Mastoid air cells clear. Paranasal sinuses show a trace of mucosal   thickening in the right ethmoid air cells.     CONCLUSION:   1. No evidence for intracranial mass, hemorrhage, hydrocephalus, or   infarct.   2. Slight inferior position of the cerebellar tonsils noted at the   craniovertebral junction which is unchanged from prior study. This is   likely an incidental finding.   3. Mastoid air cells clear. Trace of mucosal thickening in the right   ethmoid sinuses.   Past Medical History:   Diagnosis Date    Allergy, unspecified not elsewhere classified     Endometriosis     Fibromyalgia     Migraine without aura, with intractable migraine, so stated, without mention of status migrainosus     Myalgia and myositis, unspecified     Systemic lupus erythematosus (H)        Past Surgical History:   Procedure Laterality Date    SURGICAL HISTORY OF -   01/01/1986    left elbow fracture repair       Family History       Problem (# of Occurrences) Relation (Name,Age of Onset)    Dementia (1) Mother    Diabetes (1) Maternal Grandfather    Lipids (1) Mother    Prostate Cancer (1) Father (80)    Skin Cancer (1) Paternal Grandmother    Thyroid Cancer (1) Mother (50 - 60)           Negative family history of: Melanoma           Social History     Socioeconomic History    Marital status:      Spouse name: Not on file    Number of children: Not on file    Years of education: Not on file    Highest education level: Not on file   Occupational History    Not on file   Tobacco Use    Smoking status: Never    Smokeless tobacco: Never   Substance and Sexual Activity    Alcohol use: Not Currently    Drug use: No    Sexual activity: Yes     Partners: Male     Birth control/protection: Condom   Other Topics Concern    Parent/sibling w/ CABG, MI or angioplasty before 65F 55M? Not Asked   Social History Narrative    Not on file     Social  Determinants of Health     Financial Resource Strain: Not on file   Food Insecurity: Not on file   Transportation Needs: Not on file   Physical Activity: Not on file   Stress: Not on file   Social Connections: Not on file   Interpersonal Safety: Low Risk  (2/21/2024)    Interpersonal Safety     Do you feel physically and emotionally safe where you currently live?: Yes     Within the past 12 months, have you been hit, slapped, kicked or otherwise physically hurt by someone?: No     Within the past 12 months, have you been humiliated or emotionally abused in other ways by your partner or ex-partner?: No   Housing Stability: Not on file       Current Outpatient Medications   Medication Sig Dispense Refill    AMBIEN 10 MG OR TABS 1 po HS, prn 20 0    Belimumab (BENLYSTA) 200 MG/ML SOSY Inject 200 mg Subcutaneous every 7 days 12 mL 3    benzonatate (TESSALON) 100 MG capsule Take 1 capsule (100 mg) by mouth 3 times daily as needed for cough 30 capsule 0    calcium carbonate (OS-VAHID 500 MG [place]) 1250 MG tablet Take 1 tablet by mouth daily      EPINEPHrine (ANY BX GENERIC EQUIV) 0.3 MG/0.3ML injection 2-pack Inject 0.3 mLs (0.3 mg) into the muscle as needed for anaphlaxis 2 each 0    galcanezumab-gnlm (EMGALITY) 120 MG/ML injection Inject 240 mg subcutaneous first month and then inject 120 mg subcutaneous every 28 days 2 mL 11    hydroxychloroquine (PLAQUENIL) 200 MG tablet Take 1 tablet (200 mg) by mouth 2 times daily 180 tablet 1    levalbuterol (XOPENEX HFA) 45 MCG/ACT inhaler       levothyroxine (SYNTHROID/LEVOTHROID) 25 MCG tablet Take 1 tablet by mouth daily      metFORMIN (GLUCOPHAGE XR) 500 MG 24 hr tablet       montelukast (SINGULAIR) 10 MG tablet       MULTIVITAMIN TABS   OR   0    Omega-3 Fatty Acids (OMEGA 3 PO) Take 1,250 mg by mouth daily      omeprazole (PRILOSEC) 40 MG DR capsule Take 1 capsule (40 mg) by mouth daily 60 capsule 1    ondansetron (ZOFRAN) 8 MG tablet TAKE 1 TABLET BY MOUTH UP TO THREE  TIMES DAILY FOR NAUSEA 90 tablet 1    PEG-KCl-NaCl-NaSulf-Na Asc-C (MOVIPREP) 100 g SOLR       predniSONE (DELTASONE) 1 MG tablet 12.5-10 mg a day, each course x 1 week then 9-8-7-6 mg a day, each course x 1 month then 5 mg a day, use with 5 mg size tab 90 tablet 5    predniSONE (DELTASONE) 5 MG tablet 12.5-10 mg a day, each course x 1 week then 9-8-7-6 mg a day, each course x 1 month then 5 mg a day, use with 1 mg size tab 150 tablet 5    promethazine (PHENERGAN) 12.5 MG tablet Take 2 tablets (25 mg) by mouth every 6 hours as needed for nausea 20 tablet 3    promethazine (PHENERGAN) 25 MG tablet       rosuvastatin (CRESTOR) 5 MG tablet Take 1 tablet (5 mg) by mouth daily 90 tablet 3    semaglutide (OZEMPIC) 2 MG/1.5ML SOPN pen Inject 0.25 mg Subcutaneous every 7 days Increase to 0.5 mg after 2-3 weeks if tolerating. 1.5 mL 11    sertraline (ZOLOFT) 50 MG tablet Take 1 tablet by mouth daily      SPIRULINA PO Take by mouth daily      SUMAtriptan (IMITREX STATDOSE) 6 MG/0.5ML pen injector kit Inject 0.5 mLs (6 mg) Subcutaneous at onset of headache for migraine May repeat in 1 hour. Max 12 mg/24 hours. 3 kit 11    topiramate (TOPAMAX) 25 MG tablet Take 1 tablet (25 mg) by mouth 2 times daily 120 tablet 6    traMADol (ULTRAM) 50 MG tablet Take 1 tablet (50 mg) by mouth every 6 hours as needed for severe pain Take 1-2 tablets up to three times a day as needed - Oral 180 tablet 5    traMADol (ULTRAM-ER) 300 MG 24 hr tablet Take 1 tablet (300 mg) by mouth at bedtime maximum 1 tablet(s) per day 30 tablet 5    ubrogepant (UBRELVY) 100 MG tablet Take 1 tablet (100 mg) by mouth at onset of headache (May repeat in 2 hours as needed. Max 2 tabs in 24 hours) 16 tablet 11    VITAMIN D, CHOLECALCIFEROL, PO Take 5,000 Units by mouth daily         /86   Pulse 94   SpO2 100%      On examination, patient is alert and cooperative.  Vitals are stable.  She is afebrile.  HEENT is negative.  Extraocular movements are intact.  Face  is symmetric.  Tongue is midline neck is supple.  She has taut muscles in the neck and shoulder region.    She is able to move her upper extremities functionally.  She is able to carry out straight leg raising test.  Sánchez's positive bilaterally.  She is able to stand.  Romberg is negative.  Coordination tests are intact.  She is able to walk on her heels and toes.    Musculoskeletal lamination reveals areas of tenderness in the cervical facets bilaterally.  She is tender over the cervical paraspinals overlying the levator scapulae, rhomboids minor, infraspinatus, and in the lower back she is tender over the right lumbar paraspinal, SI joints bilaterally, piriformis bilaterally.  She was also tender over the trochanteric bursal region.    Neurological examination revealed normal strength normal sensation.  Reflexes are equal and symmetrical and plantars are downgoing.  Coordination tests are intact.    Impression: At this point this 48-year-old woman with multiple issues going on.  She has chronic headaches with migraines.  She definitely has issues going with her neck as well which is making her headaches and neck pain and migraines worse.  In addition she has issues with the joints and muscles in the hip and the lower back region.  In terms of treatment, it may be reasonable to treat her with my migraine protocol as well as inject the masseter muscles for her TMJ dysfunction.  Anticipate this will help improve her migraine control.  In addition she has issues with the cervical facets.  I will get MRI of the cervical spine.  I am not expecting any foraminal stenosis or other issues.  I am suspecting facet issues involving C2-C5.  I will get medial branch blocks done left alternating with right side at weekly intervals.  If she gets 80% improvement, and is not improved by addition of physical therapy once a week for 4 weeks, she would be a candidate for radiofrequency ablations.  By coordinating these injections  along with her migraine protocol, I am expecting significant improvement in her head neck and shoulder pain.  Subsequently will reassess her pain in the lower back hip region and consider treatment appropriately.    This was reviewed at length with the patient.  All her questions were answered to her satisfaction.  More than 60 minutes were spent for this visit.  More than 50% of which was for counseling on above-mentioned issues.    Thank you much for allow me to assist in her care.          Again, thank you for allowing me to participate in the care of your patient.      Sincerely,    Chacorta Hoyos MD

## 2024-03-20 ENCOUNTER — TELEPHONE (OUTPATIENT)
Dept: PHYSICAL MEDICINE AND REHAB | Facility: CLINIC | Age: 49
End: 2024-03-20
Payer: COMMERCIAL

## 2024-03-21 ENCOUNTER — HOSPITAL ENCOUNTER (OUTPATIENT)
Facility: AMBULATORY SURGERY CENTER | Age: 49
End: 2024-03-21
Attending: PHYSICAL MEDICINE & REHABILITATION
Payer: COMMERCIAL

## 2024-03-21 ENCOUNTER — TELEPHONE (OUTPATIENT)
Dept: PHYSICAL MEDICINE AND REHAB | Facility: CLINIC | Age: 49
End: 2024-03-21
Payer: COMMERCIAL

## 2024-03-21 PROBLEM — M47.812 CERVICAL SPONDYLOSIS WITHOUT MYELOPATHY: Status: ACTIVE | Noted: 2024-03-14

## 2024-04-01 ENCOUNTER — TELEPHONE (OUTPATIENT)
Dept: PHYSICAL MEDICINE AND REHAB | Facility: CLINIC | Age: 49
End: 2024-04-01
Payer: COMMERCIAL

## 2024-04-01 NOTE — TELEPHONE ENCOUNTER
Phoned the patient to reschedule the procedure with dr. Hoyos. Informed the patient dr. Hoyos is out today and will reschedule. Patient stated she'll call back when she gets back from out of town.

## 2024-04-05 ENCOUNTER — TELEPHONE (OUTPATIENT)
Dept: PHYSICAL MEDICINE AND REHAB | Facility: CLINIC | Age: 49
End: 2024-04-05
Payer: COMMERCIAL

## 2024-04-05 NOTE — TELEPHONE ENCOUNTER
RN called patient and left her a voice message to inform her that her insurance company has approved Botox with Dr. Hoyos, but the earliest she can be seen for Botox injections with Dr. Hoyos is the week of April 23rd. RN provided contact information for the [facility] for patient to return call to schedule appointment or suggested patient use My Chart to reach Dr. Hoyos and team. Kimberly Ji RN on 4/5/2024 at 10:50 AM

## 2024-04-08 ENCOUNTER — MYC MEDICAL ADVICE (OUTPATIENT)
Dept: SURGERY | Facility: AMBULATORY SURGERY CENTER | Age: 49
End: 2024-04-08

## 2024-04-08 ENCOUNTER — TELEPHONE (OUTPATIENT)
Dept: PHYSICAL MEDICINE AND REHAB | Facility: CLINIC | Age: 49
End: 2024-04-08

## 2024-04-08 ENCOUNTER — VIRTUAL VISIT (OUTPATIENT)
Dept: INTERNAL MEDICINE | Facility: CLINIC | Age: 49
End: 2024-04-08
Payer: COMMERCIAL

## 2024-04-08 DIAGNOSIS — E78.01 FAMILIAL HYPERCHOLESTEROLEMIA: ICD-10-CM

## 2024-04-08 DIAGNOSIS — R73.03 PREDIABETES: Primary | ICD-10-CM

## 2024-04-08 DIAGNOSIS — G43.909 MIGRAINE WITHOUT STATUS MIGRAINOSUS, NOT INTRACTABLE, UNSPECIFIED MIGRAINE TYPE: ICD-10-CM

## 2024-04-08 PROCEDURE — 99214 OFFICE O/P EST MOD 30 MIN: CPT | Mod: 95 | Performed by: INTERNAL MEDICINE

## 2024-04-08 RX ORDER — TRAMADOL HYDROCHLORIDE 50 MG/1
TABLET ORAL
Qty: 180 TABLET | Refills: 5 | Status: SHIPPED | OUTPATIENT
Start: 2024-04-08 | End: 2024-06-12

## 2024-04-08 NOTE — NURSING NOTE
Is the patient currently in the state of [place]? YES    Visit mode:VIDEO    If the visit is dropped, the patient can be reconnected by: VIDEO VISIT: Text to cell phone:   Telephone Information:   Mobile [phone]       Will anyone else be joining the visit? NO  (If patient encounters technical issues they should call [phone]    How would you like to obtain your AVS? MyChart    Are changes needed to the allergy or medication list? Yes Remove Moviprep and Topamax    Are refills needed on medications prescribed by this physician? YES, Tramadol    Reason for visit: RECHECK and Back Pain    SHELLIE CUNNINGHAM

## 2024-04-08 NOTE — TELEPHONE ENCOUNTER
Phoned the patient and left VM. Stated procedure for today has been canceled. Stated to call back to reschedule the injection procedure with dr. Reid.

## 2024-04-08 NOTE — PROGRESS NOTES
"Gracie is a 48 year old who is being evaluated via a billable video visit.            Subjective   Gracie is a 48 year old, presenting for the following health issues:    History of Present Illness       Back Pain:  She presents for follow up of back pain. Patient's back pain is a chronic problem.  Location of back pain:  Right lower back, left lower back, right middle of back, right side of neck, left side of neck, right shoulder, left shoulder, right buttock, right hip and right side of waist  Description of back pain: dull ache, fullness, gnawing, shooting and stabbing  Back pain spreads: right buttocks, right thigh, right knee, right foot, right shoulder and right side of neck    Since patient first noticed back pain, pain is: always present, but gets better and worse  Does back pain interfere with her job:  Yes       Hyperlipidemia:  She presents for follow up of hyperlipidemia.   She is taking medication to lower cholesterol. She is having myalgia or other side effects to statin medications.    Headaches:   Since the patient's last clinic visit, headaches are: no change  The patient is getting headaches:  15 days or more  She is not able to do normal daily activities when she has a migraine.  The patient is taking the following rescue/relief medications:  Sumatriptan (Imitrex)   Patient states \"I get some relief\" from the rescue/relief medications.   The patient is taking the following medications to prevent migraines:  Other  In the past 4 weeks, the patient has gone to an Urgent Care or Emergency Room 0 times times due to headaches.    Reason for visit:  Follow up to last visit -She consumes 0 sweetened beverage(s) daily. She exercises with enough effort to increase her heart rate 3 or less days per week.      Gracie is overall, stable, but still experiencing significant pain in multiple areas; she went for a walk with the warmer spring weather and \"everything hurt\". We discussed headache management including " migraines and also a musculoskeletal component in the left side of the neck with muscle tension and tendonitis.  I reviewed PM&R notes and she is going to continue to follow through with nerve blocks and consider possible ablation if these produce pain relief.  I also reviewed Miguel Chin's note regarding migraine management.     We also reviewed semaglutide which can be uptitrated, I gave her a new Rx to utilize 1.0 then 1.5 then 2.0 weekly doses.  She started rosuvastatin and has no known side effects from taking it; ordered a follow up lipid panel.  She's also due for tramadol refill.      Objective           Vitals:  No vitals were obtained today due to virtual visit.    Physical Exam   GENERAL: alert and no distress  EYES: Eyes grossly normal to inspection.  No discharge or erythema, or obvious scleral/conjunctival abnormalities.  RESP: No audible wheeze, cough, or visible cyanosis.    SKIN: Visible skin clear. No significant rash, abnormal pigmentation or lesions.  NEURO: Cranial nerves grossly intact.  Mentation and speech appropriate for age.  PSYCH: Appropriate affect, tone, and pace of words    A/P Gracie was seen today for recheck and back pain.    Prediabetes  -     Semaglutide, 1 MG/DOSE, (OZEMPIC) 4 MG/3ML pen; Inject 1 mg Subcutaneous every 7 days May increase to 1.5 mg weekly after four weeks as tolerated    Familial hypercholesterolemia  -     Lipid Profile FASTING; Future    Migraine without status migrainosus, not intractable, unspecified migraine type  -     traMADol (ULTRAM) 50 MG tablet; Take 1 tablet (50 mg) by mouth every 6 hours as needed for severe pain Take 1-2 tablets up to three times a day as needed - Oral     RTC 3-6 months via video to review medications    Shavon Guzman MD        Video-Visit Details    Type of service:  Video Visit started 11:25 ended 11:48 with another 10 minutes chart review and documentation same day  Originating Location (pt. Location): Home    Distant  Location (provider location):  On-site  Platform used for Video Visit: Padmini  Signed Electronically by: Shavon Guzman MD

## 2024-04-08 NOTE — PATIENT INSTRUCTIONS
Thank you for visiting the Primary Care Center today at the [facility]! The following is some information about our clinic:     Primary Care Center Frequently-Asked Questions    (1) How do I schedule appointments at the [facility]?     Primary Care--to schedule or make changes to an existing appointment, please call our primary care line at [phone].    Labs--to schedule a lab appointment at the [facility] you can use [employer] or call [phone]. If you have a [place] location that is closer to home, you can reach out to that location for scheduling options.     Imaging--if you need to schedule a CT, X-ray, MRI, ultrasound, or other imaging study you can call [phone] to schedule at the [facility] or any other [facility] imaging location.     Referrals--if a referral to another specialty was ordered you can expect a phone call from their scheduling team. If you have not heard from them in a week, please call us or send us a [employer] message to check the status or get a scheduling number. Please note that this only applies to internal [facility] referrals. If the referral is external you would need to contact their office for scheduling.     (2) I have a question about my visit, who do I contact?     You can call us at the primary care line at [phone] to ask questions about your visit. You can also send a secure message through [employer], which is reviewed by clinic staff. Please note that [employer] messages have a twenty-four to forty-eight business hour turnaround time and should not be used for urgent concerns.    (3) How will I get the results of my tests?    If you are signed up for [employer] all tests will be released to you within twenty-four hours of resulting. Please allow three to five days for your doctor to review your results and place a note interpreting the results. If you do not have [employer] you will receive your  results through mail seven to ten business days following the return of the tests. Please note that if there should be any urgent or concerning results that your doctor or their registered nurse will reach out to you the same day as the tests come back. If you have follow up questions about your results or would like to discuss the results in detail please schedule a follow up with your provider either in person or virtually.     (4) How do I get refills of my prescriptions?     You should always first contact your pharmacy for refills of your medications. If submitting a refill request on [employer], please be sure to submit the request only once--repeat requests can cause delays in refill. If you are requesting a NEW medication or a medication related to new symptoms you will need to schedule an appointment with a provider prior to approval. Please note: Routine medication refills have up to one to three business day turnaround whereas controlled substances refills have up to five to seven business day turnaround.    (5) I have new symptoms, what do I do?     If you are having an immediate medical emergency, you should dial 911 for assistance.   For anything urgent that needs to be seen within a few hours to one day you should visit a local urgent care for assistance.  For non-urgent symptoms that need to be seen within a few days to a week you can schedule with an available provider in primary care by going to [employer] or calling [phone].   If you are not sure how serious your symptoms are or you would like to receive medical advice you can always call [phone] to speak with a triage nurse.

## 2024-04-12 NOTE — TELEPHONE ENCOUNTER
AZATHIOPRINE 50MG TABLETS      Last Written Prescription Date:  3-30-22  Last Fill Quantity: 30,   # refills: 1  Last Office Visit: 10-8-21  Future Office visit:  7-1-22    CBC RESULTS: Recent Labs   Lab Test 05/17/22  1726   WBC 10.7   RBC 5.03   HGB 14.9   HCT 45.9   MCV 91   MCH 29.6   MCHC 32.5   RDW 12.5          Creatinine   Date Value Ref Range Status   05/17/2022 0.80 0.52 - 1.04 mg/dL Final   06/19/2021 0.76 0.52 - 1.04 mg/dL Final   ]    Liver Function Studies -   Recent Labs   Lab Test 05/17/22  1726   PROTTOTAL 7.4   ALBUMIN 3.9   BILITOTAL 0.3   ALKPHOS 73   AST 23   ALT 44       Routing refill request to provider for review/approval because:  Per protocol               82.6

## 2024-04-15 ENCOUNTER — TELEPHONE (OUTPATIENT)
Dept: INTERNAL MEDICINE | Facility: CLINIC | Age: 49
End: 2024-04-15

## 2024-04-15 ENCOUNTER — TELEPHONE (OUTPATIENT)
Dept: PHYSICAL MEDICINE AND REHAB | Facility: CLINIC | Age: 49
End: 2024-04-15
Payer: COMMERCIAL

## 2024-04-16 ENCOUNTER — TELEPHONE (OUTPATIENT)
Dept: PHYSICAL MEDICINE AND REHAB | Facility: CLINIC | Age: 49
End: 2024-04-16
Payer: COMMERCIAL

## 2024-04-16 NOTE — TELEPHONE ENCOUNTER
Phoned the patient and left VM. Stated prior auth approved the injections with dr. Reid. Stated to call back and reschedule those injections.

## 2024-04-17 ENCOUNTER — MYC MEDICAL ADVICE (OUTPATIENT)
Dept: INTERNAL MEDICINE | Facility: CLINIC | Age: 49
End: 2024-04-17
Payer: COMMERCIAL

## 2024-04-18 ENCOUNTER — TELEPHONE (OUTPATIENT)
Dept: PHYSICAL MEDICINE AND REHAB | Facility: CLINIC | Age: 49
End: 2024-04-18
Payer: COMMERCIAL

## 2024-04-18 DIAGNOSIS — M47.812 CERVICAL SPONDYLOSIS WITHOUT MYELOPATHY: Primary | ICD-10-CM

## 2024-04-18 NOTE — TELEPHONE ENCOUNTER
Phoned the patient and left VM. Stated to call back and reschedule injection procedures with dr. Reid.

## 2024-04-19 DIAGNOSIS — Z91.018 FOOD ALLERGY: ICD-10-CM

## 2024-04-22 ENCOUNTER — TELEPHONE (OUTPATIENT)
Dept: PHYSICAL MEDICINE AND REHAB | Facility: CLINIC | Age: 49
End: 2024-04-22
Payer: COMMERCIAL

## 2024-04-22 RX ORDER — EPINEPHRINE 0.3 MG/.3ML
INJECTION SUBCUTANEOUS
Qty: 2 EACH | Refills: 3 | Status: SHIPPED | OUTPATIENT
Start: 2024-04-22

## 2024-04-22 NOTE — TELEPHONE ENCOUNTER
Patient is going out of town for 3/weeks and will call to reschedule injection procedure when she's back.

## 2024-04-22 NOTE — TELEPHONE ENCOUNTER
Attempted to reach patient to remind them about appointment scheduled with Chacorta Hoyos MD on 4/23/24 in our [place] location.  A voicemail was left with a call back number if the patient has questions or would like to reschedule.

## 2024-04-23 ENCOUNTER — TELEPHONE (OUTPATIENT)
Dept: PHYSICAL MEDICINE AND REHAB | Facility: CLINIC | Age: 49
End: 2024-04-23

## 2024-04-23 NOTE — TELEPHONE ENCOUNTER
Retail Pharmacy Prior Authorization Team   Phone: [phone]    Note: Due to record-high volumes, our turn-around time is taking longer than usual . We are currently 10 business days behind in the pools. The encounter was dated 04/15/24 and routed to the [facility] Pool on 04/15/24. We are currently processing PA requests that were sent to the department on 04/10/24.    We are working diligently to submit all requests in a timely manner and in the order they are received. Please only flag TRUE URGENT requests as high priority to the pool at this time.   If you have questions - please send a note/message in the active PA encounter and send back to the [facility] PA pool [[phone]].    If you have more specific questions about our process please reach out to our supervisor Hodan Jackman.   Thank you!   RPPA (Retail Pharmacy Prior Authorization) team

## 2024-04-23 NOTE — TELEPHONE ENCOUNTER
The RN called and spoke to the patient's pharmacy regarding the prescription for Semaglutide, 1 MG/DOSE, (OZEMPIC) 4 MG/3ML pen.     Per pharmacy staff, a Prior Authorization is needed for this medication. The RN gave the pharmacy staff the fax number for the [facility].

## 2024-04-23 NOTE — TELEPHONE ENCOUNTER
EMELY making Gracie aware that in some confusion her appointment was scheduled incorrectly at our clinic as her prior authorization for botox is location specific for the [facility]. Explained that we have a couple of open slots for the time being this Thursday at the [facility] otherwise she could wait for her prior authorization to become active for the [place] location in a couple weeks. Gave her the callback number of [phone] where she can schedule either way.    John Krishna  Visit Facilitator  [facility]

## 2024-04-25 ENCOUNTER — OFFICE VISIT (OUTPATIENT)
Dept: PHYSICAL MEDICINE AND REHAB | Facility: CLINIC | Age: 49
End: 2024-04-25
Payer: COMMERCIAL

## 2024-04-25 DIAGNOSIS — G24.4 OROMANDIBULAR DYSTONIA: ICD-10-CM

## 2024-04-25 DIAGNOSIS — G43.019 REFRACTORY MIGRAINE WITHOUT AURA: Primary | ICD-10-CM

## 2024-04-25 PROCEDURE — 95873 GUIDE NERV DESTR ELEC STIM: CPT | Performed by: PHYSICAL MEDICINE & REHABILITATION

## 2024-04-25 PROCEDURE — 64615 CHEMODENERV MUSC MIGRAINE: CPT | Performed by: PHYSICAL MEDICINE & REHABILITATION

## 2024-04-25 PROCEDURE — 99213 OFFICE O/P EST LOW 20 MIN: CPT | Mod: 25 | Performed by: PHYSICAL MEDICINE & REHABILITATION

## 2024-04-25 NOTE — LETTER
4/25/2024       RE: Patricia Hall  [address]  [place] [address]     Dear Colleague,    Thank you for referring your patient, Patricia Hall, to the [facility]YSICAL [facility]IC[facility] CLINIC [place] at [facility]. Please see a copy of my visit note below.    CC: Patient with chronic pain headache neck pain and migraines.     Patient is a very pleasant 48-year-old woman with a complex medical history.  She has been dealing with severe migraines neck pain as well as TMJ dysfunction.  She has seen her dentist.  She has been getting Botox which have been helpful.  She still has several headaches and the headaches worsen when the Botox wears off.  She has also noted significant tightness in the muscles of the neck and shoulder.  Her treating provider is wondering whether she will benefit from a different protocol approach.  She is here for that purpose.     She also gives history of lupus.  Because of steroids, she has fungal infection in her toenails.  She has had issues with her lower back and has previously undergone radiofrequency ablation.  She currently hurts all over and notes that she has been given a diagnosis of fibroid some point in the past.     Currently she rates her pain in the 8-10 out of 10 at its worst 3-4 at best 3-4 currently.  She finds that it affects all of her activities sleep as well.  She also has issues with  strength in the hands.  She has done physical therapy for her back.  She has not had imaging studies for the neck.  She denies any bowel bladder disturbance.  She has constipation and hemorrhoids.     Interviewing the history she has had chronic pain affecting the neck and shoulders for years.  She has tried medications including ibuprofen and Tylenol for more than 3 months without relief.  She would like to get this under control.     EXAM: MR LUMBAR SPINE W/O CONTRAST  LOCATION: [facility]  [facility]  DATE/TIME: 5/24/2022 7:04 PM     INDICATION: Myelopathy, acute or progressive.  COMPARISON: 12/9/2019  TECHNIQUE: Routine Lumbar Spine MRI without IV contrast.     FINDINGS:   Nomenclature is based on 5 lumbar type vertebral bodies. Satisfactory vertebral body height. There is 2 mm degenerative anterior subluxation L5 upon S1 with no pars interarticularis defects. Mild interspace narrowing L2-L3 and L5-S1. No compression   fractures. No marrow or ligamentous edema. Satisfactory sacral alignment. Normal distal spinal cord and cauda equina with conus medullaris at L1. No cord expansive changes are noted. Nerve roots of the cauda equina are satisfactory without nodularity or   thickening. No morphologic evidence to suggest arachnoiditis. No retroperitoneal solid mass or adenopathy. Symmetric psoas appearance bilaterally. Nothing for sacral insufficiency or stress fracture. No pelvic mass or adenopathy is noted.     T12-L1: Normal disc height and signal. No herniation. Normal facets. No spinal canal or neural foraminal stenosis.      L1-L2: Normal disc height and signal. No herniation. Normal facets. No spinal canal or neural foraminal stenosis.     L2-L3: Mild loss of disc height with annular bulge. No disc herniation. No spinal stenosis. Suggested mild left foraminal compromise without right foraminal stenosis. Facet joints are satisfactory.      L3-L4: Normal disc height and signal. No herniation. Normal facets. No spinal canal or neural foraminal stenosis.     L4-L5: Minimal annular bulging. No disc herniation. No spinal stenosis. Mild foraminal narrowing inferiorly bilaterally and mild facet joint hypertrophic changes with increased joint space fluid.     L5-S1: Moderate loss of disc height. Uncovering of disc material superiorly with low-grade degenerative anterior subluxation and generalized disc bulge. No disc herniation. No spinal stenosis. Mild bilateral foraminal compromise. Facet  joint hypertrophic   changes bilaterally with increased joint space fluid. Increased joint space fluid overall has been described in association with instability, consider flexion and extension views to assess L4-L5 and L5-S1 relationships on dynamic views as indicated.                                                                      IMPRESSION:  1.  Degenerative changes lower lumbar spine most marked L4-L5 and L5-S1. No severe spinal stenosis or severe foraminal compromise at any lumbar level.     2.  No spinal stenosis with mild L4-L5 and L5-S1 foraminal compromise inferiorly. Facet joint arthropathy and increased joint space fluid is noted at these levels.     3.  Consider flexion-extension views if there is concern for instability as increased joint space fluid in the facet joints has been described in association with instability.     4.  Since previous MR 12/9/2019, L5-S1 anterior subluxation has progressed, along with interspace narrowing. Previously identified synovial cyst arising from the medial aspect of the degenerated right L5-S1 facet joint is not present on today's study   with decreased mass effect upon the right lateral thecal sac and mass effect upon the traversing right-sided nerve roots S1-S2.        [facility]   MRI HEAD WITHOUT AND WITH CONTRAST   5/17/2013     INDICATION: Lupus, psychosis, auditory hallucinations.   TECHNIQUE: Routine brain MRI without and with gadolinium. CONTRAST:   Magnevist 12 mL IV.   COMPARISON: Head MRI 7/2/2010.     REPORT: Evaluation of the intracranial contents demonstrates there is no   evidence for acute or subacute infarct on the diffusion acquisition. No   evidence for intracranial hemorrhage, mass lesion, or obstructive type   hydrocephalus. No pathologic enhancement of the brain parenchyma or   meninges following IV gadolinium. Mild inferior position of the cerebellar   tonsils at the craniovertebral junction noted; this is unchanged and likely     incidental. The major intracranial vascular flow voids are maintained.   Mastoid air cells clear. Paranasal sinuses show a trace of mucosal   thickening in the right ethmoid air cells.     CONCLUSION:   1. No evidence for intracranial mass, hemorrhage, hydrocephalus, or   infarct.   2. Slight inferior position of the cerebellar tonsils noted at the   craniovertebral junction which is unchanged from prior study. This is   likely an incidental finding.   3. Mastoid air cells clear. Trace of mucosal thickening in the right   ethmoid sinuses.   Past Medical History        Past Medical History:   Diagnosis Date    Allergy, unspecified not elsewhere classified      Endometriosis      Fibromyalgia      Migraine without aura, with intractable migraine, so stated, without mention of status migrainosus      Myalgia and myositis, unspecified      Systemic lupus erythematosus (H)              Past Surgical History         Past Surgical History:   Procedure Laterality Date    SURGICAL HISTORY OF -    01/01/1986     left elbow fracture repair            Family History            Problem (# of Occurrences) Relation (Name,Age of Onset)     Dementia (1) Mother     Diabetes (1) Maternal Grandfather     Lipids (1) Mother     Prostate Cancer (1) Father (80)     Skin Cancer (1) Paternal Grandmother     Thyroid Cancer (1) Mother (50 - 60)               Negative family history of: Melanoma                Social History   Social History            Socioeconomic History    Marital status:        Spouse name: Not on file    Number of children: Not on file    Years of education: Not on file    Highest education level: Not on file   Occupational History    Not on file   Tobacco Use    Smoking status: Never    Smokeless tobacco: Never   Substance and Sexual Activity    Alcohol use: Not Currently    Drug use: No    Sexual activity: Yes       Partners: Male       Birth control/protection: Condom   Other Topics Concern    Parent/sibling w/  CABG, MI or angioplasty before 65F 55M? Not Asked   Social History Narrative    Not on file      Social Determinants of Health           Financial Resource Strain: Not on file   Food Insecurity: Not on file   Transportation Needs: Not on file   Physical Activity: Not on file   Stress: Not on file   Social Connections: Not on file   Interpersonal Safety: Low Risk  (2/21/2024)     Interpersonal Safety      Do you feel physically and emotionally safe where you currently live?: Yes      Within the past 12 months, have you been hit, slapped, kicked or otherwise physically hurt by someone?: No      Within the past 12 months, have you been humiliated or emotionally abused in other ways by your partner or ex-partner?: No   Housing Stability: Not on file            Current Outpatient Medications   Medication Sig Dispense Refill    AMBIEN 10 MG OR TABS 1 po HS, prn 20 0    Belimumab (BENLYSTA) 200 MG/ML SOSY Inject 200 mg Subcutaneous every 7 days (Patient not taking: Reported on 3/14/2024) 12 mL 3    benzonatate (TESSALON) 100 MG capsule Take 1 capsule (100 mg) by mouth 3 times daily as needed for cough (Patient not taking: Reported on 3/14/2024) 30 capsule 0    calcium carbonate (OS-VAHID 500 MG [place]. [place]) 1250 MG tablet Take 1 tablet by mouth daily      EPINEPHrine (ANY BX GENERIC EQUIV) 0.3 MG/0.3ML injection 2-pack INJECT 0.3 MG INTO THE MUSCLE AS NEEDED FOR ANAPHYLAXIS 2 each 3    galcanezumab-gnlm (EMGALITY) 120 MG/ML injection Inject 240 mg subcutaneous first month and then inject 120 mg subcutaneous every 28 days 2 mL 11    hydroxychloroquine (PLAQUENIL) 200 MG tablet Take 1 tablet (200 mg) by mouth 2 times daily 180 tablet 1    levalbuterol (XOPENEX HFA) 45 MCG/ACT inhaler  (Patient not taking: Reported on 3/14/2024)      levothyroxine (SYNTHROID/LEVOTHROID) 25 MCG tablet Take 1 tablet by mouth daily (Patient not taking: Reported on 3/14/2024)      metFORMIN (GLUCOPHAGE XR) 500 MG 24 hr tablet  (Patient not taking:  Reported on 3/14/2024)      montelukast (SINGULAIR) 10 MG tablet  (Patient not taking: Reported on 3/14/2024)      MULTIVITAMIN TABS   OR   0    Omega-3 Fatty Acids (OMEGA 3 PO) Take 1,250 mg by mouth daily      omeprazole (PRILOSEC) 40 MG DR capsule Take 1 capsule (40 mg) by mouth daily (Patient not taking: Reported on 3/14/2024) 60 capsule 1    ondansetron (ZOFRAN) 8 MG tablet TAKE 1 TABLET BY MOUTH UP TO THREE TIMES DAILY FOR NAUSEA 90 tablet 1    PEG-KCl-NaCl-NaSulf-Na Asc-C (MOVIPREP) 100 g SOLR  (Patient not taking: Reported on 3/14/2024)      predniSONE (DELTASONE) 1 MG tablet 12.5-10 mg a day, each course x 1 week then 9-8-7-6 mg a day, each course x 1 month then 5 mg a day, use with 5 mg size tab 90 tablet 5    predniSONE (DELTASONE) 5 MG tablet 12.5-10 mg a day, each course x 1 week then 9-8-7-6 mg a day, each course x 1 month then 5 mg a day, use with 1 mg size tab 150 tablet 5    promethazine (PHENERGAN) 12.5 MG tablet Take 2 tablets (25 mg) by mouth every 6 hours as needed for nausea 20 tablet 3    promethazine (PHENERGAN) 25 MG tablet       rosuvastatin (CRESTOR) 5 MG tablet Take 1 tablet (5 mg) by mouth daily 90 tablet 3    semaglutide (OZEMPIC) 2 MG/1.5ML SOPN pen Inject 0.25 mg Subcutaneous every 7 days Increase to 0.5 mg after 2-3 weeks if tolerating. 1.5 mL 11    Semaglutide, 1 MG/DOSE, (OZEMPIC) 4 MG/3ML pen Inject 1 mg Subcutaneous every 7 days May increase to 1.5 mg weekly after four weeks as tolerated 9 mL 3    sertraline (ZOLOFT) 50 MG tablet Take 1 tablet by mouth daily (Patient not taking: Reported on 3/14/2024)      SPIRULINA PO Take by mouth daily      SUMAtriptan (IMITREX STATDOSE) 6 MG/0.5ML pen injector kit Inject 0.5 mLs (6 mg) Subcutaneous at onset of headache for migraine May repeat in 1 hour. Max 12 mg/24 hours. 3 kit 11    topiramate (TOPAMAX) 25 MG tablet Take 1 tablet (25 mg) by mouth 2 times daily 120 tablet 6    traMADol (ULTRAM) 50 MG tablet Take 1 tablet (50 mg) by mouth  every 6 hours as needed for severe pain Take 1-2 tablets up to three times a day as needed - Oral 180 tablet 5    traMADol (ULTRAM-ER) 300 MG 24 hr tablet Take 1 tablet (300 mg) by mouth at bedtime maximum 1 tablet(s) per day 30 tablet 5    ubrogepant (UBRELVY) 100 MG tablet Take 1 tablet (100 mg) by mouth at onset of headache (May repeat in 2 hours as needed. Max 2 tabs in 24 hours) 16 tablet 11    VITAMIN D, CHOLECALCIFEROL, PO Take 5,000 Units by mouth daily       There were no vitals taken for this visit.       On examination, patient is alert and cooperative.  Vitals are stable.  She is afebrile.  HEENT is negative.  Extraocular movements are intact.  Face is symmetric.  Tongue is midline neck is supple.  She has taut muscles in the neck and shoulder region.     She is able to move her upper extremities functionally.  She is able to carry out straight leg raising test.  Sánchez's positive bilaterally.  She is able to stand.  Romberg is negative.  Coordination tests are intact.  She is able to walk on her heels and toes.     Musculoskeletal lamination reveals areas of tenderness in the cervical facets bilaterally.  She is tender over the cervical paraspinals overlying the levator scapulae, rhomboids minor, infraspinatus, and in the lower back she is tender over the right lumbar paraspinal, SI joints bilaterally, piriformis bilaterally.  She was also tender over the trochanteric bursal region.     Neurological examination revealed normal strength normal sensation.  Reflexes are equal and symmetrical and plantars are downgoing.  Coordination tests are intact.     Impression: At this point this 48-year-old woman with multiple issues going on.  She has chronic headaches with migraines.  She definitely has issues going with her neck as well which is making her headaches and neck pain and migraines worse.  In addition she has issues with the joints and muscles in the hip and the lower back region.  In terms of  treatment, it may be reasonable to treat her with my migraine protocol as well as inject the masseter muscles for her TMJ dysfunction/oromandibular dystonia.  Anticipate this will help improve her migraine control.     For her cervical facets: MRI of the cervical spine.  I am not expecting any foraminal stenosis or other issues.  She has involvement of facets C2-C5.  Plan is for medial branch blocks done left alternating with right side at weekly intervals.  If she gets 80% improvement, and is not improved by addition of physical therapy once a week for 4 weeks, she would be a candidate for radiofrequency ablations.      By coordinating these injections along with her migraine protocol, I am expecting significant improvement in her head neck and shoulder pain.  Subsequently will reassess her pain in the lower back hip region and consider treatment appropriately.     This was reviewed at length with the patient.  All her questions were answered to her satisfaction.  20 minutes were spent for this visit, exclusive of the procedure..  More than 50% of which was for counseling on above-mentioned issues.    PROCEDURE NOTE: Botox injections for intractable migraine and oromandibular dystonia.    After explaining the benefits and risks of the procedure, using 200 units of Botox diluted with 4 cc of preservative-free normal saline, using ChloraPrep for skin prep Botox for migraine protocol 5 units were injected in 31 different areas.  Next using EMG for localization 22.5 units were injected into the masseter muscles on either side.  200 units were utilized in all.  None wasted.    Thank you much for allow me to assist in her care.     Thank you much for allow me to assist in her care.         Again, thank you for allowing me to participate in the care of your patient.      Sincerely,    Chacorta Hoyos MD

## 2024-04-25 NOTE — NURSING NOTE
Chief Complaint   Patient presents with    Procedure     Here for injections, confirmed with patient     Eri Roy

## 2024-04-25 NOTE — PROGRESS NOTES
CC: Patient with chronic pain headache neck pain and migraines.     Patient is a very pleasant 48-year-old woman with a complex medical history.  She has been dealing with severe migraines neck pain as well as TMJ dysfunction.  She has seen her dentist.  She has been getting Botox which have been helpful.  She still has several headaches and the headaches worsen when the Botox wears off.  She has also noted significant tightness in the muscles of the neck and shoulder.  Her treating provider is wondering whether she will benefit from a different protocol approach.  She is here for that purpose.     She also gives history of lupus.  Because of steroids, she has fungal infection in her toenails.  She has had issues with her lower back and has previously undergone radiofrequency ablation.  She currently hurts all over and notes that she has been given a diagnosis of fibroid some point in the past.     Currently she rates her pain in the 8-10 out of 10 at its worst 3-4 at best 3-4 currently.  She finds that it affects all of her activities sleep as well.  She also has issues with  strength in the hands.  She has done physical therapy for her back.  She has not had imaging studies for the neck.  She denies any bowel bladder disturbance.  She has constipation and hemorrhoids.     Interviewing the history she has had chronic pain affecting the neck and shoulders for years.  She has tried medications including ibuprofen and Tylenol for more than 3 months without relief.  She would like to get this under control.     EXAM: MR LUMBAR SPINE W/O CONTRAST  LOCATION: [facility]  DATE/TIME: 5/24/2022 7:04 PM     INDICATION: Myelopathy, acute or progressive.  COMPARISON: 12/9/2019  TECHNIQUE: Routine Lumbar Spine MRI without IV contrast.     FINDINGS:   Nomenclature is based on 5 lumbar type vertebral bodies. Satisfactory vertebral body height. There is 2 mm degenerative anterior subluxation L5 upon S1  with no pars interarticularis defects. Mild interspace narrowing L2-L3 and L5-S1. No compression   fractures. No marrow or ligamentous edema. Satisfactory sacral alignment. Normal distal spinal cord and cauda equina with conus medullaris at L1. No cord expansive changes are noted. Nerve roots of the cauda equina are satisfactory without nodularity or   thickening. No morphologic evidence to suggest arachnoiditis. No retroperitoneal solid mass or adenopathy. Symmetric psoas appearance bilaterally. Nothing for sacral insufficiency or stress fracture. No pelvic mass or adenopathy is noted.     T12-L1: Normal disc height and signal. No herniation. Normal facets. No spinal canal or neural foraminal stenosis.      L1-L2: Normal disc height and signal. No herniation. Normal facets. No spinal canal or neural foraminal stenosis.     L2-L3: Mild loss of disc height with annular bulge. No disc herniation. No spinal stenosis. Suggested mild left foraminal compromise without right foraminal stenosis. Facet joints are satisfactory.      L3-L4: Normal disc height and signal. No herniation. Normal facets. No spinal canal or neural foraminal stenosis.     L4-L5: Minimal annular bulging. No disc herniation. No spinal stenosis. Mild foraminal narrowing inferiorly bilaterally and mild facet joint hypertrophic changes with increased joint space fluid.     L5-S1: Moderate loss of disc height. Uncovering of disc material superiorly with low-grade degenerative anterior subluxation and generalized disc bulge. No disc herniation. No spinal stenosis. Mild bilateral foraminal compromise. Facet joint hypertrophic   changes bilaterally with increased joint space fluid. Increased joint space fluid overall has been described in association with instability, consider flexion and extension views to assess L4-L5 and L5-S1 relationships on dynamic views as indicated.                                                                      IMPRESSION:  1.   Degenerative changes lower lumbar spine most marked L4-L5 and L5-S1. No severe spinal stenosis or severe foraminal compromise at any lumbar level.     2.  No spinal stenosis with mild L4-L5 and L5-S1 foraminal compromise inferiorly. Facet joint arthropathy and increased joint space fluid is noted at these levels.     3.  Consider flexion-extension views if there is concern for instability as increased joint space fluid in the facet joints has been described in association with instability.     4.  Since previous MR 12/9/2019, L5-S1 anterior subluxation has progressed, along with interspace narrowing. Previously identified synovial cyst arising from the medial aspect of the degenerated right L5-S1 facet joint is not present on today's study   with decreased mass effect upon the right lateral thecal sac and mass effect upon the traversing right-sided nerve roots S1-S2.        [facility]   MRI HEAD WITHOUT AND WITH CONTRAST   5/17/2013     INDICATION: Lupus, psychosis, auditory hallucinations.   TECHNIQUE: Routine brain MRI without and with gadolinium. CONTRAST:   Magnevist 12 mL IV.   COMPARISON: Head MRI 7/2/2010.     REPORT: Evaluation of the intracranial contents demonstrates there is no   evidence for acute or subacute infarct on the diffusion acquisition. No   evidence for intracranial hemorrhage, mass lesion, or obstructive type   hydrocephalus. No pathologic enhancement of the brain parenchyma or   meninges following IV gadolinium. Mild inferior position of the cerebellar   tonsils at the craniovertebral junction noted; this is unchanged and likely    incidental. The major intracranial vascular flow voids are maintained.   Mastoid air cells clear. Paranasal sinuses show a trace of mucosal   thickening in the right ethmoid air cells.     CONCLUSION:   1. No evidence for intracranial mass, hemorrhage, hydrocephalus, or   infarct.   2. Slight inferior position of the cerebellar tonsils noted at the    craniovertebral junction which is unchanged from prior study. This is   likely an incidental finding.   3. Mastoid air cells clear. Trace of mucosal thickening in the right   ethmoid sinuses.   Past Medical History        Past Medical History:   Diagnosis Date    Allergy, unspecified not elsewhere classified      Endometriosis      Fibromyalgia      Migraine without aura, with intractable migraine, so stated, without mention of status migrainosus      Myalgia and myositis, unspecified      Systemic lupus erythematosus (H)              Past Surgical History         Past Surgical History:   Procedure Laterality Date    SURGICAL HISTORY OF -    01/01/1986     left elbow fracture repair            Family History            Problem (# of Occurrences) Relation (Name,Age of Onset)     Dementia (1) Mother     Diabetes (1) Maternal Grandfather     Lipids (1) Mother     Prostate Cancer (1) Father (80)     Skin Cancer (1) Paternal Grandmother     Thyroid Cancer (1) Mother (50 - 60)               Negative family history of: Melanoma                Social History   Social History            Socioeconomic History    Marital status:        Spouse name: Not on file    Number of children: Not on file    Years of education: Not on file    Highest education level: Not on file   Occupational History    Not on file   Tobacco Use    Smoking status: Never    Smokeless tobacco: Never   Substance and Sexual Activity    Alcohol use: Not Currently    Drug use: No    Sexual activity: Yes       Partners: Male       Birth control/protection: Condom   Other Topics Concern    Parent/sibling w/ CABG, MI or angioplasty before 65F 55M? Not Asked   Social History Narrative    Not on file      Social Determinants of Health           Financial Resource Strain: Not on file   Food Insecurity: Not on file   Transportation Needs: Not on file   Physical Activity: Not on file   Stress: Not on file   Social Connections: Not on file   Interpersonal  Safety: Low Risk  (2/21/2024)     Interpersonal Safety      Do you feel physically and emotionally safe where you currently live?: Yes      Within the past 12 months, have you been hit, slapped, kicked or otherwise physically hurt by someone?: No      Within the past 12 months, have you been humiliated or emotionally abused in other ways by your partner or ex-partner?: No   Housing Stability: Not on file            Current Outpatient Medications   Medication Sig Dispense Refill    AMBIEN 10 MG OR TABS 1 po HS, prn 20 0    Belimumab (BENLYSTA) 200 MG/ML SOSY Inject 200 mg Subcutaneous every 7 days (Patient not taking: Reported on 3/14/2024) 12 mL 3    benzonatate (TESSALON) 100 MG capsule Take 1 capsule (100 mg) by mouth 3 times daily as needed for cough (Patient not taking: Reported on 3/14/2024) 30 capsule 0    calcium carbonate (OS-VAHID 500 MG [place]. [place]) 1250 MG tablet Take 1 tablet by mouth daily      EPINEPHrine (ANY BX GENERIC EQUIV) 0.3 MG/0.3ML injection 2-pack INJECT 0.3 MG INTO THE MUSCLE AS NEEDED FOR ANAPHYLAXIS 2 each 3    galcanezumab-gnlm (EMGALITY) 120 MG/ML injection Inject 240 mg subcutaneous first month and then inject 120 mg subcutaneous every 28 days 2 mL 11    hydroxychloroquine (PLAQUENIL) 200 MG tablet Take 1 tablet (200 mg) by mouth 2 times daily 180 tablet 1    levalbuterol (XOPENEX HFA) 45 MCG/ACT inhaler  (Patient not taking: Reported on 3/14/2024)      levothyroxine (SYNTHROID/LEVOTHROID) 25 MCG tablet Take 1 tablet by mouth daily (Patient not taking: Reported on 3/14/2024)      metFORMIN (GLUCOPHAGE XR) 500 MG 24 hr tablet  (Patient not taking: Reported on 3/14/2024)      montelukast (SINGULAIR) 10 MG tablet  (Patient not taking: Reported on 3/14/2024)      MULTIVITAMIN TABS   OR   0    Omega-3 Fatty Acids (OMEGA 3 PO) Take 1,250 mg by mouth daily      omeprazole (PRILOSEC) 40 MG DR capsule Take 1 capsule (40 mg) by mouth daily (Patient not taking: Reported on 3/14/2024) 60 capsule 1     ondansetron (ZOFRAN) 8 MG tablet TAKE 1 TABLET BY MOUTH UP TO THREE TIMES DAILY FOR NAUSEA 90 tablet 1    PEG-KCl-NaCl-NaSulf-Na Asc-C (MOVIPREP) 100 g SOLR  (Patient not taking: Reported on 3/14/2024)      predniSONE (DELTASONE) 1 MG tablet 12.5-10 mg a day, each course x 1 week then 9-8-7-6 mg a day, each course x 1 month then 5 mg a day, use with 5 mg size tab 90 tablet 5    predniSONE (DELTASONE) 5 MG tablet 12.5-10 mg a day, each course x 1 week then 9-8-7-6 mg a day, each course x 1 month then 5 mg a day, use with 1 mg size tab 150 tablet 5    promethazine (PHENERGAN) 12.5 MG tablet Take 2 tablets (25 mg) by mouth every 6 hours as needed for nausea 20 tablet 3    promethazine (PHENERGAN) 25 MG tablet       rosuvastatin (CRESTOR) 5 MG tablet Take 1 tablet (5 mg) by mouth daily 90 tablet 3    semaglutide (OZEMPIC) 2 MG/1.5ML SOPN pen Inject 0.25 mg Subcutaneous every 7 days Increase to 0.5 mg after 2-3 weeks if tolerating. 1.5 mL 11    Semaglutide, 1 MG/DOSE, (OZEMPIC) 4 MG/3ML pen Inject 1 mg Subcutaneous every 7 days May increase to 1.5 mg weekly after four weeks as tolerated 9 mL 3    sertraline (ZOLOFT) 50 MG tablet Take 1 tablet by mouth daily (Patient not taking: Reported on 3/14/2024)      SPIRULINA PO Take by mouth daily      SUMAtriptan (IMITREX STATDOSE) 6 MG/0.5ML pen injector kit Inject 0.5 mLs (6 mg) Subcutaneous at onset of headache for migraine May repeat in 1 hour. Max 12 mg/24 hours. 3 kit 11    topiramate (TOPAMAX) 25 MG tablet Take 1 tablet (25 mg) by mouth 2 times daily 120 tablet 6    traMADol (ULTRAM) 50 MG tablet Take 1 tablet (50 mg) by mouth every 6 hours as needed for severe pain Take 1-2 tablets up to three times a day as needed - Oral 180 tablet 5    traMADol (ULTRAM-ER) 300 MG 24 hr tablet Take 1 tablet (300 mg) by mouth at bedtime maximum 1 tablet(s) per day 30 tablet 5    ubrogepant (UBRELVY) 100 MG tablet Take 1 tablet (100 mg) by mouth at onset of headache (May repeat in 2 hours  as needed. Max 2 tabs in 24 hours) 16 tablet 11    VITAMIN D, CHOLECALCIFEROL, PO Take 5,000 Units by mouth daily       There were no vitals taken for this visit.       On examination, patient is alert and cooperative.  Vitals are stable.  She is afebrile.  HEENT is negative.  Extraocular movements are intact.  Face is symmetric.  Tongue is midline neck is supple.  She has taut muscles in the neck and shoulder region.     She is able to move her upper extremities functionally.  She is able to carry out straight leg raising test.  Sánchez's positive bilaterally.  She is able to stand.  Romberg is negative.  Coordination tests are intact.  She is able to walk on her heels and toes.     Musculoskeletal lamination reveals areas of tenderness in the cervical facets bilaterally.  She is tender over the cervical paraspinals overlying the levator scapulae, rhomboids minor, infraspinatus, and in the lower back she is tender over the right lumbar paraspinal, SI joints bilaterally, piriformis bilaterally.  She was also tender over the trochanteric bursal region.     Neurological examination revealed normal strength normal sensation.  Reflexes are equal and symmetrical and plantars are downgoing.  Coordination tests are intact.     Impression: At this point this 48-year-old woman with multiple issues going on.  She has chronic headaches with migraines.  She definitely has issues going with her neck as well which is making her headaches and neck pain and migraines worse.  In addition she has issues with the joints and muscles in the hip and the lower back region.  In terms of treatment, it may be reasonable to treat her with my migraine protocol as well as inject the masseter muscles for her TMJ dysfunction/oromandibular dystonia.  Anticipate this will help improve her migraine control.     For her cervical facets: MRI of the cervical spine.  I am not expecting any foraminal stenosis or other issues.  She has involvement of  facets C2-C5.  Plan is for medial branch blocks done left alternating with right side at weekly intervals.  If she gets 80% improvement, and is not improved by addition of physical therapy once a week for 4 weeks, she would be a candidate for radiofrequency ablations.      By coordinating these injections along with her migraine protocol, I am expecting significant improvement in her head neck and shoulder pain.  Subsequently will reassess her pain in the lower back hip region and consider treatment appropriately.     This was reviewed at length with the patient.  All her questions were answered to her satisfaction.  20 minutes were spent for this visit, exclusive of the procedure..  More than 50% of which was for counseling on above-mentioned issues.    PROCEDURE NOTE: Botox injections for intractable migraine and oromandibular dystonia.    After explaining the benefits and risks of the procedure, using 200 units of Botox diluted with 4 cc of preservative-free normal saline, using ChloraPrep for skin prep Botox for migraine protocol 5 units were injected in 31 different areas.  Next using EMG for localization 22.5 units were injected into the masseter muscles on either side.  200 units were utilized in all.  None wasted.    Thank you much for allow me to assist in her care.    Chacorta Hoyos MD      Thank you much for allow me to assist in her care.     Chacorta Hoyos MD

## 2024-04-26 NOTE — TELEPHONE ENCOUNTER
Central Prior Authorization Team - Phone: [phone]     PA Initiation    Medication: OZEMPIC (1 MG/DOSE) 4 MG/3ML SC SOPN  Insurance Company: [employer] - Phone [phone] Fax [phone]  Pharmacy Filling the Rx: [id number] - [place], [place] - [facility] AT [facility] [address]  Filling Pharmacy Phone: [phone]  Filling Pharmacy Fax:    Start Date: 4/26/2024

## 2024-04-29 NOTE — TELEPHONE ENCOUNTER
Central Prior Authorization Team - Phone: [phone]     PRIOR AUTHORIZATION DENIED    Medication: OZEMPIC (1 MG/DOSE) 4 MG/3ML SC SOPN  Insurance Company: [employer] - Phone [phone] Fax [phone]  Denial Date: 4/26/2024    Denial Reason(s): only covered for Type 2 diabetes mellitus diagnosis.        Appeal Information: If the provider would like to appeal, please provide a letter of medical necessity and route back to the team. Otherwise you can close the encounter. Thank you, Central PA Team    Patient Notified: NO  Unfortunately, we cannot call the patient with denials because we do not know what next steps the MD will take nor can we give medical advice, please notify the patient of what they are to expect for the continuation of their therapy from the provider.

## 2024-05-25 ENCOUNTER — HEALTH MAINTENANCE LETTER (OUTPATIENT)
Age: 49
End: 2024-05-25

## 2024-06-04 DIAGNOSIS — G43.009 MIGRAINE WITHOUT AURA AND WITHOUT STATUS MIGRAINOSUS, NOT INTRACTABLE: ICD-10-CM

## 2024-06-04 RX ORDER — CEFUROXIME AXETIL 250 MG/1
6 TABLET ORAL
Qty: 3 KIT | Refills: 11 | Status: SHIPPED | OUTPATIENT
Start: 2024-06-04

## 2024-06-10 ENCOUNTER — TELEPHONE (OUTPATIENT)
Dept: INTERNAL MEDICINE | Facility: CLINIC | Age: 49
End: 2024-06-10
Payer: COMMERCIAL

## 2024-06-10 ENCOUNTER — MYC MEDICAL ADVICE (OUTPATIENT)
Dept: INTERNAL MEDICINE | Facility: CLINIC | Age: 49
End: 2024-06-10
Payer: COMMERCIAL

## 2024-06-10 DIAGNOSIS — G43.909 MIGRAINE WITHOUT STATUS MIGRAINOSUS, NOT INTRACTABLE, UNSPECIFIED MIGRAINE TYPE: ICD-10-CM

## 2024-06-11 ENCOUNTER — TELEPHONE (OUTPATIENT)
Dept: NEUROLOGY | Facility: CLINIC | Age: 49
End: 2024-06-11
Payer: COMMERCIAL

## 2024-06-11 ENCOUNTER — TELEPHONE (OUTPATIENT)
Dept: INTERNAL MEDICINE | Facility: CLINIC | Age: 49
End: 2024-06-11
Payer: COMMERCIAL

## 2024-06-11 NOTE — TELEPHONE ENCOUNTER
[facility] Call Center    Phone Message    May a detailed message be left on voicemail: yes     Reason for Call: Other: Patient calling stating she has ulcer's in mouth and she has Lupus which signifies a flare up. She'd like a call back today. Please call and advise.

## 2024-06-11 NOTE — TELEPHONE ENCOUNTER
Prior Authorization Retail Medication Request     Medication/Dose: Ubrelvy  mg  ICD code (if different than what is on RX):    Previously Tried and Failed:  :rizatriptan or sumatriptan and somedays both the same day  Sumatriptan works when catches quick enough   NSAIDs -a couple of tablets of ibuprofen in the week and does not help with the pain and does not tylenol in the last 6 month   Ondansetron helps and   Has had PT       Rationale: migraine     Insurance Name: [facility]  Insurance ID: [id number]       Pharmacy Information (if different than what is on RX)  Name:    Phone:

## 2024-06-11 NOTE — TELEPHONE ENCOUNTER
D: Pt recently seen in clinic. Found to be euvolemic on exam but having difficulty laying flat. His torsemide was increased, however he continues to have difficulty sleeping and laying flat.   I: Spoke with Dr. Watkins, who would like pt to be admitted for heart failure exacerbation. Pt instructed to come to hospital at 1200 tomorrow and to call if he has any issues over night. He states he has the phone number for the cardiology resident on call.  A: Pt verbalized understanding.   P: Will continue to monitor closely. Pt is scheduled for HM3 implant a week from tomorrow.   Patient calling stating she needs to talk to a nurse about her refill on Tramadol. She states she stayed in [place] longer than expected and had to get refills out there and she can't transfer them back here. She's looking to talk to a nurse today. Please call her back and advise.

## 2024-06-12 DIAGNOSIS — K12.1 ORAL ULCER: Primary | ICD-10-CM

## 2024-06-12 DIAGNOSIS — G43.909 MIGRAINE WITHOUT STATUS MIGRAINOSUS, NOT INTRACTABLE, UNSPECIFIED MIGRAINE TYPE: ICD-10-CM

## 2024-06-12 RX ORDER — TRAMADOL HYDROCHLORIDE 50 MG/1
TABLET ORAL
Qty: 180 TABLET | Refills: 5 | Status: SHIPPED | OUTPATIENT
Start: 2024-06-12

## 2024-06-12 RX ORDER — TRAMADOL HYDROCHLORIDE 50 MG/1
TABLET ORAL
Qty: 180 TABLET | Refills: 5 | Status: SHIPPED | OUTPATIENT
Start: 2024-06-12 | End: 2024-06-12

## 2024-06-12 NOTE — TELEPHONE ENCOUNTER
The RN called and spoke with the patient.     The RN updated the patient that Dr. Mendez sent refills of the traMADol (ULTRAM) 50 MG tablet to the [facility] pharmacy in [place].     The RN also discussed with the patient that Dr. Mendez said that usually ulcers related to lupus are not infectious, although that without seeing the patient Dr. Mendez was not able to definitively rule out an infectious etiology and that the patient should consult with her rheumatologist.     The patient stated that she had already reached out to her rheumatologist. The RN offered to schedule the patient for an appointment on 6/13/2024 at 1:30 PM with Dr. Winter Mariee for further evaluation and possible treatment of her symptoms which the patient accepted.     The patient verbalized understanding of the plan.

## 2024-06-12 NOTE — TELEPHONE ENCOUNTER
The RN called and spoke to the patient's pharmacy ([facility] in [place], [place]) who stated that the patient had transferred the prescription for traMADol (ULTRAM) 50 MG tablet to another pharmacy. [facility] pharmacy in [place], [place] stated that they were not able to refill this prescription and also that they were unable to cancel this prescription because it had been transferred out of state.     The RN called [facility] in [place], [place] (phone [phone]) who stated that the patient had Tramadol 50 mg tablets, Q=180 last sold on 05/13/2024. Per [facility] in [place], [place] staff, this prescription was unable to be transferred back to the [facility] in [place], [place]. In light of this, the RN asked that the [facility] in [place], [place] discontinue the prescription and any remaining refills for Tramadol 50 mg tablet which the pharmacy stated that they would do.

## 2024-06-13 ENCOUNTER — TELEPHONE (OUTPATIENT)
Dept: PHYSICAL MEDICINE AND REHAB | Facility: CLINIC | Age: 49
End: 2024-06-13

## 2024-06-13 RX ORDER — TRIAMCINOLONE ACETONIDE 0.1 %
PASTE (GRAM) DENTAL
Qty: 5 G | Refills: 5 | Status: SHIPPED | OUTPATIENT
Start: 2024-06-13

## 2024-06-13 NOTE — TELEPHONE ENCOUNTER
Attempted to reach patient to remind them about appointment scheduled with Chacorta Hoyos MD on 6/14/24 in our [place] location.    A voicemail was left with a call back number if the patient has questions or would like to reschedule.

## 2024-06-18 NOTE — TELEPHONE ENCOUNTER
PA Initiation    Medication: UBROGEPANT 100 MG PO TABS  Insurance Company: [email or web address] - Phone [phone] Fax [phone]  Pharmacy Filling the Rx: [id number] - [place], [place] - [facility] [address] AT [facility] [address]  Filling Pharmacy Phone: [phone]  Filling Pharmacy Fax: [phone]  Start Date: 6/18/2024

## 2024-06-19 NOTE — TELEPHONE ENCOUNTER
PRIOR AUTHORIZATION DENIED    Medication: UBROGEPANT 100 MG PO TABS  Insurance Company: [employer] - Phone [phone] Fax [phone]  Denial Date: 6/19/2024  Denial Reason(s):     Appeal Information:     Patient Notified: No, care team must notify on denials.

## 2024-06-19 NOTE — TELEPHONE ENCOUNTER
Medication Appeal Initiation    Medication: UBROGEPANT 100 MG PO TABS  Appeal Start Date:  6/19/2024  Insurance Company: [employer] - Phone [phone] Fax [phone]   Insurance Phone: [employer] - Phone [phone] Fax [phone]   Insurance Fax: [employer] - Phone [phone] Fax [phone]   Comments:       Initiated appeal process via fax. Requested expedited review. If plan determines that patient does not meet criteria for a high priority review, plan has 30 days to make a determination from date received.

## 2024-06-25 NOTE — TELEPHONE ENCOUNTER
Let patient know of the denial. We can stay with sumatriptan until I see her next time. If she would like to proceed with ubrelvy than she 'll need to stop sumatriptan. I'm Ok with whatever she decides to proceed. Thank you

## 2024-06-25 NOTE — TELEPHONE ENCOUNTER
MEDICATION APPEAL DENIED    Medication: UBROGEPANT 100 MG PO TABS  Insurance Company: [employer]  Denial Date: 6/22/2024  Denial Reason(s):       Second Level Appeal Information:     Patient Notified: No, care team must notify on denials.   Central Prior Authorization Team ONLY: Second level appeals will be managed by the clinic staff and provider. Please contact the ealth Prior Authorization Team if additional information about the denial is needed.

## 2024-07-10 ENCOUNTER — VIRTUAL VISIT (OUTPATIENT)
Dept: RHEUMATOLOGY | Facility: CLINIC | Age: 49
End: 2024-07-10
Attending: INTERNAL MEDICINE
Payer: COMMERCIAL

## 2024-07-10 DIAGNOSIS — M32.9 SYSTEMIC LUPUS ERYTHEMATOSUS, UNSPECIFIED SLE TYPE, UNSPECIFIED ORGAN INVOLVEMENT STATUS (H): Primary | ICD-10-CM

## 2024-07-10 DIAGNOSIS — Z51.81 ENCOUNTER FOR MEDICATION MONITORING: ICD-10-CM

## 2024-07-10 DIAGNOSIS — G47.11 IDIOPATHIC HYPERSOMNIA: ICD-10-CM

## 2024-07-10 PROCEDURE — G2211 COMPLEX E/M VISIT ADD ON: HCPCS | Performed by: INTERNAL MEDICINE

## 2024-07-10 PROCEDURE — 99215 OFFICE O/P EST HI 40 MIN: CPT | Mod: 95 | Performed by: INTERNAL MEDICINE

## 2024-07-10 RX ORDER — PREDNISONE 1 MG/1
TABLET ORAL
Qty: 90 TABLET | Refills: 5 | Status: SHIPPED | OUTPATIENT
Start: 2024-07-10

## 2024-07-10 RX ORDER — HYDROXYCHLOROQUINE SULFATE 200 MG/1
200 TABLET, FILM COATED ORAL 2 TIMES DAILY
Qty: 180 TABLET | Refills: 0 | Status: SHIPPED | OUTPATIENT
Start: 2024-07-10

## 2024-07-10 RX ORDER — MODAFINIL 100 MG/1
100 TABLET ORAL DAILY
Qty: 30 TABLET | Refills: 5 | Status: SHIPPED | OUTPATIENT
Start: 2024-07-10

## 2024-07-10 RX ORDER — PREDNISONE 5 MG/1
TABLET ORAL
Qty: 150 TABLET | Refills: 5 | Status: SHIPPED | OUTPATIENT
Start: 2024-07-10

## 2024-07-10 RX ORDER — MYCOPHENOLATE MOFETIL 500 MG/1
500 TABLET ORAL 2 TIMES DAILY
Qty: 60 TABLET | Refills: 1 | Status: SHIPPED | OUTPATIENT
Start: 2024-07-10

## 2024-07-10 ASSESSMENT — PAIN SCALES - GENERAL: PAINLEVEL: SEVERE PAIN (7)

## 2024-07-10 NOTE — PATIENT INSTRUCTIONS
Try provigil 100 mg every morning    Go down on prednisone to 10 mg every day, plan is to taper after start of cellcept, 1 mg down q4 weeks    Start cellcept 1 tab twice a day    DEXA bone density to be scheduled    Eye exam to be scheduled    Labs 1 month after start of cellcept    Return video visit in about 4-5 months

## 2024-07-10 NOTE — PROGRESS NOTES
Virtual Visit Details    Type of service:  Video Visit     Joined the call at 7/10/2024, 9:08:10 am.  Left the call at 7/10/2024, 9:32:29 am.    Originating Location (pt. Location): Home  Distant Location (provider location):  On-site  Platform used for Video Visit: [facility]    Rheumatology Virtual Visit Note    Reason for visit: Lupus    First Consult: 10/12/2017    Last visit:9/14/2023  DOS: 7/10/2024    Original HPI (10/12/2017):  Patricia Hall is a 42 year old female who was referred to our clinic for evaluation and management of her SLE. The patient has been following with Dr. Mao for her SLE    History obtain from chart review and patient interview    Her lupus symptoms first started in during high school including fatigue and rash. She was diagnosed with lupus in early 2000s and she was in graduate school and presented with a few years of arthralgias involving knees and ankles and hands.  She had developed new onset Raynaud's phenomenon in which her fingers turned white in the cold but no digital sores.  VINITA was 1:160.  All specific autoantibodies and rheumatoid serologies negative.  She had a rash on her face, including cheeks and bridge of her nose.  She followed with Dr. Tejal Mayen in Dermatology.  A biopsy of a lesion from the nose was non-diagnostic.  Diagnosis was earlysigns of cutaneous lupus versus acne rosacea.  She was treated with Elidel cream.  She reported intermittent oral ulcers and significant fatigue as well as intermittent episodes of hair loss.  She was started on prednisone in 2003 along with Plaquenil.  She has been maintained on prednisone 5 mg q day with intermittent bursts up as high as 30 mg for a short time.  She has found it very difficult to taper off of prednisone because she gets flare-ups of skin rash, arthralgias, and fatigue. Patient reports significant symptoms during the summers and reports significant flares this summer with photosensitivity with face  rash,  Fatigue and join pain (knees and toes and hips).  She had fevers, mouth sores  And also reports increased hair loss.  Increased pain with walking and morning stiffness with Fibromyalgia burning in the morning. Currently taking prednisone 10 mg qd and plaquenil 200 mg BID.    Patient reports complicated fertility/OBGYN history with stage four endometriosis, fibroids and one pregnancy resulting in a miscarriage. Three rounds of IVF with one banked viable egg. She is currently undergoing fertility evaluation.  She has been working with infertility specialist for years. She currently has one viable egg and would like to undergo one more episode of IVF. She was seen by an autoimmune OB/GYN specialist in [place] (Daya Frost 02/17) for extensive testing. She was found to be heterozygous for MTHFR mutation. She is now taking Matanx (L-methyl folate). She has noticed less bruising since she started this. Metformin was recommended, but it upsets her stomach. DHEA was recommended, but she is not taking it. Her thyroid studies were normal, but she was started on Synthroid 25  g daily for the TSH to be less than 2.0. It was recommended that she take Lovenox prior to IFV and throughout the pregnancy to avoid risk of miscarriage. It was also recommended that she be treated with IVIG prior to IVF. 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  She plans to proceeded further with in vitro plans, but wants to get better control of her flaring lupus and concerns for IVF treatments/hormones.     Interval HPI (7/20/2018):  Repeatedly ill during the winter with both URIs and flares of disease (joint pain, malaise, fatigue). Still undergoing IVF care via MFM and reproductive immunology. Did not initiate azathioprine following previous visit with Dr. Roy due to worry over negative effects on pregnancy. Currently undergoing medication treatment for IVF, is  "picking up medications this weekend for stimulation.    Symptomatically, her recent flares include fatigue, flu-like symptoms (fever, chills, sweats), polyarticular joint pain (fingers, toes, feet, ankles, wrists, elbows).    Recently, she has experienced further hair loss (clumps in shower), ulcers on buccal mucosa (now resolved), fatigue, malaise, significant B/L hip pain (worse from previous, approx 1 year). Specifically, it is constant deep joint pain in hips, would say 8/10 in severity when it occurs during movement. Has been seriously disrupting her daily activities. Has been using tylenol to control pain without complete relief.     Has seen Reproductive Immunology in the interim, has undergone two rounds of IVIG (believed to help with NK cell and cytokine activity in embryo implantation), first IVIG May 14th, then second was July 16th. Had flu-like symptoms with first infusion (HA, myalgias, malaise, felt \"gross\", lower back pain, neck pain). During the 2nd infusion, they slowed infusion, took benadryl/tylenol/upped prednisone to 20 mg she did not experience SEs with this. Overall, felt that IVIG improved her lupus symptoms globally. Short-lived relief. Plan is to use IVIG monthly with monitoring of cytokine levels and NK cell counts.     Currently on 15 mg of prednisone chronically, > 1 year. Today, she would say her disease is mild-moderately active in spite of the 15 mg prednisone. Currently on 400-500 U of vitamin D. Taking 1000 mg calcium.     ROS:  (-) pleurisy, sob, cough, hematuria, dysuria, eye redness, nose bleeds, easy bruising, malar rash  (+) alternating diarrhea/constipation, dry eye, dry mouth, palatal ulcers/petechiae    Is interested in discussing SLE management (Imuran) while pursuing IVF. Will be undergoing planning and another round of IVF stimulation in August.       8/21/2019: She did another round of IVF in 8/2019, no good embryo. IVF caused her flare ups. For flare ups, gets pain over " knees, knuckles and toes with extreme fatigue and photosensitivity.    Had laparoscopic surgery for endometriosis in 4/2019.    Since 4/2019, has been on OC and has not been able to taper prednisone own.    Today, she is on prednisone 15 mg every day x 2-3 weeks. Before that, most of the time, she was on 20 mg every day.    No rash today.    Has pain over knuckles, toes, knees. Has morning flu like sx in AM x 2 hours.    Has extreme fatigue.    She was getting IVIG qmonthly, till 1/2019.    She is going to resume monthly IVIG for successful pregnancy.    11/27/2019: Started IVIG on 9/8/2019, her eyes swelled up, 2 days later had severe LBP/SI joint pain. Was doing keto diet at that time, had headaches.now has sciatica pain on the R side. Did not have this reaction with IVIG before.    Late Oct/early Nov, had malar rash, hair loss.    Had LE edema, went up on her prednisone to 30-40 mg a day x few days. Back to her baseline hair loss and baseline prednisone 20 mg every day.      R SI joint pain is better, but still has it, uses topical pain cream. It is the worst in AM.    Still has swollen ankles.    This edema is new for her.    Off birth control pills. Not on IVF tx either.    Her lupus is back to her baseline.    Has not started AZA yet, but not thinks she is ready to try it.      10/8/2021:    Gracie chose not to take AZA with concern for SEs buts he would like to try benlysta. Continues to have active lupus and can not taper prednisone off.    Dealing with back pain, sciatica, gallstones. Gets vol retention, swelling, LE edema. Ankles and eyes swell up. Has gained weight. Feels stiff. Had diarrhea, stomach pain but now it is improved.      Sciatica is now better. No skin rash or major hair loss today. Takes prednisone 20 mg every day. Has sun sensitivity. Sometimes increases prednisone to 40 mg every day.    Reports small joint pain 5-6/10, has night and 1 hr am stiffness with swollen feet. Gets jaw pain,  migraines, pleurisy. Considers surrogate.      7/1/2022:     Gracie presents for follow up.    Had pelvis, L spine MRI because of having LBP x years. every time she walked over soft grass, injured herself. Was found to have degenerative changes, full MRI report is below. Going to do follow up with her spine doctor. Doing physical therapy. The top of her spine is better. Has A1C elevated.    She is on prednisone 17 mg every day, tries to work through it . Today is a good day, yesterday was a rough day. Has hard time tapering prednisone down.    Dr. Mendez put her on gabapentin.    She is now on benlysta since 11/2021, thinks it is helping her. Thinks benlysta affected her menses, had it twice. Reports edema, weight gain on it. She did not have this extra weight, LEs edema before benlysta even on prednisone 20 mg every day. It helps with energy, when she takes it, feels better for couple of days.    Urine frequency, urgency is less on benlysta, has fluid retention.    Smelling chemical feeling is better on benlsyta.    Took AZA 50 mg every day x 2 months, no SEs and she would estrella to re-try it. When she started gabapentin, stopped AZA abut wants to re-try it.    Has pain, fatigue in her legs, it is better now. She thinks that it might be caused by benlysta. Had the most intense muscle pain, took muscle relaxant and it helped.    No skin rash or oral ulcers.    Takes prednisone for pain in small joints, fingers, toes and elbows. It is different from back pain.    Feels like her whole body is in a flare by going down on prednisone, feels getting a fever and diffuse pain along with fatigue.    Has bump over R 3rd finger which hurts, swells up.    Has unhealed ulcer over big toe, does follow up with a provider.    Toenails are the same.    Looking for surrogate.    Had eye exam.      2/9/2023: Gracie is here for follow up, she likes to try cellcept given the fact that she has found a surrogate and will not get pregnant  herself. Also would like to taper prednisone down.    Stopped AZA and benlysta around cayetano.    Resumed benlysta after 2 wk, not AZA. Feels AZA is not helping, would like to try cellcept.    Dropped prednisone down to 15 mg every day, about 3 mo ago.    Off HCQ x 1 year.    2-3 months of ankle swelling has improved.    Feels swollen in her face, chest, arms, but better by going down on prednisone.    Tolerates benlysta ok.    Still Sun sensitive.    Has fatigue, joint pain affecting fingers and ankles.    Has endometriosis pain, migraines, spine pain.    Would like to try ozempic.      9/14/2023:    -urgent visit for evaluation of B/L hand weakness, drops objects, simple tasks like buttoning shirts are hard to do    -R cheek feels swollen    -has R jaw pain    -R 2 fingers feel weak    -on prednisone 13 mg every day, has hard time tapering down further, lupus sx of rash/joint pain recurs, has not started cellcept yet, but now is ready to do it        Today 7/10/2024:      Kory is a mother, she is very happy about it. Her biologic daughter Marija was born via surrogacy. She is 9 wk old. Marija is doing great. She spent some time in [facility] for a month around birth time before taking    Had a malar rash last Sat, increased prednisone from 10 to 20 mg a day x 3 days, then 15 mg a day. Rash is better.    All small joints are hurting hurting, fingers and toes and ankles.    Very tired.    A month ago, had oral ulcers,  better now.    No CP, a little wheezing.    Back pain is better, moving around, sometimes flares.    Gets migraines triggered by TMJ pain, had botox in jaw along the tendons, it helped with hand function, has the function of hands back, has not done OT done.    Came off benlysta as she thought it was not helping much, she likes     Has not started cellcept yet.            ROS:  A comprehensive ROS was done, positives are per HPI.  Past Medical History:   Diagnosis Date    Allergy, unspecified not  elsewhere classified     Endometriosis     Fibromyalgia     Migraine without aura, with intractable migraine, so stated, without mention of status migrainosus     Myalgia and myositis, unspecified     Systemic lupus erythematosus (H)      Past Surgical History:   Procedure Laterality Date    IR LUMBAR PUNCTURE  5/21/2013    SURGICAL HISTORY OF -   01/01/1986    left elbow fracture repair     Family History   Problem Relation Age of Onset    Lipids Mother     Thyroid Cancer Mother 50 - 60    Dementia Mother     Prostate Cancer Father 80    Diabetes Maternal Grandfather     Skin Cancer Paternal Grandmother     Melanoma No family hx of      Social History     Socioeconomic History    Marital status:      Spouse name: Not on file    Number of children: Not on file    Years of education: Not on file    Highest education level: Not on file   Occupational History    Not on file   Tobacco Use    Smoking status: Never    Smokeless tobacco: Never   Substance and Sexual Activity    Alcohol use: Not Currently    Drug use: No    Sexual activity: Yes     Partners: Male     Birth control/protection: Condom   Other Topics Concern    Parent/sibling w/ CABG, MI or angioplasty before 65F 55M? Not Asked   Social History Narrative    Not on file     Social Determinants of Health     Financial Resource Strain: Low Risk  (4/8/2024)    Financial Resource Strain     Within the past 12 months, have you or your family members you live with been unable to get utilities (heat, electricity) when it was really needed?: No   Food Insecurity: Low Risk  (4/8/2024)    Food Insecurity     Within the past 12 months, did you worry that your food would run out before you got money to buy more?: No     Within the past 12 months, did the food you bought just not last and you didn t have money to get more?: No   Transportation Needs: Low Risk  (4/8/2024)    Transportation Needs     Within the past 12 months, has lack of transportation kept you from  medical appointments, getting your medicines, non-medical meetings or appointments, work, or from getting things that you need?: No   Physical Activity: Not on file   Stress: Not on file   Social Connections: Not on file   Interpersonal Safety: Low Risk  (2/21/2024)    Interpersonal Safety     Do you feel physically and emotionally safe where you currently live?: Yes     Within the past 12 months, have you been hit, slapped, kicked or otherwise physically hurt by someone?: No     Within the past 12 months, have you been humiliated or emotionally abused in other ways by your partner or ex-partner?: No   Housing Stability: Low Risk  (4/8/2024)    Housing Stability     Do you have housing? 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    Tension-type headache    Diminished ovarian reserve    Disorder of connective tissue (H24)    Disorder of immune system (H24)    Endometriosis of uterus    Female infertility    History of environmental allergies    Hyperlipidemia    Irritable bowel syndrome    Major depressive disorder, recurrent episode, in full remission (H24)    Major depressive disorder, recurrent episode, mild (H24)    Migraine with aura    Myofascial pain    Raynaud's disease    Recurrent pregnancy loss without current pregnancy    Seasonal allergies    Primary fibromyalgia syndrome    Uterine leiomyoma    Sciatica of right side    Numbness and tingling of both lower extremities    Sleep apnea    Bilateral temporomandibular joint pain    Cervical cancer screening    Endometriosis    Lupus erythematosus    Cervical spondylosis without myelopathy     Allergies   Allergen Reactions    Celery Oil Anaphylaxis, Difficulty  breathing and Shortness Of Breath     swelling      Celis Anaphylaxis    Dairy Aid  [Tilactase]      Other reaction(s): Arthralgia (Joint Pain)    Gluten Meal      Other reaction(s): Abdominal Pain    No Clinical Screening - See Comments Anaphylaxis and Itching    Pistachio Nut Extract Anaphylaxis    Brassica Oleracea      Other reaction(s): Abdominal Pain  Other reaction(s): Abdominal Pain    Soybean Oil GI Disturbance    Penicillins Hives and Rash    Sulfa Antibiotics Hives and Rash       Outpatient Encounter Medications as of 7/10/2024   Medication Sig Dispense Refill    AMBIEN 10 MG OR TABS 1 po HS, prn 20 0    Belimumab (BENLYSTA) 200 MG/ML SOSY Inject 200 mg Subcutaneous every 7 days (Patient not taking: Reported on 3/14/2024) 12 mL 3    benzonatate (TESSALON) 100 MG capsule Take 1 capsule (100 mg) by mouth 3 times daily as needed for cough (Patient not taking: Reported on 3/14/2024) 30 capsule 0    calcium carbonate (OS-VAHID 500 MG [place]) 1250 MG tablet Take 1 tablet by mouth daily      EPINEPHrine (ANY BX GENERIC EQUIV) 0.3 MG/0.3ML injection 2-pack INJECT 0.3 MG INTO THE MUSCLE AS NEEDED FOR ANAPHYLAXIS 2 each 3    galcanezumab-gnlm (EMGALITY) 120 MG/ML injection Inject 240 mg subcutaneous first month and then inject 120 mg subcutaneous every 28 days 2 mL 11    hydroxychloroquine (PLAQUENIL) 200 MG tablet Take 1 tablet (200 mg) by mouth 2 times daily 180 tablet 1    levalbuterol (XOPENEX HFA) 45 MCG/ACT inhaler  (Patient not taking: Reported on 3/14/2024)      levothyroxine (SYNTHROID/LEVOTHROID) 25 MCG tablet Take 1 tablet by mouth daily (Patient not taking: Reported on 3/14/2024)      metFORMIN (GLUCOPHAGE XR) 500 MG 24 hr tablet  (Patient not taking: Reported on 3/14/2024)      montelukast (SINGULAIR) 10 MG tablet  (Patient not taking: Reported on 3/14/2024)      MULTIVITAMIN TABS   OR   0    Omega-3 Fatty Acids (OMEGA 3 PO) Take 1,250 mg by mouth daily      omeprazole (PRILOSEC) 40 MG DR capsule Take  1 capsule (40 mg) by mouth daily (Patient not taking: Reported on 3/14/2024) 60 capsule 1    ondansetron (ZOFRAN) 8 MG tablet TAKE 1 TABLET BY MOUTH UP TO THREE TIMES DAILY FOR NAUSEA 90 tablet 1    PEG-KCl-NaCl-NaSulf-Na Asc-C (MOVIPREP) 100 g SOLR  (Patient not taking: Reported on 3/14/2024)      predniSONE (DELTASONE) 1 MG tablet 12.5-10 mg a day, each course x 1 week then 9-8-7-6 mg a day, each course x 1 month then 5 mg a day, use with 5 mg size tab 90 tablet 5    predniSONE (DELTASONE) 5 MG tablet 12.5-10 mg a day, each course x 1 week then 9-8-7-6 mg a day, each course x 1 month then 5 mg a day, use with 1 mg size tab 150 tablet 5    promethazine (PHENERGAN) 12.5 MG tablet Take 2 tablets (25 mg) by mouth every 6 hours as needed for nausea 20 tablet 3    promethazine (PHENERGAN) 25 MG tablet       rosuvastatin (CRESTOR) 5 MG tablet Take 1 tablet (5 mg) by mouth daily 90 tablet 3    semaglutide (OZEMPIC) 2 MG/1.5ML SOPN pen Inject 0.25 mg Subcutaneous every 7 days Increase to 0.5 mg after 2-3 weeks if tolerating. 1.5 mL 11    Semaglutide, 1 MG/DOSE, (OZEMPIC) 4 MG/3ML pen Inject 1 mg Subcutaneous every 7 days May increase to 1.5 mg weekly after four weeks as tolerated 9 mL 3    sertraline (ZOLOFT) 50 MG tablet Take 1 tablet by mouth daily (Patient not taking: Reported on 3/14/2024)      SPIRULINA PO Take by mouth daily      SUMAtriptan (IMITREX STATDOSE) 6 MG/0.5ML pen injector kit Inject 0.5 mLs (6 mg) Subcutaneous at onset of headache for migraine May repeat in 1 hour. Max 12 mg/24 hours. 3 kit 11    traMADol (ULTRAM) 50 MG tablet Take 1-2 tablets up to three times a day as needed - Oral 180 tablet 5    traMADol (ULTRAM-ER) 300 MG 24 hr tablet Take 1 tablet (300 mg) by mouth at bedtime maximum 1 tablet(s) per day 30 tablet 5    triamcinolone (KENALOG) 0.1 % paste Put on oral ulcers bid till they heal 5 g 5    ubrogepant (UBRELVY) 100 MG tablet Take 1 tablet (100 mg) by mouth at onset of headache (May repeat  in 2 hours as needed. Max 2 tabs in 24 hours) 16 tablet 11    VITAMIN D, CHOLECALCIFEROL, PO Take 5,000 Units by mouth daily       Facility-Administered Encounter Medications as of 7/10/2024   Medication Dose Route Frequency Provider Last Rate Last Admin    botulinum toxin type A (BOTOX) 100 units injection 200 Units  200 Units Intramuscular Q90 Days Chacorta Hoyos MD        Botulinum Toxin Type A (BOTOX) 200 units injection 155 Units  155 Units Intramuscular See Admin Instructions Debora Chin APRN CNP   155 Units at 04/25/24 1544    Botulinum Toxin Type A (BOTOX) 200 units injection 155 Units  155 Units Intramuscular See Admin Instructions Debora Chin APRN CNP   155 Units at 08/10/23 0711         Her records were reviewed.    Component      Latest Ref Rng & Units 2/10/2022 5/17/2022   WBC      4.0 - 11.0 10e3/uL 11.6 (H) 10.7   RBC Count      3.80 - 5.20 10e6/uL 4.86 5.03   Hemoglobin      11.7 - 15.7 g/dL 14.8 14.9   Hematocrit      35.0 - 47.0 % 44.5 45.9   MCV      78 - 100 fL 92 91   MCH      26.5 - 33.0 pg 30.5 29.6   MCHC      31.5 - 36.5 g/dL 33.3 32.5   RDW      10.0 - 15.0 % 12.8 12.5   Platelet Count      150 - 450 10e3/uL 242 267   % Neutrophils      % 76    % Lymphocytes      % 16    % Monocytes      % 5    % Eosinophils      % 0    % Basophils      % 1    % Immature Granulocytes      % 2    NRBCs per 100 WBC      <1 /100 0    Absolute Neutrophils      1.6 - 8.3 10e3/uL 8.9 (H)    Absolute Lymphocytes      0.8 - 5.3 10e3/uL 1.8    Absolute Monocytes      0.0 - 1.3 10e3/uL 0.6    Absolute Eosinophils      0.0 - 0.7 10e3/uL 0.0    Absolute Basophils      0.0 - 0.2 10e3/uL 0.1    Absolute Immature Granulocytes      <=0.4 10e3/uL 0.2    Absolute NRBCs      10e3/uL 0.0    Sodium      133 - 144 mmol/L  141   Potassium      3.4 - 5.3 mmol/L  4.3   Chloride      94 - 109 mmol/L  102   Carbon Dioxide      20 - 32 mmol/L  32   Anion Gap      3 - 14 mmol/L  7   Urea  Nitrogen      7 - 30 mg/dL  19   Creatinine      0.52 - 1.04 mg/dL 0.72 0.80   Calcium      8.5 - 10.1 mg/dL  9.6   Glucose      70 - 99 mg/dL  128 (H)   Alkaline Phosphatase      40 - 150 U/L  73   AST      0 - 45 U/L 25 23   ALT      0 - 50 U/L 47 44   Protein Total      6.8 - 8.8 g/dL  7.4   Albumin      3.4 - 5.0 g/dL 3.8 3.9   Bilirubin Total      0.2 - 1.3 mg/dL  0.3   GFR Estimate      >60 mL/min/1.73m2 >90 >90   SARS-CoV-2 Rigo Ab, Interp, S       Positive    SARS-CoV-2 Rigo Ab, Quant, S      U/mL >250    Patient's Race       Unknown    Patient's Ethnicity       Unknown    Protein Random Urine      g/L <0.05    Protein Total Urine g/gr Creatinine           Creatinine Urine      mg/dL 13    % Retic      0.5 - 2.0 % 1.6    Absolute Retic      0.025 - 0.095 10e6/uL 0.080    DNA-ds      <10.0 IU/mL <0.6    Complement C4      13 - 39 mg/dL 34    Complement C3      81 - 157 mg/dL 135    Sed Rate      0 - 20 mm/hr 7    CRP Inflammation      0.0 - 8.0 mg/L 3.0    Hemoglobin A1C      0.0 - 5.6 %  5.8 (H)     Pregnancy: She is . H/o OCP and HRT use, see HPI    Ph.E:    Constitutional: WD/WN. Pleasant, NAD.    Eyes: EOM intact, sclera anicteric, conj not injected   MS: No active synovitis over hands, could make full fist. + [place] nodes.  Skin: malar erythema  Neuro: A&O x 3. Grossly non focal  Psych: NL affect    Assessment/Plan     1-SLE FLARE/active  2-HCQ monitoring  3-AZA monitoring  4-Benlysta monitoring    SLE, dx in  (VINITA: 1:80, dsDNA +, Smith negative, normal complements, joint pains, malar rash, oral ulcers), usually flares approximately monthly with joint pains, pleurisy and malar rash. Currently, feels that disease is active. Has been on chronic 20 mg every day prednisone, gained weight, gets LE edema, looks cushingoid. Still planning for PGS normal embryo implantation, might consider surrogate. Had a reaction to IVIG which was given for infertility tx and is not going to get it again. Her back  pain seems to be sciatica and not related to SLE.    Previous visits, was advised to try AZA as steroid sparing agent (I am concerned about long term steroid SE) but did not start with concern for potential SE but willing to try benlysta, especially with planning for surrogate pregnancy. Labs are stable.    7/2022: Gracie is on benlysta since last visit with some benefit, but can not taper prednisone below 17 mg every day due to increased joint pain by taper. Briefly tried AZA 1 tab a day, no SEs, willing to re-try it, will resume it at higher dose.        2/9/2023: Failed AZA, which was chosen over cellcept due to pregnancy planning via IVF. Will try cellcept as steroid sparing agent to allow tapering prednisone down; risks were discussed. She going to proceed with pregnancy via surrogate; therefore she will not get pregnant herself. She misunderstood and stopped HCQ, thought we were switching tx, will resume. Discussed its benefits.     9/14/2023: B/L hand poor /loss of strength seems to be OA related, discussed mx. She is willing to try cellcept to allow tapering prednisone off; risks were discussed.      Today 7/10/2024:    Off benlysta x few months, did help some not much. Prednisone baseline ow is 10 mg every day, it was 13 mg every day last visit, had to go up to 20 mg a day last Sat for flare of rash, fatigue, joint pain, now down to 15 mg qd, malar rash is better. Has not tried cellcept yet, being busy with birth of her 9 wk old daughter, lack of sleep, busy and tired. Discussed long term SE of prednisone, she is willing to try cellcept and taper down prednisone (goal is baseline 5 mg every day given long term use, can't probably completely taper off with concern for adrenal insufficiency), eye exam last done 6/2023, needs to get done DEXA scan. Needs to get done. Recommend to try provigil for fatigue; risks were discussed. Stable lupus labs.        Plan:    Continue plaquenil 200 mg twice a day with  yearly eye exam    Try provigil 100 mg every morning    Go down on prednisone to 10 mg every day, plan is to taper after start of cellcept, 1 mg down q4 weeks    Start cellcept 1 tab twice a day    DEXA bone density to be scheduled    Eye exam to be scheduled    Labs 1 month after start of cellcept    Return video visit in about 4-5 months    Josh Roy MD    TT 40 min was spent on date of the encounter doing chart review, history and exam, documentation and further activities as noted above. Any prior notes, outside records, laboratory results, and imaging studies were reviewed if relevant.     The longitudinal plan of care for the diagnosis(es)/condition(s) as documented were addressed during this visit. Due to the added complexity in care, I will continue to support Gracie in the subsequent management and with ongoing continuity of care.    Orders Placed This Encounter   Procedures    ALT    Albumin level    AST    Creatinine    Complement C4    Complement C3    CRP inflammation    DNA double stranded antibodies    Erythrocyte sedimentation rate auto    UA with Microscopic reflex to Culture    Protein  random urine    Creatinine random urine    CBC with Platelets & Differential

## 2024-07-10 NOTE — NURSING NOTE
Current patient location: [address]  [place] [address]    Is the patient currently in the state of [place]? YES    Visit mode:VIDEO    If the visit is dropped, the patient can be reconnected by: VIDEO VISIT: Text to cell phone:   Telephone Information:   Mobile [phone]       Will anyone else be joining the visit? NO  (If patient encounters technical issues they should call [phone]    How would you like to obtain your AVS? MyChart    Are changes needed to the allergy or medication list?  Unable to review medications and allergies due to time restraint. Pt did review medications and allergies when completing the echeck-in and states they are correct.     Are refills needed on medications prescribed by this physician? NO    Reason for visit: STEPHANIE CUNNINGHAM

## 2024-07-10 NOTE — LETTER
7/10/2024       RE: Patricia Hall  [address]  [place] [address]     Dear Colleague,    Thank you for referring your patient, Patricia Hall, to the [facility] [place] [facility]. Please see a copy of my visit note below.    Virtual Visit Details    Type of service:  Video Visit     Joined the call at 7/10/2024, 9:08:10 am.  Left the call at 7/10/2024, 9:32:29 am.    Originating Location (pt. Location): Home  Distant Location (provider location):  On-site  Platform used for Video Visit: [facility]    Rheumatology Virtual Visit Note    Reason for visit: Lupus    First Consult: 10/12/2017    Last visit:9/14/2023  DOS: 7/10/2024    Original HPI (10/12/2017):  Patricia Hall is a 42 year old female who was referred to our clinic for evaluation and management of her SLE. The patient has been following with Dr. Mao for her SLE    History obtain from chart review and patient interview    Her lupus symptoms first started in during high school including fatigue and rash. She was diagnosed with lupus in early 2000s and she was in graduate school and presented with a few years of arthralgias involving knees and ankles and hands.  She had developed new onset Raynaud's phenomenon in which her fingers turned white in the cold but no digital sores.  VINITA was 1:160.  All specific autoantibodies and rheumatoid serologies negative.  She had a rash on her face, including cheeks and bridge of her nose.  She followed with Dr. Tejal Mayen in Dermatology.  A biopsy of a lesion from the nose was non-diagnostic.  Diagnosis was earlysigns of cutaneous lupus versus acne rosacea.  She was treated with Elidel cream.  She reported intermittent oral ulcers and significant fatigue as well as intermittent episodes of hair loss.  She was started on prednisone in 2003 along with Plaquenil.  She has been maintained on prednisone 5 mg  q day with intermittent bursts up as high as 30 mg for a short time.  She has found it very difficult to taper off of prednisone because she gets flare-ups of skin rash, arthralgias, and fatigue. Patient reports significant symptoms during the summers and reports significant flares this summer with photosensitivity with face rash,  Fatigue and join pain (knees and toes and hips).  She had fevers, mouth sores  And also reports increased hair loss.  Increased pain with walking and morning stiffness with Fibromyalgia burning in the morning. Currently taking prednisone 10 mg qd and plaquenil 200 mg BID.    Patient reports complicated fertility/OBGYN history with stage four endometriosis, fibroids and one pregnancy resulting in a miscarriage. Three rounds of IVF with one banked viable egg. She is currently undergoing fertility evaluation.  She has been working with infertility specialist for years. She currently has one viable egg and would like to undergo one more episode of IVF. She was seen by an autoimmune OB/GYN specialist in [place] (Daya Frost 02/17) for extensive testing. She was found to be heterozygous for MTHFR mutation. She is now taking Matanx (L-methyl folate). She has noticed less bruising since she started this. Metformin was recommended, but it upsets her stomach. DHEA was recommended, but she is not taking it. Her thyroid studies were normal, but she was started on Synthroid 25  g daily for the TSH to be less than 2.0. It was recommended that she take Lovenox prior to IFV and throughout the pregnancy to avoid risk of miscarriage. It was also recommended that she be treated with IVIG prior to IVF. 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  She plans to proceeded further with in vitro plans, but wants to get better control of her flaring lupus and concerns for IVF treatments/hormones.     Interval HPI (7/20/2018):  Repeatedly  "ill during the winter with both URIs and flares of disease (joint pain, malaise, fatigue). Still undergoing IVF care via [facility] and reproductive immunology. Did not initiate azathioprine following previous visit with Dr. Roy due to worry over negative effects on pregnancy. Currently undergoing medication treatment for IVF, is picking up medications this weekend for stimulation.    Symptomatically, her recent flares include fatigue, flu-like symptoms (fever, chills, sweats), polyarticular joint pain (fingers, toes, feet, ankles, wrists, elbows).    Recently, she has experienced further hair loss (clumps in shower), ulcers on buccal mucosa (now resolved), fatigue, malaise, significant B/L hip pain (worse from previous, approx 1 year). Specifically, it is constant deep joint pain in hips, would say 8/10 in severity when it occurs during movement. Has been seriously disrupting her daily activities. Has been using tylenol to control pain without complete relief.     Has seen Reproductive Immunology in the interim, has undergone two rounds of IVIG (believed to help with NK cell and cytokine activity in embryo implantation), first IVIG May 14th, then second was July 16th. Had flu-like symptoms with first infusion (HA, myalgias, malaise, felt \"gross\", lower back pain, neck pain). During the 2nd infusion, they slowed infusion, took benadryl/tylenol/upped prednisone to 20 mg she did not experience SEs with this. Overall, felt that IVIG improved her lupus symptoms globally. Short-lived relief. Plan is to use IVIG monthly with monitoring of cytokine levels and NK cell counts.     Currently on 15 mg of prednisone chronically, > 1 year. Today, she would say her disease is mild-moderately active in spite of the 15 mg prednisone. Currently on 400-500 U of vitamin D. Taking 1000 mg calcium.     ROS:  (-) pleurisy, sob, cough, hematuria, dysuria, eye redness, nose bleeds, easy bruising, malar rash  (+) alternating " diarrhea/constipation, dry eye, dry mouth, palatal ulcers/petechiae    Is interested in discussing SLE management (Imuran) while pursuing IVF. Will be undergoing planning and another round of IVF stimulation in August.       8/21/2019: She did another round of IVF in 8/2019, no good embryo. IVF caused her flare ups. For flare ups, gets pain over knees, knuckles and toes with extreme fatigue and photosensitivity.    Had laparoscopic surgery for endometriosis in 4/2019.    Since 4/2019, has been on OC and has not been able to taper prednisone own.    Today, she is on prednisone 15 mg every day x 2-3 weeks. Before that, most of the time, she was on 20 mg every day.    No rash today.    Has pain over knuckles, toes, knees. Has morning flu like sx in AM x 2 hours.    Has extreme fatigue.    She was getting IVIG qmonthly, till 1/2019.    She is going to resume monthly IVIG for successful pregnancy.    11/27/2019: Started IVIG on 9/8/2019, her eyes swelled up, 2 days later had severe LBP/SI joint pain. Was doing keto diet at that time, had headaches.now has sciatica pain on the R side. Did not have this reaction with IVIG before.    Late Oct/early Nov, had malar rash, hair loss.    Had LE edema, went up on her prednisone to 30-40 mg a day x few days. Back to her baseline hair loss and baseline prednisone 20 mg every day.      R SI joint pain is better, but still has it, uses topical pain cream. It is the worst in AM.    Still has swollen ankles.    This edema is new for her.    Off birth control pills. Not on IVF tx either.    Her lupus is back to her baseline.    Has not started AZA yet, but not thinks she is ready to try it.      10/8/2021:    Gracie chose not to take AZA with concern for SEs buts he would like to try benlysta. Continues to have active lupus and can not taper prednisone off.    Dealing with back pain, sciatica, gallstones. Gets vol retention, swelling, LE edema. Ankles and eyes swell up. Has gained weight.  Feels stiff. Had diarrhea, stomach pain but now it is improved.      Sciatica is now better. No skin rash or major hair loss today. Takes prednisone 20 mg every day. Has sun sensitivity. Sometimes increases prednisone to 40 mg every day.    Reports small joint pain 5-6/10, has night and 1 hr am stiffness with swollen feet. Gets jaw pain, migraines, pleurisy. Considers surrogate.      7/1/2022:     Gracie presents for follow up.    Had pelvis, L spine MRI because of having LBP x years. every time she walked over soft grass, injured herself. Was found to have degenerative changes, full MRI report is below. Going to do follow up with her spine doctor. Doing physical therapy. The top of her spine is better. Has A1C elevated.    She is on prednisone 17 mg every day, tries to work through it . Today is a good day, yesterday was a rough day. Has hard time tapering prednisone down.    Dr. Mendez put her on gabapentin.    She is now on benlysta since 11/2021, thinks it is helping her. Thinks benlysta affected her menses, had it twice. Reports edema, weight gain on it. She did not have this extra weight, LEs edema before benlysta even on prednisone 20 mg every day. It helps with energy, when she takes it, feels better for couple of days.    Urine frequency, urgency is less on benlysta, has fluid retention.    Smelling chemical feeling is better on benlsyta.    Took AZA 50 mg every day x 2 months, no SEs and she would estrella to re-try it. When she started gabapentin, stopped AZA abut wants to re-try it.    Has pain, fatigue in her legs, it is better now. She thinks that it might be caused by benlysta. Had the most intense muscle pain, took muscle relaxant and it helped.    No skin rash or oral ulcers.    Takes prednisone for pain in small joints, fingers, toes and elbows. It is different from back pain.    Feels like her whole body is in a flare by going down on prednisone, feels getting a fever and diffuse pain along with  fatigue.    Has bump over R 3rd finger which hurts, swells up.    Has unhealed ulcer over big toe, does follow up with a provider.    Toenails are the same.    Looking for surrogate.    Had eye exam.      2/9/2023: Gracie is here for follow up, she likes to try cellcept given the fact that she has found a surrogate and will not get pregnant herself. Also would like to taper prednisone down.    Stopped AZA and benlysta around cayetano.    Resumed benlysta after 2 wk, not AZA. Feels AZA is not helping, would like to try cellcept.    Dropped prednisone down to 15 mg every day, about 3 mo ago.    Off HCQ x 1 year.    2-3 months of ankle swelling has improved.    Feels swollen in her face, chest, arms, but better by going down on prednisone.    Tolerates benlysta ok.    Still Sun sensitive.    Has fatigue, joint pain affecting fingers and ankles.    Has endometriosis pain, migraines, spine pain.    Would like to try ozempic.      9/14/2023:    -urgent visit for evaluation of B/L hand weakness, drops objects, simple tasks like buttoning shirts are hard to do    -R cheek feels swollen    -has R jaw pain    -R 2 fingers feel weak    -on prednisone 13 mg every day, has hard time tapering down further, lupus sx of rash/joint pain recurs, has not started cellcept yet, but now is ready to do it        Today 7/10/2024:      Kory is a mother, she is very happy about it. Her biologic daughter Marija was born via surrogacy. She is 9 wk old. Marija is doing great. She spent some time in [facility] for a month around birth time before taking    Had a malar rash last Sat, increased prednisone from 10 to 20 mg a day x 3 days, then 15 mg a day. Rash is better.    All small joints are hurting hurting, fingers and toes and ankles.    Very tired.    A month ago, had oral ulcers,  better now.    No CP, a little wheezing.    Back pain is better, moving around, sometimes flares.    Gets migraines triggered by TMJ pain, had botox in jaw along  the tendons, it helped with hand function, has the function of hands back, has not done OT done.    Came off benlysta as she thought it was not helping much, she likes     Has not started cellcept yet.            ROS:  A comprehensive ROS was done, positives are per HPI.  Past Medical History:   Diagnosis Date    Allergy, unspecified not elsewhere classified     Endometriosis     Fibromyalgia     Migraine without aura, with intractable migraine, so stated, without mention of status migrainosus     Myalgia and myositis, unspecified     Systemic lupus erythematosus (H)      Past Surgical History:   Procedure Laterality Date    IR LUMBAR PUNCTURE  5/21/2013    SURGICAL HISTORY OF -   01/01/1986    left elbow fracture repair     Family History   Problem Relation Age of Onset    Lipids Mother     Thyroid Cancer Mother 50 - 60    Dementia Mother     Prostate Cancer Father 80    Diabetes Maternal Grandfather     Skin Cancer Paternal Grandmother     Melanoma No family hx of      Social History     Socioeconomic History    Marital status:      Spouse name: Not on file    Number of children: Not on file    Years of education: Not on file    Highest education level: Not on file   Occupational History    Not on file   Tobacco Use    Smoking status: Never    Smokeless tobacco: Never   Substance and Sexual Activity    Alcohol use: Not Currently    Drug use: No    Sexual activity: Yes     Partners: Male     Birth control/protection: Condom   Other Topics Concern    Parent/sibling w/ CABG, MI or angioplasty before 65F 55M? Not Asked   Social History Narrative    Not on file     Social Determinants of Health     Financial Resource Strain: Low Risk  (4/8/2024)    Financial Resource Strain     Within the past 12 months, have you or your family members you live with been unable to get utilities (heat, electricity) when it was really needed?: No   Food Insecurity: Low Risk  (4/8/2024)    Food Insecurity     Within the past 12  months, did you worry that your food would run out before you got money to buy more?: No     Within the past 12 months, did the food you bought just not last and you didn t have money to get more?: No   Transportation Needs: Low Risk  (4/8/2024)    Transportation Needs     Within the past 12 months, has lack of transportation kept you from medical appointments, getting your medicines, non-medical meetings or appointments, work, or from getting things that you need?: No   Physical Activity: Not on file   Stress: Not on file   Social Connections: Not on file   Interpersonal Safety: Low Risk  (2/21/2024)    Interpersonal Safety     Do you feel physically and emotionally safe where you currently live?: Yes     Within the past 12 months, have you been hit, slapped, kicked or otherwise physically hurt by someone?: No     Within the past 12 months, have you been humiliated or emotionally abused in other ways by your partner or ex-partner?: No   Housing Stability: Low Risk  (4/8/2024)    Housing Stability     Do you have housing? 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    Tension-type headache    Diminished ovarian reserve    Disorder of connective tissue (H24)    Disorder of immune system (H24)    Endometriosis of uterus    Female infertility    History of environmental allergies    Hyperlipidemia    Irritable bowel syndrome    Major depressive disorder, recurrent episode, in full remission (H24)    Major depressive disorder, recurrent episode, mild (H24)    Migraine with aura    Myofascial pain    Raynaud's disease    Recurrent pregnancy loss without current pregnancy    Seasonal allergies    Primary fibromyalgia syndrome     Uterine leiomyoma    Sciatica of right side    Numbness and tingling of both lower extremities    Sleep apnea    Bilateral temporomandibular joint pain    Cervical cancer screening    Endometriosis    Lupus erythematosus    Cervical spondylosis without myelopathy     Allergies   Allergen Reactions    Celery Oil Anaphylaxis, Difficulty breathing and Shortness Of Breath     swelling      Celis Anaphylaxis    Dairy Aid  [Tilactase]      Other reaction(s): Arthralgia (Joint Pain)    Gluten Meal      Other reaction(s): Abdominal Pain    No Clinical Screening - See Comments Anaphylaxis and Itching    Pistachio Nut Extract Anaphylaxis    Brassica Oleracea      Other reaction(s): Abdominal Pain  Other reaction(s): Abdominal Pain    Soybean Oil GI Disturbance    Penicillins Hives and Rash    Sulfa Antibiotics Hives and Rash       Outpatient Encounter Medications as of 7/10/2024   Medication Sig Dispense Refill    AMBIEN 10 MG OR TABS 1 po HS, prn 20 0    Belimumab (BENLYSTA) 200 MG/ML SOSY Inject 200 mg Subcutaneous every 7 days (Patient not taking: Reported on 3/14/2024) 12 mL 3    benzonatate (TESSALON) 100 MG capsule Take 1 capsule (100 mg) by mouth 3 times daily as needed for cough (Patient not taking: Reported on 3/14/2024) 30 capsule 0    calcium carbonate (OS-VAHID 500 MG Ione. CA) 1250 MG tablet Take 1 tablet by mouth daily      EPINEPHrine (ANY BX GENERIC EQUIV) 0.3 MG/0.3ML injection 2-pack INJECT 0.3 MG INTO THE MUSCLE AS NEEDED FOR ANAPHYLAXIS 2 each 3    galcanezumab-gnlm (EMGALITY) 120 MG/ML injection Inject 240 mg subcutaneous first month and then inject 120 mg subcutaneous every 28 days 2 mL 11    hydroxychloroquine (PLAQUENIL) 200 MG tablet Take 1 tablet (200 mg) by mouth 2 times daily 180 tablet 1    levalbuterol (XOPENEX HFA) 45 MCG/ACT inhaler  (Patient not taking: Reported on 3/14/2024)      levothyroxine (SYNTHROID/LEVOTHROID) 25 MCG tablet Take 1 tablet by mouth daily (Patient not taking: Reported on  3/14/2024)      metFORMIN (GLUCOPHAGE XR) 500 MG 24 hr tablet  (Patient not taking: Reported on 3/14/2024)      montelukast (SINGULAIR) 10 MG tablet  (Patient not taking: Reported on 3/14/2024)      MULTIVITAMIN TABS   OR   0    Omega-3 Fatty Acids (OMEGA 3 PO) Take 1,250 mg by mouth daily      omeprazole (PRILOSEC) 40 MG DR capsule Take 1 capsule (40 mg) by mouth daily (Patient not taking: Reported on 3/14/2024) 60 capsule 1    ondansetron (ZOFRAN) 8 MG tablet TAKE 1 TABLET BY MOUTH UP TO THREE TIMES DAILY FOR NAUSEA 90 tablet 1    PEG-KCl-NaCl-NaSulf-Na Asc-C (MOVIPREP) 100 g SOLR  (Patient not taking: Reported on 3/14/2024)      predniSONE (DELTASONE) 1 MG tablet 12.5-10 mg a day, each course x 1 week then 9-8-7-6 mg a day, each course x 1 month then 5 mg a day, use with 5 mg size tab 90 tablet 5    predniSONE (DELTASONE) 5 MG tablet 12.5-10 mg a day, each course x 1 week then 9-8-7-6 mg a day, each course x 1 month then 5 mg a day, use with 1 mg size tab 150 tablet 5    promethazine (PHENERGAN) 12.5 MG tablet Take 2 tablets (25 mg) by mouth every 6 hours as needed for nausea 20 tablet 3    promethazine (PHENERGAN) 25 MG tablet       rosuvastatin (CRESTOR) 5 MG tablet Take 1 tablet (5 mg) by mouth daily 90 tablet 3    semaglutide (OZEMPIC) 2 MG/1.5ML SOPN pen Inject 0.25 mg Subcutaneous every 7 days Increase to 0.5 mg after 2-3 weeks if tolerating. 1.5 mL 11    Semaglutide, 1 MG/DOSE, (OZEMPIC) 4 MG/3ML pen Inject 1 mg Subcutaneous every 7 days May increase to 1.5 mg weekly after four weeks as tolerated 9 mL 3    sertraline (ZOLOFT) 50 MG tablet Take 1 tablet by mouth daily (Patient not taking: Reported on 3/14/2024)      SPIRULINA PO Take by mouth daily      SUMAtriptan (IMITREX STATDOSE) 6 MG/0.5ML pen injector kit Inject 0.5 mLs (6 mg) Subcutaneous at onset of headache for migraine May repeat in 1 hour. Max 12 mg/24 hours. 3 kit 11    traMADol (ULTRAM) 50 MG tablet Take 1-2 tablets up to three times a day as  needed - Oral 180 tablet 5    traMADol (ULTRAM-ER) 300 MG 24 hr tablet Take 1 tablet (300 mg) by mouth at bedtime maximum 1 tablet(s) per day 30 tablet 5    triamcinolone (KENALOG) 0.1 % paste Put on oral ulcers bid till they heal 5 g 5    ubrogepant (UBRELVY) 100 MG tablet Take 1 tablet (100 mg) by mouth at onset of headache (May repeat in 2 hours as needed. Max 2 tabs in 24 hours) 16 tablet 11    VITAMIN D, CHOLECALCIFEROL, PO Take 5,000 Units by mouth daily       Facility-Administered Encounter Medications as of 7/10/2024   Medication Dose Route Frequency Provider Last Rate Last Admin    botulinum toxin type A (BOTOX) 100 units injection 200 Units  200 Units Intramuscular Q90 Days Chacorta Hoyos MD        Botulinum Toxin Type A (BOTOX) 200 units injection 155 Units  155 Units Intramuscular See Admin Instructions Debora Chin APRN CNP   155 Units at 04/25/24 1544    Botulinum Toxin Type A (BOTOX) 200 units injection 155 Units  155 Units Intramuscular See Admin Instructions Debora Chin APRN CNP   155 Units at 08/10/23 0711         Her records were reviewed.    Component      Latest Ref Rng & Units 2/10/2022 5/17/2022   WBC      4.0 - 11.0 10e3/uL 11.6 (H) 10.7   RBC Count      3.80 - 5.20 10e6/uL 4.86 5.03   Hemoglobin      11.7 - 15.7 g/dL 14.8 14.9   Hematocrit      35.0 - 47.0 % 44.5 45.9   MCV      78 - 100 fL 92 91   MCH      26.5 - 33.0 pg 30.5 29.6   MCHC      31.5 - 36.5 g/dL 33.3 32.5   RDW      10.0 - 15.0 % 12.8 12.5   Platelet Count      150 - 450 10e3/uL 242 267   % Neutrophils      % 76    % Lymphocytes      % 16    % Monocytes      % 5    % Eosinophils      % 0    % Basophils      % 1    % Immature Granulocytes      % 2    NRBCs per 100 WBC      <1 /100 0    Absolute Neutrophils      1.6 - 8.3 10e3/uL 8.9 (H)    Absolute Lymphocytes      0.8 - 5.3 10e3/uL 1.8    Absolute Monocytes      0.0 - 1.3 10e3/uL 0.6    Absolute Eosinophils      0.0 - 0.7 10e3/uL  0.0    Absolute Basophils      0.0 - 0.2 10e3/uL 0.1    Absolute Immature Granulocytes      <=0.4 10e3/uL 0.2    Absolute NRBCs      10e3/uL 0.0    Sodium      133 - 144 mmol/L  141   Potassium      3.4 - 5.3 mmol/L  4.3   Chloride      94 - 109 mmol/L  102   Carbon Dioxide      20 - 32 mmol/L  32   Anion Gap      3 - 14 mmol/L  7   Urea Nitrogen      7 - 30 mg/dL  19   Creatinine      0.52 - 1.04 mg/dL 0.72 0.80   Calcium      8.5 - 10.1 mg/dL  9.6   Glucose      70 - 99 mg/dL  128 (H)   Alkaline Phosphatase      40 - 150 U/L  73   AST      0 - 45 U/L 25 23   ALT      0 - 50 U/L 47 44   Protein Total      6.8 - 8.8 g/dL  7.4   Albumin      3.4 - 5.0 g/dL 3.8 3.9   Bilirubin Total      0.2 - 1.3 mg/dL  0.3   GFR Estimate      >60 mL/min/1.73m2 >90 >90   SARS-CoV-2 Rigo Ab, Interp, S       Positive    SARS-CoV-2 Rigo Ab, Quant, S      U/mL >250    Patient's Race       Unknown    Patient's Ethnicity       Unknown    Protein Random Urine      g/L <0.05    Protein Total Urine g/gr Creatinine           Creatinine Urine      mg/dL 13    % Retic      0.5 - 2.0 % 1.6    Absolute Retic      0.025 - 0.095 10e6/uL 0.080    DNA-ds      <10.0 IU/mL <0.6    Complement C4      13 - 39 mg/dL 34    Complement C3      81 - 157 mg/dL 135    Sed Rate      0 - 20 mm/hr 7    CRP Inflammation      0.0 - 8.0 mg/L 3.0    Hemoglobin A1C      0.0 - 5.6 %  5.8 (H)     Pregnancy: She is . H/o OCP and HRT use, see HPI    Ph.E:    Constitutional: WD/WN. Pleasant, NAD.    Eyes: EOM intact, sclera anicteric, conj not injected   MS: No active synovitis over hands, could make full fist. + [place] nodes.  Skin: malar erythema  Neuro: A&O x 3. Grossly non focal  Psych: NL affect    Assessment/Plan     1-SLE FLARE/active  2-HCQ monitoring  3-AZA monitoring  4-Benlysta monitoring    SLE, dx in  (VINITA: 1:80, dsDNA +, Smith negative, normal complements, joint pains, malar rash, oral ulcers), usually flares approximately monthly with joint pains,  pleurisy and malar rash. Currently, feels that disease is active. Has been on chronic 20 mg every day prednisone, gained weight, gets LE edema, looks cushingoid. Still planning for PGS normal embryo implantation, might consider surrogate. Had a reaction to IVIG which was given for infertility tx and is not going to get it again. Her back pain seems to be sciatica and not related to SLE.    Previous visits, was advised to try AZA as steroid sparing agent (I am concerned about long term steroid SE) but did not start with concern for potential SE but willing to try benlysta, especially with planning for surrogate pregnancy. Labs are stable.    7/2022: Gracie is on benlysta since last visit with some benefit, but can not taper prednisone below 17 mg every day due to increased joint pain by taper. Briefly tried AZA 1 tab a day, no SEs, willing to re-try it, will resume it at higher dose.        2/9/2023: Failed AZA, which was chosen over cellcept due to pregnancy planning via IVF. Will try cellcept as steroid sparing agent to allow tapering prednisone down; risks were discussed. She going to proceed with pregnancy via surrogate; therefore she will not get pregnant herself. She misunderstood and stopped HCQ, thought we were switching tx, will resume. Discussed its benefits.     9/14/2023: B/L hand poor /loss of strength seems to be OA related, discussed mx. She is willing to try cellcept to allow tapering prednisone off; risks were discussed.      Today 7/10/2024:    Off benlysta x few months, did help some not much. Prednisone baseline ow is 10 mg every day, it was 13 mg every day last visit, had to go up to 20 mg a day last Sat for flare of rash, fatigue, joint pain, now down to 15 mg qd, malar rash is better. Has not tried cellcept yet, being busy with birth of her 9 wk old daughter, lack of sleep, busy and tired. Discussed long term SE of prednisone, she is willing to try cellcept and taper down prednisone (goal is  baseline 5 mg every day given long term use, can't probably completely taper off with concern for adrenal insufficiency), eye exam last done 6/2023, needs to get done DEXA scan. Needs to get done. Recommend to try provigil for fatigue; risks were discussed. Stable lupus labs.        Plan:    Continue plaquenil 200 mg twice a day with yearly eye exam    Try provigil 100 mg every morning    Go down on prednisone to 10 mg every day, plan is to taper after start of cellcept, 1 mg down q4 weeks    Start cellcept 1 tab twice a day    DEXA bone density to be scheduled    Eye exam to be scheduled    Labs 1 month after start of cellcept    Return video visit in about 4-5 months    Josh Roy MD    TT 40 min was spent on date of the encounter doing chart review, history and exam, documentation and further activities as noted above. Any prior notes, outside records, laboratory results, and imaging studies were reviewed if relevant.     The longitudinal plan of care for the diagnosis(es)/condition(s) as documented were addressed during this visit. Due to the added complexity in care, I will continue to support Gracie in the subsequent management and with ongoing continuity of care.    Orders Placed This Encounter   Procedures    ALT    Albumin level    AST    Creatinine    Complement C4    Complement C3    CRP inflammation    DNA double stranded antibodies    Erythrocyte sedimentation rate auto    UA with Microscopic reflex to Culture    Protein  random urine    Creatinine random urine    CBC with Platelets & Differential

## 2024-07-15 ENCOUNTER — VIRTUAL VISIT (OUTPATIENT)
Dept: INTERNAL MEDICINE | Facility: CLINIC | Age: 49
End: 2024-07-15
Payer: COMMERCIAL

## 2024-07-15 ENCOUNTER — TELEPHONE (OUTPATIENT)
Dept: INTERNAL MEDICINE | Facility: CLINIC | Age: 49
End: 2024-07-15

## 2024-07-15 DIAGNOSIS — G89.29 OTHER CHRONIC PAIN: ICD-10-CM

## 2024-07-15 DIAGNOSIS — H69.90 DYSFUNCTION OF EUSTACHIAN TUBE, UNSPECIFIED LATERALITY: Primary | ICD-10-CM

## 2024-07-15 DIAGNOSIS — R11.0 NAUSEA: ICD-10-CM

## 2024-07-15 DIAGNOSIS — E78.01 FAMILIAL HYPERCHOLESTEROLEMIA: ICD-10-CM

## 2024-07-15 PROCEDURE — 99214 OFFICE O/P EST MOD 30 MIN: CPT | Performed by: INTERNAL MEDICINE

## 2024-07-15 RX ORDER — ROSUVASTATIN CALCIUM 5 MG/1
5 TABLET, COATED ORAL DAILY
Qty: 90 TABLET | Refills: 3 | Status: SHIPPED | OUTPATIENT
Start: 2024-07-15

## 2024-07-15 RX ORDER — ONDANSETRON 8 MG/1
TABLET, FILM COATED ORAL
Qty: 90 TABLET | Refills: 3 | Status: SHIPPED | OUTPATIENT
Start: 2024-07-15

## 2024-07-15 RX ORDER — TRAMADOL HYDROCHLORIDE 300 MG/1
300 TABLET, EXTENDED RELEASE ORAL AT BEDTIME
Qty: 30 TABLET | Refills: 5 | Status: SHIPPED | OUTPATIENT
Start: 2024-07-15

## 2024-07-15 RX ORDER — FLUTICASONE PROPIONATE 50 MCG
2 SPRAY, SUSPENSION (ML) NASAL DAILY
Qty: 11.1 ML | Refills: 3 | Status: SHIPPED | OUTPATIENT
Start: 2024-07-15

## 2024-07-15 ASSESSMENT — PATIENT HEALTH QUESTIONNAIRE - PHQ9
SUM OF ALL RESPONSES TO PHQ QUESTIONS 1-9: 4
SUM OF ALL RESPONSES TO PHQ QUESTIONS 1-9: 4
10. IF YOU CHECKED OFF ANY PROBLEMS, HOW DIFFICULT HAVE THESE PROBLEMS MADE IT FOR YOU TO DO YOUR WORK, TAKE CARE OF THINGS AT HOME, OR GET ALONG WITH OTHER PEOPLE: NOT DIFFICULT AT ALL

## 2024-07-15 NOTE — PROGRESS NOTES
"Gracie is a 49 year old who is being evaluated via a billable telephone visit.    What phone number would you like to be contacted at? [phone]  How would you like to obtain your AVS? [place]  Originating Location (pt. Location): Home    Distant Location (provider location):  On-site    Are refills needed on medications prescribed by this physician? YES Tramadol ER, Ondansetron, Rosuvastatin, Ozempic (wants to discuss)    Subjective   Gracie is a 49 year old, presenting for the following health issues:  RECHECK and Pain    History of Present Illness       Reason for visit:  Pain    She eats 2-3 servings of fruits and vegetables daily.She consumes 0 sweetened beverage(s) daily.She exercises with enough effort to increase her heart rate 10 to 19 minutes per day.  She exercises with enough effort to increase her heart rate 3 or less days per week.   She is taking medications regularly.     Gracie is very pleased to report that she has a new healthy happy baby girl at home as the result of a surrogate.  They are adjusting well to the new demands of parenting.  She did have a recent lupus flare, treated with cellcept and also had botox injections for chronic migraine which was very helpful.     Gracie has been steadily increase semaglutide to achieve more benefit in terms of weight loss. Did have some nausea with it, but it was tolerable.     We also discussed symptoms possibly related to eustachian tube dysfunction, including ear popping and pressure changes, particularly with swallowing; no pain associated just mild discomfort with the pressure.       Objective    Vitals - Patient Reported  Systolic (Patient Reported):  (Not today)  Weight (Patient Reported):  (Not recently)  Height (Patient Reported): 170.2 cm (5' 7\")  Pain Score: Severe Pain (7)  Pain Loc: Other - see comment (Small joint, aches, muscle tightness, neck)        Physical Exam   General: Alert and no distress //Respiratory: No audible wheeze, cough, or " shortness of breath // Psychiatric:  Appropriate affect, tone, and pace of words    Gracie was seen today for recheck, med refills, and also joint pain and migraine headache pain as well as eustachian tube dysfunction    Dysfunction of Eustachian tube, unspecified laterality  -     fluticasone (FLONASE) 50 MCG/ACT nasal spray; Spray 2 sprays into both nostrils daily    Familial hypercholesterolemia  -     rosuvastatin (CRESTOR) 5 MG tablet; Take 1 tablet (5 mg) by mouth daily    Other chronic pain  -     traMADol (ULTRAM-ER) 300 MG 24 hr tablet; Take 1 tablet (300 mg) by mouth at bedtime maximum 1 tablet(s) per day    Nausea  -     ondansetron (ZOFRAN) 8 MG tablet; TAKE 1 TABLET BY MOUTH UP TO THREE TIMES DAILY FOR NAUSEA     RTC in 3 months via another video visit    Shavon Guzman MD            Phone call duration: 22 minutes  Signed Electronically by: Shavon Guzman MD

## 2024-07-15 NOTE — TELEPHONE ENCOUNTER
Left Voicemail (1st Attempt) for the patient to call back and schedule the following:    Appointment type: video  Provider: pcp  Return date: 10/14ish    Additional appointment(s) needed: lipid lab prior

## 2024-07-15 NOTE — PATIENT INSTRUCTIONS
Thank you for visiting the Primary Care Center today at the [facility]! The following is some information about our clinic:     Primary Care Center Frequently-Asked Questions    (1) How do I schedule appointments at the [facility]?     Primary Care--to schedule or make changes to an existing appointment, please call our primary care line at [phone].    Labs--to schedule a lab appointment at the [facility] you can use [facility] or call [phone]. If you have a [place] location that is closer to home, you can reach out to that location for scheduling options.     Imaging--if you need to schedule a CT, X-ray, MRI, ultrasound, or other imaging study you can call [phone] to schedule at the [facility] or any other [facility] imaging location.     Referrals--if a referral to another specialty was ordered you can expect a phone call from their scheduling team. If you have not heard from them in a week, please call us or send us a [facility] message to check the status or get a scheduling number. Please note that this only applies to internal [facility] referrals. If the referral is external you would need to contact their office for scheduling.     (2) I have a question about my visit, who do I contact?     You can call us at the primary care line at [phone] to ask questions about your visit. You can also send a secure message through [employer], which is reviewed by clinic staff. Please note that [employer] messages have a twenty-four to forty-eight business hour turnaround time and should not be used for urgent concerns.    (3) How will I get the results of my tests?    If you are signed up for [facility] all tests will be released to you within twenty-four hours of resulting. Please allow three to five days for your doctor to review your results and place a note interpreting the results. If you do not have [facility] you will receive your  results through mail seven to ten business days following the return of the tests. Please note that if there should be any urgent or concerning results that your doctor or their registered nurse will reach out to you the same day as the tests come back. If you have follow up questions about your results or would like to discuss the results in detail please schedule a follow up with your provider either in person or virtually.     (4) How do I get refills of my prescriptions?     You should always first contact your pharmacy for refills of your medications. If submitting a refill request on [facility], please be sure to submit the request only once--repeat requests can cause delays in refill. If you are requesting a NEW medication or a medication related to new symptoms you will need to schedule an appointment with a provider prior to approval. Please note: Routine medication refills have up to one to three business day turnaround whereas controlled substances refills have up to five to seven business day turnaround.    (5) I have new symptoms, what do I do?     If you are having an immediate medical emergency, you should dial 911 for assistance.   For anything urgent that needs to be seen within a few hours to one day you should visit a local urgent care for assistance.  For non-urgent symptoms that need to be seen within a few days to a week you can schedule with an available provider in primary care by going to [facility] or calling [phone].   If you are not sure how serious your symptoms are or you would like to receive medical advice you can always call [phone] to speak with a triage nurse.

## 2024-07-17 ENCOUNTER — TELEPHONE (OUTPATIENT)
Dept: RHEUMATOLOGY | Facility: CLINIC | Age: 49
End: 2024-07-17
Payer: COMMERCIAL

## 2024-07-17 NOTE — TELEPHONE ENCOUNTER
Left Voicemail (1st Attempt) and Sent Mychart (1st Attempt) for the patient to call back and schedule the following:    Appointment type: Return Rheumatology Video  Provider: Dr. Roy  Return date: November 10th, 2024  Specialty phone number: [phone]  Additional appointment(s) needed: DEXA bone density scan  Additonal Notes: NA

## 2024-07-18 ENCOUNTER — OFFICE VISIT (OUTPATIENT)
Dept: PHYSICAL MEDICINE AND REHAB | Facility: CLINIC | Age: 49
End: 2024-07-18
Payer: COMMERCIAL

## 2024-07-18 VITALS
RESPIRATION RATE: 16 BRPM | HEART RATE: 82 BPM | DIASTOLIC BLOOD PRESSURE: 84 MMHG | SYSTOLIC BLOOD PRESSURE: 144 MMHG | OXYGEN SATURATION: 97 %

## 2024-07-18 DIAGNOSIS — G89.29 OTHER CHRONIC PAIN: ICD-10-CM

## 2024-07-18 DIAGNOSIS — G43.719 INTRACTABLE CHRONIC MIGRAINE WITHOUT AURA AND WITHOUT STATUS MIGRAINOSUS: ICD-10-CM

## 2024-07-18 DIAGNOSIS — M47.812 CERVICAL SPONDYLOSIS WITHOUT MYELOPATHY: ICD-10-CM

## 2024-07-18 DIAGNOSIS — G24.4 OROMANDIBULAR DYSTONIA: Primary | ICD-10-CM

## 2024-07-18 PROCEDURE — 99213 OFFICE O/P EST LOW 20 MIN: CPT | Mod: 25 | Performed by: PHYSICAL MEDICINE & REHABILITATION

## 2024-07-18 PROCEDURE — 95874 GUIDE NERV DESTR NEEDLE EMG: CPT | Performed by: PHYSICAL MEDICINE & REHABILITATION

## 2024-07-18 PROCEDURE — 64615 CHEMODENERV MUSC MIGRAINE: CPT | Performed by: PHYSICAL MEDICINE & REHABILITATION

## 2024-07-18 ASSESSMENT — PAIN SCALES - GENERAL: PAINLEVEL: SEVERE PAIN (6)

## 2024-07-18 NOTE — PROGRESS NOTES
CC: Patient with chronic pain headache neck pain and migraines.     Patient is a very pleasant 49-year-old woman with a complex medical history.  She has been dealing with severe migraines neck pain as well as TMJ dysfunction.  She has seen her dentist.  She has been getting Botox which have been helpful.  She still has several headaches and the headaches worsen when the Botox wears off.  She has also noted significant tightness in the muscles of the neck and shoulder.  She received her last Botox on 4/25/2024.\    She also gives history of lupus.  Because of steroids, she has fungal infection in her toenails.  She has had issues with her lower back and has previously undergone radiofrequency ablation.  She currently hurts all over and notes that she has been given a diagnosis of fibroid some point in the past.     She has had pain in the 8-10 out of 10 at its worst 3-4 at best 3-4 currently.  She finds that it affects all of her activities sleep as well.  She also has issues with  strength in the hands.  She has done physical therapy for her back.  She has not had imaging studies for the neck.  She denies any bowel bladder disturbance.  She has constipation and hemorrhoids.     In reviewing the history she has had chronic pain affecting the neck and shoulders for years.  She has tried medications including ibuprofen and Tylenol for more than 3 months without relief.  She would like to get this under control.     EXAM: MR LUMBAR SPINE W/O CONTRAST  LOCATION: [facility]  DATE/TIME: 5/24/2022 7:04 PM     INDICATION: Myelopathy, acute or progressive.  COMPARISON: 12/9/2019  TECHNIQUE: Routine Lumbar Spine MRI without IV contrast.     FINDINGS:   Nomenclature is based on 5 lumbar type vertebral bodies. Satisfactory vertebral body height. There is 2 mm degenerative anterior subluxation L5 upon S1 with no pars interarticularis defects. Mild interspace narrowing L2-L3 and L5-S1. No compression    fractures. No marrow or ligamentous edema. Satisfactory sacral alignment. Normal distal spinal cord and cauda equina with conus medullaris at L1. No cord expansive changes are noted. Nerve roots of the cauda equina are satisfactory without nodularity or   thickening. No morphologic evidence to suggest arachnoiditis. No retroperitoneal solid mass or adenopathy. Symmetric psoas appearance bilaterally. Nothing for sacral insufficiency or stress fracture. No pelvic mass or adenopathy is noted.     T12-L1: Normal disc height and signal. No herniation. Normal facets. No spinal canal or neural foraminal stenosis.      L1-L2: Normal disc height and signal. No herniation. Normal facets. No spinal canal or neural foraminal stenosis.     L2-L3: Mild loss of disc height with annular bulge. No disc herniation. No spinal stenosis. Suggested mild left foraminal compromise without right foraminal stenosis. Facet joints are satisfactory.      L3-L4: Normal disc height and signal. No herniation. Normal facets. No spinal canal or neural foraminal stenosis.     L4-L5: Minimal annular bulging. No disc herniation. No spinal stenosis. Mild foraminal narrowing inferiorly bilaterally and mild facet joint hypertrophic changes with increased joint space fluid.     L5-S1: Moderate loss of disc height. Uncovering of disc material superiorly with low-grade degenerative anterior subluxation and generalized disc bulge. No disc herniation. No spinal stenosis. Mild bilateral foraminal compromise. Facet joint hypertrophic   changes bilaterally with increased joint space fluid. Increased joint space fluid overall has been described in association with instability, consider flexion and extension views to assess L4-L5 and L5-S1 relationships on dynamic views as indicated.                                                                      IMPRESSION:  1.  Degenerative changes lower lumbar spine most marked L4-L5 and L5-S1. No severe spinal stenosis  or severe foraminal compromise at any lumbar level.     2.  No spinal stenosis with mild L4-L5 and L5-S1 foraminal compromise inferiorly. Facet joint arthropathy and increased joint space fluid is noted at these levels.     3.  Consider flexion-extension views if there is concern for instability as increased joint space fluid in the facet joints has been described in association with instability.     4.  Since previous MR 12/9/2019, L5-S1 anterior subluxation has progressed, along with interspace narrowing. Previously identified synovial cyst arising from the medial aspect of the degenerated right L5-S1 facet joint is not present on today's study   with decreased mass effect upon the right lateral thecal sac and mass effect upon the traversing right-sided nerve roots S1-S2.        [facility]   MRI HEAD WITHOUT AND WITH CONTRAST   5/17/2013     INDICATION: Lupus, psychosis, auditory hallucinations.   TECHNIQUE: Routine brain MRI without and with gadolinium. CONTRAST:   Magnevist 12 mL IV.   COMPARISON: Head MRI 7/2/2010.     REPORT: Evaluation of the intracranial contents demonstrates there is no   evidence for acute or subacute infarct on the diffusion acquisition. No   evidence for intracranial hemorrhage, mass lesion, or obstructive type   hydrocephalus. No pathologic enhancement of the brain parenchyma or   meninges following IV gadolinium. Mild inferior position of the cerebellar   tonsils at the craniovertebral junction noted; this is unchanged and likely    incidental. The major intracranial vascular flow voids are maintained.   Mastoid air cells clear. Paranasal sinuses show a trace of mucosal   thickening in the right ethmoid air cells.     CONCLUSION:   1. No evidence for intracranial mass, hemorrhage, hydrocephalus, or   infarct.   2. Slight inferior position of the cerebellar tonsils noted at the   craniovertebral junction which is unchanged from prior study. This is   likely an incidental finding.    3. Mastoid air cells clear. Trace of mucosal thickening in the right   ethmoid sinuses.   Past Medical History           Past Medical History:   Diagnosis Date    Allergy, unspecified not elsewhere classified      Endometriosis      Fibromyalgia      Migraine without aura, with intractable migraine, so stated, without mention of status migrainosus      Myalgia and myositis, unspecified      Systemic lupus erythematosus (H)              Past Surgical History             Past Surgical History:   Procedure Laterality Date    SURGICAL HISTORY OF -    01/01/1986     left elbow fracture repair            Family History            Problem (# of Occurrences) Relation (Name,Age of Onset)     Dementia (1) Mother     Diabetes (1) Maternal Grandfather     Lipids (1) Mother     Prostate Cancer (1) Father (80)     Skin Cancer (1) Paternal Grandmother     Thyroid Cancer (1) Mother (50 - 60)               Negative family history of: Melanoma                Social History   Social History                Socioeconomic History    Marital status:        Spouse name: Not on file    Number of children: Not on file    Years of education: Not on file    Highest education level: Not on file   Occupational History    Not on file   Tobacco Use    Smoking status: Never    Smokeless tobacco: Never   Substance and Sexual Activity    Alcohol use: Not Currently    Drug use: No    Sexual activity: Yes       Partners: Male       Birth control/protection: Condom   Other Topics Concern    Parent/sibling w/ CABG, MI or angioplasty before 65F 55M? Not Asked   Social History Narrative    Not on file      Social Determinants of Health              Financial Resource Strain: Not on file   Food Insecurity: Not on file   Transportation Needs: Not on file   Physical Activity: Not on file   Stress: Not on file   Social Connections: Not on file   Interpersonal Safety: Low Risk  (2/21/2024)     Interpersonal Safety      Do you feel physically and  emotionally safe where you currently live?: Yes      Within the past 12 months, have you been hit, slapped, kicked or otherwise physically hurt by someone?: No      Within the past 12 months, have you been humiliated or emotionally abused in other ways by your partner or ex-partner?: No   Housing Stability: Not on file            Current Outpatient Medications   Medication Sig Dispense Refill    AMBIEN 10 MG OR TABS 1 po HS, prn 20 0    benzonatate (TESSALON) 100 MG capsule Take 1 capsule (100 mg) by mouth 3 times daily as needed for cough 30 capsule 0    calcium carbonate (OS-VAHID 500 MG [place]. [place]) 1250 MG tablet Take 1 tablet by mouth daily      EPINEPHrine (ANY BX GENERIC EQUIV) 0.3 MG/0.3ML injection 2-pack INJECT 0.3 MG INTO THE MUSCLE AS NEEDED FOR ANAPHYLAXIS 2 each 3    fluticasone (FLONASE) 50 MCG/ACT nasal spray Spray 2 sprays into both nostrils daily 11.1 mL 3    galcanezumab-gnlm (EMGALITY) 120 MG/ML injection Inject 240 mg subcutaneous first month and then inject 120 mg subcutaneous every 28 days 2 mL 11    hydroxychloroquine (PLAQUENIL) 200 MG tablet Take 1 tablet (200 mg) by mouth 2 times daily 180 tablet 0    levalbuterol (XOPENEX HFA) 45 MCG/ACT inhaler       levothyroxine (SYNTHROID/LEVOTHROID) 25 MCG tablet Take 1 tablet by mouth daily      modafinil (PROVIGIL) 100 MG tablet Take 1 tablet (100 mg) by mouth daily 30 tablet 5    montelukast (SINGULAIR) 10 MG tablet       MULTIVITAMIN TABS   OR   0    mycophenolate (GENERIC EQUIVALENT) 500 MG tablet Take 1 tablet (500 mg) by mouth 2 times daily 60 tablet 1    Omega-3 Fatty Acids (OMEGA 3 PO) Take 1,250 mg by mouth daily      omeprazole (PRILOSEC) 40 MG DR capsule Take 1 capsule (40 mg) by mouth daily 60 capsule 1    ondansetron (ZOFRAN) 8 MG tablet TAKE 1 TABLET BY MOUTH UP TO THREE TIMES DAILY FOR NAUSEA 90 tablet 3    predniSONE (DELTASONE) 1 MG tablet 9-8-7-6 mg a day, each course x 1 month then 5 mg a day, use with 5 mg size tab 90 tablet 5     predniSONE (DELTASONE) 5 MG tablet 9-8-7-6 mg a day, each course x 1 month then 5 mg a day, use with 1 mg size tab 150 tablet 5    promethazine (PHENERGAN) 12.5 MG tablet Take 2 tablets (25 mg) by mouth every 6 hours as needed for nausea 20 tablet 3    promethazine (PHENERGAN) 25 MG tablet       rosuvastatin (CRESTOR) 5 MG tablet Take 1 tablet (5 mg) by mouth daily 90 tablet 3    Semaglutide, 1 MG/DOSE, (OZEMPIC) 4 MG/3ML pen Inject 1 mg Subcutaneous every 7 days May increase to 1.5 mg weekly after four weeks as tolerated 9 mL 3    sertraline (ZOLOFT) 50 MG tablet Take 1 tablet by mouth daily      SPIRULINA PO Take by mouth daily      SUMAtriptan (IMITREX STATDOSE) 6 MG/0.5ML pen injector kit Inject 0.5 mLs (6 mg) Subcutaneous at onset of headache for migraine May repeat in 1 hour. Max 12 mg/24 hours. 3 kit 11    traMADol (ULTRAM) 50 MG tablet Take 1-2 tablets up to three times a day as needed - Oral 180 tablet 5    traMADol (ULTRAM-ER) 300 MG 24 hr tablet Take 1 tablet (300 mg) by mouth at bedtime maximum 1 tablet(s) per day 30 tablet 5    triamcinolone (KENALOG) 0.1 % paste Put on oral ulcers bid till they heal 5 g 5    ubrogepant (UBRELVY) 100 MG tablet Take 1 tablet (100 mg) by mouth at onset of headache (May repeat in 2 hours as needed. Max 2 tabs in 24 hours) 16 tablet 11    VITAMIN D, CHOLECALCIFEROL, PO Take 5,000 Units by mouth daily           There were no vitals taken for this visit.       On examination, patient is alert and cooperative.  Vitals are stable.  She is afebrile.  HEENT is negative.  Extraocular movements are intact.  Face is symmetric.  Tongue is midline neck is supple.  She has taut muscles in the neck and shoulder region.     She is able to move her upper extremities functionally.  She is able to carry out straight leg raising test.  Sánchez's positive bilaterally.  She is able to stand.  Romberg is negative.  Coordination tests are intact.  She is able to walk on her heels and toes.      Musculoskeletal lamination reveals areas of tenderness in the cervical facets bilaterally.  She is tender over the cervical paraspinals overlying the levator scapulae, rhomboids minor, infraspinatus, and in the lower back she is tender over the right lumbar paraspinal, SI joints bilaterally, piriformis bilaterally.  She was also tender over the trochanteric bursal region.     Neurological examination revealed normal strength normal sensation.  Reflexes are equal and symmetrical and plantars are downgoing.  Coordination tests are intact.     Impression: At this point this 48-year-old woman with multiple issues going on.  She has chronic headaches with migraines.  She definitely has issues going with her neck as well which is making her headaches and neck pain and migraines worse.  In addition she has issues with the joints and muscles in the hip and the lower back region.  In terms of treatment, it may be reasonable to treat her with my migraine protocol as well as inject the masseter muscles for her TMJ dysfunction/oromandibular dystonia.  Anticipate this will help improve her migraine control.      For her cervical facets: MRI of the cervical spine.  I am not expecting any foraminal stenosis or other issues.  She has involvement of facets C2-C5.  Plan is for medial branch blocks done left alternating with right side at weekly intervals.  If she gets 80% improvement, and is not improved by addition of physical therapy once a week for 4 weeks, she would be a candidate for radiofrequency ablations.       By coordinating these injections along with her migraine protocol, I am expecting significant improvement in her head neck and shoulder pain.  Subsequently will reassess her pain in the lower back hip region and consider treatment appropriately.     This was reviewed at length with the patient.  All her questions were answered to her satisfaction.  20 minutes were spent for this visit, exclusive of the procedure..   More than 50% of which was for counseling on above-mentioned issues.     PROCEDURE NOTE: Botox injections for intractable migraine and oromandibular dystonia.     After explaining the benefits and risks of the procedure, using 200 units of Botox diluted with 4 cc of preservative-free normal saline, using ChloraPrep for skin prep Botox for migraine protocol 5 units were injected in 31 different areas.  Next using EMG needed for localization 22.5 units were injected into the masseter muscles on either side.  200 units were utilized in all.  None wasted.     Thank you much for allow me to assist in her care.     Chacorta Hoyos MD

## 2024-07-18 NOTE — LETTER
7/18/2024       RE: Patricia Hall  [address]  [place] [address]     Dear Colleague,    Thank you for referring your patient, Patricia Hall, to the [facility] [place] at [facility]. Please see a copy of my visit note below.    CC: Patient with chronic pain headache neck pain and migraines.     Patient is a very pleasant 49-year-old woman with a complex medical history.  She has been dealing with severe migraines neck pain as well as TMJ dysfunction.  She has seen her dentist.  She has been getting Botox which have been helpful.  She still has several headaches and the headaches worsen when the Botox wears off.  She has also noted significant tightness in the muscles of the neck and shoulder.  She received her last Botox on 4/25/2024.\    She also gives history of lupus.  Because of steroids, she has fungal infection in her toenails.  She has had issues with her lower back and has previously undergone radiofrequency ablation.  She currently hurts all over and notes that she has been given a diagnosis of fibroid some point in the past.     She has had pain in the 8-10 out of 10 at its worst 3-4 at best 3-4 currently.  She finds that it affects all of her activities sleep as well.  She also has issues with  strength in the hands.  She has done physical therapy for her back.  She has not had imaging studies for the neck.  She denies any bowel bladder disturbance.  She has constipation and hemorrhoids.     In reviewing the history she has had chronic pain affecting the neck and shoulders for years.  She has tried medications including ibuprofen and Tylenol for more than 3 months without relief.  She would like to get this under control.     EXAM: MR LUMBAR SPINE W/O CONTRAST  LOCATION: [facility]  DATE/TIME: 5/24/2022 7:04 PM     INDICATION: Myelopathy, acute or  progressive.  COMPARISON: 12/9/2019  TECHNIQUE: Routine Lumbar Spine MRI without IV contrast.     FINDINGS:   Nomenclature is based on 5 lumbar type vertebral bodies. Satisfactory vertebral body height. There is 2 mm degenerative anterior subluxation L5 upon S1 with no pars interarticularis defects. Mild interspace narrowing L2-L3 and L5-S1. No compression   fractures. No marrow or ligamentous edema. Satisfactory sacral alignment. Normal distal spinal cord and cauda equina with conus medullaris at L1. No cord expansive changes are noted. Nerve roots of the cauda equina are satisfactory without nodularity or   thickening. No morphologic evidence to suggest arachnoiditis. No retroperitoneal solid mass or adenopathy. Symmetric psoas appearance bilaterally. Nothing for sacral insufficiency or stress fracture. No pelvic mass or adenopathy is noted.     T12-L1: Normal disc height and signal. No herniation. Normal facets. No spinal canal or neural foraminal stenosis.      L1-L2: Normal disc height and signal. No herniation. Normal facets. No spinal canal or neural foraminal stenosis.     L2-L3: Mild loss of disc height with annular bulge. No disc herniation. No spinal stenosis. Suggested mild left foraminal compromise without right foraminal stenosis. Facet joints are satisfactory.      L3-L4: Normal disc height and signal. No herniation. Normal facets. No spinal canal or neural foraminal stenosis.     L4-L5: Minimal annular bulging. No disc herniation. No spinal stenosis. Mild foraminal narrowing inferiorly bilaterally and mild facet joint hypertrophic changes with increased joint space fluid.     L5-S1: Moderate loss of disc height. Uncovering of disc material superiorly with low-grade degenerative anterior subluxation and generalized disc bulge. No disc herniation. No spinal stenosis. Mild bilateral foraminal compromise. Facet joint hypertrophic   changes bilaterally with increased joint space fluid. Increased joint  space fluid overall has been described in association with instability, consider flexion and extension views to assess L4-L5 and L5-S1 relationships on dynamic views as indicated.                                                                      IMPRESSION:  1.  Degenerative changes lower lumbar spine most marked L4-L5 and L5-S1. No severe spinal stenosis or severe foraminal compromise at any lumbar level.     2.  No spinal stenosis with mild L4-L5 and L5-S1 foraminal compromise inferiorly. Facet joint arthropathy and increased joint space fluid is noted at these levels.     3.  Consider flexion-extension views if there is concern for instability as increased joint space fluid in the facet joints has been described in association with instability.     4.  Since previous MR 12/9/2019, L5-S1 anterior subluxation has progressed, along with interspace narrowing. Previously identified synovial cyst arising from the medial aspect of the degenerated right L5-S1 facet joint is not present on today's study   with decreased mass effect upon the right lateral thecal sac and mass effect upon the traversing right-sided nerve roots S1-S2.        [facility]   MRI HEAD WITHOUT AND WITH CONTRAST   5/17/2013     INDICATION: Lupus, psychosis, auditory hallucinations.   TECHNIQUE: Routine brain MRI without and with gadolinium. CONTRAST:   Magnevist 12 mL IV.   COMPARISON: Head MRI 7/2/2010.     REPORT: Evaluation of the intracranial contents demonstrates there is no   evidence for acute or subacute infarct on the diffusion acquisition. No   evidence for intracranial hemorrhage, mass lesion, or obstructive type   hydrocephalus. No pathologic enhancement of the brain parenchyma or   meninges following IV gadolinium. Mild inferior position of the cerebellar   tonsils at the craniovertebral junction noted; this is unchanged and likely    incidental. The major intracranial vascular flow voids are maintained.   Mastoid air cells  clear. Paranasal sinuses show a trace of mucosal   thickening in the right ethmoid air cells.     CONCLUSION:   1. No evidence for intracranial mass, hemorrhage, hydrocephalus, or   infarct.   2. Slight inferior position of the cerebellar tonsils noted at the   craniovertebral junction which is unchanged from prior study. This is   likely an incidental finding.   3. Mastoid air cells clear. Trace of mucosal thickening in the right   ethmoid sinuses.   Past Medical History           Past Medical History:   Diagnosis Date     Allergy, unspecified not elsewhere classified       Endometriosis       Fibromyalgia       Migraine without aura, with intractable migraine, so stated, without mention of status migrainosus       Myalgia and myositis, unspecified       Systemic lupus erythematosus (H)              Past Surgical History             Past Surgical History:   Procedure Laterality Date     SURGICAL HISTORY OF -    01/01/1986     left elbow fracture repair            Family History            Problem (# of Occurrences) Relation (Name,Age of Onset)     Dementia (1) Mother     Diabetes (1) Maternal Grandfather     Lipids (1) Mother     Prostate Cancer (1) Father (80)     Skin Cancer (1) Paternal Grandmother     Thyroid Cancer (1) Mother (50 - 60)               Negative family history of: Melanoma                Social History   Social History                Socioeconomic History     Marital status:        Spouse name: Not on file     Number of children: Not on file     Years of education: Not on file     Highest education level: Not on file   Occupational History     Not on file   Tobacco Use     Smoking status: Never     Smokeless tobacco: Never   Substance and Sexual Activity     Alcohol use: Not Currently     Drug use: No     Sexual activity: Yes       Partners: Male       Birth control/protection: Condom   Other Topics Concern     Parent/sibling w/ CABG, MI or angioplasty before 65F 55M? Not Asked   Social  History Narrative     Not on file      Social Determinants of Health              Financial Resource Strain: Not on file   Food Insecurity: Not on file   Transportation Needs: Not on file   Physical Activity: Not on file   Stress: Not on file   Social Connections: Not on file   Interpersonal Safety: Low Risk  (2/21/2024)     Interpersonal Safety       Do you feel physically and emotionally safe where you currently live?: Yes       Within the past 12 months, have you been hit, slapped, kicked or otherwise physically hurt by someone?: No       Within the past 12 months, have you been humiliated or emotionally abused in other ways by your partner or ex-partner?: No   Housing Stability: Not on file            Current Outpatient Medications   Medication Sig Dispense Refill     AMBIEN 10 MG OR TABS 1 po HS, prn 20 0     benzonatate (TESSALON) 100 MG capsule Take 1 capsule (100 mg) by mouth 3 times daily as needed for cough 30 capsule 0     calcium carbonate (OS-VAHID 500 MG [place]) 1250 MG tablet Take 1 tablet by mouth daily       EPINEPHrine (ANY BX GENERIC EQUIV) 0.3 MG/0.3ML injection 2-pack INJECT 0.3 MG INTO THE MUSCLE AS NEEDED FOR ANAPHYLAXIS 2 each 3     fluticasone (FLONASE) 50 MCG/ACT nasal spray Spray 2 sprays into both nostrils daily 11.1 mL 3     galcanezumab-gnlm (EMGALITY) 120 MG/ML injection Inject 240 mg subcutaneous first month and then inject 120 mg subcutaneous every 28 days 2 mL 11     hydroxychloroquine (PLAQUENIL) 200 MG tablet Take 1 tablet (200 mg) by mouth 2 times daily 180 tablet 0     levalbuterol (XOPENEX HFA) 45 MCG/ACT inhaler        levothyroxine (SYNTHROID/LEVOTHROID) 25 MCG tablet Take 1 tablet by mouth daily       modafinil (PROVIGIL) 100 MG tablet Take 1 tablet (100 mg) by mouth daily 30 tablet 5     montelukast (SINGULAIR) 10 MG tablet        MULTIVITAMIN TABS   OR   0     mycophenolate (GENERIC EQUIVALENT) 500 MG tablet Take 1 tablet (500 mg) by mouth 2 times daily 60 tablet 1      Omega-3 Fatty Acids (OMEGA 3 PO) Take 1,250 mg by mouth daily       omeprazole (PRILOSEC) 40 MG DR capsule Take 1 capsule (40 mg) by mouth daily 60 capsule 1     ondansetron (ZOFRAN) 8 MG tablet TAKE 1 TABLET BY MOUTH UP TO THREE TIMES DAILY FOR NAUSEA 90 tablet 3     predniSONE (DELTASONE) 1 MG tablet 9-8-7-6 mg a day, each course x 1 month then 5 mg a day, use with 5 mg size tab 90 tablet 5     predniSONE (DELTASONE) 5 MG tablet 9-8-7-6 mg a day, each course x 1 month then 5 mg a day, use with 1 mg size tab 150 tablet 5     promethazine (PHENERGAN) 12.5 MG tablet Take 2 tablets (25 mg) by mouth every 6 hours as needed for nausea 20 tablet 3     promethazine (PHENERGAN) 25 MG tablet        rosuvastatin (CRESTOR) 5 MG tablet Take 1 tablet (5 mg) by mouth daily 90 tablet 3     Semaglutide, 1 MG/DOSE, (OZEMPIC) 4 MG/3ML pen Inject 1 mg Subcutaneous every 7 days May increase to 1.5 mg weekly after four weeks as tolerated 9 mL 3     sertraline (ZOLOFT) 50 MG tablet Take 1 tablet by mouth daily       SPIRULINA PO Take by mouth daily       SUMAtriptan (IMITREX STATDOSE) 6 MG/0.5ML pen injector kit Inject 0.5 mLs (6 mg) Subcutaneous at onset of headache for migraine May repeat in 1 hour. Max 12 mg/24 hours. 3 kit 11     traMADol (ULTRAM) 50 MG tablet Take 1-2 tablets up to three times a day as needed - Oral 180 tablet 5     traMADol (ULTRAM-ER) 300 MG 24 hr tablet Take 1 tablet (300 mg) by mouth at bedtime maximum 1 tablet(s) per day 30 tablet 5     triamcinolone (KENALOG) 0.1 % paste Put on oral ulcers bid till they heal 5 g 5     ubrogepant (UBRELVY) 100 MG tablet Take 1 tablet (100 mg) by mouth at onset of headache (May repeat in 2 hours as needed. Max 2 tabs in 24 hours) 16 tablet 11     VITAMIN D, CHOLECALCIFEROL, PO Take 5,000 Units by mouth daily           There were no vitals taken for this visit.       On examination, patient is alert and cooperative.  Vitals are stable.  She is afebrile.  HEENT is negative.   Extraocular movements are intact.  Face is symmetric.  Tongue is midline neck is supple.  She has taut muscles in the neck and shoulder region.     She is able to move her upper extremities functionally.  She is able to carry out straight leg raising test.  Sánchez's positive bilaterally.  She is able to stand.  Romberg is negative.  Coordination tests are intact.  She is able to walk on her heels and toes.     Musculoskeletal lamination reveals areas of tenderness in the cervical facets bilaterally.  She is tender over the cervical paraspinals overlying the levator scapulae, rhomboids minor, infraspinatus, and in the lower back she is tender over the right lumbar paraspinal, SI joints bilaterally, piriformis bilaterally.  She was also tender over the trochanteric bursal region.     Neurological examination revealed normal strength normal sensation.  Reflexes are equal and symmetrical and plantars are downgoing.  Coordination tests are intact.     Impression: At this point this 48-year-old woman with multiple issues going on.  She has chronic headaches with migraines.  She definitely has issues going with her neck as well which is making her headaches and neck pain and migraines worse.  In addition she has issues with the joints and muscles in the hip and the lower back region.  In terms of treatment, it may be reasonable to treat her with my migraine protocol as well as inject the masseter muscles for her TMJ dysfunction/oromandibular dystonia.  Anticipate this will help improve her migraine control.      For her cervical facets: MRI of the cervical spine.  I am not expecting any foraminal stenosis or other issues.  She has involvement of facets C2-C5.  Plan is for medial branch blocks done left alternating with right side at weekly intervals.  If she gets 80% improvement, and is not improved by addition of physical therapy once a week for 4 weeks, she would be a candidate for radiofrequency ablations.       By  coordinating these injections along with her migraine protocol, I am expecting significant improvement in her head neck and shoulder pain.  Subsequently will reassess her pain in the lower back hip region and consider treatment appropriately.     This was reviewed at length with the patient.  All her questions were answered to her satisfaction.  20 minutes were spent for this visit, exclusive of the procedure..  More than 50% of which was for counseling on above-mentioned issues.     PROCEDURE NOTE: Botox injections for intractable migraine and oromandibular dystonia.     After explaining the benefits and risks of the procedure, using 200 units of Botox diluted with 4 cc of preservative-free normal saline, using ChloraPrep for skin prep Botox for migraine protocol 5 units were injected in 31 different areas.  Next using EMG needed for localization 22.5 units were injected into the masseter muscles on either side.  200 units were utilized in all.  None wasted.     Thank you much for allow me to assist in her care.     Chacorta Hoyos MD          Again, thank you for allowing me to participate in the care of your patient.      Sincerely,    Chacorta Hoyos MD

## 2024-07-22 NOTE — TELEPHONE ENCOUNTER
Left Voicemail (2nd Attempt) for the patient to call back and schedule the following:    Appointment type: Return Rheumatology Video  Provider: Dr. Roy  Return date: November 11th, 2024  Specialty phone number: [phone]  Additional appointment(s) needed: DEXA Bone Density scan  Additonal Notes: NA

## 2024-08-03 ENCOUNTER — HEALTH MAINTENANCE LETTER (OUTPATIENT)
Age: 49
End: 2024-08-03

## 2024-10-14 ENCOUNTER — VIRTUAL VISIT (OUTPATIENT)
Dept: INTERNAL MEDICINE | Facility: CLINIC | Age: 49
End: 2024-10-14
Payer: COMMERCIAL

## 2024-10-14 DIAGNOSIS — R73.03 PREDIABETES: Primary | ICD-10-CM

## 2024-10-14 DIAGNOSIS — R11.0 NAUSEA: ICD-10-CM

## 2024-10-14 PROCEDURE — 99214 OFFICE O/P EST MOD 30 MIN: CPT | Mod: 95 | Performed by: INTERNAL MEDICINE

## 2024-10-14 RX ORDER — ONDANSETRON 8 MG/1
TABLET, FILM COATED ORAL
Qty: 30 TABLET | Refills: 11 | Status: SHIPPED | OUTPATIENT
Start: 2024-10-14

## 2024-10-14 NOTE — PROGRESS NOTES
Gracie is a 49 year old who is being evaluated via a billable video visit.    How would you like to obtain your AVS? MyChart  If the video visit is dropped, the invitation should be resent by: Text to cell phone: [phone]  Will anyone else be joining your video visit? No      Are refills needed on medications prescribed by this physician? YES, Ozempic    Subjective   Gracie is a 49 year old, presenting for the following health issues: medication review  RECHECK and Recheck Medication    History of Present Illness       Reason for visit:  Recheck Medications   She is taking medications regularly.       Gracie has questions today about ozempic.  Hasn't gotten up to a therapeutic dose as of yet. Initially had nausea, but this improved after staying on the medication for some time.  Then, she lapsed and went off of it, is hoping to restart and get back on track.  She's been busy with a new baby girl and this understandably has occupied much of her time and attention.      She also stopped taking rosuvastatin but is willing to restart it based on our conversation today. No particular side effects such as muscle aches, just got off track.  We will check another lipid panel in the future.    Tramadol is working well to help manage chronic pain with no side effects noted. She has both an extended release once daily and a prn option. Would like to continue and I agree it has been helpful.       Objective       Alert, NAD, normal voice quality  Skin: no rashes seen      Vitals:  No vitals were obtained today due to virtual visit.    A/P Gracie was seen today for recheck medication.    Prediabetes  -     Restart semaglutide (OZEMPIC) 2 MG/3ML pen; Inject 0.5 mg subcutaneously every 7 days.    Nausea  -     ondansetron (ZOFRAN) 8 MG tablet; TAKE 1 TABLET BY MOUTH UP TO THREE TIMES DAILY FOR NAUSEA    RTC in three months via video visit    Shavon Guzman MD          Video-Visit Details    Type of service:  Video Visit started  12:25 ended 12:40 with another 15 minutes chart review and documentation same day  Originating Location (pt. Location): Home    Distant Location (provider location):  On-site  Platform used for Video Visit: Padmini  Signed Electronically by: Shavon Guzman MD

## 2024-10-21 ENCOUNTER — MYC MEDICAL ADVICE (OUTPATIENT)
Dept: INTERNAL MEDICINE | Facility: CLINIC | Age: 49
End: 2024-10-21
Payer: COMMERCIAL

## 2024-10-21 DIAGNOSIS — G43.909 MIGRAINE WITHOUT STATUS MIGRAINOSUS, NOT INTRACTABLE, UNSPECIFIED MIGRAINE TYPE: ICD-10-CM

## 2024-10-22 RX ORDER — TRAMADOL HYDROCHLORIDE 50 MG/1
50 TABLET ORAL EVERY 6 HOURS PRN
Qty: 180 TABLET | Refills: 5 | Status: SHIPPED | OUTPATIENT
Start: 2024-10-28

## 2024-10-22 NOTE — TELEPHONE ENCOUNTER
"[facility] RN called and spoke with the patient's pharmacy. The patient's pharmacy stated that for the prescription for traMADol (ULTRAM) 50 MG tablet that there was a \"missed\" refill, so the patient only had 4 refills of this prescription at the pharmacy. Pharmacy staff requested a new rx for traMADol (ULTRAM) 50 MG tablet.   "

## 2024-10-24 ENCOUNTER — OFFICE VISIT (OUTPATIENT)
Dept: PHYSICAL MEDICINE AND REHAB | Facility: CLINIC | Age: 49
End: 2024-10-24
Payer: COMMERCIAL

## 2024-10-24 ENCOUNTER — TELEPHONE (OUTPATIENT)
Dept: INTERNAL MEDICINE | Facility: CLINIC | Age: 49
End: 2024-10-24

## 2024-10-24 DIAGNOSIS — M47.812 CERVICAL SPONDYLOSIS WITHOUT MYELOPATHY: ICD-10-CM

## 2024-10-24 DIAGNOSIS — G89.29 OTHER CHRONIC PAIN: ICD-10-CM

## 2024-10-24 DIAGNOSIS — G43.719 INTRACTABLE CHRONIC MIGRAINE WITHOUT AURA AND WITHOUT STATUS MIGRAINOSUS: Primary | ICD-10-CM

## 2024-10-24 DIAGNOSIS — G24.4 OROMANDIBULAR DYSTONIA: ICD-10-CM

## 2024-10-24 PROCEDURE — 95874 GUIDE NERV DESTR NEEDLE EMG: CPT | Performed by: PHYSICAL MEDICINE & REHABILITATION

## 2024-10-24 PROCEDURE — 64615 CHEMODENERV MUSC MIGRAINE: CPT | Performed by: PHYSICAL MEDICINE & REHABILITATION

## 2024-10-24 PROCEDURE — 99214 OFFICE O/P EST MOD 30 MIN: CPT | Mod: 25 | Performed by: PHYSICAL MEDICINE & REHABILITATION

## 2024-10-24 NOTE — PROGRESS NOTES
CC: Patient with chronic pain headache neck pain and migraines.     Patient returns for follow-up visit and for ongoing Botox injections.  Her last Botox injection was on 7/18/2024.  She is finding Botox helpful including the treatment of her jaws.  She is scheduled for MRI of the cervical spine.  She feels her jaw is beginning to bother her quite a bit.  She continues to take soft food.  She is pointing out to the lateral pterygoids as muscles that are sore bilaterally.  She also has the beginnings of the involvement of the scalenes and the sternocleidomastoid and the neck.  To some extent this is causing sensory disturbance in the right upper extremity.    Patient is a very pleasant 49-year-old woman with a complex medical history.  She has been dealing with severe migraines neck pain as well as TMJ dysfunction.  She has seen her dentist.  She has been getting Botox which have been helpful.  She still has several headaches and the headaches worsen when the Botox wears off.  She has also noted significant tightness in the muscles of the neck and shoulder.  She received her last Botox on 4/25/2024.\     She also gives history of lupus.  Because of steroids, she has fungal infection in her toenails.  She has had issues with her lower back and has previously undergone radiofrequency ablation.  She currently hurts all over and notes that she has been given a diagnosis of fibroid some point in the past.     She has had pain in the 8-10 out of 10 at its worst 3-4 at best 3-4 currently.  She finds that it affects all of her activities sleep as well.  She also has issues with  strength in the hands.  She has done physical therapy for her back.  She has not had imaging studies for the neck.  She denies any bowel bladder disturbance.  She has constipation and hemorrhoids.     In reviewing the history she has had chronic pain affecting the neck and shoulders for years.  She has tried medications including ibuprofen and  Tylenol for more than 3 months without relief.  She would like to get this under control.     EXAM: MR LUMBAR SPINE W/O CONTRAST  LOCATION: [facility]  DATE/TIME: 5/24/2022 7:04 PM     INDICATION: Myelopathy, acute or progressive.  COMPARISON: 12/9/2019  TECHNIQUE: Routine Lumbar Spine MRI without IV contrast.     FINDINGS:   Nomenclature is based on 5 lumbar type vertebral bodies. Satisfactory vertebral body height. There is 2 mm degenerative anterior subluxation L5 upon S1 with no pars interarticularis defects. Mild interspace narrowing L2-L3 and L5-S1. No compression   fractures. No marrow or ligamentous edema. Satisfactory sacral alignment. Normal distal spinal cord and cauda equina with conus medullaris at L1. No cord expansive changes are noted. Nerve roots of the cauda equina are satisfactory without nodularity or   thickening. No morphologic evidence to suggest arachnoiditis. No retroperitoneal solid mass or adenopathy. Symmetric psoas appearance bilaterally. Nothing for sacral insufficiency or stress fracture. No pelvic mass or adenopathy is noted.     T12-L1: Normal disc height and signal. No herniation. Normal facets. No spinal canal or neural foraminal stenosis.      L1-L2: Normal disc height and signal. No herniation. Normal facets. No spinal canal or neural foraminal stenosis.     L2-L3: Mild loss of disc height with annular bulge. No disc herniation. No spinal stenosis. Suggested mild left foraminal compromise without right foraminal stenosis. Facet joints are satisfactory.      L3-L4: Normal disc height and signal. No herniation. Normal facets. No spinal canal or neural foraminal stenosis.     L4-L5: Minimal annular bulging. No disc herniation. No spinal stenosis. Mild foraminal narrowing inferiorly bilaterally and mild facet joint hypertrophic changes with increased joint space fluid.     L5-S1: Moderate loss of disc height. Uncovering of disc material superiorly with  low-grade degenerative anterior subluxation and generalized disc bulge. No disc herniation. No spinal stenosis. Mild bilateral foraminal compromise. Facet joint hypertrophic   changes bilaterally with increased joint space fluid. Increased joint space fluid overall has been described in association with instability, consider flexion and extension views to assess L4-L5 and L5-S1 relationships on dynamic views as indicated.                                                                      IMPRESSION:  1.  Degenerative changes lower lumbar spine most marked L4-L5 and L5-S1. No severe spinal stenosis or severe foraminal compromise at any lumbar level.     2.  No spinal stenosis with mild L4-L5 and L5-S1 foraminal compromise inferiorly. Facet joint arthropathy and increased joint space fluid is noted at these levels.     3.  Consider flexion-extension views if there is concern for instability as increased joint space fluid in the facet joints has been described in association with instability.     4.  Since previous MR 12/9/2019, L5-S1 anterior subluxation has progressed, along with interspace narrowing. Previously identified synovial cyst arising from the medial aspect of the degenerated right L5-S1 facet joint is not present on today's study   with decreased mass effect upon the right lateral thecal sac and mass effect upon the traversing right-sided nerve roots S1-S2.        [facility]   MRI HEAD WITHOUT AND WITH CONTRAST   5/17/2013     INDICATION: Lupus, psychosis, auditory hallucinations.   TECHNIQUE: Routine brain MRI without and with gadolinium. CONTRAST:   Magnevist 12 mL IV.   COMPARISON: Head MRI 7/2/2010.     REPORT: Evaluation of the intracranial contents demonstrates there is no   evidence for acute or subacute infarct on the diffusion acquisition. No   evidence for intracranial hemorrhage, mass lesion, or obstructive type   hydrocephalus. No pathologic enhancement of the brain parenchyma or    meninges following IV gadolinium. Mild inferior position of the cerebellar   tonsils at the craniovertebral junction noted; this is unchanged and likely    incidental. The major intracranial vascular flow voids are maintained.   Mastoid air cells clear. Paranasal sinuses show a trace of mucosal   thickening in the right ethmoid air cells.     CONCLUSION:   1. No evidence for intracranial mass, hemorrhage, hydrocephalus, or   infarct.   2. Slight inferior position of the cerebellar tonsils noted at the   craniovertebral junction which is unchanged from prior study. This is   likely an incidental finding.   3. Mastoid air cells clear. Trace of mucosal thickening in the right   ethmoid sinuses.   Past Medical History           Past Medical History:   Diagnosis Date    Allergy, unspecified not elsewhere classified      Endometriosis      Fibromyalgia      Migraine without aura, with intractable migraine, so stated, without mention of status migrainosus      Myalgia and myositis, unspecified      Systemic lupus erythematosus (H)              Past Surgical History             Past Surgical History:   Procedure Laterality Date    SURGICAL HISTORY OF -    01/01/1986     left elbow fracture repair            Family History            Problem (# of Occurrences) Relation (Name,Age of Onset)     Dementia (1) Mother     Diabetes (1) Maternal Grandfather     Lipids (1) Mother     Prostate Cancer (1) Father (80)     Skin Cancer (1) Paternal Grandmother     Thyroid Cancer (1) Mother (50 - 60)               Negative family history of: Melanoma                Social History   Social History                Socioeconomic History    Marital status:        Spouse name: Not on file    Number of children: Not on file    Years of education: Not on file    Highest education level: Not on file   Occupational History    Not on file   Tobacco Use    Smoking status: Never    Smokeless tobacco: Never   Substance and Sexual Activity     Alcohol use: Not Currently    Drug use: No    Sexual activity: Yes       Partners: Male       Birth control/protection: Condom   Other Topics Concern    Parent/sibling w/ CABG, MI or angioplasty before 65F 55M? Not Asked   Social History Narrative    Not on file      Social Determinants of Health              Financial Resource Strain: Not on file   Food Insecurity: Not on file   Transportation Needs: Not on file   Physical Activity: Not on file   Stress: Not on file   Social Connections: Not on file   Interpersonal Safety: Low Risk  (2/21/2024)     Interpersonal Safety      Do you feel physically and emotionally safe where you currently live?: Yes      Within the past 12 months, have you been hit, slapped, kicked or otherwise physically hurt by someone?: No      Within the past 12 months, have you been humiliated or emotionally abused in other ways by your partner or ex-partner?: No   Housing Stability: Not on file            Current Outpatient Medications   Medication Sig Dispense Refill    AMBIEN 10 MG OR TABS 1 po HS, prn 20 0    benzonatate (TESSALON) 100 MG capsule Take 1 capsule (100 mg) by mouth 3 times daily as needed for cough 30 capsule 0    calcium carbonate (OS-VAHID 500 MG [place]. [place]) 1250 MG tablet Take 1 tablet by mouth daily      EPINEPHrine (ANY BX GENERIC EQUIV) 0.3 MG/0.3ML injection 2-pack INJECT 0.3 MG INTO THE MUSCLE AS NEEDED FOR ANAPHYLAXIS 2 each 3    fluticasone (FLONASE) 50 MCG/ACT nasal spray Spray 2 sprays into both nostrils daily 11.1 mL 3    galcanezumab-gnlm (EMGALITY) 120 MG/ML injection Inject 240 mg subcutaneous first month and then inject 120 mg subcutaneous every 28 days 2 mL 11    hydroxychloroquine (PLAQUENIL) 200 MG tablet Take 1 tablet (200 mg) by mouth 2 times daily 180 tablet 0    levalbuterol (XOPENEX HFA) 45 MCG/ACT inhaler       levothyroxine (SYNTHROID/LEVOTHROID) 25 MCG tablet Take 1 tablet by mouth daily      modafinil (PROVIGIL) 100 MG tablet Take 1 tablet (100 mg) by  mouth daily 30 tablet 5    montelukast (SINGULAIR) 10 MG tablet       MULTIVITAMIN TABS   OR   0    mycophenolate (GENERIC EQUIVALENT) 500 MG tablet Take 1 tablet (500 mg) by mouth 2 times daily 60 tablet 1    Omega-3 Fatty Acids (OMEGA 3 PO) Take 1,250 mg by mouth daily      omeprazole (PRILOSEC) 40 MG DR capsule Take 1 capsule (40 mg) by mouth daily 60 capsule 1    ondansetron (ZOFRAN) 8 MG tablet TAKE 1 TABLET BY MOUTH UP TO THREE TIMES DAILY FOR NAUSEA 30 tablet 11    predniSONE (DELTASONE) 1 MG tablet 9-8-7-6 mg a day, each course x 1 month then 5 mg a day, use with 5 mg size tab 90 tablet 5    predniSONE (DELTASONE) 5 MG tablet 9-8-7-6 mg a day, each course x 1 month then 5 mg a day, use with 1 mg size tab 150 tablet 5    promethazine (PHENERGAN) 12.5 MG tablet Take 2 tablets (25 mg) by mouth every 6 hours as needed for nausea 20 tablet 3    promethazine (PHENERGAN) 25 MG tablet       rosuvastatin (CRESTOR) 5 MG tablet Take 1 tablet (5 mg) by mouth daily 90 tablet 3    semaglutide (OZEMPIC) 2 MG/3ML pen Inject 0.5 mg subcutaneously every 7 days. 9 mL 3    sertraline (ZOLOFT) 50 MG tablet Take 1 tablet by mouth daily      SPIRULINA PO Take by mouth daily      SUMAtriptan (IMITREX STATDOSE) 6 MG/0.5ML pen injector kit Inject 0.5 mLs (6 mg) Subcutaneous at onset of headache for migraine May repeat in 1 hour. Max 12 mg/24 hours. 3 kit 11    [START ON 10/28/2024] traMADol (ULTRAM) 50 MG tablet Take 1 tablet (50 mg) by mouth every 6 hours as needed for severe pain. Take 1-2 tablets up to three times a day as needed - Oral 180 tablet 5    traMADol (ULTRAM-ER) 300 MG 24 hr tablet Take 1 tablet (300 mg) by mouth at bedtime maximum 1 tablet(s) per day 30 tablet 5    triamcinolone (KENALOG) 0.1 % paste Put on oral ulcers bid till they heal 5 g 5    ubrogepant (UBRELVY) 100 MG tablet Take 1 tablet (100 mg) by mouth at onset of headache (May repeat in 2 hours as needed. Max 2 tabs in 24 hours) 16 tablet 11    VITAMIN D,  CHOLECALCIFEROL, PO Take 5,000 Units by mouth daily        There were no vitals taken for this visit.       On examination, patient is alert and cooperative.  Vitals are stable.  She is afebrile.  HEENT is negative.  Extraocular movements are intact.  Face is symmetric.  Tongue is midline neck is supple.  She has taut muscles in the neck and shoulder region.     She has activation of the masseters bilaterally.  The lateral pterygoids are sore compared to the medial pterygoids on either side.    Impression: At this point this 49-year-old woman with multiple issues going on.  She has chronic headaches with migraines.  In addition she also has TMJ dysfunction/oromandibular dystonia.  I have authorization for 200 units of Botox today.  I will continue the Botox for migraine protocol using 155 units and divide the remaining 45 units to treat the lateral pterygoids and masseters on either side.    For her cervical facets: MRI of the cervical spine.  I am not expecting any foraminal stenosis or other issues.  She has involvement of facets C2-C5.  Plan is for medial branch blocks done left alternating with right side at weekly intervals.  If she gets 80% improvement, and is not improved by addition of physical therapy once a week for 4 weeks, she would be a candidate for radiofrequency ablations.  She will likely need treatment of her lower back where the facets have cysts and effusions.     This was reviewed at length with the patient.  All her questions were answered to her satisfaction.  30 minutes were spent for this visit, exclusive of the procedure..  More than 50% of which was for counseling on above-mentioned issues.     PROCEDURE NOTE: Botox injections for intractable migraine and oromandibular dystonia.     After explaining the benefits and risks of the procedure, using 200 units of Botox diluted with 4 cc of preservative-free normal saline, using ChloraPrep for skin prep Botox for migraine protocol 5 units were  injected in 31 different areas.  Next using EMG needed for localization 12.5 units were injected into the lateral pterygoid muscles on either side using EMG needle.  The masseter muscles on either side were injected with 10 units each.  200 units were utilized in all.  None wasted.  She tolerated the procedure well.    She can ice the areas injected, continue with soft food, and follow-up with the visit in 4 weeks time.  Will await the results of the MRI of the cervical spine as well.     Thank you much for allow me to assist in her care.     Chacorta Hoyos MD

## 2024-10-24 NOTE — TELEPHONE ENCOUNTER
Patient confirmed scheduled appointment:  Date: Jan 20th   Time: 12:30pm  Visit type: Video visit return   Provider: PCP  Location: [facility]  Additional notes: per check out - Return in about 3 months (around 1/14/2025) for Follow up, using a video visit.

## 2024-10-24 NOTE — LETTER
10/24/2024       RE: Patricia Hall  [address]  [place] [address]     Dear Colleague,    Thank you for referring your patient, Patricia Hall, to the [facility] PHYSICAL MEDICINE AND [facility]HABILITATION CLINIC [place] at [facility]. Please see a copy of my visit note below.    CC: Patient with chronic pain headache neck pain and migraines.     Patient returns for follow-up visit and for ongoing Botox injections.  Her last Botox injection was on 7/18/2024.  She is finding Botox helpful including the treatment of her jaws.  She is scheduled for MRI of the cervical spine.  She feels her jaw is beginning to bother her quite a bit.  She continues to take soft food.  She is pointing out to the lateral pterygoids as muscles that are sore bilaterally.  She also has the beginnings of the involvement of the scalenes and the sternocleidomastoid and the neck.  To some extent this is causing sensory disturbance in the right upper extremity.    Patient is a very pleasant 49-year-old woman with a complex medical history.  She has been dealing with severe migraines neck pain as well as TMJ dysfunction.  She has seen her dentist.  She has been getting Botox which have been helpful.  She still has several headaches and the headaches worsen when the Botox wears off.  She has also noted significant tightness in the muscles of the neck and shoulder.  She received her last Botox on 4/25/2024.\     She also gives history of lupus.  Because of steroids, she has fungal infection in her toenails.  She has had issues with her lower back and has previously undergone radiofrequency ablation.  She currently hurts all over and notes that she has been given a diagnosis of fibroid some point in the past.     She has had pain in the 8-10 out of 10 at its worst 3-4 at best 3-4 currently.  She finds that it affects all of her activities sleep as well.  She also has issues  with  strength in the hands.  She has done physical therapy for her back.  She has not had imaging studies for the neck.  She denies any bowel bladder disturbance.  She has constipation and hemorrhoids.     In reviewing the history she has had chronic pain affecting the neck and shoulders for years.  She has tried medications including ibuprofen and Tylenol for more than 3 months without relief.  She would like to get this under control.     EXAM: MR LUMBAR SPINE W/O CONTRAST  LOCATION: [facility]  DATE/TIME: 5/24/2022 7:04 PM     INDICATION: Myelopathy, acute or progressive.  COMPARISON: 12/9/2019  TECHNIQUE: Routine Lumbar Spine MRI without IV contrast.     FINDINGS:   Nomenclature is based on 5 lumbar type vertebral bodies. Satisfactory vertebral body height. There is 2 mm degenerative anterior subluxation L5 upon S1 with no pars interarticularis defects. Mild interspace narrowing L2-L3 and L5-S1. No compression   fractures. No marrow or ligamentous edema. Satisfactory sacral alignment. Normal distal spinal cord and cauda equina with conus medullaris at L1. No cord expansive changes are noted. Nerve roots of the cauda equina are satisfactory without nodularity or   thickening. No morphologic evidence to suggest arachnoiditis. No retroperitoneal solid mass or adenopathy. Symmetric psoas appearance bilaterally. Nothing for sacral insufficiency or stress fracture. No pelvic mass or adenopathy is noted.     T12-L1: Normal disc height and signal. No herniation. Normal facets. No spinal canal or neural foraminal stenosis.      L1-L2: Normal disc height and signal. No herniation. Normal facets. No spinal canal or neural foraminal stenosis.     L2-L3: Mild loss of disc height with annular bulge. No disc herniation. No spinal stenosis. Suggested mild left foraminal compromise without right foraminal stenosis. Facet joints are satisfactory.      L3-L4: Normal disc height and signal. No  herniation. Normal facets. No spinal canal or neural foraminal stenosis.     L4-L5: Minimal annular bulging. No disc herniation. No spinal stenosis. Mild foraminal narrowing inferiorly bilaterally and mild facet joint hypertrophic changes with increased joint space fluid.     L5-S1: Moderate loss of disc height. Uncovering of disc material superiorly with low-grade degenerative anterior subluxation and generalized disc bulge. No disc herniation. No spinal stenosis. Mild bilateral foraminal compromise. Facet joint hypertrophic   changes bilaterally with increased joint space fluid. Increased joint space fluid overall has been described in association with instability, consider flexion and extension views to assess L4-L5 and L5-S1 relationships on dynamic views as indicated.                                                                      IMPRESSION:  1.  Degenerative changes lower lumbar spine most marked L4-L5 and L5-S1. No severe spinal stenosis or severe foraminal compromise at any lumbar level.     2.  No spinal stenosis with mild L4-L5 and L5-S1 foraminal compromise inferiorly. Facet joint arthropathy and increased joint space fluid is noted at these levels.     3.  Consider flexion-extension views if there is concern for instability as increased joint space fluid in the facet joints has been described in association with instability.     4.  Since previous MR 12/9/2019, L5-S1 anterior subluxation has progressed, along with interspace narrowing. Previously identified synovial cyst arising from the medial aspect of the degenerated right L5-S1 facet joint is not present on today's study   with decreased mass effect upon the right lateral thecal sac and mass effect upon the traversing right-sided nerve roots S1-S2.        [facility]   MRI HEAD WITHOUT AND WITH CONTRAST   5/17/2013     INDICATION: Lupus, psychosis, auditory hallucinations.   TECHNIQUE: Routine brain MRI without and with gadolinium.  CONTRAST:   Magnevist 12 mL IV.   COMPARISON: Head MRI 7/2/2010.     REPORT: Evaluation of the intracranial contents demonstrates there is no   evidence for acute or subacute infarct on the diffusion acquisition. No   evidence for intracranial hemorrhage, mass lesion, or obstructive type   hydrocephalus. No pathologic enhancement of the brain parenchyma or   meninges following IV gadolinium. Mild inferior position of the cerebellar   tonsils at the craniovertebral junction noted; this is unchanged and likely    incidental. The major intracranial vascular flow voids are maintained.   Mastoid air cells clear. Paranasal sinuses show a trace of mucosal   thickening in the right ethmoid air cells.     CONCLUSION:   1. No evidence for intracranial mass, hemorrhage, hydrocephalus, or   infarct.   2. Slight inferior position of the cerebellar tonsils noted at the   craniovertebral junction which is unchanged from prior study. This is   likely an incidental finding.   3. Mastoid air cells clear. Trace of mucosal thickening in the right   ethmoid sinuses.   Past Medical History           Past Medical History:   Diagnosis Date     Allergy, unspecified not elsewhere classified       Endometriosis       Fibromyalgia       Migraine without aura, with intractable migraine, so stated, without mention of status migrainosus       Myalgia and myositis, unspecified       Systemic lupus erythematosus (H)              Past Surgical History             Past Surgical History:   Procedure Laterality Date     SURGICAL HISTORY OF -    01/01/1986     left elbow fracture repair            Family History            Problem (# of Occurrences) Relation (Name,Age of Onset)     Dementia (1) Mother     Diabetes (1) Maternal Grandfather     Lipids (1) Mother     Prostate Cancer (1) Father (80)     Skin Cancer (1) Paternal Grandmother     Thyroid Cancer (1) Mother (50 - 60)               Negative family history of: Melanoma                Social  History   Social History                Socioeconomic History     Marital status:        Spouse name: Not on file     Number of children: Not on file     Years of education: Not on file     Highest education level: Not on file   Occupational History     Not on file   Tobacco Use     Smoking status: Never     Smokeless tobacco: Never   Substance and Sexual Activity     Alcohol use: Not Currently     Drug use: No     Sexual activity: Yes       Partners: Male       Birth control/protection: Condom   Other Topics Concern     Parent/sibling w/ CABG, MI or angioplasty before 65F 55M? Not Asked   Social History Narrative     Not on file      Social Determinants of Health              Financial Resource Strain: Not on file   Food Insecurity: Not on file   Transportation Needs: Not on file   Physical Activity: Not on file   Stress: Not on file   Social Connections: Not on file   Interpersonal Safety: Low Risk  (2/21/2024)     Interpersonal Safety       Do you feel physically and emotionally safe where you currently live?: Yes       Within the past 12 months, have you been hit, slapped, kicked or otherwise physically hurt by someone?: No       Within the past 12 months, have you been humiliated or emotionally abused in other ways by your partner or ex-partner?: No   Housing Stability: Not on file            Current Outpatient Medications   Medication Sig Dispense Refill     AMBIEN 10 MG OR TABS 1 po HS, prn 20 0     benzonatate (TESSALON) 100 MG capsule Take 1 capsule (100 mg) by mouth 3 times daily as needed for cough 30 capsule 0     calcium carbonate (OS-VAHID 500 MG [place]. [place]) 1250 MG tablet Take 1 tablet by mouth daily       EPINEPHrine (ANY BX GENERIC EQUIV) 0.3 MG/0.3ML injection 2-pack INJECT 0.3 MG INTO THE MUSCLE AS NEEDED FOR ANAPHYLAXIS 2 each 3     fluticasone (FLONASE) 50 MCG/ACT nasal spray Spray 2 sprays into both nostrils daily 11.1 mL 3     galcanezumab-gnlm (EMGALITY) 120 MG/ML injection Inject 240 mg  subcutaneous first month and then inject 120 mg subcutaneous every 28 days 2 mL 11     hydroxychloroquine (PLAQUENIL) 200 MG tablet Take 1 tablet (200 mg) by mouth 2 times daily 180 tablet 0     levalbuterol (XOPENEX HFA) 45 MCG/ACT inhaler        levothyroxine (SYNTHROID/LEVOTHROID) 25 MCG tablet Take 1 tablet by mouth daily       modafinil (PROVIGIL) 100 MG tablet Take 1 tablet (100 mg) by mouth daily 30 tablet 5     montelukast (SINGULAIR) 10 MG tablet        MULTIVITAMIN TABS   OR   0     mycophenolate (GENERIC EQUIVALENT) 500 MG tablet Take 1 tablet (500 mg) by mouth 2 times daily 60 tablet 1     Omega-3 Fatty Acids (OMEGA 3 PO) Take 1,250 mg by mouth daily       omeprazole (PRILOSEC) 40 MG DR capsule Take 1 capsule (40 mg) by mouth daily 60 capsule 1     ondansetron (ZOFRAN) 8 MG tablet TAKE 1 TABLET BY MOUTH UP TO THREE TIMES DAILY FOR NAUSEA 30 tablet 11     predniSONE (DELTASONE) 1 MG tablet 9-8-7-6 mg a day, each course x 1 month then 5 mg a day, use with 5 mg size tab 90 tablet 5     predniSONE (DELTASONE) 5 MG tablet 9-8-7-6 mg a day, each course x 1 month then 5 mg a day, use with 1 mg size tab 150 tablet 5     promethazine (PHENERGAN) 12.5 MG tablet Take 2 tablets (25 mg) by mouth every 6 hours as needed for nausea 20 tablet 3     promethazine (PHENERGAN) 25 MG tablet        rosuvastatin (CRESTOR) 5 MG tablet Take 1 tablet (5 mg) by mouth daily 90 tablet 3     semaglutide (OZEMPIC) 2 MG/3ML pen Inject 0.5 mg subcutaneously every 7 days. 9 mL 3     sertraline (ZOLOFT) 50 MG tablet Take 1 tablet by mouth daily       SPIRULINA PO Take by mouth daily       SUMAtriptan (IMITREX STATDOSE) 6 MG/0.5ML pen injector kit Inject 0.5 mLs (6 mg) Subcutaneous at onset of headache for migraine May repeat in 1 hour. Max 12 mg/24 hours. 3 kit 11     [START ON 10/28/2024] traMADol (ULTRAM) 50 MG tablet Take 1 tablet (50 mg) by mouth every 6 hours as needed for severe pain. Take 1-2 tablets up to three times a day as  needed - Oral 180 tablet 5     traMADol (ULTRAM-ER) 300 MG 24 hr tablet Take 1 tablet (300 mg) by mouth at bedtime maximum 1 tablet(s) per day 30 tablet 5     triamcinolone (KENALOG) 0.1 % paste Put on oral ulcers bid till they heal 5 g 5     ubrogepant (UBRELVY) 100 MG tablet Take 1 tablet (100 mg) by mouth at onset of headache (May repeat in 2 hours as needed. Max 2 tabs in 24 hours) 16 tablet 11     VITAMIN D, CHOLECALCIFEROL, PO Take 5,000 Units by mouth daily        There were no vitals taken for this visit.       On examination, patient is alert and cooperative.  Vitals are stable.  She is afebrile.  HEENT is negative.  Extraocular movements are intact.  Face is symmetric.  Tongue is midline neck is supple.  She has taut muscles in the neck and shoulder region.     She has activation of the masseters bilaterally.  The lateral pterygoids are sore compared to the medial pterygoids on either side.    Impression: At this point this 49-year-old woman with multiple issues going on.  She has chronic headaches with migraines.  In addition she also has TMJ dysfunction/oromandibular dystonia.  I have authorization for 200 units of Botox today.  I will continue the Botox for migraine protocol using 155 units and divide the remaining 45 units to treat the lateral pterygoids and masseters on either side.    For her cervical facets: MRI of the cervical spine.  I am not expecting any foraminal stenosis or other issues.  She has involvement of facets C2-C5.  Plan is for medial branch blocks done left alternating with right side at weekly intervals.  If she gets 80% improvement, and is not improved by addition of physical therapy once a week for 4 weeks, she would be a candidate for radiofrequency ablations.  She will likely need treatment of her lower back where the facets have cysts and effusions.     This was reviewed at length with the patient.  All her questions were answered to her satisfaction.  30 minutes were spent  for this visit, exclusive of the procedure..  More than 50% of which was for counseling on above-mentioned issues.     PROCEDURE NOTE: Botox injections for intractable migraine and oromandibular dystonia.     After explaining the benefits and risks of the procedure, using 200 units of Botox diluted with 4 cc of preservative-free normal saline, using ChloraPrep for skin prep Botox for migraine protocol 5 units were injected in 31 different areas.  Next using EMG needed for localization 12.5 units were injected into the lateral pterygoid muscles on either side using EMG needle.  The masseter muscles on either side were injected with 10 units each.  200 units were utilized in all.  None wasted.  She tolerated the procedure well.    She can ice the areas injected, continue with soft food, and follow-up with the visit in 4 weeks time.  Will await the results of the MRI of the cervical spine as well.     Thank you much for allow me to assist in her care.     Chacorta Hoyos MD          Again, thank you for allowing me to participate in the care of your patient.      Sincerely,    Chacorta Hoyos MD

## 2024-10-24 NOTE — TELEPHONE ENCOUNTER
Called pharmacy- they are ok filling tomorrow and gave ok.    Hugo Arana RN on 10/24/2024 at 5:24 PM

## 2024-11-15 DIAGNOSIS — H69.90 DYSFUNCTION OF EUSTACHIAN TUBE, UNSPECIFIED LATERALITY: ICD-10-CM

## 2024-11-15 RX ORDER — FLUTICASONE PROPIONATE 50 MCG
2 SPRAY, SUSPENSION (ML) NASAL DAILY
Qty: 16 G | Refills: 11 | Status: SHIPPED | OUTPATIENT
Start: 2024-11-15

## 2024-11-15 NOTE — TELEPHONE ENCOUNTER
LVD:  10/14/2024  [facility] [place]     Shavon Raines MD     Refilled per protocol.

## 2024-12-31 ENCOUNTER — TELEPHONE (OUTPATIENT)
Dept: INTERNAL MEDICINE | Facility: CLINIC | Age: 49
End: 2024-12-31

## 2024-12-31 ENCOUNTER — OFFICE VISIT (OUTPATIENT)
Dept: INTERNAL MEDICINE | Facility: CLINIC | Age: 49
End: 2024-12-31
Payer: COMMERCIAL

## 2024-12-31 VITALS
HEART RATE: 100 BPM | BODY MASS INDEX: 31.86 KG/M2 | TEMPERATURE: 98 F | HEIGHT: 67 IN | RESPIRATION RATE: 18 BRPM | DIASTOLIC BLOOD PRESSURE: 84 MMHG | SYSTOLIC BLOOD PRESSURE: 126 MMHG | OXYGEN SATURATION: 99 %

## 2024-12-31 DIAGNOSIS — Z11.4 SCREENING FOR HIV (HUMAN IMMUNODEFICIENCY VIRUS): ICD-10-CM

## 2024-12-31 DIAGNOSIS — G89.29 OTHER CHRONIC PAIN: ICD-10-CM

## 2024-12-31 DIAGNOSIS — Z12.31 VISIT FOR SCREENING MAMMOGRAM: Primary | ICD-10-CM

## 2024-12-31 PROCEDURE — 99207 PR NO CHARGE LOS: CPT

## 2024-12-31 PROCEDURE — 90656 IIV3 VACC NO PRSV 0.5 ML IM: CPT

## 2024-12-31 PROCEDURE — 90480 ADMN SARSCOV2 VAC 1/ONLY CMP: CPT

## 2024-12-31 PROCEDURE — 91320 SARSCV2 VAC 30MCG TRS-SUC IM: CPT

## 2024-12-31 PROCEDURE — 90471 IMMUNIZATION ADMIN: CPT

## 2024-12-31 RX ORDER — TRAMADOL HYDROCHLORIDE 300 MG/1
300 TABLET, EXTENDED RELEASE ORAL AT BEDTIME
Qty: 30 TABLET | Refills: 0 | Status: SHIPPED | OUTPATIENT
Start: 2025-01-04

## 2024-12-31 ASSESSMENT — PATIENT HEALTH QUESTIONNAIRE - PHQ9
SUM OF ALL RESPONSES TO PHQ QUESTIONS 1-9: 3
SUM OF ALL RESPONSES TO PHQ QUESTIONS 1-9: 3
10. IF YOU CHECKED OFF ANY PROBLEMS, HOW DIFFICULT HAVE THESE PROBLEMS MADE IT FOR YOU TO DO YOUR WORK, TAKE CARE OF THINGS AT HOME, OR GET ALONG WITH OTHER PEOPLE: NOT DIFFICULT AT ALL

## 2024-12-31 NOTE — TELEPHONE ENCOUNTER
Reason for Call:  Appointment Request    Patient requesting this type of appt: Chronic Diease Management/Medication/Follow-Up    Requested provider: Shavon Raines    Reason patient unable to be scheduled: Not within requested timeframe    When does patient want to be seen/preferred time: Same day or 01/02/2025    Comments: a F/U appt & med check visit    Could we send this information to you in [facility] or would you prefer to receive a phone call?:   Patient would prefer a phone call   Okay to leave a detailed message?: Yes at Cell number on file:    Telephone Information:   Mobile [phone]       Call taken on 12/31/2024 at 9:37 AM by Sona Song

## 2024-12-31 NOTE — PROGRESS NOTES
"  PRIMARY CARE CENTER  DATE: December 31, 2024  CHIEF COMPLAINT:   Chief Complaint   Patient presents with    Follow Up     Med check; flu and covid shots     Medication Refill     Tramadol ER (pt states usually has 5 refills at a time)       Patricia Hall is a 49 year old woman, who presents to the clinic for a medication refill and COVID/flu shots. Her history significant for lupus, endometriosis, migraine headaches, chronic back/joint pain.    Patricia is a patient of Dr. Mendez; she is scheduled to see her next on January 6, 2025.  Patient has reportedly ran out of refills for her controlled substance, tramadol extended release 300 mg.  She reportedly will not be able to see Dr. Mendez before her next refill date, and given her prior difficulties with getting a controlled substance refilled, patient elected to come into clinic to have it done before her next visit.  Patient was informed that controlled substances should primarily be managed by 1 provider and that since Dr. Mendez is her primary provider, she will be the one to manage it.  Informed patient that Dr. Mendez is in office this week and she has already received the refill request in her inbox per our records.  Inform patient that she will have to wait until Dr. Mendez refills the prescription.  Patient not satisfied with our response; reported that this information was not brought to her attention from the beginning.  Patient did not wish to continue the encounter; she requested to receive her COVID and flu vaccines and then will continue her remaining discussions with Dr. Mendez.      /84 (BP Location: Right arm, Patient Position: Sitting, Cuff Size: Adult Large)   Pulse 100   Temp 98  F (36.7  C) (Oral)   Resp 18   Ht 1.698 m (5' 6.85\")   SpO2 99%   BMI 31.86 kg/m      Physical Exam: Not Performed      Plan:  -Informed Dr. Mendez of the situation and said that she will take care of the prescription refill  -Patient " "to follow-up with Dr. Mendez as scheduled on 01/06/2025      Patient discussed with Dr. Bhavana Farrar MD   Internal Medicine  PGY-2  [facility]   Clinic Phone\" [phone]    "

## 2024-12-31 NOTE — PROGRESS NOTES
Sandhya Bowman is a 49 year old, presenting for the following health issues:  Follow Up (Med check; flu and covid shots ) and Medication Refill (Tramadol ER (pt states usually has 5 refills at a time))      12/31/2024    12:15 PM   Additional Questions   Roomed by MR     Clinical visit completed.  Please refer to the other clinic note from the same day regarding history of visit, examination findings, and medical decision making/care plan regarding patient's acute concerns.    Odalis Farrar M.D.  Internal Medicine  PGY-2   [facility]

## 2024-12-31 NOTE — TELEPHONE ENCOUNTER
traMADol (ULTRAM-ER) 300 MG 24 hr tablet 30 tablet 5 7/15/2024       Last Office Visit: 10/14/24  Future Office visit:   1/6/25      Routing refill request to provider for review/approval because:    CONTROLLED MEDICATION    Rosario Larry, RN  [facility] Triage & Med Refill Team

## 2024-12-31 NOTE — TELEPHONE ENCOUNTER
Controlled substance refill request notes    Refill request received for: Tramadol Hcl Er 300 Mg Tablet  [place]  data reviewed 12/31/24:  Medication last refill: 30 tab, 30 day supply, filled/sold to patient on 12/05/2024  Pended order: Tramadol Hcl Er 300 Mg Tablet, 30 tab, 30 day supply, fill on or after 01/04/2025     Primary care provider: Shavon Raines  Last office visit this department: 2/21/2024  Last virtual visit this department: 10/14/2024  Next appointment with PCP: 01/06/2025    Refill request forwarded to provider for review.     Josefina GUPTA LPN  [facility]

## 2024-12-31 NOTE — TELEPHONE ENCOUNTER
Patient confirmed scheduled appointment:  Date: Dec 31st   Time: 12:10pm  Visit type: UMP Return   Provider: Dr. Farrar  Location: [place]      Date: Jan 6th   Time: 12:30pm  Visit type: Video Return   Provider: PCP  Location: [place]  Additional notes: Rescheduled sooner

## 2025-01-08 ENCOUNTER — TRANSFERRED RECORDS (OUTPATIENT)
Dept: HEALTH INFORMATION MANAGEMENT | Facility: CLINIC | Age: 50
End: 2025-01-08
Payer: COMMERCIAL

## 2025-01-14 ENCOUNTER — TELEPHONE (OUTPATIENT)
Dept: PHYSICAL MEDICINE AND REHAB | Facility: CLINIC | Age: 50
End: 2025-01-14
Payer: COMMERCIAL

## 2025-01-14 DIAGNOSIS — G24.4 OROMANDIBULAR DYSTONIA: ICD-10-CM

## 2025-01-14 DIAGNOSIS — M47.812 CERVICAL SPONDYLOSIS WITHOUT MYELOPATHY: ICD-10-CM

## 2025-01-14 DIAGNOSIS — G43.719 INTRACTABLE CHRONIC MIGRAINE WITHOUT AURA AND WITHOUT STATUS MIGRAINOSUS: Primary | ICD-10-CM

## 2025-01-14 DIAGNOSIS — G89.29 OTHER CHRONIC PAIN: ICD-10-CM

## 2025-01-14 NOTE — TELEPHONE ENCOUNTER
Writer left message for patient to remind her of her upcoming appointment with Dr. Reid and to let her know that our finance department has contacted me to let me know we do not have update health insurance for her since the first of the year. Writer requested patient contact clinic to let us know if she plans to keep her appointment or if she would like to reschedule. Kimberly Ji RN on 1/14/2025 at 10:50 AM

## 2025-01-21 ENCOUNTER — PATIENT OUTREACH (OUTPATIENT)
Dept: CARE COORDINATION | Facility: CLINIC | Age: 50
End: 2025-01-21
Payer: COMMERCIAL

## 2025-02-19 DIAGNOSIS — G43.009 MIGRAINE WITHOUT AURA AND WITHOUT STATUS MIGRAINOSUS, NOT INTRACTABLE: ICD-10-CM

## 2025-02-26 ENCOUNTER — VIRTUAL VISIT (OUTPATIENT)
Dept: NEUROLOGY | Facility: CLINIC | Age: 50
End: 2025-02-26
Payer: COMMERCIAL

## 2025-02-26 VITALS — BODY MASS INDEX: 26.68 KG/M2 | HEIGHT: 67 IN | WEIGHT: 170 LBS

## 2025-02-26 DIAGNOSIS — R20.2 PARESTHESIA: ICD-10-CM

## 2025-02-26 DIAGNOSIS — R51.9 WORSENING HEADACHES: Primary | ICD-10-CM

## 2025-02-26 DIAGNOSIS — G43.719 INTRACTABLE CHRONIC MIGRAINE WITHOUT AURA AND WITHOUT STATUS MIGRAINOSUS: ICD-10-CM

## 2025-02-26 DIAGNOSIS — M32.9 SYSTEMIC LUPUS ERYTHEMATOSUS, UNSPECIFIED SLE TYPE, UNSPECIFIED ORGAN INVOLVEMENT STATUS (H): ICD-10-CM

## 2025-02-26 DIAGNOSIS — G43.009 MIGRAINE WITHOUT AURA AND WITHOUT STATUS MIGRAINOSUS, NOT INTRACTABLE: ICD-10-CM

## 2025-02-26 ASSESSMENT — HEADACHE IMPACT TEST (HIT 6)
HIT6 TOTAL SCORE: 75
WHEN YOU HAVE A HEADACHE HOW OFTEN DO YOU WISH YOU COULD LIE DOWN: ALWAYS
HIT6 TOTAL SCORE: 75
HOW OFTEN DO HEADACHES LIMIT YOUR DAILY ACTIVITIES: ALWAYS
HOW OFTEN HAVE YOU FELT FED UP OR IRRITATED BECAUSE OF YOUR HEADACHES: ALWAYS
HOW OFTEN DID HEADACHS LIMIT CONCENTRATION ON WORK OR DAILY ACTIVITY: ALWAYS
HOW OFTEN DO HEADACHES LIMIT YOUR DAILY ACTIVITIES: ALWAYS
HOW OFTEN HAVE YOU FELT TOO TIRED TO WORK BECAUSE OF YOUR HEADACHES: SOMETIMES
HOW OFTEN DID HEADACHS LIMIT CONCENTRATION ON WORK OR DAILY ACTIVITY: ALWAYS
WHEN YOU HAVE HEADACHES HOW OFTEN IS THE PAIN SEVERE: ALWAYS
WHEN YOU HAVE A HEADACHE HOW OFTEN DO YOU WISH YOU COULD LIE DOWN: ALWAYS
WHEN YOU HAVE HEADACHES HOW OFTEN IS THE PAIN SEVERE: ALWAYS
HOW OFTEN HAVE YOU FELT TOO TIRED TO WORK BECAUSE OF YOUR HEADACHES: SOMETIMES
HOW OFTEN HAVE YOU FELT FED UP OR IRRITATED BECAUSE OF YOUR HEADACHES: ALWAYS

## 2025-02-26 ASSESSMENT — MIGRAINE DISABILITY ASSESSMENT (MIDAS)
HOW MANY DAYS WAS YOUR PRODUCTIVITY CUT IN HALF BECAUSE OF HEADACHES: 45
HOW OFTEN WERE SOCIAL ACTIVITIES MISSED DUE TO HEADACHES: 45
HOW MANY DAYS IN THE PAST 3 MONTHS HAVE YOU HAD A HEADACHE: 45
HOW MANY DAYS WAS HOUSEWORK PRODUCTIVITY CUT IN HALF DUE TO HEADACHES: 45
TOTAL SCORE: 225
HOW MANY DAYS DID YOU MISS WORK OR SCHOOL BECAUSE OF HEADACHES: 45
ON A SCALE FROM 0-10 ON AVERAGE HOW PAINFUL WERE HEADACHES: 9
HOW MANY DAYS DID YOU NOT DO HOUSEWORK BECAUSE OF HEADACHES: 45

## 2025-02-26 ASSESSMENT — PAIN SCALES - GENERAL: PAINLEVEL_OUTOF10: SEVERE PAIN (7)

## 2025-02-26 NOTE — NURSING NOTE
Current patient location: [address]  [place] [address]    Is the patient currently in the state of [place]? YES    Visit mode: VIDEO    If the visit is dropped, the patient can be reconnected by:VIDEO VISIT: Text to cell phone:   Telephone Information:   Mobile [phone]       Will anyone else be joining the visit? NO  (If patient encounters technical issues they should call [phone]    Are changes needed to the allergy or medication list? No    Are refills needed on medications prescribed by this physician? NO    Rooming Documentation:  Questionnaire(s) completed    Reason for visit: STEPHANIE PEREYRAF

## 2025-02-26 NOTE — PROGRESS NOTES
Virtual Visit Details    Type of service:  Video Visit   Originating Location (pt. Location): Home  Distant Location (provider location):  Off-site  Platform used for Video Visit: [facility]    Headache Neurology Progress Note  February 26, 2025      Assessment/Plan:   Patricia Hall is a 49 year old ***  Headaches chronic and in the spring had a new headache symptoms but back to the baseline now. Headaches are more frequent. History of immunosuppression and SLE not unreasonable to recommend brain MRI for any structural causes of headaches increased frequency.   Plan:  Updated brain MRI with and without a contrast   Valium 30 min before MRI and repeat in 30 min if needed. Arrange for a .   Right arm weakness and gripping problems and some on the left. Will order cervical MRI for any lesions/evidence of radiculopathy. Cervical MRI     Acute Treatment:  -For acute treatment of mild headache, take acetaminophen  as needed. Do not exceed more than 14 days per month to avoid medication overuse.  -For acute treatment of moderate to severe headache, take SUMATRIPTAN inj at the onset of headache, with a repeat dose in two hours if needed. Do not exceed more than 9 days per month to avoid medication overuse.  Nurtec as needed   -For headache related nausea, or as a rescue medicine for headache, take ondasetron as needed.     Preventive Treatment:  -For headache prevention,   Retrial of Emgality     Botox for headaches, neck and oromandibular dystonia with Dr Hoyos     Follow up in 3 months or sooner if needed     XXX minutes spent on the date of the encounter doing video or face to face  access, chart  review, exam, results review,  meds review, treatment plan, documentation and further activities as noted above    CARISSA Johnson, CNP [facility]        The longitudinal plan of care for Gracie was addressed during this visit. Due to the  "added complexity in care, I will continue to support Gracie in the subsequent management of this condition(s) and with the ongoing continuity of care of this condition(s).      Subjective:    Patriciachloe Hall returns for follow up of ***  Last visit 2/15/2024   Switched insurance in [facility] of 2025 but now back on track but behind with treatment. Headaches returned after stopping treatments.   Off Emgality in the spring of 2024-was for 3 months. No side effects.   Had a surrogate baby and was very busy. Baby is 10 months old.   Saw Dr Hoyos 3/14/2024 initially and had Botox with him -last Botox 10/24/2024-migraines, neck pain and TMJ dysfunction. Botox does help and does not want to give up Botox.   Total headaches -30 days out of 30 days per month and severe headaches 15-18/month and duration more 4 hours.   Headaches in the back/base of the neck and behind eyes. Worse with movents and light. Bending over worsen headaches. Holding baby who is 20lbs causes tightness in the back and migraine.     Right side -UE paresthesia and thumb weakness and gripping issues with both hands . Was recommended PT    Brain MRI was ordered but cancelled. Patient reports PTSD from medical procedures.     Headache Tx  Amitriptyline years ago and it did not do anything   Topiramate did not do anything   Sertraline   Gabapentin -did not help   Botox currently   Rizatriptan did not help   Sumatriptan and ubrelvy-but sumatriptan inj only would work   Ubrelvy does not seem to be effective   Emgality only 3 rounds   Nurtec -never tried   Ondasetron helps   Prochlorperazine did not seem to help   Promethazine -does not work for nausea and cannot substitute it for ondasetron   Ice helps        Hx of SLE and had eye exam a month ago -patient is on plaquenil. Presumed normal intraocular pressure or papilledema   Objective:  ***  Vitals: Ht 1.695 m (5' 6.75\")   Wt 77.1 kg (170 lb)   BMI 26.83 kg/m    General: Cooperative, " NAD  Neurologic:  Mental Status: Fully alert, attentive and oriented. Speech clear and fluent.   Cranial Nerves: Facial movements symmetric.   Motor: No abnormal movements.      Pertinent Investigations:    ***        2/15/2024    11:26 AM 2/26/2025    11:37 AM   HIT-6   When you have headaches, how often is the pain severe 13 13    How often do headaches limit your ability to do usual daily activities including household work, work, school, or social activities? 13 13    When you have a headache, how often do you wish you could lie down? 13 13    In the past 4 weeks, how often have you felt too tired to do work or daily activities because of your headaches 13 10    In the past 4 weeks, how often have you felt fed up or irritated because of your headaches 13 13    In the past 4 weeks, how often did headaches limit your ability to concentrate on work or daily activities 13 13    HIT-6 Total Score 78 75        Proxy-reported           2/15/2024    11:32 AM 2/26/2025    11:39 AM   MIDAS - in the past three months:   On how many days did you miss work or school because of your headaches? 25 45   How many days was your productivity at work or school reduced by half or more because of your headaches? 25 45   On how many days did you not do household work because of your headaches? 25 45   How many days was your productivity in household work reduced by half or more because of your headaches? 25 45   On how many days did you miss family, social, or leisure activities because of your headaches? 25 45   On how many days did you have a headache? 25 45   On a scale of 0-10, on average how painful were these headaches? 10 9   MIDAS Score 125 (IV - Severe Disability) 225 (IV - Severe Disability)

## 2025-03-04 ENCOUNTER — TELEPHONE (OUTPATIENT)
Dept: NEUROLOGY | Facility: CLINIC | Age: 50
End: 2025-03-04

## 2025-03-04 NOTE — TELEPHONE ENCOUNTER
Sent Mychart (1st Attempt) and Left Voicemail (2nd Attempt) for the patient to call back and schedule the following:    Appointment type: Return Headache  Provider: Debora Chin   Return date: May 2025  Specialty phone number: [phone]  Additional appointment(s) needed: brain MRI and cervical MRI   Additonal Notes:

## 2025-03-27 DIAGNOSIS — R53.83 FATIGUE: ICD-10-CM

## 2025-03-27 DIAGNOSIS — N95.1 SYMPTOMATIC MENOPAUSAL OR FEMALE CLIMACTERIC STATES: Primary | ICD-10-CM

## 2025-03-27 DIAGNOSIS — R63.5 ABNORMAL WEIGHT GAIN: ICD-10-CM

## 2025-03-31 ENCOUNTER — VIRTUAL VISIT (OUTPATIENT)
Dept: INTERNAL MEDICINE | Facility: CLINIC | Age: 50
End: 2025-03-31
Payer: COMMERCIAL

## 2025-03-31 DIAGNOSIS — G43.009 MIGRAINE WITHOUT AURA AND WITHOUT STATUS MIGRAINOSUS, NOT INTRACTABLE: ICD-10-CM

## 2025-03-31 DIAGNOSIS — G43.909 MIGRAINE WITHOUT STATUS MIGRAINOSUS, NOT INTRACTABLE, UNSPECIFIED MIGRAINE TYPE: ICD-10-CM

## 2025-03-31 DIAGNOSIS — Z78.0 MENOPAUSE: Primary | ICD-10-CM

## 2025-03-31 DIAGNOSIS — Z91.018 FOOD ALLERGY: ICD-10-CM

## 2025-03-31 PROCEDURE — 98006 SYNCH AUDIO-VIDEO EST MOD 30: CPT | Performed by: INTERNAL MEDICINE

## 2025-03-31 PROCEDURE — 1125F AMNT PAIN NOTED PAIN PRSNT: CPT | Performed by: INTERNAL MEDICINE

## 2025-03-31 RX ORDER — ESTRADIOL 10 UG/1
10 TABLET, FILM COATED VAGINAL
Qty: 24 TABLET | Refills: 3 | Status: SHIPPED | OUTPATIENT
Start: 2025-03-31

## 2025-03-31 RX ORDER — CEFUROXIME AXETIL 250 MG/1
6 TABLET ORAL
Qty: 3 KIT | Refills: 11 | Status: SHIPPED | OUTPATIENT
Start: 2025-03-31

## 2025-03-31 RX ORDER — TRAMADOL HYDROCHLORIDE 50 MG/1
50 TABLET ORAL EVERY 6 HOURS PRN
Qty: 180 TABLET | Refills: 5 | Status: SHIPPED | OUTPATIENT
Start: 2025-03-31

## 2025-03-31 RX ORDER — EPINEPHRINE 0.3 MG/.3ML
INJECTION SUBCUTANEOUS
Qty: 2 EACH | Refills: 3 | Status: SHIPPED | OUTPATIENT
Start: 2025-03-31

## 2025-03-31 NOTE — PROGRESS NOTES
"Gracie is a 49 year old who is being evaluated via a billable video visit.    How would you like to obtain your AVS? MyChart  If the video visit is dropped, the invitation should be resent by: Text to cell phone: [phone]  Will anyone else be joining your video visit? No        Subjective   Gracie is a 49 year old, presenting for the following health issues:  follow up, dermatitis, menopause, migraines  RECHECK and Facial concerns and medications    History of Present Illness       Reason for visit:  Face stuff and follow ups refillsShe consumes 3 sweetened beverage(s) daily.         Gracie is doing \"okay\" has some increased low back pain carrying around her baby daughter who is growing and gaining weight.  We reviewed MRI findings from two years ago, we discussed strategies, if not improving she will come in for an in person visit to do more physical exam and consider imaging.    It's been over 12 months since her last menstrual period and she is having hot flashes, fatigue, vaginal dryness.  We discussed different management strategies, both hormonal and nonhormonal for menopause symptoms.  She'd like to try a low dose combination patch in addition to vaginal estrogen twice weekly and we will see how this works for her.  Also advised to check with Dr. Roy; she has no personal history of thrombosis and screening for lupus anticoagulant has been negative, but obviously we did consider thrombotic risk. Transdermal approach would be safer in that sense, so that is what I would recommend.    Saw Neurology recently, was recommended to take nurtec in addition to sumatriptan; she also was unable to get scheduled botox injections for some time and is feeling the effects of that.  Has two scheduled for early next month.     She also endorses flaking, irritation, and cracking around the skin of both lips (corner of the mouth).  We discussed topical vaseline twice daily for healing.      Objective           Vitals:  No vitals " were obtained today due to virtual visit.    Physical Exam   GENERAL: alert and no distress  EYES: Eyes grossly normal to inspection.  No discharge or erythema, or obvious scleral/conjunctival abnormalities.  RESP: No audible wheeze, cough, or visible cyanosis.    SKIN: Visible skin clear. No significant rash, abnormal pigmentation or lesions.  NEURO: Cranial nerves grossly intact.  Mentation and speech appropriate for age.  PSYCH: Appropriate affect, tone, and pace of words    Gracie was seen today for recheck and facial dermatitis as well as menopause and migraine management    Menopause  -   Start  estradiol (VAGIFEM) 10 MCG TABS vaginal tablet; Place 1 tablet (10 mcg) vaginally twice a week.  -    Start estradiol-norethindrone (COMBIPATCH) 0.05-0.14 MG/DAY bi-weekly patch; Place 1 patch over 96 hours onto the skin twice a week.    Migraine without status migrainosus, not intractable, unspecified migraine type  -     Refill traMADol (ULTRAM) 50 MG tablet; Take 1 tablet (50 mg) by mouth every 6 hours as needed for severe pain. Take 1-2 tablets up to three times a day as needed - Oral    Food allergy  -     EPINEPHrine (ANY BX GENERIC EQUIV) 0.3 MG/0.3ML injection 2-pack; INJECT 0.3 MG INTO THE MUSCLE AS NEEDED FOR ANAPHYLAXIS    Migraine without aura and without status migrainosus, not intractable  -    Refill SUMAtriptan (IMITREX STATDOSE) 6 MG/0.5ML pen injector kit; Inject 0.5 mLs (6 mg) subcutaneously at onset of headache for migraine. May repeat in 1 hour. Max 12 mg/24 hours.     RTC three months, in person visit    Shavon Guzman MD          Video-Visit Details    Type of service:  Video Visit started 12:01 ended 12:18 with another 15 minutes chart review and documentation same day  Originating Location (pt. Location): Home    Distant Location (provider location):  On-site  Platform used for Video Visit: Padmini  Signed Electronically by: Shavon Guzman MD

## 2025-03-31 NOTE — PATIENT INSTRUCTIONS
Thank you for visiting the Primary Care Center today at the [facility]! The following is some information about our clinic:     Primary Care Center Frequently-Asked Questions    (1) How do I schedule appointments at the [facility]?     Primary Care--to schedule or make changes to an existing appointment, please call our primary care line at [phone].    Labs--to schedule a lab appointment at the [facility] you can use [facility] or call [phone]. If you have a [place] location that is closer to home, you can reach out to that location for scheduling options.     Imaging--if you need to schedule a CT, X-ray, MRI, ultrasound, or other imaging study you can call [phone] to schedule at the [facility] or any other [facility] imaging location.     Referrals--if a referral to another specialty was ordered you can expect a phone call from their scheduling team. If you have not heard from them in a week, please call us or send us a [facility] message to check the status or get a scheduling number. Please note that this only applies to internal [facility] referrals. If the referral is external you would need to contact their office for scheduling.     (2) I have a question about my visit, who do I contact?     You can call us at the primary care line at [phone] to ask questions about your visit. You can also send a secure message through [facility], which is reviewed by clinic staff. Please note that [facility] messages have a twenty-four to forty-eight business hour turnaround time and should not be used for urgent concerns.    (3) How will I get the results of my tests?    If you are signed up for [facility] all tests will be released to you within twenty-four hours of resulting. Please allow three to five days for your doctor to review your results and place a note interpreting the results. If you do not have [facility] you will receive your  results through mail seven to ten business days following the return of the tests. Please note that if there should be any urgent or concerning results that your doctor or their registered nurse will reach out to you the same day as the tests come back. If you have follow up questions about your results or would like to discuss the results in detail please schedule a follow up with your provider either in person or virtually.     (4) How do I get refills of my prescriptions?     You should always first contact your pharmacy for refills of your medications. If submitting a refill request on Mipro[facility], please be sure to submit the request only once--repeat requests can cause delays in refill. If you are requesting a NEW medication or a medication related to new symptoms you will need to schedule an appointment with a provider prior to approval. Please note: Routine medication refills have up to one to three business day turnaround whereas controlled substances refills have up to five to seven business day turnaround.    (5) I have new symptoms, what do I do?     If you are having an immediate medical emergency, you should dial 911 for assistance.   For anything urgent that needs to be seen within a few hours to one day you should visit a local urgent care for assistance.  For non-urgent symptoms that need to be seen within a few days to a week you can schedule with an available provider in primary care by going to [facility] or calling [phone].   If you are not sure how serious your symptoms are or you would like to receive medical advice you can always call [phone] to speak with a triage nurse.

## 2025-04-01 ENCOUNTER — MYC MEDICAL ADVICE (OUTPATIENT)
Dept: INTERNAL MEDICINE | Facility: CLINIC | Age: 50
End: 2025-04-01
Payer: COMMERCIAL

## 2025-04-01 NOTE — TELEPHONE ENCOUNTER
Left Voicemail (1st Attempt) and Sent Mychart (1st Attempt) for the patient to call back and schedule the following:    Appointment type: Return   Provider: PCP  Return date: Late June/July   Specialty phone number: [phone]  Additonal Notes: per check out -Return in about 3 months (around 6/30/2025) for Follow up, in person.

## 2025-04-03 NOTE — TELEPHONE ENCOUNTER
Left Voicemail (2nd Attempt) for the patient to call back and schedule the following:    Appointment type: Return   Provider: PCP  Return date: Late June/July   Specialty phone number: [phone]  Additonal Notes: per check out -Return in about 3 months (around 6/30/2025) for Follow up, in person.

## 2025-04-12 DIAGNOSIS — G47.11 IDIOPATHIC HYPERSOMNIA: ICD-10-CM

## 2025-04-13 NOTE — TELEPHONE ENCOUNTER
Last Written Prescription:  NOT ON ACTIVE MED LIST  modafinil (PROVIGIL) 100 MG tablet    Sig: TAKE 1 TABLET(100 MG) BY MOUTH DAILY    ----------------------  Last Visit Date: 7-10-24  Future Visit Date: 5-16-25    Refill decision: Medication unable to be refilled by RN due to:   Controlled medication  NOTON ACTIVE MED LIST  Discontinue BY OTHER PROVIDER  Discontinued None Debora Chin, CARISSA CNP 2/26/25 1210    Request from pharmacy:  Requested Prescriptions   Pending Prescriptions Disp Refills    modafinil (PROVIGIL) 100 MG tablet [Pharmacy Med Name: MODAFINIL 100MG TABLETS] 30 tablet      Sig: TAKE 1 TABLET(100 MG) BY MOUTH DAILY       Rx Protocol Controlled Substance Failed - 4/13/2025  9:45 AM        Failed - Visit with relevant provider in past 3 months or upcoming 3 months        Failed - Medication is active on med list and the sig matches        Failed - Urine drug screeen results on file in past 12 months     [unfilled]           Failed - Medication not refilled in past 28 days     Invalid Medication [place]          Failed - Controlled Substance Agreement on file in last 12 months     Please review last Controlled Substance Pain agreement document.   CSA -- Encounter Level:    CSA: None found at the encounter level.       CSA -- Patient Level:    CSA: None found at the patient level.               Failed - Auto Fail - Please forward to Provider        Passed - No active pregnancy on record        Passed - No pregnancy test in past 12 months or most recent test was negative

## 2025-04-14 NOTE — TELEPHONE ENCOUNTER
Josh Roy MD to Me     4/14/25 10:13 AM  Could we ask Gracie about it?      Left detailed message with call back number regarding refill of Provigil on self-identified VM.       Ronda HAWKINS RN  [facility]

## 2025-04-14 NOTE — TELEPHONE ENCOUNTER
Medication/Dose: modafinil (PROVIGIL) 100 MG tablet   Last Written Prescription Date: 7/10/24  Last Fill Quantity: 30, # refills: 5  Last Office Visit:  7/10/24  Next Enc:  5/16/25    Rx discontinued by neurology in February visit, however, there is no mention of why this was done in their notes.     Rx pended for original prescriber to review: controlled substance.         Ronda Wilhelm RN  [facility]

## 2025-04-17 ENCOUNTER — OFFICE VISIT (OUTPATIENT)
Dept: PHYSICAL MEDICINE AND REHAB | Facility: CLINIC | Age: 50
End: 2025-04-17
Payer: COMMERCIAL

## 2025-04-17 DIAGNOSIS — G24.4 OROMANDIBULAR DYSTONIA: ICD-10-CM

## 2025-04-17 DIAGNOSIS — G43.719 INTRACTABLE CHRONIC MIGRAINE WITHOUT AURA AND WITHOUT STATUS MIGRAINOSUS: Primary | ICD-10-CM

## 2025-04-17 DIAGNOSIS — G24.9 DYSTONIA: ICD-10-CM

## 2025-04-17 NOTE — NURSING NOTE
Chief Complaint   Patient presents with    Procedure     Here for Botox injections, confirmed with patient     Eri Roy

## 2025-04-17 NOTE — PROGRESS NOTES
CC: Patient with chronic pain headache neck pain and migraines.     Patient returns for follow-up visit and for ongoing Botox injections.  Her last Botox injection was on 10/24/2024..  She is finding Botox helpful including the treatment of her jaws.      She wants to get MRI of the cervical spine.  She feels her jaw is beginning to bother her quite a bit.  She continues to take soft food.  She is pointing out to the lateral pterygoids as muscles that are sore bilaterally.  She is also expressing tightness in the muscles of the neck and shoulder region.  She also complains of low back issues in the setting of endometriosis.     Patient is a very pleasant 49-year-old woman with a complex medical history.  She has been dealing with severe migraines neck pain as well as TMJ dysfunction.  She has seen her dentist.  She has been getting Botox which have been helpful.  She still has several headaches and the headaches worsen when the Botox wears off.  She has also noted significant tightness in the muscles of the neck and shoulder.  She received her last Botox on 4/25/2024.\     She also gives history of lupus.  Because of steroids, she has fungal infection in her toenails.  She has had issues with her lower back and has previously undergone radiofrequency ablation.  She currently hurts all over and notes that she has been given a diagnosis of fibroid some point in the past.     She has had pain in the 8-10 out of 10 at its worst 3-4 at best 3-4 currently.  She finds that it affects all of her activities sleep as well.  She also has issues with  strength in the hands.  She has done physical therapy for her back.  She has not had imaging studies for the neck.  She denies any bowel bladder disturbance.  She has constipation and hemorrhoids.     In reviewing the history she has had chronic pain affecting the neck and shoulders for years.  She has tried medications including ibuprofen and Tylenol for more than 3 months  without relief.  She would like to get this under control.     EXAM: MR LUMBAR SPINE W/O CONTRAST  LOCATION: [facility]  DATE/TIME: 5/24/2022 7:04 PM     INDICATION: Myelopathy, acute or progressive.  COMPARISON: 12/9/2019  TECHNIQUE: Routine Lumbar Spine MRI without IV contrast.     FINDINGS:   Nomenclature is based on 5 lumbar type vertebral bodies. Satisfactory vertebral body height. There is 2 mm degenerative anterior subluxation L5 upon S1 with no pars interarticularis defects. Mild interspace narrowing L2-L3 and L5-S1. No compression   fractures. No marrow or ligamentous edema. Satisfactory sacral alignment. Normal distal spinal cord and cauda equina with conus medullaris at L1. No cord expansive changes are noted. Nerve roots of the cauda equina are satisfactory without nodularity or   thickening. No morphologic evidence to suggest arachnoiditis. No retroperitoneal solid mass or adenopathy. Symmetric psoas appearance bilaterally. Nothing for sacral insufficiency or stress fracture. No pelvic mass or adenopathy is noted.     T12-L1: Normal disc height and signal. No herniation. Normal facets. No spinal canal or neural foraminal stenosis.      L1-L2: Normal disc height and signal. No herniation. Normal facets. No spinal canal or neural foraminal stenosis.     L2-L3: Mild loss of disc height with annular bulge. No disc herniation. No spinal stenosis. Suggested mild left foraminal compromise without right foraminal stenosis. Facet joints are satisfactory.      L3-L4: Normal disc height and signal. No herniation. Normal facets. No spinal canal or neural foraminal stenosis.     L4-L5: Minimal annular bulging. No disc herniation. No spinal stenosis. Mild foraminal narrowing inferiorly bilaterally and mild facet joint hypertrophic changes with increased joint space fluid.     L5-S1: Moderate loss of disc height. Uncovering of disc material superiorly with low-grade degenerative anterior  subluxation and generalized disc bulge. No disc herniation. No spinal stenosis. Mild bilateral foraminal compromise. Facet joint hypertrophic   changes bilaterally with increased joint space fluid. Increased joint space fluid overall has been described in association with instability, consider flexion and extension views to assess L4-L5 and L5-S1 relationships on dynamic views as indicated.                                                                      IMPRESSION:  1.  Degenerative changes lower lumbar spine most marked L4-L5 and L5-S1. No severe spinal stenosis or severe foraminal compromise at any lumbar level.     2.  No spinal stenosis with mild L4-L5 and L5-S1 foraminal compromise inferiorly. Facet joint arthropathy and increased joint space fluid is noted at these levels.     3.  Consider flexion-extension views if there is concern for instability as increased joint space fluid in the facet joints has been described in association with instability.     4.  Since previous MR 12/9/2019, L5-S1 anterior subluxation has progressed, along with interspace narrowing. Previously identified synovial cyst arising from the medial aspect of the degenerated right L5-S1 facet joint is not present on today's study   with decreased mass effect upon the right lateral thecal sac and mass effect upon the traversing right-sided nerve roots S1-S2.        [facility]   MRI HEAD WITHOUT AND WITH CONTRAST   5/17/2013     INDICATION: Lupus, psychosis, auditory hallucinations.   TECHNIQUE: Routine brain MRI without and with gadolinium. CONTRAST:   Magnevist 12 mL IV.   COMPARISON: Head MRI 7/2/2010.     REPORT: Evaluation of the intracranial contents demonstrates there is no   evidence for acute or subacute infarct on the diffusion acquisition. No   evidence for intracranial hemorrhage, mass lesion, or obstructive type   hydrocephalus. No pathologic enhancement of the brain parenchyma or   meninges following IV gadolinium.  Mild inferior position of the cerebellar   tonsils at the craniovertebral junction noted; this is unchanged and likely    incidental. The major intracranial vascular flow voids are maintained.   Mastoid air cells clear. Paranasal sinuses show a trace of mucosal   thickening in the right ethmoid air cells.     CONCLUSION:   1. No evidence for intracranial mass, hemorrhage, hydrocephalus, or   infarct.   2. Slight inferior position of the cerebellar tonsils noted at the   craniovertebral junction which is unchanged from prior study. This is   likely an incidental finding.   3. Mastoid air cells clear. Trace of mucosal thickening in the right   ethmoid sinuses.   Past Medical History           Past Medical History:   Diagnosis Date    Allergy, unspecified not elsewhere classified      Endometriosis      Fibromyalgia      Migraine without aura, with intractable migraine, so stated, without mention of status migrainosus      Myalgia and myositis, unspecified      Systemic lupus erythematosus (H)              Past Surgical History             Past Surgical History:   Procedure Laterality Date    SURGICAL HISTORY OF -    01/01/1986     left elbow fracture repair            Family History            Problem (# of Occurrences) Relation (Name,Age of Onset)     Dementia (1) Mother     Diabetes (1) Maternal Grandfather     Lipids (1) Mother     Prostate Cancer (1) Father (80)     Skin Cancer (1) Paternal Grandmother     Thyroid Cancer (1) Mother (50 - 60)               Negative family history of: Melanoma                Social History   Social History                Socioeconomic History    Marital status:        Spouse name: Not on file    Number of children: Not on file    Years of education: Not on file    Highest education level: Not on file   Occupational History    Not on file   Tobacco Use    Smoking status: Never    Smokeless tobacco: Never   Substance and Sexual Activity    Alcohol use: Not Currently    Drug  use: No    Sexual activity: Yes       Partners: Male       Birth control/protection: Condom   Other Topics Concern    Parent/sibling w/ CABG, MI or angioplasty before 65F 55M? Not Asked   Social History Narrative    Not on file      Social Determinants of Health              Financial Resource Strain: Not on file   Food Insecurity: Not on file   Transportation Needs: Not on file   Physical Activity: Not on file   Stress: Not on file   Social Connections: Not on file   Interpersonal Safety: Low Risk  (2/21/2024)     Interpersonal Safety      Do you feel physically and emotionally safe where you currently live?: Yes      Within the past 12 months, have you been hit, slapped, kicked or otherwise physically hurt by someone?: No      Within the past 12 months, have you been humiliated or emotionally abused in other ways by your partner or ex-partner?: No   Housing Stability: Not on file            Current Outpatient Medications   Medication Sig Dispense Refill    AMBIEN 10 MG OR TABS 1 po HS, prn 20 0    calcium carbonate (OS-VAHID 500 MG [place]. [place]) 1250 MG tablet Take 1 tablet by mouth daily      EPINEPHrine (ANY BX GENERIC EQUIV) 0.3 MG/0.3ML injection 2-pack INJECT 0.3 MG INTO THE MUSCLE AS NEEDED FOR ANAPHYLAXIS 2 each 3    estradiol (VAGIFEM) 10 MCG TABS vaginal tablet Place 1 tablet (10 mcg) vaginally twice a week. 24 tablet 3    estradiol-norethindrone (COMBIPATCH) 0.05-0.14 MG/DAY bi-weekly patch Place 1 patch over 96 hours onto the skin twice a week. 24 patch 3    fluticasone (FLONASE) 50 MCG/ACT nasal spray SHAKE LIQUID AND USE 2 SPRAYS IN EACH NOSTRIL DAILY 16 g 11    galcanezumab-gnlm (EMGALITY) 120 MG/ML injection Inject 240 mg subcutaneous first month and then inject 120 mg subcutaneous every 28 days 2 mL 11    hydroxychloroquine (PLAQUENIL) 200 MG tablet Take 1 tablet (200 mg) by mouth 2 times daily 180 tablet 0    MULTIVITAMIN TABS   OR   0    omeprazole (PRILOSEC) 40 MG DR capsule Take 1 capsule (40 mg) by  mouth daily 60 capsule 1    ondansetron (ZOFRAN) 8 MG tablet Take 1 tablet (8 mg) by mouth 2 times daily as needed. 30 tablet 11    predniSONE (DELTASONE) 1 MG tablet 9-8-7-6 mg a day, each course x 1 month then 5 mg a day, use with 5 mg size tab 90 tablet 5    predniSONE (DELTASONE) 5 MG tablet 9-8-7-6 mg a day, each course x 1 month then 5 mg a day, use with 1 mg size tab 150 tablet 5    rimegepant (NURTEC) 75 MG ODT tablet Place 1 tablet (75 mg) under the tongue daily as needed for migraine. Maximum of 1 tablet every 24 hours. 8 tablet 11    SUMAtriptan (IMITREX STATDOSE) 6 MG/0.5ML pen injector kit Inject 0.5 mLs (6 mg) subcutaneously at onset of headache for migraine. May repeat in 1 hour. Max 12 mg/24 hours. 3 kit 11    traMADol (ULTRAM) 50 MG tablet Take 1 tablet (50 mg) by mouth every 6 hours as needed for severe pain. Take 1-2 tablets up to three times a day as needed - Oral 180 tablet 5    traMADol (ULTRAM-ER) 300 MG 24 hr tablet Take 1 tablet (300 mg) by mouth at bedtime. maximum 1 tablet(s) per day 30 tablet 5    VITAMIN D, CHOLECALCIFEROL, PO Take 5,000 Units by mouth daily      rosuvastatin (CRESTOR) 5 MG tablet Take 1 tablet (5 mg) by mouth daily (Patient not taking: No sig reported) 90 tablet 3    semaglutide (OZEMPIC) 2 MG/3ML pen Inject 0.5 mg subcutaneously every 7 days. (Patient not taking: No sig reported) 9 mL 3       There were no vitals taken for this visit.       On examination, patient is alert and cooperative.  Vitals are stable.  She is afebrile.  HEENT is negative.  Extraocular movements are intact.  Face is symmetric.  Tongue is midline neck is supple.  She has taut muscles in the neck and shoulder region.     She has activation of the masseters bilaterally.  The lateral pterygoids and medial pterygoids on either side are tender and taut..     Impression: At this point this 49-year-old woman with multiple issues going on.  She has chronic headaches with migraines.  In addition she also  has TMJ dysfunction/oromandibular dystonia.  There is an element of cervical dystonia/dystonia as well.  Will continue with 200 units of Botox.    I have suggested that she return for reassessment in 4 weeks time to assess her neck and order additional workup.    This was reviewed at length with the patient.  All her questions were answered to her satisfaction.  30 minutes were spent for this visit, exclusive of the procedure.  All on the date of encounter. More than 50% of which was for counseling on above-mentioned issues.     PROCEDURE NOTE: Botox injections for intractable migraine and oromandibular dystonia/cervical dystonia/dystonia.     After explaining the benefits and risks of the procedure, using 200 units of Botox diluted with 4 cc of preservative-free normal saline, using ChloraPrep for skin prep Botox for migraine protocol 5 units were injected in 31 different areas.  Next using EMG needed for localization 10 units were injected into the lateral pterygoid muscles on either side using EMG needle.  Levator scapulae on either side were injected with 7.5 units each.  The masseter muscles on either side were injected with 10 units each.  200 units were utilized in all.  None wasted.  She tolerated the procedure well.     She can ice the areas injected, continue with soft food, and follow-up with the visit in 4 weeks time.  Will await the results of the MRI of the cervical spine as well.     Thank you much for allow me to assist in her care.     MD deann Brooks

## 2025-04-17 NOTE — LETTER
4/17/2025       RE: Patricia Hall  [address]  [place] [address]     Dear Colleague,    Thank you for referring your patient, Patricia Hall, to the [facility] PHYSICAL MEDICINE AND REHABILITATION CLINIC [place] at [facility]. Please see a copy of my visit note below.    CC: Patient with chronic pain headache neck pain and migraines.     Patient returns for follow-up visit and for ongoing Botox injections.  Her last Botox injection was on 10/24/2024..  She is finding Botox helpful including the treatment of her jaws.      She wants to get MRI of the cervical spine.  She feels her jaw is beginning to bother her quite a bit.  She continues to take soft food.  She is pointing out to the lateral pterygoids as muscles that are sore bilaterally.  She is also expressing tightness in the muscles of the neck and shoulder region.  She also complains of low back issues in the setting of endometriosis.     Patient is a very pleasant 49-year-old woman with a complex medical history.  She has been dealing with severe migraines neck pain as well as TMJ dysfunction.  She has seen her dentist.  She has been getting Botox which have been helpful.  She still has several headaches and the headaches worsen when the Botox wears off.  She has also noted significant tightness in the muscles of the neck and shoulder.  She received her last Botox on 4/25/2024.\     She also gives history of lupus.  Because of steroids, she has fungal infection in her toenails.  She has had issues with her lower back and has previously undergone radiofrequency ablation.  She currently hurts all over and notes that she has been given a diagnosis of fibroid some point in the past.     She has had pain in the 8-10 out of 10 at its worst 3-4 at best 3-4 currently.  She finds that it affects all of her activities sleep as well.  She also has issues with  strength in  the hands.  She has done physical therapy for her back.  She has not had imaging studies for the neck.  She denies any bowel bladder disturbance.  She has constipation and hemorrhoids.     In reviewing the history she has had chronic pain affecting the neck and shoulders for years.  She has tried medications including ibuprofen and Tylenol for more than 3 months without relief.  She would like to get this under control.     EXAM: MR LUMBAR SPINE W/O CONTRAST  LOCATION: [facility]  DATE/TIME: 5/24/2022 7:04 PM     INDICATION: Myelopathy, acute or progressive.  COMPARISON: 12/9/2019  TECHNIQUE: Routine Lumbar Spine MRI without IV contrast.     FINDINGS:   Nomenclature is based on 5 lumbar type vertebral bodies. Satisfactory vertebral body height. There is 2 mm degenerative anterior subluxation L5 upon S1 with no pars interarticularis defects. Mild interspace narrowing L2-L3 and L5-S1. No compression   fractures. No marrow or ligamentous edema. Satisfactory sacral alignment. Normal distal spinal cord and cauda equina with conus medullaris at L1. No cord expansive changes are noted. Nerve roots of the cauda equina are satisfactory without nodularity or   thickening. No morphologic evidence to suggest arachnoiditis. No retroperitoneal solid mass or adenopathy. Symmetric psoas appearance bilaterally. Nothing for sacral insufficiency or stress fracture. No pelvic mass or adenopathy is noted.     T12-L1: Normal disc height and signal. No herniation. Normal facets. No spinal canal or neural foraminal stenosis.      L1-L2: Normal disc height and signal. No herniation. Normal facets. No spinal canal or neural foraminal stenosis.     L2-L3: Mild loss of disc height with annular bulge. No disc herniation. No spinal stenosis. Suggested mild left foraminal compromise without right foraminal stenosis. Facet joints are satisfactory.      L3-L4: Normal disc height and signal. No herniation. Normal facets. No  spinal canal or neural foraminal stenosis.     L4-L5: Minimal annular bulging. No disc herniation. No spinal stenosis. Mild foraminal narrowing inferiorly bilaterally and mild facet joint hypertrophic changes with increased joint space fluid.     L5-S1: Moderate loss of disc height. Uncovering of disc material superiorly with low-grade degenerative anterior subluxation and generalized disc bulge. No disc herniation. No spinal stenosis. Mild bilateral foraminal compromise. Facet joint hypertrophic   changes bilaterally with increased joint space fluid. Increased joint space fluid overall has been described in association with instability, consider flexion and extension views to assess L4-L5 and L5-S1 relationships on dynamic views as indicated.                                                                      IMPRESSION:  1.  Degenerative changes lower lumbar spine most marked L4-L5 and L5-S1. No severe spinal stenosis or severe foraminal compromise at any lumbar level.     2.  No spinal stenosis with mild L4-L5 and L5-S1 foraminal compromise inferiorly. Facet joint arthropathy and increased joint space fluid is noted at these levels.     3.  Consider flexion-extension views if there is concern for instability as increased joint space fluid in the facet joints has been described in association with instability.     4.  Since previous MR 12/9/2019, L5-S1 anterior subluxation has progressed, along with interspace narrowing. Previously identified synovial cyst arising from the medial aspect of the degenerated right L5-S1 facet joint is not present on today's study   with decreased mass effect upon the right lateral thecal sac and mass effect upon the traversing right-sided nerve roots S1-S2.        [facility]   MRI HEAD WITHOUT AND WITH CONTRAST   5/17/2013     INDICATION: Lupus, psychosis, auditory hallucinations.   TECHNIQUE: Routine brain MRI without and with gadolinium. CONTRAST:   Magnevist 12 mL IV.    COMPARISON: Head MRI 7/2/2010.     REPORT: Evaluation of the intracranial contents demonstrates there is no   evidence for acute or subacute infarct on the diffusion acquisition. No   evidence for intracranial hemorrhage, mass lesion, or obstructive type   hydrocephalus. No pathologic enhancement of the brain parenchyma or   meninges following IV gadolinium. Mild inferior position of the cerebellar   tonsils at the craniovertebral junction noted; this is unchanged and likely    incidental. The major intracranial vascular flow voids are maintained.   Mastoid air cells clear. Paranasal sinuses show a trace of mucosal   thickening in the right ethmoid air cells.     CONCLUSION:   1. No evidence for intracranial mass, hemorrhage, hydrocephalus, or   infarct.   2. Slight inferior position of the cerebellar tonsils noted at the   craniovertebral junction which is unchanged from prior study. This is   likely an incidental finding.   3. Mastoid air cells clear. Trace of mucosal thickening in the right   ethmoid sinuses.   Past Medical History           Past Medical History:   Diagnosis Date     Allergy, unspecified not elsewhere classified       Endometriosis       Fibromyalgia       Migraine without aura, with intractable migraine, so stated, without mention of status migrainosus       Myalgia and myositis, unspecified       Systemic lupus erythematosus (H)              Past Surgical History             Past Surgical History:   Procedure Laterality Date     SURGICAL HISTORY OF -    01/01/1986     left elbow fracture repair            Family History            Problem (# of Occurrences) Relation (Name,Age of Onset)     Dementia (1) Mother     Diabetes (1) Maternal Grandfather     Lipids (1) Mother     Prostate Cancer (1) Father (80)     Skin Cancer (1) Paternal Grandmother     Thyroid Cancer (1) Mother (50 - 60)               Negative family history of: Melanoma                Social History   Social History                 Socioeconomic History     Marital status:        Spouse name: Not on file     Number of children: Not on file     Years of education: Not on file     Highest education level: Not on file   Occupational History     Not on file   Tobacco Use     Smoking status: Never     Smokeless tobacco: Never   Substance and Sexual Activity     Alcohol use: Not Currently     Drug use: No     Sexual activity: Yes       Partners: Male       Birth control/protection: Condom   Other Topics Concern     Parent/sibling w/ CABG, MI or angioplasty before 65F 55M? Not Asked   Social History Narrative     Not on file      Social Determinants of Health              Financial Resource Strain: Not on file   Food Insecurity: Not on file   Transportation Needs: Not on file   Physical Activity: Not on file   Stress: Not on file   Social Connections: Not on file   Interpersonal Safety: Low Risk  (2/21/2024)     Interpersonal Safety       Do you feel physically and emotionally safe where you currently live?: Yes       Within the past 12 months, have you been hit, slapped, kicked or otherwise physically hurt by someone?: No       Within the past 12 months, have you been humiliated or emotionally abused in other ways by your partner or ex-partner?: No   Housing Stability: Not on file            Current Outpatient Medications   Medication Sig Dispense Refill     AMBIEN 10 MG OR TABS 1 po HS, prn 20 0     calcium carbonate (OS-VAHID 500 MG [place]. [place]) 1250 MG tablet Take 1 tablet by mouth daily       EPINEPHrine (ANY BX GENERIC EQUIV) 0.3 MG/0.3ML injection 2-pack INJECT 0.3 MG INTO THE MUSCLE AS NEEDED FOR ANAPHYLAXIS 2 each 3     estradiol (VAGIFEM) 10 MCG TABS vaginal tablet Place 1 tablet (10 mcg) vaginally twice a week. 24 tablet 3     estradiol-norethindrone (COMBIPATCH) 0.05-0.14 MG/DAY bi-weekly patch Place 1 patch over 96 hours onto the skin twice a week. 24 patch 3     fluticasone (FLONASE) 50 MCG/ACT nasal spray SHAKE LIQUID AND USE 2  SPRAYS IN EACH NOSTRIL DAILY 16 g 11     galcanezumab-gnlm (EMGALITY) 120 MG/ML injection Inject 240 mg subcutaneous first month and then inject 120 mg subcutaneous every 28 days 2 mL 11     hydroxychloroquine (PLAQUENIL) 200 MG tablet Take 1 tablet (200 mg) by mouth 2 times daily 180 tablet 0     MULTIVITAMIN TABS   OR   0     omeprazole (PRILOSEC) 40 MG DR capsule Take 1 capsule (40 mg) by mouth daily 60 capsule 1     ondansetron (ZOFRAN) 8 MG tablet Take 1 tablet (8 mg) by mouth 2 times daily as needed. 30 tablet 11     predniSONE (DELTASONE) 1 MG tablet 9-8-7-6 mg a day, each course x 1 month then 5 mg a day, use with 5 mg size tab 90 tablet 5     predniSONE (DELTASONE) 5 MG tablet 9-8-7-6 mg a day, each course x 1 month then 5 mg a day, use with 1 mg size tab 150 tablet 5     rimegepant (NURTEC) 75 MG ODT tablet Place 1 tablet (75 mg) under the tongue daily as needed for migraine. Maximum of 1 tablet every 24 hours. 8 tablet 11     SUMAtriptan (IMITREX STATDOSE) 6 MG/0.5ML pen injector kit Inject 0.5 mLs (6 mg) subcutaneously at onset of headache for migraine. May repeat in 1 hour. Max 12 mg/24 hours. 3 kit 11     traMADol (ULTRAM) 50 MG tablet Take 1 tablet (50 mg) by mouth every 6 hours as needed for severe pain. Take 1-2 tablets up to three times a day as needed - Oral 180 tablet 5     traMADol (ULTRAM-ER) 300 MG 24 hr tablet Take 1 tablet (300 mg) by mouth at bedtime. maximum 1 tablet(s) per day 30 tablet 5     VITAMIN D, CHOLECALCIFEROL, PO Take 5,000 Units by mouth daily       rosuvastatin (CRESTOR) 5 MG tablet Take 1 tablet (5 mg) by mouth daily (Patient not taking: No sig reported) 90 tablet 3     semaglutide (OZEMPIC) 2 MG/3ML pen Inject 0.5 mg subcutaneously every 7 days. (Patient not taking: No sig reported) 9 mL 3       There were no vitals taken for this visit.       On examination, patient is alert and cooperative.  Vitals are stable.  She is afebrile.  HEENT is negative.  Extraocular movements  are intact.  Face is symmetric.  Tongue is midline neck is supple.  She has taut muscles in the neck and shoulder region.     She has activation of the masseters bilaterally.  The lateral pterygoids and medial pterygoids on either side are tender and taut..     Impression: At this point this 49-year-old woman with multiple issues going on.  She has chronic headaches with migraines.  In addition she also has TMJ dysfunction/oromandibular dystonia.  There is an element of cervical dystonia/dystonia as well.  Will continue with 200 units of Botox.    I have suggested that she return for reassessment in 4 weeks time to assess her neck and order additional workup.    This was reviewed at length with the patient.  All her questions were answered to her satisfaction.  30 minutes were spent for this visit, exclusive of the procedure.  All on the date of encounter. More than 50% of which was for counseling on above-mentioned issues.     PROCEDURE NOTE: Botox injections for intractable migraine and oromandibular dystonia/cervical dystonia/dystonia.     After explaining the benefits and risks of the procedure, using 200 units of Botox diluted with 4 cc of preservative-free normal saline, using ChloraPrep for skin prep Botox for migraine protocol 5 units were injected in 31 different areas.  Next using EMG needed for localization 10 units were injected into the lateral pterygoid muscles on either side using EMG needle.  Levator scapulae on either side were injected with 7.5 units each.  The masseter muscles on either side were injected with 10 units each.  200 units were utilized in all.  None wasted.  She tolerated the procedure well.     She can ice the areas injected, continue with soft food, and follow-up with the visit in 4 weeks time.  Will await the results of the MRI of the cervical spine as well.     Thank you much for allow me to assist in her care.     Chacorta Hoyos MD b      Again, thank you for allowing me  to participate in the care of your patient.      Sincerely,    Chacorta Hoyos MD

## 2025-04-21 RX ORDER — MODAFINIL 100 MG/1
100 TABLET ORAL DAILY
Qty: 30 TABLET | Refills: 5 | OUTPATIENT
Start: 2025-04-21

## 2025-04-21 NOTE — TELEPHONE ENCOUNTER
Left detailed message with call back number regarding refill request on self-identified VM. 3rd attempt (2 calls and mychart). Will close encounter.       Ronda HAWKINS RN  [facility]

## 2025-05-01 ENCOUNTER — VIRTUAL VISIT (OUTPATIENT)
Dept: URGENT CARE | Facility: CLINIC | Age: 50
End: 2025-05-01
Payer: COMMERCIAL

## 2025-05-01 DIAGNOSIS — J01.90 ACUTE NON-RECURRENT SINUSITIS, UNSPECIFIED LOCATION: Primary | ICD-10-CM

## 2025-05-01 RX ORDER — DOXYCYCLINE HYCLATE 100 MG
100 TABLET ORAL 2 TIMES DAILY
Qty: 14 TABLET | Refills: 0 | Status: SHIPPED | OUTPATIENT
Start: 2025-05-01 | End: 2025-05-08

## 2025-05-01 NOTE — PROGRESS NOTES
"Gracie is a 49 year old who is being evaluated via a billable video visit.          Assessment & Plan     Acute non-recurrent sinusitis, unspecified location    - doxycycline hyclate (VIBRA-TABS) 100 MG tablet; Take 1 tablet (100 mg) by mouth 2 times daily for 7 days.          BMI  Estimated body mass index is 26.83 kg/m  as calculated from the following:    Height as of 2/26/25: 1.695 m (5' 6.75\").    Weight as of 2/26/25: 77.1 kg (170 lb).         Follow-up  Return in about 1 week (around 5/8/2025), or if symptoms worsen or fail to improve.    Subjective   Gracie is a 49 year old, presenting for the following health issues:  No chief complaint on file.    HPI      Presents with 10-day history of upper respiratory illness that includes sinus pain and pressure, cough, nasal drainage and fever with max temp of 103.0 two days ago.  Has been using Tylenol and ibuprofen to help with symptom management.  History of lupus        Review of Systems  Constitutional, HEENT, cardiovascular, pulmonary, gi and gu systems are negative, except as otherwise noted.      Objective           Vitals:  No vitals were obtained today due to virtual visit.    Physical Exam   GENERAL: alert and no distress  RESP: No audible wheeze, cough, or visible cyanosis.    PSYCH: Appropriate affect, tone, and pace of words          Video-Visit Details    Type of service:  Video Visit   Originating Location (pt. Location): Home    Distant Location (provider location):  Off-site  Platform used for Video Visit: Padmini  Signed Electronically by: LUBNA BECKER VIRTUAL CARE PROVIDER 2    "

## 2025-05-04 ENCOUNTER — MYC MEDICAL ADVICE (OUTPATIENT)
Dept: INTERNAL MEDICINE | Facility: CLINIC | Age: 50
End: 2025-05-04
Payer: COMMERCIAL

## 2025-05-06 ENCOUNTER — VIRTUAL VISIT (OUTPATIENT)
Dept: INTERNAL MEDICINE | Facility: CLINIC | Age: 50
End: 2025-05-06
Payer: COMMERCIAL

## 2025-05-06 DIAGNOSIS — N30.00 ACUTE CYSTITIS WITHOUT HEMATURIA: Primary | ICD-10-CM

## 2025-05-06 PROCEDURE — 1125F AMNT PAIN NOTED PAIN PRSNT: CPT | Performed by: INTERNAL MEDICINE

## 2025-05-06 PROCEDURE — 98005 SYNCH AUDIO-VIDEO EST LOW 20: CPT | Performed by: INTERNAL MEDICINE

## 2025-05-06 RX ORDER — NITROFURANTOIN 25; 75 MG/1; MG/1
100 CAPSULE ORAL 2 TIMES DAILY
Qty: 14 CAPSULE | Refills: 0 | Status: SHIPPED | OUTPATIENT
Start: 2025-05-06 | End: 2025-05-13

## 2025-05-06 RX ORDER — PHENAZOPYRIDINE HYDROCHLORIDE 100 MG/1
100 TABLET, FILM COATED ORAL 3 TIMES DAILY PRN
Qty: 30 TABLET | Refills: 0 | Status: SHIPPED | OUTPATIENT
Start: 2025-05-06

## 2025-05-06 NOTE — PROGRESS NOTES
"Gracie is a 49 year old who is being evaluated via a billable video visit.    How would you like to obtain your AVS? MyChart  If the video visit is dropped, the invitation should be resent by: Text to cell phone: [phone]  Will anyone else be joining your video visit? No      Are refills needed on medications prescribed by this physician? NO    Brieanne Olvera VVF      Assessment & Plan     Acute cystitis without hematuria:  - Acute cystitis confirmed with over-the-counter test. No history of urinary tract infections.  - Complete doxycycline course for respiratory symptoms.  - Given pcn and sulfa allergies, prescribe nitrofurantoin for urinary tract infection.  - Prescribe Pyridium for symptom control. If symptoms persist, order urine culture for targeted treatment.    Consent was obtained from the patient to use an AI documentation tool in the creation of this note.    BMI  Estimated body mass index is 26.83 kg/m  as calculated from the following:    Height as of 2/26/25: 1.695 m (5' 6.75\").    Weight as of 2/26/25: 77.1 kg (170 lb).       I spent a total of 20 minutes on the day of the visit.  Time spent by me doing chart review, history and exam, documentation and further activities per the note    Subjective   Gracie is a 49 year old, presenting for the following health issues:  RECHECK and possible UTI    History of Present Illness       Reason for visit:  UTI and cold   She is taking medications regularly.   Respiratory Symptoms  - Developed chest and respiratory flu-like symptoms 2 days after Easter.  -  and daughter were also sick around the same time.  - Prescribed doxycycline for respiratory symptoms, first dose taken on May 1, 2025.  - Improvement noted with doxycycline; coughing up green phlegm, nasal congestion clearing.    Urinary Tract Infection (UTI) Symptoms  - Symptoms of UTI began a couple of days after a fever of 103 F on the previous Thursday.  - Over-the-counter test for UTI was positive.  - " "No prior history of urinary tract infections.  - Over-the-counter pyridium provides symptom relief.    Fever  - Experienced a fever of 103 F last Thursday, which was controlled.  - No recurrence of fever, though occasional feelings of feverishness without actual fever.                Objective    Vitals - Patient Reported  Weight (Patient Reported): 79.4 kg (175 lb)  Height (Patient Reported): 170.2 cm (5' 7\")  BMI (Based on Pt Reported Ht/Wt): 27.41  Pain Score: Severe Pain (7)  Pain Loc: Other - see comment        Physical Exam   GENERAL: alert and no distress  EYES: Eyes grossly normal to inspection.  No discharge or erythema, or obvious scleral/conjunctival abnormalities.  RESP: No audible wheeze, cough, or visible cyanosis.    SKIN: Visible skin clear. No significant rash, abnormal pigmentation or lesions.  NEURO: Cranial nerves grossly intact.  Mentation and speech appropriate for age.  PSYCH: Appropriate affect, tone, and pace of words          Video-Visit Details    Type of service:  Video Visit   Originating Location (pt. Location): Home    Distant Location (provider location):  Off-site  Platform used for Video Visit: Padmini  Signed Electronically by: Elva Le MD    "

## 2025-05-06 NOTE — PATIENT INSTRUCTIONS
Thank you for visiting the Primary Care Center today at the [facility]! The following is some information about our clinic:     Primary Care Center Frequently-Asked Questions    (1) How do I schedule appointments at the [facility]?     Primary Care--to schedule or make changes to an existing appointment, please call our primary care line at [phone].    Labs--to schedule a lab appointment at the [facility] you can use [employer] or call [phone]. If you have a [place] location that is closer to home, you can reach out to that location for scheduling options.     Imaging--if you need to schedule a CT, X-ray, MRI, ultrasound, or other imaging study you can call [phone] to schedule at the [facility] or any other [facility] imaging location.     Referrals--if a referral to another specialty was ordered you can expect a phone call from their scheduling team. If you have not heard from them in a week, please call us or send us a [employer] message to check the status or get a scheduling number. Please note that this only applies to internal [facility] referrals. If the referral is external you would need to contact their office for scheduling.     (2) I have a question about my visit, who do I contact?     You can call us at the primary care line at [phone] to ask questions about your visit. You can also send a secure message through [employer], which is reviewed by clinic staff. Please note that [employer] messages have a twenty-four to forty-eight business hour turnaround time and should not be used for urgent concerns.    (3) How will I get the results of my tests?    If you are signed up for [employer] all tests will be released to you within twenty-four hours of resulting. Please allow three to five days for your doctor to review your results and place a note interpreting the results. If you do not have [employer] you will receive your  results through mail seven to ten business days following the return of the tests. Please note that if there should be any urgent or concerning results that your doctor or their registered nurse will reach out to you the same day as the tests come back. If you have follow up questions about your results or would like to discuss the results in detail please schedule a follow up with your provider either in person or virtually.     (4) How do I get refills of my prescriptions?     You should always first contact your pharmacy for refills of your medications. If submitting a refill request on [employer], please be sure to submit the request only once--repeat requests can cause delays in refill. If you are requesting a NEW medication or a medication related to new symptoms you will need to schedule an appointment with a provider prior to approval. Please note: Routine medication refills have up to one to three business day turnaround whereas controlled substances refills have up to five to seven business day turnaround.    (5) I have new symptoms, what do I do?     If you are having an immediate medical emergency, you should dial 911 for assistance.   For anything urgent that needs to be seen within a few hours to one day you should visit a local urgent care for assistance.  For non-urgent symptoms that need to be seen within a few days to a week you can schedule with an available provider in primary care by going to [employer] or calling [phone].   If you are not sure how serious your symptoms are or you would like to receive medical advice you can always call [phone] to speak with a triage nurse.

## 2025-05-14 ENCOUNTER — TELEPHONE (OUTPATIENT)
Dept: PHYSICAL MEDICINE AND REHAB | Facility: CLINIC | Age: 50
End: 2025-05-14
Payer: COMMERCIAL

## 2025-05-14 NOTE — TELEPHONE ENCOUNTER
[facility] Call Center    Phone Message    May a detailed message be left on voicemail: yes     Reason for Call: Other: Pt is calling and stating that she missed her appt for today and needs to get it rescheduled, Please call Pt back to discuss.      Action Taken: Message routed to:  [facility] PMR    Travel Screening: Not Applicable     Date of Service:

## 2025-05-14 NOTE — TELEPHONE ENCOUNTER
Returned call to pt unable to LVM, phone rang multiple times with no voicemail prompt. Will try calling again.

## 2025-05-15 NOTE — TELEPHONE ENCOUNTER
Returned call and spoke with pt, notified her we placed her July appt on a wait list to hopefully schedule in the event of a cancellation. Pt agreed with this plan.

## 2025-05-16 ENCOUNTER — VIRTUAL VISIT (OUTPATIENT)
Dept: RHEUMATOLOGY | Facility: CLINIC | Age: 50
End: 2025-05-16
Payer: COMMERCIAL

## 2025-05-16 VITALS — BODY MASS INDEX: 25.01 KG/M2 | WEIGHT: 165 LBS | HEIGHT: 68 IN

## 2025-05-16 DIAGNOSIS — Z79.899 LONG-TERM USE OF PLAQUENIL: ICD-10-CM

## 2025-05-16 DIAGNOSIS — M32.9 SYSTEMIC LUPUS ERYTHEMATOSUS, UNSPECIFIED SLE TYPE, UNSPECIFIED ORGAN INVOLVEMENT STATUS (H): Primary | ICD-10-CM

## 2025-05-16 RX ORDER — MODAFINIL 100 MG/1
100 TABLET ORAL DAILY
Qty: 30 TABLET | Refills: 5 | Status: SHIPPED | OUTPATIENT
Start: 2025-05-16

## 2025-05-16 ASSESSMENT — PAIN SCALES - GENERAL: PAINLEVEL_OUTOF10: SEVERE PAIN (8)

## 2025-05-16 NOTE — LETTER
5/16/2025       RE: Patricia Hall  [address]  [place] [address]     Dear Colleague,    Thank you for referring your patient, Patricia Hall, to the [facility] [place] [facility]. Please see a copy of my visit note below.    Virtual Visit Details    Type of service:  Video Visit     Joined the call at 5/16/2025, 11:13:32 am.  Left the call at 5/16/2025, 11:26:26 am.  Originating Location (pt. Location): Home  Distant Location (provider location):  On-site  Platform used for Video Visit: [facility]    Rheumatology Virtual Visit Note    Reason for visit: Lupus    First Consult: 10/12/2017    Last visit:7/10/2024  DOS: 5/16/2025    Original HPI (10/12/2017):  Patricia Hall is a 42 year old female who was referred to our clinic for evaluation and management of her SLE. The patient has been following with Dr. Mao for her SLE    History obtain from chart review and patient interview    Her lupus symptoms first started in during high school including fatigue and rash. She was diagnosed with lupus in early 2000s and she was in graduate school and presented with a few years of arthralgias involving knees and ankles and hands.  She had developed new onset Raynaud's phenomenon in which her fingers turned white in the cold but no digital sores.  VINITA was 1:160.  All specific autoantibodies and rheumatoid serologies negative.  She had a rash on her face, including cheeks and bridge of her nose.  She followed with Dr. Tejal Mayen in Dermatology.  A biopsy of a lesion from the nose was non-diagnostic.  Diagnosis was earlysigns of cutaneous lupus versus acne rosacea.  She was treated with Elidel cream.  She reported intermittent oral ulcers and significant fatigue as well as intermittent episodes of hair loss.  She was started on prednisone in 2003 along with Plaquenil.  She has been maintained on  prednisone 5 mg q day with intermittent bursts up as high as 30 mg for a short time.  She has found it very difficult to taper off of prednisone because she gets flare-ups of skin rash, arthralgias, and fatigue. Patient reports significant symptoms during the summers and reports significant flares this summer with photosensitivity with face rash,  Fatigue and join pain (knees and toes and hips).  She had fevers, mouth sores  And also reports increased hair loss.  Increased pain with walking and morning stiffness with Fibromyalgia burning in the morning. Currently taking prednisone 10 mg qd and plaquenil 200 mg BID.    Patient reports complicated fertility/OBGYN history with stage four endometriosis, fibroids and one pregnancy resulting in a miscarriage. Three rounds of IVF with one banked viable egg. She is currently undergoing fertility evaluation.  She has been working with infertility specialist for years. She currently has one viable egg and would like to undergo one more episode of IVF. She was seen by an autoimmune OB/GYN specialist in [place] (Daya Frost 02/17) for extensive testing. She was found to be heterozygous for MTHFR mutation. She is now taking Matanx (L-methyl folate). She has noticed less bruising since she started this. Metformin was recommended, but it upsets her stomach. DHEA was recommended, but she is not taking it. Her thyroid studies were normal, but she was started on Synthroid 25  g daily for the TSH to be less than 2.0. It was recommended that she take Lovenox prior to IFV and throughout the pregnancy to avoid risk of miscarriage. It was also recommended that she be treated with IVIG prior to IVF. 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  She plans to proceeded further with in vitro plans, but wants to get better control of her flaring lupus and concerns for IVF treatments/hormones.     Interval HPI  "(7/20/2018):  Repeatedly ill during the winter with both URIs and flares of disease (joint pain, malaise, fatigue). Still undergoing IVF care via [facility] and reproductive immunology. Did not initiate azathioprine following previous visit with Dr. Roy due to worry over negative effects on pregnancy. Currently undergoing medication treatment for IVF, is picking up medications this weekend for stimulation.    Symptomatically, her recent flares include fatigue, flu-like symptoms (fever, chills, sweats), polyarticular joint pain (fingers, toes, feet, ankles, wrists, elbows).    Recently, she has experienced further hair loss (clumps in shower), ulcers on buccal mucosa (now resolved), fatigue, malaise, significant B/L hip pain (worse from previous, approx 1 year). Specifically, it is constant deep joint pain in hips, would say 8/10 in severity when it occurs during movement. Has been seriously disrupting her daily activities. Has been using tylenol to control pain without complete relief.     Has seen Reproductive Immunology in the interim, has undergone two rounds of IVIG (believed to help with NK cell and cytokine activity in embryo implantation), first IVIG May 14th, then second was July 16th. Had flu-like symptoms with first infusion (HA, myalgias, malaise, felt \"gross\", lower back pain, neck pain). During the 2nd infusion, they slowed infusion, took benadryl/tylenol/upped prednisone to 20 mg she did not experience SEs with this. Overall, felt that IVIG improved her lupus symptoms globally. Short-lived relief. Plan is to use IVIG monthly with monitoring of cytokine levels and NK cell counts.     Currently on 15 mg of prednisone chronically, > 1 year. Today, she would say her disease is mild-moderately active in spite of the 15 mg prednisone. Currently on 400-500 U of vitamin D. Taking 1000 mg calcium.     ROS:  (-) pleurisy, sob, cough, hematuria, dysuria, eye redness, nose bleeds, easy bruising, malar rash  (+) " alternating diarrhea/constipation, dry eye, dry mouth, palatal ulcers/petechiae    Is interested in discussing SLE management (Imuran) while pursuing IVF. Will be undergoing planning and another round of IVF stimulation in August.       8/21/2019: She did another round of IVF in 8/2019, no good embryo. IVF caused her flare ups. For flare ups, gets pain over knees, knuckles and toes with extreme fatigue and photosensitivity.    Had laparoscopic surgery for endometriosis in 4/2019.    Since 4/2019, has been on OC and has not been able to taper prednisone own.    Today, she is on prednisone 15 mg every day x 2-3 weeks. Before that, most of the time, she was on 20 mg every day.    No rash today.    Has pain over knuckles, toes, knees. Has morning flu like sx in AM x 2 hours.    Has extreme fatigue.    She was getting IVIG qmonthly, till 1/2019.    She is going to resume monthly IVIG for successful pregnancy.    11/27/2019: Started IVIG on 9/8/2019, her eyes swelled up, 2 days later had severe LBP/SI joint pain. Was doing keto diet at that time, had headaches.now has sciatica pain on the R side. Did not have this reaction with IVIG before.    Late Oct/early Nov, had malar rash, hair loss.    Had LE edema, went up on her prednisone to 30-40 mg a day x few days. Back to her baseline hair loss and baseline prednisone 20 mg every day.      R SI joint pain is better, but still has it, uses topical pain cream. It is the worst in AM.    Still has swollen ankles.    This edema is new for her.    Off birth control pills. Not on IVF tx either.    Her lupus is back to her baseline.    Has not started AZA yet, but not thinks she is ready to try it.      10/8/2021:    Gracie chose not to take AZA with concern for SEs buts he would like to try benlysta. Continues to have active lupus and can not taper prednisone off.    Dealing with back pain, sciatica, gallstones. Gets vol retention, swelling, LE edema. Ankles and eyes swell up. Has  gained weight. Feels stiff. Had diarrhea, stomach pain but now it is improved.      Sciatica is now better. No skin rash or major hair loss today. Takes prednisone 20 mg every day. Has sun sensitivity. Sometimes increases prednisone to 40 mg every day.    Reports small joint pain 5-6/10, has night and 1 hr am stiffness with swollen feet. Gets jaw pain, migraines, pleurisy. Considers surrogate.      7/1/2022:     Gracie presents for follow up.    Had pelvis, L spine MRI because of having LBP x years. every time she walked over soft grass, injured herself. Was found to have degenerative changes, full MRI report is below. Going to do follow up with her spine doctor. Doing physical therapy. The top of her spine is better. Has A1C elevated.    She is on prednisone 17 mg every day, tries to work through it . Today is a good day, yesterday was a rough day. Has hard time tapering prednisone down.    Dr. Mendez put her on gabapentin.    She is now on benlysta since 11/2021, thinks it is helping her. Thinks benlysta affected her menses, had it twice. Reports edema, weight gain on it. She did not have this extra weight, LEs edema before benlysta even on prednisone 20 mg every day. It helps with energy, when she takes it, feels better for couple of days.    Urine frequency, urgency is less on benlysta, has fluid retention.    Smelling chemical feeling is better on benlsyta.    Took AZA 50 mg every day x 2 months, no SEs and she would estrella to re-try it. When she started gabapentin, stopped AZA abut wants to re-try it.    Has pain, fatigue in her legs, it is better now. She thinks that it might be caused by benlysta. Had the most intense muscle pain, took muscle relaxant and it helped.    No skin rash or oral ulcers.    Takes prednisone for pain in small joints, fingers, toes and elbows. It is different from back pain.    Feels like her whole body is in a flare by going down on prednisone, feels getting a fever and diffuse pain  along with fatigue.    Has bump over R 3rd finger which hurts, swells up.    Has unhealed ulcer over big toe, does follow up with a provider.    Toenails are the same.    Looking for surrogate.    Had eye exam.      2/9/2023: Gracie is here for follow up, she likes to try cellcept given the fact that she has found a surrogate and will not get pregnant herself. Also would like to taper prednisone down.    Stopped AZA and benlysta around cayetano.    Resumed benlysta after 2 wk, not AZA. Feels AZA is not helping, would like to try cellcept.    Dropped prednisone down to 15 mg every day, about 3 mo ago.    Off HCQ x 1 year.    2-3 months of ankle swelling has improved.    Feels swollen in her face, chest, arms, but better by going down on prednisone.    Tolerates benlysta ok.    Still Sun sensitive.    Has fatigue, joint pain affecting fingers and ankles.    Has endometriosis pain, migraines, spine pain.    Would like to try ozempic.      9/14/2023:    -urgent visit for evaluation of B/L hand weakness, drops objects, simple tasks like buttoning shirts are hard to do    -R cheek feels swollen    -has R jaw pain    -R 2 fingers feel weak    -on prednisone 13 mg every day, has hard time tapering down further, lupus sx of rash/joint pain recurs, has not started cellcept yet, but now is ready to do it      7/10/2024:      Kory is a mother, she is very happy about it. Her biologic daughter Marija was born via surrogacy. She is 9 wk old. Marija is doing great. She spent some time in [facility] for a month around birth time before taking    Had a malar rash last Sat, increased prednisone from 10 to 20 mg a day x 3 days, then 15 mg a day. Rash is better.    All small joints are hurting hurting, fingers and toes and ankles.    Very tired.    A month ago, had oral ulcers,  better now.    No CP, a little wheezing.    Back pain is better, moving around, sometimes flares.    Gets migraines triggered by TMJ pain, had botox in jaw  along the tendons, it helped with hand function, has the function of hands back, has not done OT done.    Came off benlysta as she thought it was not helping much, she likes     Has not started cellcept yet.      Today 5/16/2025:      -rashes have been ok, but recur by tapering prednisone down    -has small joint pain    -can not taper prednisone below 10 mg a day    -has morning pain, wondering if it is fibro, achilles tendon hurts    -provigil 1/4 a of a pill helped, caused her palpitations at 1 tab a day, likes a refill    -was told to have second through eye exam to check for HCQ toxicity, she has not scheduled it yet, then stopped the HCQ, this was 2 months ago, no worsening sx off the HCQ, might be small joint pain    -PCP manages her pain, did not have good experience with pain mx visit    -DEXA not done yet    -gets UTIs, had URI    -in menopause, started estradiol. Progesterone causes her migraines      ROS:  A comprehensive ROS was done, positives are per HPI.  Past Medical History:   Diagnosis Date     Allergy, unspecified not elsewhere classified      Endometriosis      Fibromyalgia      Migraine without aura, with intractable migraine, so stated, without mention of status migrainosus      Myalgia and myositis, unspecified      Systemic lupus erythematosus (H)      Past Surgical History:   Procedure Laterality Date     IR LUMBAR PUNCTURE  5/21/2013     SURGICAL HISTORY OF -   01/01/1986    left elbow fracture repair     Family History   Problem Relation Age of Onset     Lipids Mother      Thyroid Cancer Mother 50 - 60     Dementia Mother      Prostate Cancer Father 80     Diabetes Maternal Grandfather      Skin Cancer Paternal Grandmother      Melanoma No family hx of      Social History     Socioeconomic History     Marital status:      Spouse name: Not on file     Number of children: Not on file     Years of education: Not on file     Highest education level: Not on file   Occupational History      Not on file   Tobacco Use     Smoking status: Never     Smokeless tobacco: Never   Vaping Use     Vaping status: Never Used   Substance and Sexual Activity     Alcohol use: Not Currently     Drug use: No     Sexual activity: Yes     Partners: Male     Birth control/protection: Condom   Other Topics Concern     Parent/sibling w/ CABG, MI or angioplasty before 65F 55M? Not Asked   Social History Narrative     Not on file     Social Drivers of Health     Financial Resource Strain: Unknown (3/31/2025)    Financial Resource Strain      Within the past 12 months, have you or your family members you live with been unable to get utilities (heat, electricity) when it was really needed?: Patient declined   Food Insecurity: Unknown (3/31/2025)    Food Insecurity      Within the past 12 months, did you worry that your food would run out before you got money to buy more?: Patient declined      Within the past 12 months, did the food you bought just not last and you didn t have money to get more?: Patient declined   Transportation Needs: Unknown (3/31/2025)    Transportation Needs      Within the past 12 months, has lack of transportation kept you from medical appointments, getting your medicines, non-medical meetings or appointments, work, or from getting things that you need?: Patient declined   Physical Activity: Not on file   Stress: Not on file   Social Connections: Not on file   Interpersonal Safety: Low Risk  (12/31/2024)    Interpersonal Safety      Do you feel physically and emotionally safe where you currently live?: Yes      Within the past 12 months, have you been hit, slapped, kicked or otherwise physically hurt by someone?: No      Within the past 12 months, have you been humiliated or emotionally abused in other ways by your partner or ex-partner?: No   Housing Stability: Unknown (3/31/2025)    Housing Stability      Do you have housing? 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     Tension-type headache     Diminished ovarian reserve     Disorder of connective tissue     Disorder of immune system     Endometriosis of uterus     Female infertility     History of environmental allergies     Hyperlipidemia     Irritable bowel syndrome     Major depressive disorder, recurrent episode, in full remission     Major depressive disorder, recurrent episode, mild     Migraine with aura     Myofascial pain     Raynaud's disease     Recurrent pregnancy loss without current pregnancy     Seasonal allergies     Primary fibromyalgia syndrome     Uterine leiomyoma     Sciatica of right side     Numbness and tingling of both lower extremities     Sleep apnea     Bilateral temporomandibular joint pain     Cervical cancer screening     Endometriosis     Lupus erythematosus     Cervical spondylosis without myelopathy     Allergies   Allergen Reactions     Celery Oil Anaphylaxis, Difficulty breathing and Shortness Of Breath     swelling       Celis Anaphylaxis     Dairy Aid  [Tilactase]      Other reaction(s): Arthralgia (Joint Pain)     Gluten Meal      Other reaction(s): Abdominal Pain     No Clinical Screening - See Comments Anaphylaxis and Itching     Pistachio Nut Extract Anaphylaxis     Brassica Oleracea      Other reaction(s): Abdominal Pain  Other reaction(s): Abdominal Pain     Soybean Oil GI Disturbance     Penicillins Hives and Rash     Sulfa Antibiotics Hives and Rash       Outpatient Encounter Medications as of 5/16/2025   Medication Sig Dispense Refill     AMBIEN 10 MG OR TABS 1 po HS, prn 20 0     calcium carbonate (OS-VAHID 500 MG Kake. CA) 1250 MG tablet Take 1 tablet by mouth daily        EPINEPHrine (ANY BX GENERIC EQUIV) 0.3 MG/0.3ML injection 2-pack INJECT 0.3 MG INTO THE MUSCLE AS NEEDED FOR ANAPHYLAXIS 2 each 3     estradiol (VAGIFEM) 10 MCG TABS vaginal tablet Place 1 tablet (10 mcg) vaginally twice a week. 24 tablet 3     estradiol-norethindrone (COMBIPATCH) 0.05-0.14 MG/DAY bi-weekly patch Place 1 patch over 96 hours onto the skin twice a week. 24 patch 3     fluticasone (FLONASE) 50 MCG/ACT nasal spray SHAKE LIQUID AND USE 2 SPRAYS IN EACH NOSTRIL DAILY 16 g 11     galcanezumab-gnlm (EMGALITY) 120 MG/ML injection Inject 240 mg subcutaneous first month and then inject 120 mg subcutaneous every 28 days 2 mL 11     hydroxychloroquine (PLAQUENIL) 200 MG tablet Take 1 tablet (200 mg) by mouth 2 times daily 180 tablet 0     MULTIVITAMIN TABS   OR   0     omeprazole (PRILOSEC) 40 MG DR capsule Take 1 capsule (40 mg) by mouth daily 60 capsule 1     ondansetron (ZOFRAN) 8 MG tablet Take 1 tablet (8 mg) by mouth 2 times daily as needed. 30 tablet 11     phenazopyridine (PYRIDIUM) 100 MG tablet Take 1 tablet (100 mg) by mouth 3 times daily as needed for urinary tract discomfort. 30 tablet 0     predniSONE (DELTASONE) 1 MG tablet 9-8-7-6 mg a day, each course x 1 month then 5 mg a day, use with 5 mg size tab 90 tablet 5     predniSONE (DELTASONE) 5 MG tablet 9-8-7-6 mg a day, each course x 1 month then 5 mg a day, use with 1 mg size tab 150 tablet 5     rimegepant (NURTEC) 75 MG ODT tablet Place 1 tablet (75 mg) under the tongue daily as needed for migraine. Maximum of 1 tablet every 24 hours. 8 tablet 11     rosuvastatin (CRESTOR) 5 MG tablet Take 1 tablet (5 mg) by mouth daily (Patient not taking: No sig reported) 90 tablet 3     semaglutide (OZEMPIC) 2 MG/3ML pen Inject 0.5 mg subcutaneously every 7 days. (Patient not taking: No sig reported) 9 mL 3     SUMAtriptan (IMITREX STATDOSE) 6 MG/0.5ML pen injector kit Inject 0.5 mLs (6 mg) subcutaneously at onset of headache for migraine. May repeat in 1  hour. Max 12 mg/24 hours. 3 kit 11     traMADol (ULTRAM) 50 MG tablet Take 1 tablet (50 mg) by mouth every 6 hours as needed for severe pain. Take 1-2 tablets up to three times a day as needed - Oral 180 tablet 5     traMADol (ULTRAM-ER) 300 MG 24 hr tablet Take 1 tablet (300 mg) by mouth at bedtime. maximum 1 tablet(s) per day 30 tablet 5     VITAMIN D, CHOLECALCIFEROL, PO Take 5,000 Units by mouth daily       Facility-Administered Encounter Medications as of 5/16/2025   Medication Dose Route Frequency Provider Last Rate Last Admin     botulinum toxin type A (BOTOX) 100 units injection 200-300 Units  200-300 Units Intramuscular Q90 Days    200 Units at 04/17/25 1515         Her records were reviewed.    Component      Latest Ref Rng & Units 2/10/2022 5/17/2022   WBC      4.0 - 11.0 10e3/uL 11.6 (H) 10.7   RBC Count      3.80 - 5.20 10e6/uL 4.86 5.03   Hemoglobin      11.7 - 15.7 g/dL 14.8 14.9   Hematocrit      35.0 - 47.0 % 44.5 45.9   MCV      78 - 100 fL 92 91   MCH      26.5 - 33.0 pg 30.5 29.6   MCHC      31.5 - 36.5 g/dL 33.3 32.5   RDW      10.0 - 15.0 % 12.8 12.5   Platelet Count      150 - 450 10e3/uL 242 267   % Neutrophils      % 76    % Lymphocytes      % 16    % Monocytes      % 5    % Eosinophils      % 0    % Basophils      % 1    % Immature Granulocytes      % 2    NRBCs per 100 WBC      <1 /100 0    Absolute Neutrophils      1.6 - 8.3 10e3/uL 8.9 (H)    Absolute Lymphocytes      0.8 - 5.3 10e3/uL 1.8    Absolute Monocytes      0.0 - 1.3 10e3/uL 0.6    Absolute Eosinophils      0.0 - 0.7 10e3/uL 0.0    Absolute Basophils      0.0 - 0.2 10e3/uL 0.1    Absolute Immature Granulocytes      <=0.4 10e3/uL 0.2    Absolute NRBCs      10e3/uL 0.0    Sodium      133 - 144 mmol/L  141   Potassium      3.4 - 5.3 mmol/L  4.3   Chloride      94 - 109 mmol/L  102   Carbon Dioxide      20 - 32 mmol/L  32   Anion Gap      3 - 14 mmol/L  7   Urea Nitrogen      7 - 30 mg/dL  19   Creatinine      0.52 - 1.04 mg/dL 0.72  0.80   Calcium      8.5 - 10.1 mg/dL  9.6   Glucose      70 - 99 mg/dL  128 (H)   Alkaline Phosphatase      40 - 150 U/L  73   AST      0 - 45 U/L 25 23   ALT      0 - 50 U/L 47 44   Protein Total      6.8 - 8.8 g/dL  7.4   Albumin      3.4 - 5.0 g/dL 3.8 3.9   Bilirubin Total      0.2 - 1.3 mg/dL  0.3   GFR Estimate      >60 mL/min/1.73m2 >90 >90   SARS-CoV-2 Rigo Ab, Interp, S       Positive    SARS-CoV-2 Rigo Ab, Quant, S      U/mL >250    Patient's Race       Unknown    Patient's Ethnicity       Unknown    Protein Random Urine      g/L <0.05    Protein Total Urine g/gr Creatinine           Creatinine Urine      mg/dL 13    % Retic      0.5 - 2.0 % 1.6    Absolute Retic      0.025 - 0.095 10e6/uL 0.080    DNA-ds      <10.0 IU/mL <0.6    Complement C4      13 - 39 mg/dL 34    Complement C3      81 - 157 mg/dL 135    Sed Rate      0 - 20 mm/hr 7    CRP Inflammation      0.0 - 8.0 mg/L 3.0    Hemoglobin A1C      0.0 - 5.6 %  5.8 (H)     Pregnancy: She is . H/o OCP and HRT use, see HPI    Ph.E:    Constitutional: WD/WN. Pleasant, NAD.    Eyes: EOM intact, sclera anicteric, conj not injected   MS: No active synovitis over hands, could make full fist, but it hurts. + [place] nodes.  Skin: no rash  Neuro: A&O x 3. Grossly non focal  Psych: NL affect    Assessment/Plan     1-SLE FLARE/active  2-HCQ monitoring  3-AZA monitoring  4-Benlysta monitoring    SLE, dx in  (VINITA: 1:80, dsDNA +, Smith negative, normal complements, joint pains, malar rash, oral ulcers), usually flares approximately monthly with joint pains, pleurisy and malar rash. Currently, feels that disease is active. Has been on chronic 20 mg every day prednisone, gained weight, gets LE edema, looks cushingoid. Still planning for PGS normal embryo implantation, might consider surrogate. Had a reaction to IVIG which was given for infertility tx and is not going to get it again. Her back pain seems to be sciatica and not related to SLE.    Previous  visits, was advised to try AZA as steroid sparing agent (I am concerned about long term steroid SE) but did not start with concern for potential SE but willing to try benlysta, especially with planning for surrogate pregnancy. Labs are stable.    7/2022: Gracie is on benlysta since last visit with some benefit, but can not taper prednisone below 17 mg every day due to increased joint pain by taper. Briefly tried AZA 1 tab a day, no SEs, willing to re-try it, will resume it at higher dose.        2/9/2023: Failed AZA, which was chosen over cellcept due to pregnancy planning via IVF. Will try cellcept as steroid sparing agent to allow tapering prednisone down; risks were discussed. She going to proceed with pregnancy via surrogate; therefore she will not get pregnant herself. She misunderstood and stopped HCQ, thought we were switching tx, will resume. Discussed its benefits.     9/14/2023: B/L hand poor /loss of strength seems to be OA related, discussed mx. She is willing to try cellcept to allow tapering prednisone off; risks were discussed.      7/10/2024:    Off benlysta x few months, did help some not much. Prednisone baseline ow is 10 mg every day, it was 13 mg every day last visit, had to go up to 20 mg a day last Sat for flare of rash, fatigue, joint pain, now down to 15 mg qd, malar rash is better. Has not tried cellcept yet, being busy with birth of her 9 wk old daughter, lack of sleep, busy and tired. Discussed long term SE of prednisone, she is willing to try cellcept and taper down prednisone (goal is baseline 5 mg every day given long term use, can't probably completely taper off with concern for adrenal insufficiency), eye exam last done 6/2023, needs to get done DEXA scan. Needs to get done. Recommend to try provigil for fatigue; risks were discussed. Stable lupus labs.        Plan:    Continue plaquenil 200 mg twice a day with yearly eye exam    Try provigil 100 mg every morning    Go down on  prednisone to 10 mg every day, plan is to taper after start of cellcept, 1 mg down q4 weeks    Start cellcept 1 tab twice a day    DEXA bone density to be scheduled    Eye exam to be scheduled    Labs 1 month after start of cellcept    Return video visit in about 4-5 months        Today 5/16/2025:    Can not taper prednisone below 10 mg every day, not just withdrawal from steroid, gets recurrence of rash, increased joint pain/AM stiffness. Off HCQ x 2 months, given abnormal eye exam, needs to see a retinal specialist. Never started cellcept, with concerns for potential SE. Likes to try saphnelo; risks were discussed. Also discussed long term SE of prednisone, any dose above 5 mg every day.    Plan:      [facility] Tang eye exam referral, if no plaquenil toxicity, will go back on plaquenil    DEXA bone density and labs (including repeat APLA given being on estrogen)    Shingrix vaccine before saphnelo    Go back on provigil    Will try saphnelo monthly infusion, [facility] pharmacist will contact you    Return in 6 months in person      Orders Placed This Encounter   Procedures     Hepatitis C antibody     Hepatitis B surface antigen     Hepatitis B core antibody     Lupus Anticoagulant Panel     Cardiolipin Dorinda IgG and IgM     Beta 2 Glycoprotein Antibodies IGG IGM     Cardiolipin Antibody IgA     Beta 2 Glycoprotein 1 Antibody IgA     Adult Eye  Referral     Med Therapy Management Referral     Quantiferon TB Gold Plus      Josh Roy MD    TT 40 min was spent on date of the encounter doing chart review, history and exam, documentation and further activities as noted above. Any prior notes, outside records, laboratory results, and imaging studies were reviewed if relevant.     The longitudinal plan of care for the diagnosis(es)/condition(s) as documented were addressed during this visit. Due to the added complexity in care, I will continue to support Gracie in the subsequent management and with ongoing  continuity of care.            Again, thank you for allowing me to participate in the care of your patient.      Sincerely,    Josh Roy MD

## 2025-05-16 NOTE — PROGRESS NOTES
Virtual Visit Details    Type of service:  Video Visit     Joined the call at 5/16/2025, 11:13:32 am.  Left the call at 5/16/2025, 11:26:26 am.  Originating Location (pt. Location): Home  Distant Location (provider location):  On-site  Platform used for Video Visit: [facility]    Rheumatology Virtual Visit Note    Reason for visit: Lupus    First Consult: 10/12/2017    Last visit:7/10/2024  DOS: 5/16/2025    Original HPI (10/12/2017):  Patricia Hall is a 42 year old female who was referred to our clinic for evaluation and management of her SLE. The patient has been following with Dr. Mao for her SLE    History obtain from chart review and patient interview    Her lupus symptoms first started in during high school including fatigue and rash. She was diagnosed with lupus in early 2000s and she was in graduate school and presented with a few years of arthralgias involving knees and ankles and hands.  She had developed new onset Raynaud's phenomenon in which her fingers turned white in the cold but no digital sores.  VINITA was 1:160.  All specific autoantibodies and rheumatoid serologies negative.  She had a rash on her face, including cheeks and bridge of her nose.  She followed with Dr. Tejal Mayen in Dermatology.  A biopsy of a lesion from the nose was non-diagnostic.  Diagnosis was earlysigns of cutaneous lupus versus acne rosacea.  She was treated with Elidel cream.  She reported intermittent oral ulcers and significant fatigue as well as intermittent episodes of hair loss.  She was started on prednisone in 2003 along with Plaquenil.  She has been maintained on prednisone 5 mg q day with intermittent bursts up as high as 30 mg for a short time.  She has found it very difficult to taper off of prednisone because she gets flare-ups of skin rash, arthralgias, and fatigue. Patient reports significant symptoms during the summers and reports significant flares this summer with photosensitivity with face  rash,  Fatigue and join pain (knees and toes and hips).  She had fevers, mouth sores  And also reports increased hair loss.  Increased pain with walking and morning stiffness with Fibromyalgia burning in the morning. Currently taking prednisone 10 mg qd and plaquenil 200 mg BID.    Patient reports complicated fertility/OBGYN history with stage four endometriosis, fibroids and one pregnancy resulting in a miscarriage. Three rounds of IVF with one banked viable egg. She is currently undergoing fertility evaluation.  She has been working with infertility specialist for years. She currently has one viable egg and would like to undergo one more episode of IVF. She was seen by an autoimmune OB/GYN specialist in [place] (Daya Frost 02/17) for extensive testing. She was found to be heterozygous for MTHFR mutation. She is now taking Matanx (L-methyl folate). She has noticed less bruising since she started this. Metformin was recommended, but it upsets her stomach. DHEA was recommended, but she is not taking it. Her thyroid studies were normal, but she was started on Synthroid 25  g daily for the TSH to be less than 2.0. It was recommended that she take Lovenox prior to IFV and throughout the pregnancy to avoid risk of miscarriage. It was also recommended that she be treated with IVIG prior to IVF. She states she was also seen at the 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  She plans to proceeded further with in vitro plans, but wants to get better control of her flaring lupus and concerns for IVF treatments/hormones.     Interval HPI (7/20/2018):  Repeatedly ill during the winter with both URIs and flares of disease (joint pain, malaise, fatigue). Still undergoing IVF care via MFM and reproductive immunology. Did not initiate azathioprine following previous visit with Dr. Roy due to worry over negative effects on pregnancy. Currently undergoing medication treatment for IVF, is  "picking up medications this weekend for stimulation.    Symptomatically, her recent flares include fatigue, flu-like symptoms (fever, chills, sweats), polyarticular joint pain (fingers, toes, feet, ankles, wrists, elbows).    Recently, she has experienced further hair loss (clumps in shower), ulcers on buccal mucosa (now resolved), fatigue, malaise, significant B/L hip pain (worse from previous, approx 1 year). Specifically, it is constant deep joint pain in hips, would say 8/10 in severity when it occurs during movement. Has been seriously disrupting her daily activities. Has been using tylenol to control pain without complete relief.     Has seen Reproductive Immunology in the interim, has undergone two rounds of IVIG (believed to help with NK cell and cytokine activity in embryo implantation), first IVIG May 14th, then second was July 16th. Had flu-like symptoms with first infusion (HA, myalgias, malaise, felt \"gross\", lower back pain, neck pain). During the 2nd infusion, they slowed infusion, took benadryl/tylenol/upped prednisone to 20 mg she did not experience SEs with this. Overall, felt that IVIG improved her lupus symptoms globally. Short-lived relief. Plan is to use IVIG monthly with monitoring of cytokine levels and NK cell counts.     Currently on 15 mg of prednisone chronically, > 1 year. Today, she would say her disease is mild-moderately active in spite of the 15 mg prednisone. Currently on 400-500 U of vitamin D. Taking 1000 mg calcium.     ROS:  (-) pleurisy, sob, cough, hematuria, dysuria, eye redness, nose bleeds, easy bruising, malar rash  (+) alternating diarrhea/constipation, dry eye, dry mouth, palatal ulcers/petechiae    Is interested in discussing SLE management (Imuran) while pursuing IVF. Will be undergoing planning and another round of IVF stimulation in August.       8/21/2019: She did another round of IVF in 8/2019, no good embryo. IVF caused her flare ups. For flare ups, gets pain over " knees, knuckles and toes with extreme fatigue and photosensitivity.    Had laparoscopic surgery for endometriosis in 4/2019.    Since 4/2019, has been on OC and has not been able to taper prednisone own.    Today, she is on prednisone 15 mg every day x 2-3 weeks. Before that, most of the time, she was on 20 mg every day.    No rash today.    Has pain over knuckles, toes, knees. Has morning flu like sx in AM x 2 hours.    Has extreme fatigue.    She was getting IVIG qmonthly, till 1/2019.    She is going to resume monthly IVIG for successful pregnancy.    11/27/2019: Started IVIG on 9/8/2019, her eyes swelled up, 2 days later had severe LBP/SI joint pain. Was doing keto diet at that time, had headaches.now has sciatica pain on the R side. Did not have this reaction with IVIG before.    Late Oct/early Nov, had malar rash, hair loss.    Had LE edema, went up on her prednisone to 30-40 mg a day x few days. Back to her baseline hair loss and baseline prednisone 20 mg every day.      R SI joint pain is better, but still has it, uses topical pain cream. It is the worst in AM.    Still has swollen ankles.    This edema is new for her.    Off birth control pills. Not on IVF tx either.    Her lupus is back to her baseline.    Has not started AZA yet, but not thinks she is ready to try it.      10/8/2021:    Gracie chose not to take AZA with concern for SEs buts he would like to try benlysta. Continues to have active lupus and can not taper prednisone off.    Dealing with back pain, sciatica, gallstones. Gets vol retention, swelling, LE edema. Ankles and eyes swell up. Has gained weight. Feels stiff. Had diarrhea, stomach pain but now it is improved.      Sciatica is now better. No skin rash or major hair loss today. Takes prednisone 20 mg every day. Has sun sensitivity. Sometimes increases prednisone to 40 mg every day.    Reports small joint pain 5-6/10, has night and 1 hr am stiffness with swollen feet. Gets jaw pain,  migraines, pleurisy. Considers surrogate.      7/1/2022:     Gracie presents for follow up.    Had pelvis, L spine MRI because of having LBP x years. every time she walked over soft grass, injured herself. Was found to have degenerative changes, full MRI report is below. Going to do follow up with her spine doctor. Doing physical therapy. The top of her spine is better. Has A1C elevated.    She is on prednisone 17 mg every day, tries to work through it . Today is a good day, yesterday was a rough day. Has hard time tapering prednisone down.    Dr. Mendez put her on gabapentin.    She is now on benlysta since 11/2021, thinks it is helping her. Thinks benlysta affected her menses, had it twice. Reports edema, weight gain on it. She did not have this extra weight, LEs edema before benlysta even on prednisone 20 mg every day. It helps with energy, when she takes it, feels better for couple of days.    Urine frequency, urgency is less on benlysta, has fluid retention.    Smelling chemical feeling is better on benlsyta.    Took AZA 50 mg every day x 2 months, no SEs and she would estrella to re-try it. When she started gabapentin, stopped AZA abut wants to re-try it.    Has pain, fatigue in her legs, it is better now. She thinks that it might be caused by benlysta. Had the most intense muscle pain, took muscle relaxant and it helped.    No skin rash or oral ulcers.    Takes prednisone for pain in small joints, fingers, toes and elbows. It is different from back pain.    Feels like her whole body is in a flare by going down on prednisone, feels getting a fever and diffuse pain along with fatigue.    Has bump over R 3rd finger which hurts, swells up.    Has unhealed ulcer over big toe, does follow up with a provider.    Toenails are the same.    Looking for surrogate.    Had eye exam.      2/9/2023: Gracie is here for follow up, she likes to try cellcept given the fact that she has found a surrogate and will not get pregnant  herself. Also would like to taper prednisone down.    Stopped AZA and benlysta around cayetano.    Resumed benlysta after 2 wk, not AZA. Feels AZA is not helping, would like to try cellcept.    Dropped prednisone down to 15 mg every day, about 3 mo ago.    Off HCQ x 1 year.    2-3 months of ankle swelling has improved.    Feels swollen in her face, chest, arms, but better by going down on prednisone.    Tolerates benlysta ok.    Still Sun sensitive.    Has fatigue, joint pain affecting fingers and ankles.    Has endometriosis pain, migraines, spine pain.    Would like to try ozempic.      9/14/2023:    -urgent visit for evaluation of B/L hand weakness, drops objects, simple tasks like buttoning shirts are hard to do    -R cheek feels swollen    -has R jaw pain    -R 2 fingers feel weak    -on prednisone 13 mg every day, has hard time tapering down further, lupus sx of rash/joint pain recurs, has not started cellcept yet, but now is ready to do it      7/10/2024:      Kory is a mother, she is very happy about it. Her biologic daughter Marija was born via surrogacy. She is 9 wk old. Marija is doing great. She spent some time in [facility] for a month around birth time before taking    Had a malar rash last Sat, increased prednisone from 10 to 20 mg a day x 3 days, then 15 mg a day. Rash is better.    All small joints are hurting hurting, fingers and toes and ankles.    Very tired.    A month ago, had oral ulcers,  better now.    No CP, a little wheezing.    Back pain is better, moving around, sometimes flares.    Gets migraines triggered by TMJ pain, had botox in jaw along the tendons, it helped with hand function, has the function of hands back, has not done OT done.    Came off benlysta as she thought it was not helping much, she likes     Has not started cellcept yet.      Today 5/16/2025:      -rashes have been ok, but recur by tapering prednisone down    -has small joint pain    -can not taper prednisone below  10 mg a day    -has morning pain, wondering if it is fibro, achilles tendon hurts    -provigil 1/4 a of a pill helped, caused her palpitations at 1 tab a day, likes a refill    -was told to have second through eye exam to check for HCQ toxicity, she has not scheduled it yet, then stopped the HCQ, this was 2 months ago, no worsening sx off the HCQ, might be small joint pain    -PCP manages her pain, did not have good experience with pain mx visit    -DEXA not done yet    -gets UTIs, had URI    -in menopause, started estradiol. Progesterone causes her migraines      ROS:  A comprehensive ROS was done, positives are per HPI.  Past Medical History:   Diagnosis Date    Allergy, unspecified not elsewhere classified     Endometriosis     Fibromyalgia     Migraine without aura, with intractable migraine, so stated, without mention of status migrainosus     Myalgia and myositis, unspecified     Systemic lupus erythematosus (H)      Past Surgical History:   Procedure Laterality Date    IR LUMBAR PUNCTURE  5/21/2013    SURGICAL HISTORY OF -   01/01/1986    left elbow fracture repair     Family History   Problem Relation Age of Onset    Lipids Mother     Thyroid Cancer Mother 50 - 60    Dementia Mother     Prostate Cancer Father 80    Diabetes Maternal Grandfather     Skin Cancer Paternal Grandmother     Melanoma No family hx of      Social History     Socioeconomic History    Marital status:      Spouse name: Not on file    Number of children: Not on file    Years of education: Not on file    Highest education level: Not on file   Occupational History    Not on file   Tobacco Use    Smoking status: Never    Smokeless tobacco: Never   Vaping Use    Vaping status: Never Used   Substance and Sexual Activity    Alcohol use: Not Currently    Drug use: No    Sexual activity: Yes     Partners: Male     Birth control/protection: Condom   Other Topics Concern    Parent/sibling w/ CABG, MI or angioplasty before 65F 55M? Not Asked    Social History Narrative    Not on file     Social Drivers of Health     Financial Resource Strain: Unknown (3/31/2025)    Financial Resource Strain     Within the past 12 months, have you or your family members you live with been unable to get utilities (heat, electricity) when it was really needed?: Patient declined   Food Insecurity: Unknown (3/31/2025)    Food Insecurity     Within the past 12 months, did you worry that your food would run out before you got money to buy more?: Patient declined     Within the past 12 months, did the food you bought just not last and you didn t have money to get more?: Patient declined   Transportation Needs: Unknown (3/31/2025)    Transportation Needs     Within the past 12 months, has lack of transportation kept you from medical appointments, getting your medicines, non-medical meetings or appointments, work, or from getting things that you need?: Patient declined   Physical Activity: Not on file   Stress: Not on file   Social Connections: Not on file   Interpersonal Safety: Low Risk  (12/31/2024)    Interpersonal Safety     Do you feel physically and emotionally safe where you currently live?: Yes     Within the past 12 months, have you been hit, slapped, kicked or otherwise physically hurt by someone?: No     Within the past 12 months, have you been humiliated or emotionally abused in other ways by your partner or ex-partner?: No   Housing Stability: Unknown (3/31/2025)    Housing Stability     Do you have housing? 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    Tension-type headache    Diminished ovarian reserve    Disorder of connective tissue    Disorder  of immune system    Endometriosis of uterus    Female infertility    History of environmental allergies    Hyperlipidemia    Irritable bowel syndrome    Major depressive disorder, recurrent episode, in full remission    Major depressive disorder, recurrent episode, mild    Migraine with aura    Myofascial pain    Raynaud's disease    Recurrent pregnancy loss without current pregnancy    Seasonal allergies    Primary fibromyalgia syndrome    Uterine leiomyoma    Sciatica of right side    Numbness and tingling of both lower extremities    Sleep apnea    Bilateral temporomandibular joint pain    Cervical cancer screening    Endometriosis    Lupus erythematosus    Cervical spondylosis without myelopathy     Allergies   Allergen Reactions    Celery Oil Anaphylaxis, Difficulty breathing and Shortness Of Breath     swelling      Celis Anaphylaxis    Dairy Aid  [Tilactase]      Other reaction(s): Arthralgia (Joint Pain)    Gluten Meal      Other reaction(s): Abdominal Pain    No Clinical Screening - See Comments Anaphylaxis and Itching    Pistachio Nut Extract Anaphylaxis    Brassica Oleracea      Other reaction(s): Abdominal Pain  Other reaction(s): Abdominal Pain    Soybean Oil GI Disturbance    Penicillins Hives and Rash    Sulfa Antibiotics Hives and Rash       Outpatient Encounter Medications as of 5/16/2025   Medication Sig Dispense Refill    AMBIEN 10 MG OR TABS 1 po HS, prn 20 0    calcium carbonate (OS-VAHID 500 MG [place]. [place]) 1250 MG tablet Take 1 tablet by mouth daily      EPINEPHrine (ANY BX GENERIC EQUIV) 0.3 MG/0.3ML injection 2-pack INJECT 0.3 MG INTO THE MUSCLE AS NEEDED FOR ANAPHYLAXIS 2 each 3    estradiol (VAGIFEM) 10 MCG TABS vaginal tablet Place 1 tablet (10 mcg) vaginally twice a week. 24 tablet 3    estradiol-norethindrone (COMBIPATCH) 0.05-0.14 MG/DAY bi-weekly patch Place 1 patch over 96 hours onto the skin twice a week. 24 patch 3    fluticasone (FLONASE) 50 MCG/ACT nasal spray SHAKE LIQUID AND USE 2  SPRAYS IN EACH NOSTRIL DAILY 16 g 11    galcanezumab-gnlm (EMGALITY) 120 MG/ML injection Inject 240 mg subcutaneous first month and then inject 120 mg subcutaneous every 28 days 2 mL 11    hydroxychloroquine (PLAQUENIL) 200 MG tablet Take 1 tablet (200 mg) by mouth 2 times daily 180 tablet 0    MULTIVITAMIN TABS   OR   0    omeprazole (PRILOSEC) 40 MG DR capsule Take 1 capsule (40 mg) by mouth daily 60 capsule 1    ondansetron (ZOFRAN) 8 MG tablet Take 1 tablet (8 mg) by mouth 2 times daily as needed. 30 tablet 11    phenazopyridine (PYRIDIUM) 100 MG tablet Take 1 tablet (100 mg) by mouth 3 times daily as needed for urinary tract discomfort. 30 tablet 0    predniSONE (DELTASONE) 1 MG tablet 9-8-7-6 mg a day, each course x 1 month then 5 mg a day, use with 5 mg size tab 90 tablet 5    predniSONE (DELTASONE) 5 MG tablet 9-8-7-6 mg a day, each course x 1 month then 5 mg a day, use with 1 mg size tab 150 tablet 5    rimegepant (NURTEC) 75 MG ODT tablet Place 1 tablet (75 mg) under the tongue daily as needed for migraine. Maximum of 1 tablet every 24 hours. 8 tablet 11    rosuvastatin (CRESTOR) 5 MG tablet Take 1 tablet (5 mg) by mouth daily (Patient not taking: No sig reported) 90 tablet 3    semaglutide (OZEMPIC) 2 MG/3ML pen Inject 0.5 mg subcutaneously every 7 days. (Patient not taking: No sig reported) 9 mL 3    SUMAtriptan (IMITREX STATDOSE) 6 MG/0.5ML pen injector kit Inject 0.5 mLs (6 mg) subcutaneously at onset of headache for migraine. May repeat in 1 hour. Max 12 mg/24 hours. 3 kit 11    traMADol (ULTRAM) 50 MG tablet Take 1 tablet (50 mg) by mouth every 6 hours as needed for severe pain. Take 1-2 tablets up to three times a day as needed - Oral 180 tablet 5    traMADol (ULTRAM-ER) 300 MG 24 hr tablet Take 1 tablet (300 mg) by mouth at bedtime. maximum 1 tablet(s) per day 30 tablet 5    VITAMIN D, CHOLECALCIFEROL, PO Take 5,000 Units by mouth daily       Facility-Administered Encounter Medications as of  5/16/2025   Medication Dose Route Frequency Provider Last Rate Last Admin    botulinum toxin type A (BOTOX) 100 units injection 200-300 Units  200-300 Units Intramuscular Q90 Days    200 Units at 04/17/25 1515         Her records were reviewed.    Component      Latest Ref Rng & Units 2/10/2022 5/17/2022   WBC      4.0 - 11.0 10e3/uL 11.6 (H) 10.7   RBC Count      3.80 - 5.20 10e6/uL 4.86 5.03   Hemoglobin      11.7 - 15.7 g/dL 14.8 14.9   Hematocrit      35.0 - 47.0 % 44.5 45.9   MCV      78 - 100 fL 92 91   MCH      26.5 - 33.0 pg 30.5 29.6   MCHC      31.5 - 36.5 g/dL 33.3 32.5   RDW      10.0 - 15.0 % 12.8 12.5   Platelet Count      150 - 450 10e3/uL 242 267   % Neutrophils      % 76    % Lymphocytes      % 16    % Monocytes      % 5    % Eosinophils      % 0    % Basophils      % 1    % Immature Granulocytes      % 2    NRBCs per 100 WBC      <1 /100 0    Absolute Neutrophils      1.6 - 8.3 10e3/uL 8.9 (H)    Absolute Lymphocytes      0.8 - 5.3 10e3/uL 1.8    Absolute Monocytes      0.0 - 1.3 10e3/uL 0.6    Absolute Eosinophils      0.0 - 0.7 10e3/uL 0.0    Absolute Basophils      0.0 - 0.2 10e3/uL 0.1    Absolute Immature Granulocytes      <=0.4 10e3/uL 0.2    Absolute NRBCs      10e3/uL 0.0    Sodium      133 - 144 mmol/L  141   Potassium      3.4 - 5.3 mmol/L  4.3   Chloride      94 - 109 mmol/L  102   Carbon Dioxide      20 - 32 mmol/L  32   Anion Gap      3 - 14 mmol/L  7   Urea Nitrogen      7 - 30 mg/dL  19   Creatinine      0.52 - 1.04 mg/dL 0.72 0.80   Calcium      8.5 - 10.1 mg/dL  9.6   Glucose      70 - 99 mg/dL  128 (H)   Alkaline Phosphatase      40 - 150 U/L  73   AST      0 - 45 U/L 25 23   ALT      0 - 50 U/L 47 44   Protein Total      6.8 - 8.8 g/dL  7.4   Albumin      3.4 - 5.0 g/dL 3.8 3.9   Bilirubin Total      0.2 - 1.3 mg/dL  0.3   GFR Estimate      >60 mL/min/1.73m2 >90 >90   SARS-CoV-2 Rigo Ab, Interp, S       Positive    SARS-CoV-2 Rigo Ab, Quant, S      U/mL >250    Patient's Race        Unknown    Patient's Ethnicity       Unknown    Protein Random Urine      g/L <0.05    Protein Total Urine g/gr Creatinine           Creatinine Urine      mg/dL 13    % Retic      0.5 - 2.0 % 1.6    Absolute Retic      0.025 - 0.095 10e6/uL 0.080    DNA-ds      <10.0 IU/mL <0.6    Complement C4      13 - 39 mg/dL 34    Complement C3      81 - 157 mg/dL 135    Sed Rate      0 - 20 mm/hr 7    CRP Inflammation      0.0 - 8.0 mg/L 3.0    Hemoglobin A1C      0.0 - 5.6 %  5.8 (H)     Pregnancy: She is . H/o OCP and HRT use, see HPI    Ph.E:    Constitutional: WD/WN. Pleasant, NAD.    Eyes: EOM intact, sclera anicteric, conj not injected   MS: No active synovitis over hands, could make full fist, but it hurts. + [place] nodes.  Skin: no rash  Neuro: A&O x 3. Grossly non focal  Psych: NL affect    Assessment/Plan     1-SLE FLARE/active  2-HCQ monitoring  3-AZA monitoring  4-Benlysta monitoring    SLE, dx in  (VINITA: 1:80, dsDNA +, Smith negative, normal complements, joint pains, malar rash, oral ulcers), usually flares approximately monthly with joint pains, pleurisy and malar rash. Currently, feels that disease is active. Has been on chronic 20 mg every day prednisone, gained weight, gets LE edema, looks cushingoid. Still planning for PGS normal embryo implantation, might consider surrogate. Had a reaction to IVIG which was given for infertility tx and is not going to get it again. Her back pain seems to be sciatica and not related to SLE.    Previous visits, was advised to try AZA as steroid sparing agent (I am concerned about long term steroid SE) but did not start with concern for potential SE but willing to try benlysta, especially with planning for surrogate pregnancy. Labs are stable.    2022: Gracie is on benlysta since last visit with some benefit, but can not taper prednisone below 17 mg every day due to increased joint pain by taper. Briefly tried AZA 1 tab a day, no SEs, willing to re-try it, will  resume it at higher dose.        2/9/2023: Failed AZA, which was chosen over cellcept due to pregnancy planning via IVF. Will try cellcept as steroid sparing agent to allow tapering prednisone down; risks were discussed. She going to proceed with pregnancy via surrogate; therefore she will not get pregnant herself. She misunderstood and stopped HCQ, thought we were switching tx, will resume. Discussed its benefits.     9/14/2023: B/L hand poor /loss of strength seems to be OA related, discussed mx. She is willing to try cellcept to allow tapering prednisone off; risks were discussed.      7/10/2024:    Off benlysta x few months, did help some not much. Prednisone baseline ow is 10 mg every day, it was 13 mg every day last visit, had to go up to 20 mg a day last Sat for flare of rash, fatigue, joint pain, now down to 15 mg qd, malar rash is better. Has not tried cellcept yet, being busy with birth of her 9 wk old daughter, lack of sleep, busy and tired. Discussed long term SE of prednisone, she is willing to try cellcept and taper down prednisone (goal is baseline 5 mg every day given long term use, can't probably completely taper off with concern for adrenal insufficiency), eye exam last done 6/2023, needs to get done DEXA scan. Needs to get done. Recommend to try provigil for fatigue; risks were discussed. Stable lupus labs.        Plan:    Continue plaquenil 200 mg twice a day with yearly eye exam    Try provigil 100 mg every morning    Go down on prednisone to 10 mg every day, plan is to taper after start of cellcept, 1 mg down q4 weeks    Start cellcept 1 tab twice a day    DEXA bone density to be scheduled    Eye exam to be scheduled    Labs 1 month after start of cellcept    Return video visit in about 4-5 months        Today 5/16/2025:    Can not taper prednisone below 10 mg every day, not just withdrawal from steroid, gets recurrence of rash, increased joint pain/AM stiffness. Off HCQ x 2 months, given  abnormal eye exam, needs to see a retinal specialist. Never started cellcept, with concerns for potential SE. Likes to try saphnelo; risks were discussed. Also discussed long term SE of prednisone, any dose above 5 mg every day.    Plan:      Yokasta Tang eye exam referral, if no plaquenil toxicity, will go back on plaquenil    DEXA bone density and labs (including repeat APLA given being on estrogen)    Shingrix vaccine before saphnelo    Go back on provigil    Will try saphnelo monthly infusion, [facility] pharmacist will contact you    Return in 6 months in person      Orders Placed This Encounter   Procedures    Hepatitis C antibody    Hepatitis B surface antigen    Hepatitis B core antibody    Lupus Anticoagulant Panel    Cardiolipin Dorinda IgG and IgM    Beta 2 Glycoprotein Antibodies IGG IGM    Cardiolipin Antibody IgA    Beta 2 Glycoprotein 1 Antibody IgA    Adult Eye  Referral    Med Therapy Management Referral    Quantiferon TB Gold Plus      Josh Roy MD    TT 40 min was spent on date of the encounter doing chart review, history and exam, documentation and further activities as noted above. Any prior notes, outside records, laboratory results, and imaging studies were reviewed if relevant.     The longitudinal plan of care for the diagnosis(es)/condition(s) as documented were addressed during this visit. Due to the added complexity in care, I will continue to support Gracie in the subsequent management and with ongoing continuity of care.

## 2025-05-16 NOTE — PATIENT INSTRUCTIONS
Yokasta Tang eye exam referral, if no plaquenil toxicity, will go back on plaquenil    DEXA bone density and labs    Shingrix vaccine    Go back on provigil    Will try saphnelo monthly infusion, [facility] pharmacist will contact you    Return in 6 months in person

## 2025-05-16 NOTE — NURSING NOTE
Current patient location: [address]  [place] [address]    Is the patient currently in the state of [place]? YES    Visit mode: VIDEO    If the visit is dropped, the patient can be reconnected by:VIDEO VISIT: Text to cell phone:   Telephone Information:   Mobile [phone]       Will anyone else be joining the visit? NO  (If patient encounters technical issues they should call [phone])    Are changes needed to the allergy or medication list? Pt stated no med changes    Are refills needed on medications prescribed by this physician? Discuss with provider    Rooming Documentation:  Questionnaire(s) completed    Reason for visit: RECHECK    No other vitals to report per pt    Meka PEREYRAF

## 2025-05-19 ENCOUNTER — PATIENT OUTREACH (OUTPATIENT)
Dept: CARE COORDINATION | Facility: CLINIC | Age: 50
End: 2025-05-19
Payer: COMMERCIAL

## 2025-05-20 ENCOUNTER — TELEPHONE (OUTPATIENT)
Dept: RHEUMATOLOGY | Facility: CLINIC | Age: 50
End: 2025-05-20
Payer: COMMERCIAL

## 2025-05-20 NOTE — TELEPHONE ENCOUNTER
MTM referral from: [facility] visit (referral by provider)    MTM referral outreach attempt #2 on May 20, 2025 at 12:51 PM      Outcome: Patient not reachable after several attempts, routed to Pharmacist Team/Provider as an FYI    Use hbc for the carrier/Plan on the flowsheet      Appcelerator Message Sent    Ariella Pruitt  MTM

## 2025-05-21 ENCOUNTER — PATIENT OUTREACH (OUTPATIENT)
Dept: CARE COORDINATION | Facility: CLINIC | Age: 50
End: 2025-05-21
Payer: COMMERCIAL

## 2025-05-29 ENCOUNTER — VIRTUAL VISIT (OUTPATIENT)
Dept: URGENT CARE | Facility: CLINIC | Age: 50
End: 2025-05-29
Payer: COMMERCIAL

## 2025-05-29 DIAGNOSIS — R30.0 DYSURIA: Primary | ICD-10-CM

## 2025-05-29 NOTE — LETTER
Patricia Hall  [address]  [place] [address]  : 1975  MRN:  [id number]      May 17, 2022      To Whom It May Concern,    Patricia Hall was contacted for jury duty for May 31, 2022. Juror #[id number]    Ms. 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      Hue FERRER, CNP  Primary Care Clinic  [facility]                     [employer]

## 2025-05-30 ENCOUNTER — RESULTS FOLLOW-UP (OUTPATIENT)
Dept: URGENT CARE | Facility: CLINIC | Age: 50
End: 2025-05-30

## 2025-05-30 PROCEDURE — 99000 SPECIMEN HANDLING OFFICE-LAB: CPT | Performed by: PATHOLOGY

## 2025-05-30 PROCEDURE — 86481 TB AG RESPONSE T-CELL SUSP: CPT | Performed by: INTERNAL MEDICINE

## 2025-05-30 PROCEDURE — 86803 HEPATITIS C AB TEST: CPT | Performed by: INTERNAL MEDICINE

## 2025-05-30 PROCEDURE — 82607 VITAMIN B-12: CPT | Performed by: NURSE PRACTITIONER

## 2025-05-30 PROCEDURE — 86160 COMPLEMENT ANTIGEN: CPT | Performed by: INTERNAL MEDICINE

## 2025-05-30 PROCEDURE — 82306 VITAMIN D 25 HYDROXY: CPT | Performed by: NURSE PRACTITIONER

## 2025-05-30 PROCEDURE — 85390 FIBRINOLYSINS SCREEN I&R: CPT | Mod: 26 | Performed by: PATHOLOGY

## 2025-05-30 PROCEDURE — 86146 BETA-2 GLYCOPROTEIN ANTIBODY: CPT | Performed by: INTERNAL MEDICINE

## 2025-05-30 PROCEDURE — 86225 DNA ANTIBODY NATIVE: CPT | Performed by: INTERNAL MEDICINE

## 2025-05-30 PROCEDURE — 83525 ASSAY OF INSULIN: CPT | Performed by: NURSE PRACTITIONER

## 2025-05-30 PROCEDURE — 83036 HEMOGLOBIN GLYCOSYLATED A1C: CPT | Performed by: NURSE PRACTITIONER

## 2025-05-30 PROCEDURE — 86147 CARDIOLIPIN ANTIBODY EA IG: CPT | Performed by: INTERNAL MEDICINE

## 2025-05-30 PROCEDURE — 86704 HEP B CORE ANTIBODY TOTAL: CPT | Performed by: INTERNAL MEDICINE

## 2025-05-30 PROCEDURE — 87340 HEPATITIS B SURFACE AG IA: CPT | Performed by: INTERNAL MEDICINE

## 2025-05-30 PROCEDURE — 85730 THROMBOPLASTIN TIME PARTIAL: CPT | Performed by: INTERNAL MEDICINE

## 2025-05-30 NOTE — PATIENT INSTRUCTIONS
I ordered you a urinalysis and wet prep     To schedule: go to your [employer] home page and scroll down to the section that says  You have an appointment that needs to be scheduled  and click the large green button that says  Schedule Now  and follow the steps to find the next available openings.    If you are unable to complete these [employer] scheduling steps, please call [phone] to schedule your testing.    Once results are back, you will be contacted via My Chart if treatment is indicated.

## 2025-05-30 NOTE — PROGRESS NOTES
"Gracie is a 50 year old who is being evaluated via a billable video visit.          Assessment & Plan     Dysuria    - UA Macroscopic with reflex to Microscopic and Culture - Lab Collect; Future  - Wet prep - lab collect; Future    Treated earlier this month for UTI with Macrobid with no labs. She did not complete treatment due business and just forgot. Sx came back 2 days ago.   Hx Lupus      BMI  Estimated body mass index is 25.46 kg/m  as calculated from the following:    Height as of 5/16/25: 1.715 m (5' 7.5\").    Weight as of 5/16/25: 74.8 kg (165 lb).         Follow-up  Return in about 5 days (around 6/3/2025), or if symptoms worsen or fail to improve.    Subjective   Gracie is a 50 year old, presenting for the following health issues:  No chief complaint on file.    HPI      Urinary urgency, frequency and dysuria for 48 hours.  No fever or blood in the urine.  No back pain or abdominal pain.  Has had some vaginal dryness but no discharge itching or odor.  Has been on 2 antibiotics this month 1 for sinus infection of the for UTI.  No labs done at that time.  She did have a positive home test though.  Took another home test today which was positive for nitrites but negative for leukocytes        Review of Systems  Constitutional, HEENT, cardiovascular, pulmonary, gi and gu systems are negative, except as otherwise noted.      Objective           Vitals:  No vitals were obtained today due to virtual visit.    Physical Exam   GENERAL: alert and no distress  PSYCH: Appropriate affect, tone, and pace of words          Video-Visit Details    Type of service:  Video Visit   Originating Location (pt. Location): Home    Distant Location (provider location):  Off-site  Platform used for Video Visit: Padmini  Signed Electronically by: Virtual Urgent Care    "

## 2025-06-03 ENCOUNTER — TELEPHONE (OUTPATIENT)
Dept: RHEUMATOLOGY | Facility: CLINIC | Age: 50
End: 2025-06-03
Payer: COMMERCIAL

## 2025-06-03 NOTE — TELEPHONE ENCOUNTER
Multiple attempts were made to get patient set up with MTM pharmacist. She did not attend visit with MTM on 6/2. Will continue to work on getting her set up with MTM if she is willing. In the meantime, sending saphnelo orders to provider to prevent further delay.     Saphnel 300mg infusion q28 days pended.    Ronda HAWKINS RN  Adult Rheumatology Clinic

## 2025-06-04 ENCOUNTER — VIRTUAL VISIT (OUTPATIENT)
Dept: PHARMACY | Facility: CLINIC | Age: 50
End: 2025-06-04
Attending: INTERNAL MEDICINE
Payer: COMMERCIAL

## 2025-06-04 DIAGNOSIS — Z71.85 VACCINE COUNSELING: ICD-10-CM

## 2025-06-04 DIAGNOSIS — M32.19 SYSTEMIC LUPUS ERYTHEMATOSUS WITH OTHER ORGAN INVOLVEMENT, UNSPECIFIED SLE TYPE (H): Primary | ICD-10-CM

## 2025-06-04 NOTE — PROGRESS NOTES
Medication Therapy Management (MTM) Encounter    ASSESSMENT:                            Medication Adherence/Access: {adherencechoices:397504}    SLE:   Provided education on *** today including dosing, general administration, side effects (both common/serious), precautions, monitoring and time to efficacy. Discussed data on malignancy and risk of serious infection in depth. Encouraged indicated non-live vaccines and avoidance of live vaccines. Discussed potential need to hold therapy in the setting of signs/symptoms of active infection. Encouraged her to contact the rheumatology clinic in the event she has questions on this. Would benefit from starting *** once it arrives and using *** as directed.     PLAN:                            ***    Follow-up: {followuptest2:999973}    SUBJECTIVE/OBJECTIVE:                          Gracie Hall is a 50 year old female seen for {mtmvisit:923452}     Reason for visit: ***.    Allergies/ADRs: {1/2/3/4/5:133947}  Past Medical History: {1/2/3/4/5:097677}  Tobacco: She reports that she has never smoked. She has never used smokeless tobacco.  Alcohol: {ALCOHOL CONSUMPTION HX:040319}  {Social and Goals:412891}  Medication Adherence/Access: {fumedadherence:623928}    SystemicLupus Erythematosus:  Saphnelo 300mg every 4 weeks  Modafinil 100mg daily    Endometriosis  Combipatch 1 patch over 96 hours twice weekly  Vagifem 10mcg vaginally weekly    Fibromyalgia  Tramadol 300mg at bedtime   Tramadol 50mg q6h prn     Migraine  Nurtec 75mg prn  Sumatriptan 6mg subcutaneous injection prn  Emgality 120mg every 28 days  Botox 200-300 units every 90 days    ***  ***    Today's Vitals: There were no vitals taken for this visit.  ----------------  {DASIA?:220646}    I spent {mtm total time 3:275783} with this patient today{MTMpartdbillingquestion:132312}. { :127455}.     A summary of these recommendations {GIVEN/NOT GIVEN:862338}.    ***    Telemedicine Visit Details  The patient's medications  "can be safely assessed via a telemedicine encounter.  Type of service:  {telemedvisitmtm:467328::\"Telephone visit\"}  Originating Location (pt. Location): {video visit patient location:898757::\"Home\"}  {PROVIDER LOCATION On-site should be selected for visits conducted from your clinic location or adjoining [facility] hospital, academic office, or other nearby [facility] building. Off-site should be selected for all other provider locations, including home:537172}  Distant Location (provider location):  {virtual location provider:657087}  Start Time: {video/phone visit start time:152948}  End Time: {video/phone visit end time:152948}     Medication Therapy Recommendations  No medication therapy recommendations to display    "

## 2025-06-05 ENCOUNTER — RESULTS FOLLOW-UP (OUTPATIENT)
Dept: RHEUMATOLOGY | Facility: CLINIC | Age: 50
End: 2025-06-05

## 2025-06-05 ENCOUNTER — ENROLLMENT (OUTPATIENT)
Dept: HOME HEALTH SERVICES | Facility: HOME HEALTH | Age: 50
End: 2025-06-05
Payer: COMMERCIAL

## 2025-06-05 RX ORDER — DIPHENHYDRAMINE HYDROCHLORIDE 50 MG/ML
50 INJECTION, SOLUTION INTRAMUSCULAR; INTRAVENOUS
Start: 2025-06-05

## 2025-06-05 RX ORDER — ALBUTEROL SULFATE 0.83 MG/ML
2.5 SOLUTION RESPIRATORY (INHALATION)
OUTPATIENT
Start: 2025-06-05

## 2025-06-05 RX ORDER — METHYLPREDNISOLONE SODIUM SUCCINATE 40 MG/ML
40 INJECTION INTRAMUSCULAR; INTRAVENOUS
Start: 2025-06-05

## 2025-06-05 RX ORDER — DIPHENHYDRAMINE HCL 25 MG
25 CAPSULE ORAL
OUTPATIENT
Start: 2025-06-05

## 2025-06-05 RX ORDER — METHYLPREDNISOLONE SODIUM SUCCINATE 125 MG/2ML
125 INJECTION INTRAMUSCULAR; INTRAVENOUS
OUTPATIENT
Start: 2025-06-05

## 2025-06-05 RX ORDER — DIPHENHYDRAMINE HYDROCHLORIDE 50 MG/ML
25 INJECTION, SOLUTION INTRAMUSCULAR; INTRAVENOUS
Start: 2025-06-05

## 2025-06-05 RX ORDER — HEPARIN SODIUM (PORCINE) LOCK FLUSH IV SOLN 100 UNIT/ML 100 UNIT/ML
5 SOLUTION INTRAVENOUS
OUTPATIENT
Start: 2025-06-05

## 2025-06-05 RX ORDER — EPINEPHRINE 1 MG/ML
0.3 INJECTION, SOLUTION, CONCENTRATE INTRAVENOUS EVERY 5 MIN PRN
OUTPATIENT
Start: 2025-06-05

## 2025-06-05 RX ORDER — ALBUTEROL SULFATE 90 UG/1
1-2 INHALANT RESPIRATORY (INHALATION)
Start: 2025-06-05

## 2025-06-05 RX ORDER — ACETAMINOPHEN 325 MG/1
650 TABLET ORAL
OUTPATIENT
Start: 2025-06-05

## 2025-06-05 RX ORDER — HEPARIN SODIUM,PORCINE 10 UNIT/ML
5-20 VIAL (ML) INTRAVENOUS DAILY PRN
OUTPATIENT
Start: 2025-06-05

## 2025-06-05 NOTE — PROGRESS NOTES
Medication Therapy Management (MTM) Encounter    ASSESSMENT:                            Medication Adherence/Access: No issues identified.    Systemic Lupus Erythematosus: Provided education on Saphnelo today including dosing, general administration, side effects (both common/serious), precautions, monitoring and time to efficacy. Discussed data on malignancy and risk of serious infection in depth. Encouraged indicated non-live vaccines and avoidance of live vaccines. Discussed potential need to hold therapy in the setting of signs/symptoms of active infection. Encouraged her to contact the rheumatology clinic in the event she has questions on this. Reviewed home infusion scheduling and securing information. Will place home infusion referral to start process. Discussed once home infusion has her referral processed they will call to set up an infusion date.    Vaccines: Per ACIP recommendations, eligible for Shingrix series and Prevnar 20 vaccines. Reviewed importance of completing 1st Shingrix dose prior to starting Saphnelo.    PLAN:                            1. We are working with [facility] to start Saphnelo infusions monthly. They will give you a phone call to schedule once they process the information we sent.    2. Consider completing your Shingrix (shingles) and Prevnar 20 (pneumonia) vaccines. We discussed this can be completed at your primary care doctor's office or a local pharmacy. The first Shingrix dose should be completed prior to starting Saphnelo, the second dose will be 2-6 months after the first.    Follow-up: Return in about 3 months (around 9/4/2025) for MTM Pharmacist Visit.    SUBJECTIVE/OBJECTIVE:                          Gracie Hall is a 50 year old female seen for an initial visit. She was referred to me from Josh Roy MD.      Reason for visit: Saphnelo education    Allergies/ADRs: Reviewed in chart  Past Medical History: Reviewed in chart  Tobacco: She reports that  she has never smoked. She has never used smokeless tobacco.  Alcohol: not currently using    Medication Adherence/Access: no issues reported.    Systemic Lupus Erythematosus:   Modafinil 25 mg daily (uses 1/4 of 100 mg tab)    Reports she has been having significant fatigue, small joint pain, and tendon pain. Overall has not been feeling well and struggles to have the energy to keep up with her 1 year old. Did try higher dose of modafinil but got heart palpitations. Discussed starting Saphnelo with rheumatologist at last visit - interested in getting this set up to try. Prefers home infusion if possible.     Pre-Biologic Screening:   Hep B Surface Antibody No record of completion    Hep B Core Antibody  Non-reactive (5/30/25)    Hep B Surface Antigen Non-reactive (5/30/25)    Hep C Antibody  Non-reactive (5/30/25)    HIV Antigen Antibody No record of completion   Quantiferon TB Gold Negative (5/30/25)      Last lab monitoring completed: 5/30/25    Vaccines:  Immunization History   Covid-19 vaccine ([id number] version)  Up-to-date   Influenza (annual) Up-to-date, avoid live FluMist   Pneumococcal Due to receive PCV-20   Tetanus/Tdap  Up-to-date   Shingrix Due to receive   All patients on biologics should avoid live vaccines (varicella/VZV, intranasal influenza, MMR, or yellow fever vaccine (if traveling))        Patient unable to make scheduled visit time and had to be accommodated during a 30 min available appointment later in the day. Due to time constraints, unable to complete full CMR assessment today.    Today's Vitals: There were no vitals taken for this visit.  ----------------    I spent 30 minutes with this patient today. All changes were made via collaborative practice agreement with Josh Roy.     A summary of these recommendations was sent via [employer].    Arina Mock, PharmD  Medication Therapy Management Pharmacist  [facility]  Phone:  [phone]    Telemedicine Visit Details  The patient's medications can be safely assessed via a telemedicine encounter.  Type of service:  Telephone visit  Originating Location (pt. Location): Home    Distant Location (provider location):  Off-site  Start Time: 3:30 PM  End Time: 4:00 PM     Medication Therapy Recommendations  Vaccine counseling   1 Rationale: Preventive therapy - Needs additional medication therapy - Indication   Recommendation: Start Medication - Shingrix 50 MCG/0.5ML Susr   Status: Accepted - no CPA Needed   Identified Date: 6/4/2025 Completed Date: 6/4/2025

## 2025-06-05 NOTE — PATIENT INSTRUCTIONS
"Recommendations from today's MTM visit:                                                       1. We are working with [facility] to start Saphnelo infusions monthly. They will give you a phone call to schedule once they process the information we sent.    2. Consider completing your Shingrix (shingles) and Prevnar 20 (pneumonia) vaccines. We discussed this can be completed at your primary care doctor's office or a local pharmacy. The first Shingrix dose should be completed prior to starting Saphnelo, the second dose will be 2-6 months after the first.    Follow-up: Return in about 3 months (around 9/4/2025) for MTM Pharmacist Visit.    It was great speaking with you today.  I value your experience and would be very thankful for your time in providing feedback in our clinic survey. In the next few days, you may receive an email or text message from [employer] with a link to a survey related to your  clinical pharmacist.\"     To schedule another MTM appointment, please call the clinic directly or you may call the MTM scheduling line at [phone].    My Clinical Pharmacist's contact information:                                                      Please feel free to contact me with any questions or concerns you have.      Arina Mock, PharmD  Medication Therapy Management Pharmacist  [facility]  Phone: [phone]     "

## 2025-06-09 DIAGNOSIS — M32.9 SYSTEMIC LUPUS ERYTHEMATOSUS (H): Primary | ICD-10-CM

## 2025-06-14 ENCOUNTER — HEALTH MAINTENANCE LETTER (OUTPATIENT)
Age: 50
End: 2025-06-14

## 2025-07-10 ENCOUNTER — OFFICE VISIT (OUTPATIENT)
Dept: PHYSICAL MEDICINE AND REHAB | Facility: CLINIC | Age: 50
End: 2025-07-10
Payer: COMMERCIAL

## 2025-07-10 DIAGNOSIS — G24.4 OROMANDIBULAR DYSTONIA: ICD-10-CM

## 2025-07-10 DIAGNOSIS — G43.719 INTRACTABLE CHRONIC MIGRAINE WITHOUT AURA AND WITHOUT STATUS MIGRAINOSUS: Primary | ICD-10-CM

## 2025-07-10 DIAGNOSIS — G24.9 DYSTONIA: ICD-10-CM

## 2025-07-10 NOTE — LETTER
7/10/2025       RE: Patricia Hall  [address]  [place] [address]     Dear Colleague,    Thank you for referring your patient, Patricia Hall, to the [facility] [place] at [facility]. Please see a copy of my visit note below.    CC: Patient with chronic pain headache neck pain and migraines.     Patient returns for follow-up visit and for ongoing Botox injections.  Her last Botox injection was on 4/17/2025.  We have been giving her 200 units and while it is helping with her migraine, her jaw pain continues to be a problem.  We have talked about increasing the dose and she is here for reassessment.  She is finding Botox helpful including the treatment of her jaws.       She wants to get MRI of the cervical spine.  She feels her jaw is beginning to bother her quite a bit.  She continues to take soft food.  She is pointing out to the lateral pterygoids as muscles that are sore bilaterally.  She is also expressing tightness in the muscles of the neck and shoulder region.  She also complains of low back issues in the setting of endometriosis.     Patient is a very pleasant 50-year-old woman with a complex medical history.  She has been dealing with severe migraines neck pain as well as TMJ dysfunction.  She has seen her dentist.  She has been getting Botox which have been helpful.  She still has several headaches and the headaches worsen when the Botox wears off.  She has also noted significant tightness in the muscles of the neck and shoulder.  She received her last Botox on 4/25/2024.\     She also gives history of lupus.  Because of steroids, she has fungal infection in her toenails.  She has had issues with her lower back and has previously undergone radiofrequency ablation.  She currently hurts all over and notes that she has been given a diagnosis of fibroid some point in the  past.     She has had pain in the 8-10 out of 10 at its worst 3-4 at best 3-4 currently.  She finds that it affects all of her activities sleep as well.  She also has issues with  strength in the hands.  She has done physical therapy for her back.  She has not had imaging studies for the neck.  She denies any bowel bladder disturbance.  She has constipation and hemorrhoids.     In reviewing the history she has had chronic pain affecting the neck and shoulders for years.  She has tried medications including ibuprofen and Tylenol for more than 3 months without relief.  She would like to get this under control.     EXAM: MR LUMBAR SPINE W/O CONTRAST  LOCATION: [facility]  DATE/TIME: 5/24/2022 7:04 PM     INDICATION: Myelopathy, acute or progressive.  COMPARISON: 12/9/2019  TECHNIQUE: Routine Lumbar Spine MRI without IV contrast.     FINDINGS:   Nomenclature is based on 5 lumbar type vertebral bodies. Satisfactory vertebral body height. There is 2 mm degenerative anterior subluxation L5 upon S1 with no pars interarticularis defects. Mild interspace narrowing L2-L3 and L5-S1. No compression   fractures. No marrow or ligamentous edema. Satisfactory sacral alignment. Normal distal spinal cord and cauda equina with conus medullaris at L1. No cord expansive changes are noted. Nerve roots of the cauda equina are satisfactory without nodularity or   thickening. No morphologic evidence to suggest arachnoiditis. No retroperitoneal solid mass or adenopathy. Symmetric psoas appearance bilaterally. Nothing for sacral insufficiency or stress fracture. No pelvic mass or adenopathy is noted.     T12-L1: Normal disc height and signal. No herniation. Normal facets. No spinal canal or neural foraminal stenosis.      L1-L2: Normal disc height and signal. No herniation. Normal facets. No spinal canal or neural foraminal stenosis.     L2-L3: Mild loss of disc height with annular bulge. No disc herniation. No  spinal stenosis. Suggested mild left foraminal compromise without right foraminal stenosis. Facet joints are satisfactory.      L3-L4: Normal disc height and signal. No herniation. Normal facets. No spinal canal or neural foraminal stenosis.     L4-L5: Minimal annular bulging. No disc herniation. No spinal stenosis. Mild foraminal narrowing inferiorly bilaterally and mild facet joint hypertrophic changes with increased joint space fluid.     L5-S1: Moderate loss of disc height. Uncovering of disc material superiorly with low-grade degenerative anterior subluxation and generalized disc bulge. No disc herniation. No spinal stenosis. Mild bilateral foraminal compromise. Facet joint hypertrophic   changes bilaterally with increased joint space fluid. Increased joint space fluid overall has been described in association with instability, consider flexion and extension views to assess L4-L5 and L5-S1 relationships on dynamic views as indicated.                                                                      IMPRESSION:  1.  Degenerative changes lower lumbar spine most marked L4-L5 and L5-S1. No severe spinal stenosis or severe foraminal compromise at any lumbar level.     2.  No spinal stenosis with mild L4-L5 and L5-S1 foraminal compromise inferiorly. Facet joint arthropathy and increased joint space fluid is noted at these levels.     3.  Consider flexion-extension views if there is concern for instability as increased joint space fluid in the facet joints has been described in association with instability.     4.  Since previous MR 12/9/2019, L5-S1 anterior subluxation has progressed, along with interspace narrowing. Previously identified synovial cyst arising from the medial aspect of the degenerated right L5-S1 facet joint is not present on today's study   with decreased mass effect upon the right lateral thecal sac and mass effect upon the traversing right-sided nerve roots S1-S2.        [facility]   MRI  HEAD WITHOUT AND WITH CONTRAST   5/17/2013     INDICATION: Lupus, psychosis, auditory hallucinations.   TECHNIQUE: Routine brain MRI without and with gadolinium. CONTRAST:   Magnevist 12 mL IV.   COMPARISON: Head MRI 7/2/2010.     REPORT: Evaluation of the intracranial contents demonstrates there is no   evidence for acute or subacute infarct on the diffusion acquisition. No   evidence for intracranial hemorrhage, mass lesion, or obstructive type   hydrocephalus. No pathologic enhancement of the brain parenchyma or   meninges following IV gadolinium. Mild inferior position of the cerebellar   tonsils at the craniovertebral junction noted; this is unchanged and likely    incidental. The major intracranial vascular flow voids are maintained.   Mastoid air cells clear. Paranasal sinuses show a trace of mucosal   thickening in the right ethmoid air cells.     CONCLUSION:   1. No evidence for intracranial mass, hemorrhage, hydrocephalus, or   infarct.   2. Slight inferior position of the cerebellar tonsils noted at the   craniovertebral junction which is unchanged from prior study. This is   likely an incidental finding.   3. Mastoid air cells clear. Trace of mucosal thickening in the right   ethmoid sinuses.   Past Medical History           Past Medical History:   Diagnosis Date     Allergy, unspecified not elsewhere classified       Endometriosis       Fibromyalgia       Migraine without aura, with intractable migraine, so stated, without mention of status migrainosus       Myalgia and myositis, unspecified       Systemic lupus erythematosus (H)              Past Surgical History             Past Surgical History:   Procedure Laterality Date     SURGICAL HISTORY OF -    01/01/1986     left elbow fracture repair            Family History            Problem (# of Occurrences) Relation (Name,Age of Onset)     Dementia (1) Mother     Diabetes (1) Maternal Grandfather     Lipids (1) Mother     Prostate Cancer (1) Father  (80)     Skin Cancer (1) Paternal Grandmother     Thyroid Cancer (1) Mother (50 - 60)               Negative family history of: Melanoma                Social History   Social History                Socioeconomic History     Marital status:        Spouse name: Not on file     Number of children: Not on file     Years of education: Not on file     Highest education level: Not on file   Occupational History     Not on file   Tobacco Use     Smoking status: Never     Smokeless tobacco: Never   Substance and Sexual Activity     Alcohol use: Not Currently     Drug use: No     Sexual activity: Yes       Partners: Male       Birth control/protection: Condom   Other Topics Concern     Parent/sibling w/ CABG, MI or angioplasty before 65F 55M? Not Asked   Social History Narrative     Not on file      Social Determinants of Health              Financial Resource Strain: Not on file   Food Insecurity: Not on file   Transportation Needs: Not on file   Physical Activity: Not on file   Stress: Not on file   Social Connections: Not on file   Interpersonal Safety: Low Risk  (2/21/2024)     Interpersonal Safety       Do you feel physically and emotionally safe where you currently live?: Yes       Within the past 12 months, have you been hit, slapped, kicked or otherwise physically hurt by someone?: No       Within the past 12 months, have you been humiliated or emotionally abused in other ways by your partner or ex-partner?: No   Housing Stability: Not on file            Current Outpatient Medications   Medication Sig Dispense Refill     AMBIEN 10 MG OR TABS 1 po HS, prn 20 0     anifrolumab-fnia (SAPHNELO) 300 mg in sodium chloride 0.9 % 112 mL via gravity infusion Infuse 300 mg at 224 mL/hr over 30 minutes into the vein every 4 weeks. Allow bag to reach room temperature. Flush bag with 20 mL NS to flush tubing. Do not shake. 104512 mL 0     calcium carbonate (OS-VAHID 500 MG [employer] [place]) 1250 MG tablet Take 1 tablet by mouth  daily       EPINEPHrine (ANY BX GENERIC EQUIV) 0.3 MG/0.3ML injection 2-pack INJECT 0.3 MG INTO THE MUSCLE AS NEEDED FOR ANAPHYLAXIS 2 each 3     estradiol (VAGIFEM) 10 MCG TABS vaginal tablet Place 1 tablet (10 mcg) vaginally twice a week. 24 tablet 3     estradiol-norethindrone (COMBIPATCH) 0.05-0.14 MG/DAY bi-weekly patch Place 1 patch over 96 hours onto the skin twice a week. 24 patch 3     fluticasone (FLONASE) 50 MCG/ACT nasal spray SHAKE LIQUID AND USE 2 SPRAYS IN EACH NOSTRIL DAILY 16 g 11     galcanezumab-gnlm (EMGALITY) 120 MG/ML injection Inject 240 mg subcutaneous first month and then inject 120 mg subcutaneous every 28 days 2 mL 11     modafinil (PROVIGIL) 100 MG tablet Take 1 tablet (100 mg) by mouth daily. 30 tablet 5     MULTIVITAMIN TABS   OR   0     omeprazole (PRILOSEC) 40 MG DR capsule Take 1 capsule (40 mg) by mouth daily 60 capsule 1     ondansetron (ZOFRAN) 8 MG tablet Take 1 tablet (8 mg) by mouth 2 times daily as needed. 30 tablet 11     phenazopyridine (PYRIDIUM) 100 MG tablet Take 1 tablet (100 mg) by mouth 3 times daily as needed for urinary tract discomfort. 30 tablet 0     predniSONE (DELTASONE) 1 MG tablet 9-8-7-6 mg a day, each course x 1 month then 5 mg a day, use with 5 mg size tab 90 tablet 5     predniSONE (DELTASONE) 5 MG tablet 9-8-7-6 mg a day, each course x 1 month then 5 mg a day, use with 1 mg size tab 150 tablet 5     rimegepant (NURTEC) 75 MG ODT tablet Place 1 tablet (75 mg) under the tongue daily as needed for migraine. Maximum of 1 tablet every 24 hours. 8 tablet 11     rosuvastatin (CRESTOR) 5 MG tablet Take 1 tablet (5 mg) by mouth daily (Patient not taking: No sig reported) 90 tablet 3     semaglutide (OZEMPIC) 2 MG/3ML pen Inject 0.5 mg subcutaneously every 7 days. (Patient not taking: No sig reported) 9 mL 3     sodium chloride, PF, 0.9% PF flush Inject 10 mLs into the vein as needed for line flush. Flush IV before and after medication administration as directed  and/or at least every 12 hours. 432042 mL 0     SUMAtriptan (IMITREX STATDOSE) 6 MG/0.5ML pen injector kit Inject 0.5 mLs (6 mg) subcutaneously at onset of headache for migraine. May repeat in 1 hour. Max 12 mg/24 hours. 3 kit 11     traMADol (ULTRAM) 50 MG tablet Take 1 tablet (50 mg) by mouth every 6 hours as needed for severe pain. Take 1-2 tablets up to three times a day as needed - Oral 180 tablet 5     traMADol (ULTRAM-ER) 300 MG 24 hr tablet Take 1 tablet (300 mg) by mouth at bedtime. maximum 1 tablet(s) per day 30 tablet 5     VITAMIN D, CHOLECALCIFEROL, PO Take 5,000 Units by mouth daily          There were no vitals taken for this visit.       On examination, patient is alert and cooperative.  Vitals are stable.  She is afebrile.  HEENT is negative.  Extraocular movements are intact.  Face is symmetric.  Tongue is midline neck is supple.  She has taut muscles in the neck and shoulder region.     She has activation of the masseters bilaterally.  The lateral pterygoids and medial pterygoids on either side are tender and taut..     Impression: At this point this 50-year-old woman with multiple issues going on.  She has chronic headaches with migraines.  In addition she also has TMJ dysfunction/oromandibular dystonia.  There is an element of cervical dystonia/dystonia as well.  Will go with 300 units of Botox at this point.    I have suggested that she return for reassessment in 6 weeks time to assess her neck and order additional workup.     This was reviewed at length with the patient.  All her questions were answered to her satisfaction.  20 minutes were spent for this visit, exclusive of the procedure.  All on the date of encounter. More than 50% of which was for counseling on above-mentioned issues.     PROCEDURE NOTE: Botox injections for intractable migraine and oromandibular dystonia/cervical dystonia/dystonia.     After explaining the benefits and risks of the procedure, using 300 units of Botox  diluted with 6 cc of preservative-free normal saline, using ChloraPrep for skin prep Botox for migraine protocol 5 units were injected in 31 different areas for a total of 155 units.      Next using EMG needed for localization 20 units were injected into the lateral pterygoid muscles on either side using EMG needle.  Total 40    Levator scapulae on either side were injected with 10 units each.  Total 20    The masseter muscles on either side were injected with 20 units each.  Total 40    The spine Allis capitis was injected with 12.5 units each on either side.  Total 25    The longissimus on either side were injected with 10 units each.  Total 20    300 units were utilized in all.  None wasted.  She tolerated the procedure well.     She can ice the areas injected, continue with soft food, and follow-up with the visit in 4 weeks time.  Will await the results of the MRI of the cervical spine as well.     Thank you much for allow me to assist in her care.     Chacorta Hoyos MD    Again, thank you for allowing me to participate in the care of your patient.      Sincerely,    Chacorta Hoyos MD

## 2025-07-10 NOTE — PROGRESS NOTES
CC: Patient with chronic pain headache neck pain and migraines.     Patient returns for follow-up visit and for ongoing Botox injections.  Her last Botox injection was on 4/17/2025.  We have been giving her 200 units and while it is helping with her migraine, her jaw pain continues to be a problem.  We have talked about increasing the dose and she is here for reassessment.  She is finding Botox helpful including the treatment of her jaws.       She wants to get MRI of the cervical spine.  She feels her jaw is beginning to bother her quite a bit.  She continues to take soft food.  She is pointing out to the lateral pterygoids as muscles that are sore bilaterally.  She is also expressing tightness in the muscles of the neck and shoulder region.  She also complains of low back issues in the setting of endometriosis.     Patient is a very pleasant 50-year-old woman with a complex medical history.  She has been dealing with severe migraines neck pain as well as TMJ dysfunction.  She has seen her dentist.  She has been getting Botox which have been helpful.  She still has several headaches and the headaches worsen when the Botox wears off.  She has also noted significant tightness in the muscles of the neck and shoulder.  She received her last Botox on 4/25/2024.\     She also gives history of lupus.  Because of steroids, she has fungal infection in her toenails.  She has had issues with her lower back and has previously undergone radiofrequency ablation.  She currently hurts all over and notes that she has been given a diagnosis of fibroid some point in the past.     She has had pain in the 8-10 out of 10 at its worst 3-4 at best 3-4 currently.  She finds that it affects all of her activities sleep as well.  She also has issues with  strength in the hands.  She has done physical therapy for her back.  She has not had imaging studies for the neck.  She denies any bowel bladder disturbance.  She has constipation and  hemorrhoids.     In reviewing the history she has had chronic pain affecting the neck and shoulders for years.  She has tried medications including ibuprofen and Tylenol for more than 3 months without relief.  She would like to get this under control.     EXAM: MR LUMBAR SPINE W/O CONTRAST  LOCATION: [facility]  DATE/TIME: 5/24/2022 7:04 PM     INDICATION: Myelopathy, acute or progressive.  COMPARISON: 12/9/2019  TECHNIQUE: Routine Lumbar Spine MRI without IV contrast.     FINDINGS:   Nomenclature is based on 5 lumbar type vertebral bodies. Satisfactory vertebral body height. There is 2 mm degenerative anterior subluxation L5 upon S1 with no pars interarticularis defects. Mild interspace narrowing L2-L3 and L5-S1. No compression   fractures. No marrow or ligamentous edema. Satisfactory sacral alignment. Normal distal spinal cord and cauda equina with conus medullaris at L1. No cord expansive changes are noted. Nerve roots of the cauda equina are satisfactory without nodularity or   thickening. No morphologic evidence to suggest arachnoiditis. No retroperitoneal solid mass or adenopathy. Symmetric psoas appearance bilaterally. Nothing for sacral insufficiency or stress fracture. No pelvic mass or adenopathy is noted.     T12-L1: Normal disc height and signal. No herniation. Normal facets. No spinal canal or neural foraminal stenosis.      L1-L2: Normal disc height and signal. No herniation. Normal facets. No spinal canal or neural foraminal stenosis.     L2-L3: Mild loss of disc height with annular bulge. No disc herniation. No spinal stenosis. Suggested mild left foraminal compromise without right foraminal stenosis. Facet joints are satisfactory.      L3-L4: Normal disc height and signal. No herniation. Normal facets. No spinal canal or neural foraminal stenosis.     L4-L5: Minimal annular bulging. No disc herniation. No spinal stenosis. Mild foraminal narrowing inferiorly bilaterally and  mild facet joint hypertrophic changes with increased joint space fluid.     L5-S1: Moderate loss of disc height. Uncovering of disc material superiorly with low-grade degenerative anterior subluxation and generalized disc bulge. No disc herniation. No spinal stenosis. Mild bilateral foraminal compromise. Facet joint hypertrophic   changes bilaterally with increased joint space fluid. Increased joint space fluid overall has been described in association with instability, consider flexion and extension views to assess L4-L5 and L5-S1 relationships on dynamic views as indicated.                                                                      IMPRESSION:  1.  Degenerative changes lower lumbar spine most marked L4-L5 and L5-S1. No severe spinal stenosis or severe foraminal compromise at any lumbar level.     2.  No spinal stenosis with mild L4-L5 and L5-S1 foraminal compromise inferiorly. Facet joint arthropathy and increased joint space fluid is noted at these levels.     3.  Consider flexion-extension views if there is concern for instability as increased joint space fluid in the facet joints has been described in association with instability.     4.  Since previous MR 12/9/2019, L5-S1 anterior subluxation has progressed, along with interspace narrowing. Previously identified synovial cyst arising from the medial aspect of the degenerated right L5-S1 facet joint is not present on today's study   with decreased mass effect upon the right lateral thecal sac and mass effect upon the traversing right-sided nerve roots S1-S2.        [facility]   MRI HEAD WITHOUT AND WITH CONTRAST   5/17/2013     INDICATION: Lupus, psychosis, auditory hallucinations.   TECHNIQUE: Routine brain MRI without and with gadolinium. CONTRAST:   Magnevist 12 mL IV.   COMPARISON: Head MRI 7/2/2010.     REPORT: Evaluation of the intracranial contents demonstrates there is no   evidence for acute or subacute infarct on the diffusion  acquisition. No   evidence for intracranial hemorrhage, mass lesion, or obstructive type   hydrocephalus. No pathologic enhancement of the brain parenchyma or   meninges following IV gadolinium. Mild inferior position of the cerebellar   tonsils at the craniovertebral junction noted; this is unchanged and likely    incidental. The major intracranial vascular flow voids are maintained.   Mastoid air cells clear. Paranasal sinuses show a trace of mucosal   thickening in the right ethmoid air cells.     CONCLUSION:   1. No evidence for intracranial mass, hemorrhage, hydrocephalus, or   infarct.   2. Slight inferior position of the cerebellar tonsils noted at the   craniovertebral junction which is unchanged from prior study. This is   likely an incidental finding.   3. Mastoid air cells clear. Trace of mucosal thickening in the right   ethmoid sinuses.   Past Medical History           Past Medical History:   Diagnosis Date    Allergy, unspecified not elsewhere classified      Endometriosis      Fibromyalgia      Migraine without aura, with intractable migraine, so stated, without mention of status migrainosus      Myalgia and myositis, unspecified      Systemic lupus erythematosus (H)              Past Surgical History             Past Surgical History:   Procedure Laterality Date    SURGICAL HISTORY OF -    01/01/1986     left elbow fracture repair            Family History            Problem (# of Occurrences) Relation (Name,Age of Onset)     Dementia (1) Mother     Diabetes (1) Maternal Grandfather     Lipids (1) Mother     Prostate Cancer (1) Father (80)     Skin Cancer (1) Paternal Grandmother     Thyroid Cancer (1) Mother (50 - 60)               Negative family history of: Melanoma                Social History   Social History                Socioeconomic History    Marital status:        Spouse name: Not on file    Number of children: Not on file    Years of education: Not on file    Highest education  level: Not on file   Occupational History    Not on file   Tobacco Use    Smoking status: Never    Smokeless tobacco: Never   Substance and Sexual Activity    Alcohol use: Not Currently    Drug use: No    Sexual activity: Yes       Partners: Male       Birth control/protection: Condom   Other Topics Concern    Parent/sibling w/ CABG, MI or angioplasty before 65F 55M? Not Asked   Social History Narrative    Not on file      Social Determinants of Health              Financial Resource Strain: Not on file   Food Insecurity: Not on file   Transportation Needs: Not on file   Physical Activity: Not on file   Stress: Not on file   Social Connections: Not on file   Interpersonal Safety: Low Risk  (2/21/2024)     Interpersonal Safety      Do you feel physically and emotionally safe where you currently live?: Yes      Within the past 12 months, have you been hit, slapped, kicked or otherwise physically hurt by someone?: No      Within the past 12 months, have you been humiliated or emotionally abused in other ways by your partner or ex-partner?: No   Housing Stability: Not on file            Current Outpatient Medications   Medication Sig Dispense Refill    AMBIEN 10 MG OR TABS 1 po HS, prn 20 0    anifrolumab-fnia (SAPHNELO) 300 mg in sodium chloride 0.9 % 112 mL via gravity infusion Infuse 300 mg at 224 mL/hr over 30 minutes into the vein every 4 weeks. Allow bag to reach room temperature. Flush bag with 20 mL NS to flush tubing. Do not shake. 463013 mL 0    calcium carbonate (OS-VAHID 500 MG [place]. [place]) 1250 MG tablet Take 1 tablet by mouth daily      EPINEPHrine (ANY BX GENERIC EQUIV) 0.3 MG/0.3ML injection 2-pack INJECT 0.3 MG INTO THE MUSCLE AS NEEDED FOR ANAPHYLAXIS 2 each 3    estradiol (VAGIFEM) 10 MCG TABS vaginal tablet Place 1 tablet (10 mcg) vaginally twice a week. 24 tablet 3    estradiol-norethindrone (COMBIPATCH) 0.05-0.14 MG/DAY bi-weekly patch Place 1 patch over 96 hours onto the skin twice a week. 24 patch 3     fluticasone (FLONASE) 50 MCG/ACT nasal spray SHAKE LIQUID AND USE 2 SPRAYS IN EACH NOSTRIL DAILY 16 g 11    galcanezumab-gnlm (EMGALITY) 120 MG/ML injection Inject 240 mg subcutaneous first month and then inject 120 mg subcutaneous every 28 days 2 mL 11    modafinil (PROVIGIL) 100 MG tablet Take 1 tablet (100 mg) by mouth daily. 30 tablet 5    MULTIVITAMIN TABS   OR   0    omeprazole (PRILOSEC) 40 MG DR capsule Take 1 capsule (40 mg) by mouth daily 60 capsule 1    ondansetron (ZOFRAN) 8 MG tablet Take 1 tablet (8 mg) by mouth 2 times daily as needed. 30 tablet 11    phenazopyridine (PYRIDIUM) 100 MG tablet Take 1 tablet (100 mg) by mouth 3 times daily as needed for urinary tract discomfort. 30 tablet 0    predniSONE (DELTASONE) 1 MG tablet 9-8-7-6 mg a day, each course x 1 month then 5 mg a day, use with 5 mg size tab 90 tablet 5    predniSONE (DELTASONE) 5 MG tablet 9-8-7-6 mg a day, each course x 1 month then 5 mg a day, use with 1 mg size tab 150 tablet 5    rimegepant (NURTEC) 75 MG ODT tablet Place 1 tablet (75 mg) under the tongue daily as needed for migraine. Maximum of 1 tablet every 24 hours. 8 tablet 11    rosuvastatin (CRESTOR) 5 MG tablet Take 1 tablet (5 mg) by mouth daily (Patient not taking: No sig reported) 90 tablet 3    semaglutide (OZEMPIC) 2 MG/3ML pen Inject 0.5 mg subcutaneously every 7 days. (Patient not taking: No sig reported) 9 mL 3    sodium chloride, PF, 0.9% PF flush Inject 10 mLs into the vein as needed for line flush. Flush IV before and after medication administration as directed and/or at least every 12 hours. [id number] mL 0    SUMAtriptan (IMITREX STATDOSE) 6 MG/0.5ML pen injector kit Inject 0.5 mLs (6 mg) subcutaneously at onset of headache for migraine. May repeat in 1 hour. Max 12 mg/24 hours. 3 kit 11    traMADol (ULTRAM) 50 MG tablet Take 1 tablet (50 mg) by mouth every 6 hours as needed for severe pain. Take 1-2 tablets up to three times a day as needed - Oral 180 tablet 5     traMADol (ULTRAM-ER) 300 MG 24 hr tablet Take 1 tablet (300 mg) by mouth at bedtime. maximum 1 tablet(s) per day 30 tablet 5    VITAMIN D, CHOLECALCIFEROL, PO Take 5,000 Units by mouth daily          There were no vitals taken for this visit.       On examination, patient is alert and cooperative.  Vitals are stable.  She is afebrile.  HEENT is negative.  Extraocular movements are intact.  Face is symmetric.  Tongue is midline neck is supple.  She has taut muscles in the neck and shoulder region.     She has activation of the masseters bilaterally.  The lateral pterygoids and medial pterygoids on either side are tender and taut..     Impression: At this point this 50-year-old woman with multiple issues going on.  She has chronic headaches with migraines.  In addition she also has TMJ dysfunction/oromandibular dystonia.  There is an element of cervical dystonia/dystonia as well.  Will go with 300 units of Botox at this point.    I have suggested that she return for reassessment in 6 weeks time to assess her neck and order additional workup.     This was reviewed at length with the patient.  All her questions were answered to her satisfaction.  20 minutes were spent for this visit, exclusive of the procedure.  All on the date of encounter. More than 50% of which was for counseling on above-mentioned issues.     PROCEDURE NOTE: Botox injections for intractable migraine and oromandibular dystonia/cervical dystonia/dystonia.     After explaining the benefits and risks of the procedure, using 300 units of Botox diluted with 6 cc of preservative-free normal saline, using ChloraPrep for skin prep Botox for migraine protocol 5 units were injected in 31 different areas for a total of 155 units.      Next using EMG needed for localization 20 units were injected into the lateral pterygoid muscles on either side using EMG needle.  Total 40    Levator scapulae on either side were injected with 10 units each.  Total 20    The  masseter muscles on either side were injected with 20 units each.  Total 40    The spine Allis capitis was injected with 12.5 units each on either side.  Total 25    The longissimus on either side were injected with 10 units each.  Total 20    300 units were utilized in all.  None wasted.  She tolerated the procedure well.     She can ice the areas injected, continue with soft food, and follow-up with the visit in 4 weeks time.  Will await the results of the MRI of the cervical spine as well.     Thank you much for allow me to assist in her care.     Chacorta Hoyos MD

## 2025-07-14 ENCOUNTER — VIRTUAL VISIT (OUTPATIENT)
Dept: INTERNAL MEDICINE | Facility: CLINIC | Age: 50
End: 2025-07-14
Payer: COMMERCIAL

## 2025-07-14 DIAGNOSIS — G89.29 OTHER CHRONIC PAIN: ICD-10-CM

## 2025-07-14 DIAGNOSIS — G62.9 NEUROPATHY: Primary | ICD-10-CM

## 2025-07-14 DIAGNOSIS — G43.909 MIGRAINE WITHOUT STATUS MIGRAINOSUS, NOT INTRACTABLE, UNSPECIFIED MIGRAINE TYPE: ICD-10-CM

## 2025-07-14 PROCEDURE — 98006 SYNCH AUDIO-VIDEO EST MOD 30: CPT | Mod: 24 | Performed by: INTERNAL MEDICINE

## 2025-07-14 PROCEDURE — 1125F AMNT PAIN NOTED PAIN PRSNT: CPT | Performed by: INTERNAL MEDICINE

## 2025-07-14 RX ORDER — CONJUGATED ESTROGENS/BAZEDOXIFENE .45; 2 MG/1; MG/1
TABLET, FILM COATED ORAL
COMMUNITY
Start: 2025-07-10

## 2025-07-14 RX ORDER — GABAPENTIN 300 MG/1
300 CAPSULE ORAL AT BEDTIME
Qty: 90 CAPSULE | Refills: 3 | Status: SHIPPED | OUTPATIENT
Start: 2025-07-14

## 2025-07-14 RX ORDER — TRAMADOL HYDROCHLORIDE 300 MG/1
300 TABLET, EXTENDED RELEASE ORAL AT BEDTIME
Qty: 30 TABLET | Refills: 5 | Status: SHIPPED | OUTPATIENT
Start: 2025-07-14

## 2025-07-14 RX ORDER — TACROLIMUS 1 MG/G
OINTMENT TOPICAL
COMMUNITY
Start: 2025-07-09

## 2025-08-01 ENCOUNTER — TRANSFERRED RECORDS (OUTPATIENT)
Dept: HEALTH INFORMATION MANAGEMENT | Facility: CLINIC | Age: 50
End: 2025-08-01
Payer: COMMERCIAL

## 2025-08-04 ENCOUNTER — MYC MEDICAL ADVICE (OUTPATIENT)
Dept: INTERNAL MEDICINE | Facility: CLINIC | Age: 50
End: 2025-08-04
Payer: COMMERCIAL

## 2025-08-05 ENCOUNTER — VIRTUAL VISIT (OUTPATIENT)
Dept: INTERNAL MEDICINE | Facility: CLINIC | Age: 50
End: 2025-08-05
Payer: COMMERCIAL

## 2025-08-05 DIAGNOSIS — R06.00 DYSPNEA, UNSPECIFIED TYPE: Primary | ICD-10-CM

## 2025-08-05 DIAGNOSIS — Z12.31 VISIT FOR SCREENING MAMMOGRAM: ICD-10-CM

## 2025-08-05 DIAGNOSIS — M32.9 SYSTEMIC LUPUS ERYTHEMATOSUS, UNSPECIFIED SLE TYPE, UNSPECIFIED ORGAN INVOLVEMENT STATUS (H): ICD-10-CM

## 2025-08-05 DIAGNOSIS — E78.01 FAMILIAL HYPERCHOLESTEROLEMIA: ICD-10-CM

## 2025-08-05 DIAGNOSIS — G43.909 MIGRAINE WITHOUT STATUS MIGRAINOSUS, NOT INTRACTABLE, UNSPECIFIED MIGRAINE TYPE: ICD-10-CM

## 2025-08-05 PROCEDURE — 98013 SYNCH AUDIO-ONLY EST LOW 20: CPT | Performed by: INTERNAL MEDICINE

## 2025-08-05 PROCEDURE — 1125F AMNT PAIN NOTED PAIN PRSNT: CPT | Performed by: INTERNAL MEDICINE

## 2025-08-05 RX ORDER — PREDNISONE 5 MG/1
TABLET ORAL
Qty: 150 TABLET | Refills: 5 | Status: SHIPPED | OUTPATIENT
Start: 2025-08-05

## 2025-08-05 RX ORDER — ROSUVASTATIN CALCIUM 5 MG/1
5 TABLET, COATED ORAL DAILY
Qty: 90 TABLET | Refills: 3 | Status: SHIPPED | OUTPATIENT
Start: 2025-08-05

## 2025-08-05 RX ORDER — BUDESONIDE AND FORMOTEROL FUMARATE DIHYDRATE 80; 4.5 UG/1; UG/1
2 AEROSOL RESPIRATORY (INHALATION) 2 TIMES DAILY PRN
Qty: 10.2 G | Refills: 3 | Status: SHIPPED | OUTPATIENT
Start: 2025-08-05

## 2025-08-05 RX ORDER — TRAMADOL HYDROCHLORIDE 50 MG/1
50 TABLET ORAL EVERY 6 HOURS PRN
Qty: 180 TABLET | Refills: 5 | Status: SHIPPED | OUTPATIENT
Start: 2025-08-05

## 2025-08-06 ENCOUNTER — PATIENT OUTREACH (OUTPATIENT)
Dept: CARE COORDINATION | Facility: CLINIC | Age: 50
End: 2025-08-06
Payer: COMMERCIAL

## 2025-08-25 ENCOUNTER — MYC MEDICAL ADVICE (OUTPATIENT)
Dept: RHEUMATOLOGY | Facility: CLINIC | Age: 50
End: 2025-08-25
Payer: COMMERCIAL

## 2025-08-25 DIAGNOSIS — M32.9 SYSTEMIC LUPUS ERYTHEMATOSUS, UNSPECIFIED SLE TYPE, UNSPECIFIED ORGAN INVOLVEMENT STATUS (H): ICD-10-CM

## 2025-08-27 RX ORDER — PREDNISONE 1 MG/1
TABLET ORAL
Qty: 120 TABLET | Refills: 5 | Status: SHIPPED | OUTPATIENT
Start: 2025-08-27

## 2025-08-27 RX ORDER — PREDNISONE 5 MG/1
TABLET ORAL
Qty: 90 TABLET | Refills: 5 | Status: SHIPPED | OUTPATIENT
Start: 2025-08-27

## (undated) RX ORDER — KETOROLAC TROMETHAMINE 30 MG/ML
INJECTION, SOLUTION INTRAMUSCULAR; INTRAVENOUS
Status: DISPENSED
Start: 2023-05-18